# Patient Record
Sex: MALE | Race: WHITE | NOT HISPANIC OR LATINO | Employment: OTHER | URBAN - METROPOLITAN AREA
[De-identification: names, ages, dates, MRNs, and addresses within clinical notes are randomized per-mention and may not be internally consistent; named-entity substitution may affect disease eponyms.]

---

## 2017-03-15 ENCOUNTER — OFFICE VISIT (OUTPATIENT)
Dept: MEDICAL GROUP | Facility: PHYSICIAN GROUP | Age: 77
End: 2017-03-15
Payer: MEDICARE

## 2017-03-15 ENCOUNTER — HOSPITAL ENCOUNTER (OUTPATIENT)
Dept: LAB | Facility: MEDICAL CENTER | Age: 77
End: 2017-03-15
Attending: FAMILY MEDICINE
Payer: MEDICARE

## 2017-03-15 VITALS
DIASTOLIC BLOOD PRESSURE: 70 MMHG | OXYGEN SATURATION: 94 % | TEMPERATURE: 97.2 F | RESPIRATION RATE: 14 BRPM | BODY MASS INDEX: 29.63 KG/M2 | HEART RATE: 62 BPM | SYSTOLIC BLOOD PRESSURE: 136 MMHG | HEIGHT: 70 IN | WEIGHT: 207 LBS

## 2017-03-15 DIAGNOSIS — I25.9 ISCHEMIC HEART DISEASE: ICD-10-CM

## 2017-03-15 DIAGNOSIS — N40.2 NODULAR PROSTATE WITHOUT URINARY OBSTRUCTION: ICD-10-CM

## 2017-03-15 DIAGNOSIS — Z12.5 PROSTATE CANCER SCREENING: ICD-10-CM

## 2017-03-15 DIAGNOSIS — Z23 NEED FOR SHINGLES VACCINE: ICD-10-CM

## 2017-03-15 DIAGNOSIS — F41.9 ANXIETY: ICD-10-CM

## 2017-03-15 DIAGNOSIS — E55.9 VITAMIN D DEFICIENCY DISEASE: ICD-10-CM

## 2017-03-15 DIAGNOSIS — E78.2 MIXED HYPERLIPIDEMIA: ICD-10-CM

## 2017-03-15 DIAGNOSIS — R06.09 DYSPNEA ON EXERTION: ICD-10-CM

## 2017-03-15 LAB
25(OH)D3 SERPL-MCNC: 33 NG/ML (ref 30–100)
ALBUMIN SERPL BCP-MCNC: 4.3 G/DL (ref 3.2–4.9)
ALBUMIN/GLOB SERPL: 1.7 G/DL
ALP SERPL-CCNC: 63 U/L (ref 30–99)
ALT SERPL-CCNC: 33 U/L (ref 2–50)
ANION GAP SERPL CALC-SCNC: 9 MMOL/L (ref 0–11.9)
APPEARANCE UR: CLEAR
AST SERPL-CCNC: 25 U/L (ref 12–45)
BASOPHILS # BLD AUTO: 0.07 K/UL (ref 0–0.12)
BASOPHILS NFR BLD AUTO: 0.9 % (ref 0–1.8)
BILIRUB SERPL-MCNC: 0.7 MG/DL (ref 0.1–1.5)
BILIRUB UR QL STRIP.AUTO: NEGATIVE
BUN SERPL-MCNC: 14 MG/DL (ref 8–22)
CALCIUM SERPL-MCNC: 9.5 MG/DL (ref 8.5–10.5)
CHLORIDE SERPL-SCNC: 108 MMOL/L (ref 96–112)
CHOLEST SERPL-MCNC: 122 MG/DL (ref 100–199)
CO2 SERPL-SCNC: 23 MMOL/L (ref 20–33)
COLOR UR AUTO: ABNORMAL
CREAT SERPL-MCNC: 0.87 MG/DL (ref 0.5–1.4)
CULTURE IF INDICATED INDCX: NO UA CULTURE
EOSINOPHIL # BLD: 0.17 K/UL (ref 0–0.51)
EOSINOPHIL NFR BLD AUTO: 2.1 % (ref 0–6.9)
ERYTHROCYTE [DISTWIDTH] IN BLOOD BY AUTOMATED COUNT: 43.2 FL (ref 35.9–50)
GLOBULIN SER CALC-MCNC: 2.6 G/DL (ref 1.9–3.5)
GLUCOSE SERPL-MCNC: 193 MG/DL (ref 65–99)
GLUCOSE UR STRIP.AUTO-MCNC: ABNORMAL MG/DL
HCT VFR BLD AUTO: 45.5 % (ref 42–52)
HDLC SERPL-MCNC: 39 MG/DL
HGB BLD-MCNC: 14.8 G/DL (ref 14–18)
IMM GRANULOCYTES # BLD AUTO: 0.04 K/UL (ref 0–0.11)
IMM GRANULOCYTES NFR BLD AUTO: 0.5 % (ref 0–0.9)
KETONES UR STRIP.AUTO-MCNC: NEGATIVE MG/DL
LDLC SERPL CALC-MCNC: 60 MG/DL
LEUKOCYTE ESTERASE UR QL STRIP.AUTO: NEGATIVE
LYMPHOCYTES # BLD: 1.81 K/UL (ref 1–4.8)
LYMPHOCYTES NFR BLD AUTO: 22.3 % (ref 22–41)
MCH RBC QN AUTO: 30.1 PG (ref 27–33)
MCHC RBC AUTO-ENTMCNC: 32.5 G/DL (ref 33.7–35.3)
MCV RBC AUTO: 92.5 FL (ref 81.4–97.8)
MICRO URNS: ABNORMAL
MONOCYTES # BLD: 0.7 K/UL (ref 0–0.85)
MONOCYTES NFR BLD AUTO: 8.6 % (ref 0–13.4)
NEUTROPHILS # BLD: 5.31 K/UL (ref 1.82–7.42)
NEUTROPHILS NFR BLD AUTO: 65.6 % (ref 44–72)
NITRITE UR QL STRIP.AUTO: NEGATIVE
NRBC # BLD AUTO: 0 K/UL
NRBC BLD-RTO: 0 /100 WBC
PH UR: 5.5 [PH]
PLATELET # BLD AUTO: 251 K/UL (ref 164–446)
PMV BLD AUTO: 10.2 FL (ref 9–12.9)
POTASSIUM SERPL-SCNC: 4.2 MMOL/L (ref 3.6–5.5)
PROT SERPL-MCNC: 6.9 G/DL (ref 6–8.2)
PROT UR QL STRIP: NEGATIVE MG/DL
PSA SERPL DL<=0.01 NG/ML-MCNC: 4.16 NG/ML (ref 0–4)
RBC # BLD AUTO: 4.92 M/UL (ref 4.7–6.1)
RBC UR QL AUTO: NEGATIVE
SODIUM SERPL-SCNC: 140 MMOL/L (ref 135–145)
SP GR UR STRIP.AUTO: 1.02
TRIGL SERPL-MCNC: 113 MG/DL (ref 0–149)
WBC # BLD AUTO: 8.1 K/UL (ref 4.8–10.8)

## 2017-03-15 PROCEDURE — 85025 COMPLETE CBC W/AUTO DIFF WBC: CPT

## 2017-03-15 PROCEDURE — 84153 ASSAY OF PSA TOTAL: CPT | Mod: GA

## 2017-03-15 PROCEDURE — 80061 LIPID PANEL: CPT

## 2017-03-15 PROCEDURE — G8482 FLU IMMUNIZE ORDER/ADMIN: HCPCS | Performed by: FAMILY MEDICINE

## 2017-03-15 PROCEDURE — 81003 URINALYSIS AUTO W/O SCOPE: CPT

## 2017-03-15 PROCEDURE — 82306 VITAMIN D 25 HYDROXY: CPT

## 2017-03-15 PROCEDURE — 36415 COLL VENOUS BLD VENIPUNCTURE: CPT

## 2017-03-15 PROCEDURE — G8420 CALC BMI NORM PARAMETERS: HCPCS | Performed by: FAMILY MEDICINE

## 2017-03-15 PROCEDURE — 4040F PNEUMOC VAC/ADMIN/RCVD: CPT | Performed by: FAMILY MEDICINE

## 2017-03-15 PROCEDURE — 1036F TOBACCO NON-USER: CPT | Performed by: FAMILY MEDICINE

## 2017-03-15 PROCEDURE — 1101F PT FALLS ASSESS-DOCD LE1/YR: CPT | Performed by: FAMILY MEDICINE

## 2017-03-15 PROCEDURE — 80053 COMPREHEN METABOLIC PANEL: CPT

## 2017-03-15 PROCEDURE — G8599 NO ASA/ANTIPLAT THER USE RNG: HCPCS | Performed by: FAMILY MEDICINE

## 2017-03-15 PROCEDURE — G8432 DEP SCR NOT DOC, RNG: HCPCS | Performed by: FAMILY MEDICINE

## 2017-03-15 PROCEDURE — 99214 OFFICE O/P EST MOD 30 MIN: CPT | Performed by: FAMILY MEDICINE

## 2017-03-15 RX ORDER — ATORVASTATIN CALCIUM 20 MG/1
20 TABLET, FILM COATED ORAL
Qty: 90 TAB | Refills: 3 | Status: ON HOLD | OUTPATIENT
Start: 2017-03-15 | End: 2017-07-10

## 2017-03-15 ASSESSMENT — PATIENT HEALTH QUESTIONNAIRE - PHQ9: CLINICAL INTERPRETATION OF PHQ2 SCORE: 0

## 2017-03-15 NOTE — MR AVS SNAPSHOT
"Raf Pelayo   3/15/2017 8:00 AM   Office Visit   MRN: [id number]    Department:  [facility]   Dept Phone:  [phone]    Description:  Male : 1940   Provider:  Brandon Velázquez M.D.           Reason for Visit     Annual Exam annual wellness visit       Allergies as of 3/15/2017     No Known Allergies      You were diagnosed with     Ischemic heart disease   [262964]       Mixed hyperlipidemia   [272.2.ICD-9-CM]       Anxiety   [307883]       Nodular prostate without urinary obstruction   [600.10.ICD-9-CM]       Vitamin D deficiency disease   [422637]       Prostate cancer screening   [599225]       Dyspnea on exertion   [442540]         Vital Signs     Blood Pressure Pulse Temperature Respirations Height Weight    136/70 mmHg 62 36.2 °C (97.2 °F) 14 1.778 m (5' 10\") 93.895 kg (207 lb)    Body Mass Index Oxygen Saturation Smoking Status             29.70 kg/m2 94% Never Smoker          Basic Information     Date Of Birth Sex Race Ethnicity Preferred Language    1940 Male Unknown Non- English      Your appointments     2017  2:20 PM   Established Patient with Brandon Velázquez M.D.   [facility] [place] [facility]    [address] [id number] [phone]           You will be receiving a confirmation call a few days before your appointment from our automated call confirmation system.   Please bring to your appointment; a photo ID, copies of your insurance card, all medication bottles and any co-pay that you are responsible for.  Please allow 45-60 minutes to complete your scheduled appointment.            Aug 16, 2017  8:20 AM   Established Patient with Brandon Velázquez M.D.   [facility] [place] [facility])    [address] [place] [id number] [phone]           You will be receiving a confirmation call a few days before your appointment from our automated call confirmation system.              Problem List       "       ICD-10-CM Priority Class Noted - Resolved    Anxiety F41.9   7/27/2009 - Present    Mixed hyperlipidemia E78.2   7/27/2009 - Present    Nodular prostate without urinary obstruction N40.2   7/27/2009 - Present    Counseling for marital and partner problems Z63.0   8/31/2009 - Present    Vitamin D deficiency disease E55.9   12/13/2011 - Present    Ischemic heart disease I25.9   8/17/2012 - Present    History of colonic polyps Z86.010   12/29/2014 - Present    Arthritis M19.90   4/14/2016 - Present      Health Maintenance        Date Due Completion Dates    IMM ZOSTER VACCINE 8/17/2000 ---    COLON CANCER SCREENING ANNUAL FIT 4/18/2017 4/18/2016, 2/26/2013    COLONOSCOPY 12/24/2019 12/24/2014    IMM DTaP/Tdap/Td Vaccine (2 - Td) 12/9/2021 12/9/2011            Current Immunizations     13-VALENT PCV PREVNAR 2/18/2016    Influenza TIV (IM) 10/13/2011    Influenza Vaccine Pediatric 10/25/2009    Influenza Vaccine Quad Inj (Preserved) 9/20/2016, 9/29/2015    Pneumococcal polysaccharide vaccine (PPSV-23) 12/9/2011    Tdap Vaccine 12/9/2011      Below and/or attached are the medications your provider expects you to take. Review all of your home medications and newly ordered medications with your provider and/or pharmacist. Follow medication instructions as directed by your provider and/or pharmacist. Please keep your medication list with you and share with your provider. Update the information when medications are discontinued, doses are changed, or new medications (including over-the-counter products) are added; and carry medication information at all times in the event of emergency situations     Allergies:  No Known Allergies          Medications  Valid as of: March 15, 2017 -  9:07 AM    Generic Name Brand Name Tablet Size Instructions for use    Atorvastatin Calcium (Tab) LIPITOR 20 MG Take 1 Tab by mouth every bedtime.        Cholecalciferol (Tab) vitamin D3 (cholecalciferol) 1000 UNIT Take 1 Tab by mouth every  day.        Etodolac (Tab) LODINE 400 MG Take 1 Tab by mouth 2 times a day. Take with food        Glucosamine-Chondroit-Vit C-Mn (Cap) GLUCOSAMINE CHONDR 1500 COMPLX  Take 1 Cap by mouth every day.        Ketoconazole (Cream) NIZORAL 2 % Apply 1 Squirt to affected area(s) 2 times a day.        NON SPECIFIED   Apply blue emu oil to painful shoulders        Triamcinolone Acetonide (Cream) KENALOG 0.1 % Apply 1 Squirt to affected area(s) 2 times a day.        .                 Medicines prescribed today were sent to:     [id number] - [place], [place] - [address]    [address] [facility] [address]    Phone: [phone] Fax: [phone]    Open 24 Hours?: No    [facility] - [place] [address]    Phone: [phone] Fax: [phone]    Open 24 Hours?: No    [facility] - [place] [address]    Phone: [phone] Fax: [phone]    Open 24 Hours?: No      Medication refill instructions:       If your prescription bottle indicates you have medication refills left, it is not necessary to call your provider’s office. Please contact your pharmacy and they will refill your medication.    If your prescription bottle indicates you do not have any refills left, you may request refills at any time through one of the following ways: The online [employer] system (except Urgent Care), by calling your provider’s office, or by asking your pharmacy to contact your provider’s office with a refill request. Medication refills are processed only during regular business hours and may not be available until the next business day. Your provider may request additional information or to have a follow-up visit with you prior to refilling your medication.   *Please Note: Medication refills are assigned a new Rx number when refilled electronically. Your pharmacy may indicate that  no refills were authorized even though a new prescription for the same medication is available at the pharmacy. Please request the medicine by name with the pharmacy before contacting your provider for a refill.        Your To Do List     Future Labs/Procedures Complete By Expires    CBC WITH DIFFERENTIAL  As directed 3/16/2018    COMP METABOLIC PANEL  As directed 3/16/2018    LIPID PROFILE  As directed 3/16/2018    PROSTATE SPECIFIC AG SCREENING  As directed 3/16/2018    URINALYSIS,CULTURE IF INDICATED  As directed 3/16/2018      Referral     A referral request has been sent to our patient care coordination department. Please allow 3-5 business days for us to process this request and contact you either by phone or mail. If you do not hear from us by the 5th business day, please call us at [phone].           [employer] Access Code: Activation code not generated  Current [employer] Status: Active

## 2017-03-15 NOTE — PATIENT INSTRUCTIONS
Patient given written instructions regarding labs, medications, referrals, dietary and lifestyle management, and return visit.    Brandon Velázquez MD

## 2017-03-15 NOTE — PROGRESS NOTES
Patient comes in with several issues. He is due for lab work. He complains of some dyspnea on exertion although he denies chest pains. He is a nonsmoker. I would like to have a cardiologist see him because of this dyspnea, however.  The patient is worried that by alfred is becoming very expensive for him. He wonders if there is anything else we can give him. He is on the 10/20 dose of Vicodin. I think we can switch him to 20 mg atorvastatin and still get good control of his lipids. His lipids of been well controlled in the past.  He has no change in bowels. He says his urine flow is stable. He does not want a prostate exam today but is willing to get a PSA.    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    • Chronic ischemic heart disease, unspecified    • Mixed hyperlipidemia    • Anxiety         Past Surgical History   Procedure Laterality Date   • Appendectomy         No Known Allergies    Current Outpatient Prescriptions   Medication Sig Dispense Refill   • atorvastatin (LIPITOR) 20 MG Tab Take 1 Tab by mouth every bedtime. 90 Tab 3   • Glucosamine-Chondroit-Vit C-Mn (GLUCOSAMINE CHONDR 1500 COMPLX) Cap Take 1 Cap by mouth every day. 100 Cap 3   • Cholecalciferol (VITAMIN D3) 1000 UNIT TABS Take 1 Tab by mouth every day. 100 Tab 3   • NON SPECIFIED Apply blue emu oil to painful shoulders 1 Bottle 3   • etodolac (LODINE) 400 MG tablet Take 1 Tab by mouth 2 times a day. Take with food 30 Tab 3   • triamcinolone acetonide (KENALOG) 0.1 % Cream Apply 1 Squirt to affected area(s) 2 times a day. 30 g 1   • ketoconazole (NIZORAL) 2 % Cream Apply 1 Squirt to affected area(s) 2 times a day. 30 g 2     No current facility-administered medications for this visit.        Family History   Problem Relation Age of Onset   • Stroke Mother    • Heart Disease Brother        Social History     Social History   • Marital Status:      Spouse Name: N/A   •  Number of Children: N/A   • Years of Education: N/A     Occupational History   • Not on file.     Social History Main Topics   • Smoking status: Never Smoker    • Smokeless tobacco: Never Used   • Alcohol Use: 1.2 oz/week     2 Shots of liquor per week      Comment: moderate    • Drug Use: No   • Sexual Activity:     Partners: Female     Birth Control/ Protection: Post-Menopausal     Other Topics Concern   • Not on file     Social History Narrative          Physical Exam   Constitutional: He is oriented. He appears well-developed and well-nourished. No distress.   HENT:   Head: Normocephalic and atraumatic.   Right Ear: External ear normal. Ear canal and TM normal   Left Ear: External ear normal. Ear canal and TM normal   Nose: Nose normal.   Mouth/Throat: Oropharynx is clear and moist.   Eyes: Conjunctivae and extraocular motions are normal. Pupils are equal, round, and reactive to light.        Fundi benign bilaterally   Neck: No thyromegaly present.   Cardiovascular: Normal rate, regular rhythm, normal heart sounds and intact distal pulses.  Exam reveals no gallop.    No murmur heard.  Pulmonary/Chest: Effort normal and breath sounds normal. No respiratory distress. He has no wheezes. He has no rales.   Abdominal: Soft. Bowel sounds are normal. No hepatosplenomegaly. He exhibits no distension. No tenderness. He has no rebound and no guarding.   Musculoskeletal: Normal range of motion. He exhibits no edema and no tenderness.   Lymphadenopathy:     He has no cervical adenopathy.  No supraclavicular adenopathy.   Neurological: He is alert and oriented. He has normal reflexes.        Babinskis downgoing bilaterally   Skin: Skin is warm and dry. No rash noted. No erythema.   Psychiatric: He has a normal mood and appropriate affect. His behavior is normal. Judgment and thought content normal.     1. Ischemic heart disease  atorvastatin (LIPITOR) 20 MG Tab--one by mouth daily at bedtime instead of Vytorin 10/10 area  he'll finish his current prescription of that and then switch over to the atorvastatin.     COMP METABOLIC PANEL    LIPID PROFILE    REFERRAL TO CARDIOLOGY--[facility]    2. Mixed hyperlipidemia  atorvastatin (LIPITOR) 20 MG Tab--as above     CBC WITH DIFFERENTIAL    COMP METABOLIC PANEL    LIPID PROFILE    VITAMIN D 25-HYDROXY    REFERRAL TO CARDIOLOGY--[facility]    3. Anxiety   stable    4. Nodular prostate without urinary obstruction  URINALYSIS,CULTURE IF INDICATED   5. Vitamin D deficiency disease  VITAMIN D 25-HYDROXY   6. Prostate cancer screening  PROSTATE SPECIFIC AG SCREENING   7. Dyspnea on exertion  atorvastatin (LIPITOR) 20 MG Tab    REFERRAL TO CARDIOLOGY--[facility]      Recheck with me 5 months or when necessary    Please note that this dictation was created using voice recognition software. I have worked with consultants from the vendor as well as technical experts from [employer] to optimize the interface. I have made every reasonable attempt to correct obvious errors, but I expect that there are errors of grammar and possibly content that I did not discover before finalizing the note.

## 2017-03-16 DIAGNOSIS — R97.20 ELEVATED PSA: ICD-10-CM

## 2017-03-16 DIAGNOSIS — R73.9 HYPERGLYCEMIA: ICD-10-CM

## 2017-03-16 RX ORDER — CIPROFLOXACIN 500 MG/1
500 TABLET, FILM COATED ORAL 2 TIMES DAILY
Qty: 28 TAB | Refills: 0 | Status: SHIPPED | OUTPATIENT
Start: 2017-03-16 | End: 2017-06-29

## 2017-04-17 ENCOUNTER — OFFICE VISIT (OUTPATIENT)
Dept: MEDICAL GROUP | Facility: PHYSICIAN GROUP | Age: 77
End: 2017-04-17
Payer: MEDICARE

## 2017-04-17 VITALS
TEMPERATURE: 97 F | BODY MASS INDEX: 29.49 KG/M2 | RESPIRATION RATE: 14 BRPM | WEIGHT: 206 LBS | HEIGHT: 70 IN | DIASTOLIC BLOOD PRESSURE: 70 MMHG | OXYGEN SATURATION: 92 % | HEART RATE: 71 BPM | SYSTOLIC BLOOD PRESSURE: 140 MMHG

## 2017-04-17 DIAGNOSIS — R97.20 ELEVATED PSA: ICD-10-CM

## 2017-04-17 DIAGNOSIS — E11.9 DIABETES MELLITUS WITHOUT COMPLICATION (HCC): ICD-10-CM

## 2017-04-17 DIAGNOSIS — Z12.5 PROSTATE CANCER SCREENING: ICD-10-CM

## 2017-04-17 DIAGNOSIS — N40.2 NODULAR PROSTATE WITHOUT URINARY OBSTRUCTION: ICD-10-CM

## 2017-04-17 PROCEDURE — G8432 DEP SCR NOT DOC, RNG: HCPCS | Performed by: FAMILY MEDICINE

## 2017-04-17 PROCEDURE — 4040F PNEUMOC VAC/ADMIN/RCVD: CPT | Performed by: FAMILY MEDICINE

## 2017-04-17 PROCEDURE — 1036F TOBACCO NON-USER: CPT | Performed by: FAMILY MEDICINE

## 2017-04-17 PROCEDURE — 99214 OFFICE O/P EST MOD 30 MIN: CPT | Performed by: FAMILY MEDICINE

## 2017-04-17 PROCEDURE — G8599 NO ASA/ANTIPLAT THER USE RNG: HCPCS | Performed by: FAMILY MEDICINE

## 2017-04-17 PROCEDURE — G8417 CALC BMI ABV UP PARAM F/U: HCPCS | Performed by: FAMILY MEDICINE

## 2017-04-17 PROCEDURE — 1101F PT FALLS ASSESS-DOCD LE1/YR: CPT | Performed by: FAMILY MEDICINE

## 2017-04-17 NOTE — PROGRESS NOTES
Patient comes in to follow-up on his early diabetes. He's cut out sweets and sugar from his diet. He feels better. He is fasting today.  The patient also has being treated for an elevated PSA of 4.16. I gave him Cipro 500 mg by mouth twice a day. He is due for a follow-up PSA and I want to examine his prostate today.  The patient has no chest pains or shortness of breath.    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    • Chronic ischemic heart disease, unspecified    • Mixed hyperlipidemia    • Anxiety         Past Surgical History   Procedure Laterality Date   • Appendectomy         No Known Allergies    Current Outpatient Prescriptions   Medication Sig Dispense Refill   • Blood Glucose Monitoring Suppl Device Meter: Dispense Device of Insurance Preference. Sig. Use daily for blood sugar monitoring. #1. E11.9 1 Device 0   • glucose blood strip Disp blood glucose test strips to go with selected blood glucose meter--test daily E11.9 90 Strip 10   • ciprofloxacin (CIPRO) 500 MG Tab Take 1 Tab by mouth 2 times a day. 28 Tab 0   • metformin (GLUCOPHAGE) 500 MG Tab Take 1 Tab by mouth 2 times a day, with meals. 60 Tab 3   • atorvastatin (LIPITOR) 20 MG Tab Take 1 Tab by mouth every bedtime. 90 Tab 3   • Glucosamine-Chondroit-Vit C-Mn (GLUCOSAMINE CHONDR 1500 COMPLX) Cap Take 1 Cap by mouth every day. 100 Cap 3   • Cholecalciferol (VITAMIN D3) 1000 UNIT TABS Take 1 Tab by mouth every day. 100 Tab 3   • NON SPECIFIED Apply blue emu oil to painful shoulders 1 Bottle 3   • etodolac (LODINE) 400 MG tablet Take 1 Tab by mouth 2 times a day. Take with food 30 Tab 3   • triamcinolone acetonide (KENALOG) 0.1 % Cream Apply 1 Squirt to affected area(s) 2 times a day. 30 g 1   • ketoconazole (NIZORAL) 2 % Cream Apply 1 Squirt to affected area(s) 2 times a day. 30 g 2     No current facility-administered medications for this visit.        Family History    Problem Relation Age of Onset   • Stroke Mother    • Heart Disease Brother        Social History     Social History   • Marital Status:      Spouse Name: N/A   • Number of Children: N/A   • Years of Education: N/A     Occupational History   • Not on file.     Social History Main Topics   • Smoking status: Never Smoker    • Smokeless tobacco: Never Used   • Alcohol Use: 1.2 oz/week     2 Shots of liquor per week      Comment: moderate    • Drug Use: No   • Sexual Activity:     Partners: Female     Birth Control/ Protection: Post-Menopausal     Other Topics Concern   • Not on file     Social History Narrative            Physical Exam   Nursing note and vitals reviewed.  Constitutional: He is oriented. He appears well-developed and well-nourished. He appears not diaphoretic. No distress.   Head: Normocephalic and atraumatic.   Right Ear: External ear normal. Ear canal and TM normal  Left Ear: External ear normal. Ear canal and TM normal  Nose: Nose normal.   Mouth/Throat: Oropharynx is clear and moist. No oropharyngeal exudate.   Eyes: Conjunctivae and extraocular motions are normal. Pupils are equal, round, and reactive to light. Right eye exhibits no discharge. Left eye exhibits no discharge. No scleral icterus. Fundi benign bilaterally   Neck: Normal range of motion. Neck supple. No JVD present. No tracheal deviation present. No thyromegaly present.   Cardiovascular: Normal rate, regular rhythm, normal heart sounds and intact distal pulses.  Exam reveals no gallop and no friction rub.    No murmur heard.  Pulmonary/Chest: Effort normal and breath sounds normal. No stridor. No respiratory distress. He has no wheezes. He has no rales. He exhibits no tenderness.   Abdominal: Soft. Bowel sounds are normal. He exhibits no distension and no mass. No tenderness. No hepatosplenomegaly. He has no rebound and no guarding. Hernia confirmed negative in the right inguinal area and confirmed negative in the left inguinal  area.   Genitourinary: Penis normal. Rectal exam shows no external hemorrhoid, no internal hemorrhoid, no fissure, no mass, no tenderness and anal tone normal. Guaiac negative stool. Prostate is 1+ enlarged and not tender. There are no prostate masses. Right testis shows no mass, no swelling, no tenderness. Right testis is descended. Left testis shows no mass, no swelling, no tenderness. Left testis is descended. Circumcised. No phimosis, penile erythema or penile tenderness. No discharge found.   Musculoskeletal: Normal range of motion. He exhibits no edema and no tenderness.   Lymphadenopathy:     He has no cervical adenopathy.   No supraclavicular adenopathy.       Right: No inguinal adenopathy present.        Left: No inguinal adenopathy present.   Neurological: He is alert and oriented. He displays normal reflexes. No cranial nerve deficit. He exhibits normal muscle tone. Coordination normal. The patient has intact sensation to monofilament light touch over both feet. No sores or ulcers are noted over the feet.    Skin: Skin is warm and dry. No rash noted. He is not diaphoretic. No erythema. No pallor.   Psychiatric: He has a normal mood and appropriate affect. His behavior is normal. Judgment and thought content normal.     1. Nodular prostate without urinary obstruction  PROSTATE SPECIFIC AG SCREENING   2. Elevated PSA  PROSTATE SPECIFIC AG SCREENING   3. Prostate cancer screening  PROSTATE SPECIFIC AG SCREENING   4. Diabetes mellitus without complication (CMS-HCC) --needs reassessment  COMP METABOLIC PANEL    LIPID PROFILE    MICROALBUMIN CREAT RATIO URINE    HEMOGLOBIN A1C    Blood Glucose Monitoring Suppl Device--he's going to do fasting and 5 PM blood sugars     glucose blood strip    Diabetic Monofilament LE Exam     Recheck with me 2 months or when necessary    Please note that this dictation was created using voice recognition software. I have worked with consultants from the vendor as well as  technical experts from [facility] to optimize the interface. I have made every reasonable attempt to correct obvious errors, but I expect that there are errors of grammar and possibly content that I did not discover before finalizing the note.

## 2017-04-17 NOTE — PATIENT INSTRUCTIONS
Patient given written instructions regarding labs, imaging, medications, referrals, dietary and lifestyle management, and return visit.    Brandon Velázquez MD

## 2017-04-17 NOTE — MR AVS SNAPSHOT
"Raf Pelayo   2017 8:00 AM   Office Visit   MRN: [id number]    Department:  [facility]   Dept Phone:  [phone]    Description:  Male : 1940   Provider:  Brandon Velázquez M.D.           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   [600.10.ICD-9-CM]       Elevated PSA   [699623]       Prostate cancer screening   [383569]       Diabetes mellitus without complication (CMS-HCC)   [571577]         Vital Signs     Blood Pressure Pulse Temperature Respirations Height Weight    140/70 mmHg 71 36.1 °C (97 °F) 14 1.778 m (5' 10\") 93.441 kg (206 lb)    Body Mass Index Oxygen Saturation Smoking Status             29.56 kg/m2 92% Never Smoker          Basic Information     Date Of Birth Sex Race Ethnicity Preferred Language    1940 Male Unknown Non- English      Your appointments     May 10, 2017  8:00 AM   [facility] PAT[facility] Nino [facility]    [address]  [address]  [facility] [id number] [phone]            2017  8:20 AM   Established Patient with Brandon Velázquez M.D.   [facility])    [address] [facility] [address] [phone]           You will be receiving a confirmation call a few days before your appointment from our automated call confirmation system.   Please bring to your appointment; a photo ID, copies of your insurance card, all medication bottles and any co-pay that you are responsible for.  Please allow 45-60 minutes to complete your scheduled appointment.            Aug 16, 2017  8:20 AM   Established Patient with Brandon Velázquez M.D.   [facility] ([place])    [address] [facility] [address] [phone]           You will be receiving a confirmation call a few days " before your appointment from our automated call confirmation system.              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 N40.2   7/27/2009 - Present    Counseling for marital and partner problems Z63.0   8/31/2009 - Present    Vitamin D deficiency disease E55.9   12/13/2011 - Present    Ischemic heart disease I25.9   8/17/2012 - Present    History of colonic polyps Z86.010   12/29/2014 - Present    Arthritis M19.90   4/14/2016 - Present      Health Maintenance        Date Due Completion Dates    IMM ZOSTER VACCINE 8/17/2000 ---    COLON CANCER SCREENING ANNUAL FIT 4/18/2017 4/18/2016, 2/26/2013    COLONOSCOPY 12/24/2019 12/24/2014    IMM DTaP/Tdap/Td Vaccine (2 - Td) 12/9/2021 12/9/2011            Current Immunizations     13-VALENT PCV PREVNAR 2/18/2016    Influenza TIV (IM) 10/13/2011    Influenza Vaccine Pediatric 10/25/2009    Influenza Vaccine Quad Inj (Preserved) 9/20/2016, 9/29/2015    Pneumococcal polysaccharide vaccine (PPSV-23) 12/9/2011    Tdap Vaccine 12/9/2011      Below and/or attached are the medications your provider expects you to take. Review all of your home medications and newly ordered medications with your provider and/or pharmacist. Follow medication instructions as directed by your provider and/or pharmacist. Please keep your medication list with you and share with your provider. Update the information when medications are discontinued, doses are changed, or new medications (including over-the-counter products) are added; and carry medication information at all times in the event of emergency situations     Allergies:  No Known Allergies          Medications  Valid as of: April 17, 2017 - 11:41 AM    Generic Name Brand Name Tablet Size Instructions for use    Atorvastatin Calcium (Tab) LIPITOR 20 MG Take 1 Tab by mouth every  bedtime.        Blood Glucose Monitoring Suppl (Device) Blood Glucose Monitoring Suppl  Meter: Dispense Device of Insurance Preference. Sig. Use daily for blood sugar monitoring. #1. E11.9        Cholecalciferol (Tab) vitamin D3 (cholecalciferol) 1000 UNIT Take 1 Tab by mouth every day.        Ciprofloxacin HCl (Tab) CIPRO 500 MG Take 1 Tab by mouth 2 times a day.        Etodolac (Tab) LODINE 400 MG Take 1 Tab by mouth 2 times a day. Take with food        Glucosamine-Chondroit-Vit C-Mn (Cap) GLUCOSAMINE CHONDR 1500 COMPLX  Take 1 Cap by mouth every day.        Glucose Blood (Strip) glucose blood  Disp blood glucose test strips to go with selected blood glucose meter--test daily E11.9        Ketoconazole (Cream) NIZORAL 2 % Apply 1 Squirt to affected area(s) 2 times a day.        MetFORMIN HCl (Tab) GLUCOPHAGE 500 MG Take 1 Tab by mouth 2 times a day, with meals.        NON SPECIFIED   Apply blue emu oil to painful shoulders        Triamcinolone Acetonide (Cream) KENALOG 0.1 % Apply 1 Squirt to affected area(s) 2 times a day.        .                 Medicines prescribed today were sent to:     [facility][id number] - [place] - [address]    [address] [facility] [address]    Phone: [phone] Fax: [phone]    Open 24 Hours?: No    [facility] - [place] [address]    Phone: [phone] Fax: [phone]    Open 24 Hours?: No    [facility] - [place] [address]    Phone: [phone] Fax: [phone]    Open 24 Hours?: No    [id number] - [place] - [address] [address]    [address] [facility] [address]    Phone: [phone] Fax: [phone]    Open 24 Hours?: No      Medication refill instructions:       If your prescription bottle indicates you have medication refills left, it is not necessary to  call your provider’s office. Please contact your pharmacy and they will refill your medication.    If your prescription bottle indicates you do not have any refills left, you may request refills at any time through one of the following ways: The online prollie system (except Urgent Care), by calling your provider’s office, or by asking your pharmacy to contact your provider’s office with a refill request. Medication refills are processed only during regular business hours and may not be available until the next business day. Your provider may request additional information or to have a follow-up visit with you prior to refilling your medication.   *Please Note: Medication refills are assigned a new Rx number when refilled electronically. Your pharmacy may indicate that no refills were authorized even though a new prescription for the same medication is available at the pharmacy. Please request the medicine by name with the pharmacy before contacting your provider for a refill.        Your To Do List     Future Labs/Procedures Complete By Expires    COMP METABOLIC PANEL  As directed 4/18/2018    HEMOGLOBIN A1C  As directed 4/18/2018    LIPID PROFILE  As directed 4/18/2018    MICROALBUMIN CREAT RATIO URINE  As directed 4/18/2018    PROSTATE SPECIFIC AG SCREENING  As directed 4/18/2018         prollie Access Code: Activation code not generated  Current prollie Status: Active

## 2017-04-18 ENCOUNTER — HOSPITAL ENCOUNTER (OUTPATIENT)
Dept: LAB | Facility: MEDICAL CENTER | Age: 77
End: 2017-04-18
Attending: FAMILY MEDICINE
Payer: MEDICARE

## 2017-04-18 DIAGNOSIS — E11.9 DIABETES MELLITUS WITHOUT COMPLICATION (HCC): ICD-10-CM

## 2017-04-18 DIAGNOSIS — R97.20 ELEVATED PSA: ICD-10-CM

## 2017-04-18 DIAGNOSIS — Z12.5 PROSTATE CANCER SCREENING: ICD-10-CM

## 2017-04-18 DIAGNOSIS — N40.2 NODULAR PROSTATE WITHOUT URINARY OBSTRUCTION: ICD-10-CM

## 2017-04-18 LAB
ALBUMIN SERPL BCP-MCNC: 4.3 G/DL (ref 3.2–4.9)
ALBUMIN/GLOB SERPL: 1.7 G/DL
ALP SERPL-CCNC: 58 U/L (ref 30–99)
ALT SERPL-CCNC: 24 U/L (ref 2–50)
ANION GAP SERPL CALC-SCNC: 9 MMOL/L (ref 0–11.9)
AST SERPL-CCNC: 20 U/L (ref 12–45)
BILIRUB SERPL-MCNC: 0.8 MG/DL (ref 0.1–1.5)
BUN SERPL-MCNC: 14 MG/DL (ref 8–22)
CALCIUM SERPL-MCNC: 9.3 MG/DL (ref 8.5–10.5)
CHLORIDE SERPL-SCNC: 107 MMOL/L (ref 96–112)
CHOLEST SERPL-MCNC: 115 MG/DL (ref 100–199)
CO2 SERPL-SCNC: 24 MMOL/L (ref 20–33)
CREAT SERPL-MCNC: 0.79 MG/DL (ref 0.5–1.4)
CREAT UR-MCNC: 137.9 MG/DL
GFR SERPL CREATININE-BSD FRML MDRD: >60 ML/MIN/1.73 M 2
GLOBULIN SER CALC-MCNC: 2.6 G/DL (ref 1.9–3.5)
GLUCOSE SERPL-MCNC: 134 MG/DL (ref 65–99)
HDLC SERPL-MCNC: 35 MG/DL
LDLC SERPL CALC-MCNC: 61 MG/DL
MICROALBUMIN UR-MCNC: 1.6 MG/DL
MICROALBUMIN/CREAT UR: 12 MG/G (ref 0–30)
POTASSIUM SERPL-SCNC: 4 MMOL/L (ref 3.6–5.5)
PROT SERPL-MCNC: 6.9 G/DL (ref 6–8.2)
PSA SERPL-MCNC: 4.54 NG/ML (ref 0–4)
SODIUM SERPL-SCNC: 140 MMOL/L (ref 135–145)
TRIGL SERPL-MCNC: 96 MG/DL (ref 0–149)

## 2017-04-18 PROCEDURE — 80053 COMPREHEN METABOLIC PANEL: CPT

## 2017-04-18 PROCEDURE — 82570 ASSAY OF URINE CREATININE: CPT

## 2017-04-18 PROCEDURE — 80061 LIPID PANEL: CPT

## 2017-04-18 PROCEDURE — 82043 UR ALBUMIN QUANTITATIVE: CPT

## 2017-04-18 PROCEDURE — 84153 ASSAY OF PSA TOTAL: CPT | Mod: GA

## 2017-04-18 PROCEDURE — 36415 COLL VENOUS BLD VENIPUNCTURE: CPT

## 2017-05-24 ENCOUNTER — OFFICE VISIT (OUTPATIENT)
Dept: CARDIOLOGY | Facility: MEDICAL CENTER | Age: 77
End: 2017-05-24
Payer: MEDICARE

## 2017-05-24 VITALS
OXYGEN SATURATION: 98 % | SYSTOLIC BLOOD PRESSURE: 120 MMHG | WEIGHT: 196 LBS | HEART RATE: 68 BPM | DIASTOLIC BLOOD PRESSURE: 60 MMHG | BODY MASS INDEX: 28.06 KG/M2 | HEIGHT: 70 IN

## 2017-05-24 DIAGNOSIS — R06.02 SOB (SHORTNESS OF BREATH): ICD-10-CM

## 2017-05-24 DIAGNOSIS — I25.9 ISCHEMIC HEART DISEASE: ICD-10-CM

## 2017-05-24 DIAGNOSIS — F41.9 ANXIETY: ICD-10-CM

## 2017-05-24 DIAGNOSIS — E78.2 MIXED HYPERLIPIDEMIA: ICD-10-CM

## 2017-05-24 DIAGNOSIS — E11.8 CONTROLLED TYPE 2 DIABETES MELLITUS WITH COMPLICATION, WITHOUT LONG-TERM CURRENT USE OF INSULIN (HCC): ICD-10-CM

## 2017-05-24 LAB — EKG IMPRESSION: NORMAL

## 2017-05-24 PROCEDURE — G8432 DEP SCR NOT DOC, RNG: HCPCS | Performed by: INTERNAL MEDICINE

## 2017-05-24 PROCEDURE — 4040F PNEUMOC VAC/ADMIN/RCVD: CPT | Performed by: INTERNAL MEDICINE

## 2017-05-24 PROCEDURE — 1036F TOBACCO NON-USER: CPT | Performed by: INTERNAL MEDICINE

## 2017-05-24 PROCEDURE — G8417 CALC BMI ABV UP PARAM F/U: HCPCS | Performed by: INTERNAL MEDICINE

## 2017-05-24 PROCEDURE — 99204 OFFICE O/P NEW MOD 45 MIN: CPT | Performed by: INTERNAL MEDICINE

## 2017-05-24 PROCEDURE — 93000 ELECTROCARDIOGRAM COMPLETE: CPT | Performed by: INTERNAL MEDICINE

## 2017-05-24 PROCEDURE — 1101F PT FALLS ASSESS-DOCD LE1/YR: CPT | Performed by: INTERNAL MEDICINE

## 2017-05-24 PROCEDURE — G8599 NO ASA/ANTIPLAT THER USE RNG: HCPCS | Performed by: INTERNAL MEDICINE

## 2017-05-24 NOTE — Clinical Note
[facility]   [address]   [address]  [place] [address]  Phone: [phone]  Fax: [phone]              Raf Pelayo  1940    Encounter Date: 5/24/2017    Brandon Velázquez M.D.    Thank you for the referral. I had the pleasure of seeing Raf Pelayo today in cardiology clinic. I've attached my visit note below. If you have any questions please feel free to give me a call anytime.      Kevin Nino MD, PhD, formerly [facility]  Cardiology and Lipidology  [facility]                                                                    PROGRESS NOTE:  Cardiology Consult Note:    Kevin Nino  Date & Time note created:    5/24/2017   9:44 AM       Patient ID:  Name:             Raf Pelayo   YOB: 1940  Age:                 76 y.o.  male   MRN:               [id number]                                                             Chief Complaint: No chief complaint on file.            History of Present Illness:   Raf Pelayo has recent increased MESA no CP; h/o LHC about 10 yrs ago told CAD but no need for intervention at that time; DM II and HLD; FHx CVA    Review of Systems:     Constitutional: Denies fevers, Denies weight changes  Eyes: Denies changes in vision, no eye pain  Ears/Nose/Throat/Mouth: Denies nasal congestion or sore throat   Cardiovascular: Denies chest pain or palpitations   Respiratory: MESA;  shortness of breath , Denies cough  Gastrointestinal/Hepatic: Denies abdominal pain, nausea, vomiting, diarrhea, constipation or GI bleeding   Genitourinary: Denies bladder dysfunction, dysuria or frequency  Musculoskeletal/Rheum: Denies  joint pain and swelling   Skin/Breast: Denies rash, denies breast lumps or discharge  Neurological: Denies headache, confusion, memory loss or focal weakness/parasthesias  Psychiatric: denies mood  disorder   Endocrine: denies hx of diabetes or thyroid dysfunction  Heme/Oncology/Lymph Nodes: Denies enlarged lymph nodes, denies bruising or known bleeding disorder  Allergic/Immunologic: Denies hx of allergies      All other systems were reviewed and are negative (AMA/CMS criteria)    Past Medical History:   Past Medical History   Diagnosis Date   • Nodular 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 Sig. Use daily for blood sugar monitoring. #1. E11.9, Disp: 1 Device, Rfl: 0  •  glucose blood strip, Disp blood glucose test strips to go with selected blood glucose meter--test daily E11.9, Disp: 90 Strip, Rfl: 10  •  metformin (GLUCOPHAGE) 500 MG Tab, Take 1 Tab by mouth 2 times a day, with meals., Disp: 60 Tab, Rfl: 3  •  atorvastatin (LIPITOR) 20 MG Tab, Take 1 Tab by mouth every bedtime., Disp: 90 Tab, Rfl: 3  •  NON SPECIFIED, Apply blue emu oil to painful shoulders, Disp: 1 Bottle, Rfl: 3  •  etodolac (LODINE) 400 MG tablet, Take 1 Tab by mouth 2 times a day. Take with food, Disp: 30 Tab, Rfl: 3  •  Glucosamine-Chondroit-Vit C-Mn (GLUCOSAMINE CHONDR 1500 COMPLX) Cap, Take 1 Cap by mouth every day., Disp: 100 Cap, Rfl: 3  •  Cholecalciferol (VITAMIN D3) 1000 UNIT TABS, Take 1 Tab by mouth every day., Disp: 100 Tab, Rfl: 3  •  ciprofloxacin (CIPRO) 500 MG Tab, Take 1 Tab by mouth 2 times a day., Disp: 28 Tab, Rfl: 0  •  triamcinolone acetonide (KENALOG) 0.1 % Cream, Apply 1 Squirt to affected area(s) 2 times a day., Disp: 30 g, Rfl: 1  •  ketoconazole (NIZORAL) 2 % Cream, Apply 1 Squirt to affected area(s) 2 times a day., Disp: 30 g, Rfl: 2    Current Outpatient  Medications:  Current Outpatient Prescriptions   Medication Sig Dispense Refill   • Blood Glucose Monitoring Suppl Device Meter: Dispense Device of Insurance Preference. Sig. Use daily for blood sugar monitoring. #1. E11.9 1 Device 0   • glucose blood strip Disp blood glucose test strips to go with selected blood glucose meter--test daily E11.9 90 Strip 10   • metformin (GLUCOPHAGE) 500 MG Tab Take 1 Tab by mouth 2 times a day, with meals. 60 Tab 3   • atorvastatin (LIPITOR) 20 MG Tab Take 1 Tab by mouth every bedtime. 90 Tab 3   • NON SPECIFIED Apply blue emu oil to painful shoulders 1 Bottle 3   • etodolac (LODINE) 400 MG tablet Take 1 Tab by mouth 2 times a day. Take with food 30 Tab 3   • Glucosamine-Chondroit-Vit C-Mn (GLUCOSAMINE CHONDR 1500 COMPLX) Cap Take 1 Cap by mouth every day. 100 Cap 3   • Cholecalciferol (VITAMIN D3) 1000 UNIT TABS Take 1 Tab by mouth every day. 100 Tab 3   • ciprofloxacin (CIPRO) 500 MG Tab Take 1 Tab by mouth 2 times a day. 28 Tab 0   • triamcinolone acetonide (KENALOG) 0.1 % Cream Apply 1 Squirt to affected area(s) 2 times a day. 30 g 1   • ketoconazole (NIZORAL) 2 % Cream Apply 1 Squirt to affected area(s) 2 times a day. 30 g 2     No current facility-administered medications for this visit.         Medication Allergy/Sensitivities:  No Known Allergies    Family History:  Family History   Problem Relation Age of Onset   • Stroke Mother    • Heart Disease Brother        Social History:  Social History     Social History   • Marital Status:      Spouse Name: N/A   • Number of Children: N/A   • Years of Education: N/A     Occupational History   • Not on file.     Social History Main Topics   • Smoking status: Never Smoker    • Smokeless tobacco: Never Used   • Alcohol Use: 1.2 oz/week     2 Shots of liquor per week      Comment: moderate    • Drug Use: No   • Sexual Activity:     Partners: Female     Birth Control/ Protection: Post-Menopausal     Other Topics Concern   • Not  "on file     Social History Narrative         Physical Exam:  Vitals  Weight/BMI: Body mass index is 28.12 kg/(m^2).  Blood pressure 120/60, pulse 68, height 1.778 m (5' 10\"), weight 88.905 kg (196 lb), SpO2 98 %.  Filed Vitals:    05/24/17 0930   BP: 120/60   Pulse: 68   Height: 1.778 m (5' 10\")   Weight: 88.905 kg (196 lb)   SpO2: 98%     Oxygen Therapy:  Pulse Oximetry: 98 %  General Appearance:   Well developed, Well nourished, No acute distress, Non-toxic appearance.   HENT:  Normocephalic, Atraumatic, Oropharynx moist mucous membranes, Dentition: Mallampati 3 OP, Nose normal.    Eyes:  PERRLA, EOMI, Conjunctiva normal, No discharge.  Neck:  Normal range of motion, No cervical tenderness, Supple, No stridor, no JVD .  No thyromegaly.  No carotid bruit.  Cardiovascular:  Normal heart rate, Normal rhythm,  S1, S2, no S3,  S4; No gallops; No murmurs, No rubs, .   Extremitites with intact distal pulses, no cyanosis, clubbing or edema.  No heaves, thrills, HJR;  Peripheral pulses: carotid 2+, brachial 2+, radial 2+, ulnar 2+, femoral 2+, popliteal 2+, PT 2+, DP 2+;  Lungs:  Respiratory effort is normal. Normal breath sounds, breath sounds clear to auscultation bilaterally,  no rales, no rhonchi, no wheezing.   Abdomen: Bowel sounds normal, Soft, No tenderness, No guarding, No rebound, No masses, No hepatosplenomegaly.  Skin: Warm, Dry, No erythema, No rash, no induration or crepitus.  Neurologic: Alert & oriented x 3, Normal motor function, Normal sensory function, No focal deficits noted, cranial nerves II through XII are normal,  normal gait.  Psychiatric: Affect normal, Judgment normal, Mood normal.      Data Review:     Records reviewed and summarized in current documentation    Lab Data Review:  Lab Results   Component Value Date/Time    SODIUM 140 04/18/2017 08:42 AM    POTASSIUM 4.0 04/18/2017 08:42 AM    CHLORIDE 107 04/18/2017 08:42 AM    CO2 24 04/18/2017 08:42 AM    GLUCOSE 134* 04/18/2017 08:42 AM    BUN " 14 04/18/2017 08:42 AM    CREATININE 0.79 04/18/2017 08:42 AM      Lab Results   Component Value Date/Time    PT 14.2* 12/26/2006 10:34 AM    INR 1.13 12/26/2006 10:34 AM      Lab Results   Component Value Date/Time    WBC 8.1 03/15/2017 09:28 AM    RBC 4.92 03/15/2017 09:28 AM    HEMOGLOBIN 14.8 03/15/2017 09:28 AM    HEMATOCRIT 45.5 03/15/2017 09:28 AM    MCV 92.5 03/15/2017 09:28 AM    MCH 30.1 03/15/2017 09:28 AM    MCHC 32.5* 03/15/2017 09:28 AM    MPV 10.2 03/15/2017 09:28 AM    NEUTROPHILS-POLYS 65.60 03/15/2017 09:28 AM    LYMPHOCYTES 22.30 03/15/2017 09:28 AM    MONOCYTES 8.60 03/15/2017 09:28 AM    EOSINOPHILS 2.10 03/15/2017 09:28 AM    BASOPHILS 0.90 03/15/2017 09:28 AM    HYPOCHROMIA 1+ 02/19/2013 10:35 AM        Imaging/Procedures Review:    To my review shows:na    EKG To my review shows: SR 60 bpm and NSSTT abn QTc 408 msec    Assessment and Plan.   76 y.o. male has increased MESA and known CAD; Pls see orders for eval; Also discuss CV risk  Rec: continue DM control and Statin for HLD    1. SOB (shortness of breath)    - EKG  - ECHOCARDIOGRAM COMP W/O CONT; Future  - NM-CARDIAC STRESS TEST; Future    2. Ischemic heart disease    - ECHOCARDIOGRAM COMP W/O CONT; Future  - NM-CARDIAC STRESS TEST; Future    3. Mixed hyperlipidemia  Reviewed, on Statin    4. Controlled type 2 diabetes mellitus with complication, without long-term current use of insulin (CMS-[facility])  Per PCP    5. Anxiety  none      1. SOB (shortness of breath)  EKG    ECHOCARDIOGRAM COMP W/O CONT    NM-CARDIAC STRESS TEST   2. Ischemic heart disease  ECHOCARDIOGRAM COMP W/O CONT    NM-CARDIAC STRESS TEST   3. Mixed hyperlipidemia     4. Controlled type 2 diabetes mellitus with complication, without long-term current use of insulin (CMS-[facility])     5. Anxiety           Brandon Velázquez M.D.  [address] [facility]  [address]  [place] [address]  VIA In Basket

## 2017-05-24 NOTE — MR AVS SNAPSHOT
"        Raf Pelayo   2017 9:15 AM   Office Visit   MRN: [id number]    Department:  [facility]   Dept Phone:  [phone]    Description:  Male : 1940   Provider:  Kevin Nino MD,[facility]           Allergies as of 2017     No Known Allergies      You were diagnosed with     SOB (shortness of breath)   [706938]       Ischemic heart disease   [987480]       Mixed hyperlipidemia   [272.2.ICD-9-CM]       Controlled type 2 diabetes mellitus with complication, without long-term current use of insulin (CMS-[facility])   [6151365]       Anxiety   [264016]         Vital Signs     Blood Pressure Pulse Height Weight Body Mass Index Oxygen Saturation    120/60 mmHg 68 1.778 m (5' 10\") 88.905 kg (196 lb) 28.12 kg/m2 98%    Smoking Status                   Never Smoker            Basic Information     Date Of Birth Sex Race Ethnicity Preferred Language    1940 Male Unknown Non- English      Your appointments     2017  8:20 AM   Established Patient with Brandon Velázquez M.D.   [facility]    [address] [address]   [phone]           You will be receiving a confirmation call a few days before your appointment from our automated call confirmation system.   Please bring to your appointment; a photo ID, copies of your insurance card, all medication bottles and any co-pay that you are responsible for.  Please allow 45-60 minutes to complete your scheduled appointment.            2017  8:00 AM   NM CARDIAC STRESS TEST (30) with [facility] NM DSPECT 2   [facility] ([facility])    [address] [place] [address]   [phone]           NPO for 4 hours prior to scan.  No caffeine for 24 hours prior to test (decaf, coffee, cola, tea, chocolate, Excedrin, and Anacin).  No Viagra or other ED meds for 48 hours.  Warn patient of length of test and to bring list of meds.            2017  " 1:15 PM   ECHO with ECHO [facility], [facility] EXAM 12   ECHOCARDIOLOGY [facility] ([facility])    [address]  [place] [address]   [phone]           No prep            Aug 16, 2017  8:20 AM   Established Patient with Brandon Velázquez M.D.   [facility] [place] [facility]    [address]  [place] [address] [phone]           You will be receiving a confirmation call a few days before your appointment from our automated call confirmation system.              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 N40.2   7/27/2009 - Present    Counseling for marital and partner problems Z63.0   8/31/2009 - Present    Vitamin D deficiency disease E55.9   12/13/2011 - Present    Ischemic heart disease I25.9   8/17/2012 - Present    History of colonic polyps Z86.010   12/29/2014 - Present    Arthritis M19.90   4/14/2016 - Present      Health Maintenance        Date Due Completion Dates    RETINAL SCREENING 8/17/1958 ---    IMM ZOSTER VACCINE 8/17/2000 ---    COLON CANCER SCREENING ANNUAL FIT 4/18/2017 4/18/2016, 2/26/2013    A1C SCREENING 4/20/2017 10/20/2016    DIABETES MONOFILAMENT / LE EXAM 4/17/2018 4/17/2017    FASTING LIPID PROFILE 4/18/2018 4/18/2017, 3/15/2017, 10/20/2016, 1/23/2016, 1/22/2015, 2/19/2013, 8/17/2012, 12/9/2011, 9/23/2010, 3/18/2010, 8/19/2009, 10/27/2008, 5/3/2008, 11/6/2007, 3/23/2007, 12/18/2006, 11/9/2006    URINE ACR / MICROALBUMIN 4/18/2018 4/18/2017    SERUM CREATININE 4/18/2018 4/18/2017, 3/15/2017, 10/20/2016, 1/23/2016, 1/22/2015, 2/19/2013, 8/17/2012, 12/9/2011, 9/23/2010, 3/18/2010, 8/19/2009, 10/27/2008, 5/3/2008, 12/26/2006, 11/9/2006    COLONOSCOPY 12/24/2019 12/24/2014    IMM DTaP/Tdap/Td Vaccine (2 - Td) 12/9/2021 12/9/2011            Results       Current Immunizations     13-VALENT PCV PREVNAR 2/18/2016    Influenza TIV  (IM) 10/13/2011    Influenza Vaccine Pediatric 10/25/2009    Influenza Vaccine Quad Inj (Preserved) 9/20/2016, 9/29/2015    Pneumococcal polysaccharide vaccine (PPSV-23) 12/9/2011    Tdap Vaccine 12/9/2011      Below and/or attached are the medications your provider expects you to take. Review all of your home medications and newly ordered medications with your provider and/or pharmacist. Follow medication instructions as directed by your provider and/or pharmacist. Please keep your medication list with you and share with your provider. Update the information when medications are discontinued, doses are changed, or new medications (including over-the-counter products) are added; and carry medication information at all times in the event of emergency situations     Allergies:  No Known Allergies          Medications  Valid as of: May 24, 2017 -  9:59 AM    Generic Name Brand Name Tablet Size Instructions for use    Atorvastatin Calcium (Tab) LIPITOR 20 MG Take 1 Tab by mouth every bedtime.        Blood Glucose Monitoring Suppl (Device) Blood Glucose Monitoring Suppl  Meter: Dispense Device of Insurance Preference. Sig. Use daily for blood sugar monitoring. #1. E11.9        Cholecalciferol (Tab) vitamin D3 (cholecalciferol) 1000 UNIT Take 1 Tab by mouth every day.        Ciprofloxacin HCl (Tab) CIPRO 500 MG Take 1 Tab by mouth 2 times a day.        Etodolac (Tab) LODINE 400 MG Take 1 Tab by mouth 2 times a day. Take with food        Glucosamine-Chondroit-Vit C-Mn (Cap) GLUCOSAMINE CHONDR 1500 COMPLX  Take 1 Cap by mouth every day.        Glucose Blood (Strip) glucose blood  Disp blood glucose test strips to go with selected blood glucose meter--test daily E11.9        Ketoconazole (Cream) NIZORAL 2 % Apply 1 Squirt to affected area(s) 2 times a day.        MetFORMIN HCl (Tab) GLUCOPHAGE 500 MG Take 1 Tab by mouth 2 times a day, with meals.        NON SPECIFIED   Apply blue emu oil to painful shoulders         Triamcinolone Acetonide (Cream) KENALOG 0.1 % Apply 1 Squirt to affected area(s) 2 times a day.        .                 Medicines prescribed today were sent to:     [id number] - [place] - [address]    [address] [place] [address]    Phone: [phone] Fax: [phone]    Open 24 Hours?: No    [facility] - [place] [address]    Phone: [phone] Fax: [phone]    Open 24 Hours?: No    [facility] - [place] [address]    Phone: [phone] Fax: [phone]    Open 24 Hours?: No    [id number] - [place], [place] - [address] AT [facility]    [address] [facility] [place] [address]    Phone: [phone] Fax: [phone]    Open 24 Hours?: No      Medication refill instructions:       If your prescription bottle indicates you have medication refills left, it is not necessary to call your provider’s office. Please contact your pharmacy and they will refill your medication.    If your prescription bottle indicates you do not have any refills left, you may request refills at any time through one of the following ways: The online [employer] system (except Urgent Care), by calling your provider’s office, or by asking your pharmacy to contact your provider’s office with a refill request. Medication refills are processed only during regular business hours and may not be available until the next business day. Your provider may request additional information or to have a follow-up visit with you prior to refilling your medication.   *Please Note: Medication refills are assigned a new Rx number when refilled electronically. Your pharmacy may indicate that no refills were authorized even though a new prescription for the same medication is available at the pharmacy. Please request the medicine by name with the pharmacy  before contacting your provider for a refill.        Your To Do List     Future Labs/Procedures Complete By Expires    ECHOCARDIOGRAM COMP W/O CONT  As directed 5/24/2018    NM-CARDIAC STRESS TEST  As directed 11/21/2017         [facility] Access Code: Activation code not generated  Current Actual Experience Status: Active

## 2017-05-24 NOTE — PROGRESS NOTES
Cardiology Consult Note:    Kevin Nino  Date & Time note created:    5/24/2017   9:44 AM       Patient ID:  Name:             Raf Pelayo   YOB: 1940  Age:                 76 y.o.  male   MRN:               [id number]                                                             Chief Complaint: No chief complaint on file.            History of Present Illness:   Raf Pelayo has recent increased MESA no CP; h/o LHC about 10 yrs ago told CAD but no need for intervention at that time; DM II and HLD; FHx CVA    Review of Systems:     Constitutional: Denies fevers, Denies weight changes  Eyes: Denies changes in vision, no eye pain  Ears/Nose/Throat/Mouth: Denies nasal congestion or sore throat   Cardiovascular: Denies chest pain or palpitations   Respiratory: MESA;  shortness of breath , Denies cough  Gastrointestinal/Hepatic: Denies abdominal pain, nausea, vomiting, diarrhea, constipation or GI bleeding   Genitourinary: Denies bladder dysfunction, dysuria or frequency  Musculoskeletal/Rheum: Denies  joint pain and swelling   Skin/Breast: Denies rash, denies breast lumps or discharge  Neurological: Denies headache, confusion, memory loss or focal weakness/parasthesias  Psychiatric: denies mood disorder   Endocrine: denies hx of diabetes or thyroid dysfunction  Heme/Oncology/Lymph Nodes: Denies enlarged lymph nodes, denies bruising or known bleeding disorder  Allergic/Immunologic: Denies hx of allergies      All other systems were reviewed and are negative (AMA/CMS criteria)    Past Medical History:   Past Medical History   Diagnosis Date   • Nodular prostate without urinary obstruction    • Chronic ischemic heart disease, unspecified    • Mixed hyperlipidemia    • Anxiety          Past Surgical History:  Past Surgical History   Procedure Laterality Date   • Appendectomy         Hospital Medications:    Current outpatient prescriptions:   •  Blood Glucose Monitoring Suppl  Device, Meter: Dispense Device of Insurance Preference. Sig. Use daily for blood sugar monitoring. #1. E11.9, Disp: 1 Device, Rfl: 0  •  glucose blood strip, Disp blood glucose test strips to go with selected blood glucose meter--test daily E11.9, Disp: 90 Strip, Rfl: 10  •  metformin (GLUCOPHAGE) 500 MG Tab, Take 1 Tab by mouth 2 times a day, with meals., Disp: 60 Tab, Rfl: 3  •  atorvastatin (LIPITOR) 20 MG Tab, Take 1 Tab by mouth every bedtime., Disp: 90 Tab, Rfl: 3  •  NON SPECIFIED, Apply blue emu oil to painful shoulders, Disp: 1 Bottle, Rfl: 3  •  etodolac (LODINE) 400 MG tablet, Take 1 Tab by mouth 2 times a day. Take with food, Disp: 30 Tab, Rfl: 3  •  Glucosamine-Chondroit-Vit C-Mn (GLUCOSAMINE CHONDR 1500 COMPLX) Cap, Take 1 Cap by mouth every day., Disp: 100 Cap, Rfl: 3  •  Cholecalciferol (VITAMIN D3) 1000 UNIT TABS, Take 1 Tab by mouth every day., Disp: 100 Tab, Rfl: 3  •  ciprofloxacin (CIPRO) 500 MG Tab, Take 1 Tab by mouth 2 times a day., Disp: 28 Tab, Rfl: 0  •  triamcinolone acetonide (KENALOG) 0.1 % Cream, Apply 1 Squirt to affected area(s) 2 times a day., Disp: 30 g, Rfl: 1  •  ketoconazole (NIZORAL) 2 % Cream, Apply 1 Squirt to affected area(s) 2 times a day., Disp: 30 g, Rfl: 2    Current Outpatient Medications:  Current Outpatient Prescriptions   Medication Sig Dispense Refill   • Blood Glucose Monitoring Suppl Device Meter: Dispense Device of Insurance Preference. Sig. Use daily for blood sugar monitoring. #1. E11.9 1 Device 0   • glucose blood strip Disp blood glucose test strips to go with selected blood glucose meter--test daily E11.9 90 Strip 10   • metformin (GLUCOPHAGE) 500 MG Tab Take 1 Tab by mouth 2 times a day, with meals. 60 Tab 3   • atorvastatin (LIPITOR) 20 MG Tab Take 1 Tab by mouth every bedtime. 90 Tab 3   • NON SPECIFIED Apply blue emu oil to painful shoulders 1 Bottle 3   • etodolac (LODINE) 400 MG tablet Take 1 Tab by mouth 2 times a day. Take with food 30 Tab 3   •  "Glucosamine-Chondroit-Vit C-Mn (GLUCOSAMINE CHONDR 1500 COMPLX) Cap Take 1 Cap by mouth every day. 100 Cap 3   • Cholecalciferol (VITAMIN D3) 1000 UNIT TABS Take 1 Tab by mouth every day. 100 Tab 3   • ciprofloxacin (CIPRO) 500 MG Tab Take 1 Tab by mouth 2 times a day. 28 Tab 0   • triamcinolone acetonide (KENALOG) 0.1 % Cream Apply 1 Squirt to affected area(s) 2 times a day. 30 g 1   • ketoconazole (NIZORAL) 2 % Cream Apply 1 Squirt to affected area(s) 2 times a day. 30 g 2     No current facility-administered medications for this visit.         Medication Allergy/Sensitivities:  No Known Allergies    Family History:  Family History   Problem Relation Age of Onset   • Stroke Mother    • Heart Disease Brother        Social History:  Social History     Social History   • Marital Status:      Spouse Name: N/A   • Number of Children: N/A   • Years of Education: N/A     Occupational History   • Not on file.     Social History Main Topics   • Smoking status: Never Smoker    • Smokeless tobacco: Never Used   • Alcohol Use: 1.2 oz/week     2 Shots of liquor per week      Comment: moderate    • Drug Use: No   • Sexual Activity:     Partners: Female     Birth Control/ Protection: Post-Menopausal     Other Topics Concern   • Not on file     Social History Narrative         Physical Exam:  Vitals  Weight/BMI: Body mass index is 28.12 kg/(m^2).  Blood pressure 120/60, pulse 68, height 1.778 m (5' 10\"), weight 88.905 kg (196 lb), SpO2 98 %.  Filed Vitals:    05/24/17 0930   BP: 120/60   Pulse: 68   Height: 1.778 m (5' 10\")   Weight: 88.905 kg (196 lb)   SpO2: 98%     Oxygen Therapy:  Pulse Oximetry: 98 %  General Appearance:   Well developed, Well nourished, No acute distress, Non-toxic appearance.   HENT:  Normocephalic, Atraumatic, Oropharynx moist mucous membranes, Dentition: Mallampati 3 OP, Nose normal.    Eyes:  PERRLA, EOMI, Conjunctiva normal, No discharge.  Neck:  Normal range of motion, No cervical tenderness, " Supple, No stridor, no JVD .  No thyromegaly.  No carotid bruit.  Cardiovascular:  Normal heart rate, Normal rhythm,  S1, S2, no S3,  S4; No gallops; No murmurs, No rubs, .   Extremitites with intact distal pulses, no cyanosis, clubbing or edema.  No heaves, thrills, HJR;  Peripheral pulses: carotid 2+, brachial 2+, radial 2+, ulnar 2+, femoral 2+, popliteal 2+, PT 2+, DP 2+;  Lungs:  Respiratory effort is normal. Normal breath sounds, breath sounds clear to auscultation bilaterally,  no rales, no rhonchi, no wheezing.   Abdomen: Bowel sounds normal, Soft, No tenderness, No guarding, No rebound, No masses, No hepatosplenomegaly.  Skin: Warm, Dry, No erythema, No rash, no induration or crepitus.  Neurologic: Alert & oriented x 3, Normal motor function, Normal sensory function, No focal deficits noted, cranial nerves II through XII are normal,  normal gait.  Psychiatric: Affect normal, Judgment normal, Mood normal.      Data Review:     Records reviewed and summarized in current documentation    Lab Data Review:  Lab Results   Component Value Date/Time    SODIUM 140 04/18/2017 08:42 AM    POTASSIUM 4.0 04/18/2017 08:42 AM    CHLORIDE 107 04/18/2017 08:42 AM    CO2 24 04/18/2017 08:42 AM    GLUCOSE 134* 04/18/2017 08:42 AM    BUN 14 04/18/2017 08:42 AM    CREATININE 0.79 04/18/2017 08:42 AM      Lab Results   Component Value Date/Time    PT 14.2* 12/26/2006 10:34 AM    INR 1.13 12/26/2006 10:34 AM      Lab Results   Component Value Date/Time    WBC 8.1 03/15/2017 09:28 AM    RBC 4.92 03/15/2017 09:28 AM    HEMOGLOBIN 14.8 03/15/2017 09:28 AM    HEMATOCRIT 45.5 03/15/2017 09:28 AM    MCV 92.5 03/15/2017 09:28 AM    MCH 30.1 03/15/2017 09:28 AM    MCHC 32.5* 03/15/2017 09:28 AM    MPV 10.2 03/15/2017 09:28 AM    NEUTROPHILS-POLYS 65.60 03/15/2017 09:28 AM    LYMPHOCYTES 22.30 03/15/2017 09:28 AM    MONOCYTES 8.60 03/15/2017 09:28 AM    EOSINOPHILS 2.10 03/15/2017 09:28 AM    BASOPHILS 0.90 03/15/2017 09:28 AM     HYPOCHROMIA 1+ 02/19/2013 10:35 AM        Imaging/Procedures Review:    To my review shows:na    EKG To my review shows: SR 60 bpm and NSSTT abn QTc 408 msec    Assessment and Plan.   76 y.o. male has increased MESA and known CAD; Pls see orders for eval; Also discuss CV risk  Rec: continue DM control and Statin for HLD    1. SOB (shortness of breath)    - EKG  - ECHOCARDIOGRAM COMP W/O CONT; Future  - NM-CARDIAC STRESS TEST; Future    2. Ischemic heart disease    - ECHOCARDIOGRAM COMP W/O CONT; Future  - NM-CARDIAC STRESS TEST; Future    3. Mixed hyperlipidemia  Reviewed, on Statin    4. Controlled type 2 diabetes mellitus with complication, without long-term current use of insulin (CMS[facility])  Per PCP    5. Anxiety  none      1. SOB (shortness of breath)  EKG    ECHOCARDIOGRAM COMP W/O CONT    NM-CARDIAC STRESS TEST   2. Ischemic heart disease  ECHOCARDIOGRAM COMP W/O CONT    NM-CARDIAC STRESS TEST   3. Mixed hyperlipidemia     4. Controlled type 2 diabetes mellitus with complication, without long-term current use of insulin ([facility])     5. Anxiety

## 2017-05-30 RX ORDER — ETODOLAC 400 MG/1
TABLET, FILM COATED ORAL
Qty: 30 TAB | Refills: 3 | Status: SHIPPED | OUTPATIENT
Start: 2017-05-30 | End: 2017-06-29

## 2017-06-06 ENCOUNTER — HOSPITAL ENCOUNTER (OUTPATIENT)
Dept: LAB | Facility: MEDICAL CENTER | Age: 77
End: 2017-06-06
Attending: FAMILY MEDICINE
Payer: MEDICARE

## 2017-06-06 ENCOUNTER — OFFICE VISIT (OUTPATIENT)
Dept: MEDICAL GROUP | Facility: PHYSICIAN GROUP | Age: 77
End: 2017-06-06
Payer: MEDICARE

## 2017-06-06 VITALS
RESPIRATION RATE: 14 BRPM | BODY MASS INDEX: 23.48 KG/M2 | TEMPERATURE: 97.9 F | HEIGHT: 70 IN | WEIGHT: 164 LBS | DIASTOLIC BLOOD PRESSURE: 70 MMHG | HEART RATE: 68 BPM | SYSTOLIC BLOOD PRESSURE: 126 MMHG | OXYGEN SATURATION: 96 %

## 2017-06-06 DIAGNOSIS — I25.9 ISCHEMIC HEART DISEASE: ICD-10-CM

## 2017-06-06 DIAGNOSIS — G47.62 NOCTURNAL LEG CRAMPS: ICD-10-CM

## 2017-06-06 DIAGNOSIS — E78.2 MIXED HYPERLIPIDEMIA: ICD-10-CM

## 2017-06-06 DIAGNOSIS — R97.20 ELEVATED PSA: ICD-10-CM

## 2017-06-06 DIAGNOSIS — N40.2 NODULAR PROSTATE WITHOUT URINARY OBSTRUCTION: ICD-10-CM

## 2017-06-06 LAB
ANION GAP SERPL CALC-SCNC: 10 MMOL/L (ref 0–11.9)
CALCIUM SERPL-MCNC: 9.6 MG/DL (ref 8.5–10.5)
CHLORIDE SERPL-SCNC: 104 MMOL/L (ref 96–112)
CO2 SERPL-SCNC: 25 MMOL/L (ref 20–33)
POTASSIUM SERPL-SCNC: 4 MMOL/L (ref 3.6–5.5)
SODIUM SERPL-SCNC: 139 MMOL/L (ref 135–145)

## 2017-06-06 PROCEDURE — 36415 COLL VENOUS BLD VENIPUNCTURE: CPT

## 2017-06-06 PROCEDURE — G8599 NO ASA/ANTIPLAT THER USE RNG: HCPCS | Performed by: FAMILY MEDICINE

## 2017-06-06 PROCEDURE — 99214 OFFICE O/P EST MOD 30 MIN: CPT | Performed by: FAMILY MEDICINE

## 2017-06-06 PROCEDURE — G8432 DEP SCR NOT DOC, RNG: HCPCS | Performed by: FAMILY MEDICINE

## 2017-06-06 PROCEDURE — 1101F PT FALLS ASSESS-DOCD LE1/YR: CPT | Performed by: FAMILY MEDICINE

## 2017-06-06 PROCEDURE — G8420 CALC BMI NORM PARAMETERS: HCPCS | Performed by: FAMILY MEDICINE

## 2017-06-06 PROCEDURE — 4040F PNEUMOC VAC/ADMIN/RCVD: CPT | Performed by: FAMILY MEDICINE

## 2017-06-06 PROCEDURE — 80051 ELECTROLYTE PANEL: CPT

## 2017-06-06 PROCEDURE — 82310 ASSAY OF CALCIUM: CPT

## 2017-06-06 PROCEDURE — 1036F TOBACCO NON-USER: CPT | Performed by: FAMILY MEDICINE

## 2017-06-06 NOTE — MR AVS SNAPSHOT
"        Raf Morenourt   2017 8:20 AM   Office Visit   MRN: [id number]    Department:  [facility]   Dept Phone:  [phone]    Description:  Male : 1940   Provider:  Brandon Velázquez M.D.           Reason for Visit     Follow-Up           Allergies as of 2017     No Known Allergies      You were diagnosed with     Elevated PSA   [515183]       Ischemic heart disease   [005777]       Nodular prostate without urinary obstruction   [600.10.ICD-9-CM]       Mixed hyperlipidemia   [272.2.ICD-9-CM]       Nocturnal leg cramps   [853496]         Vital Signs     Blood Pressure Pulse Temperature Respirations Height Weight    126/70 mmHg 68 36.6 °C (97.9 °F) 14 1.778 m (5' 10\") 74.39 kg (164 lb)    Body Mass Index Oxygen Saturation Smoking Status             23.53 kg/m2 96% Never Smoker          Basic Information     Date Of Birth Sex Race Ethnicity Preferred Language    1940 Male Unknown Non- English      Your appointments     2017  8:00 AM   NM CARDIAC STRESS TEST (30) with [facility] NM DSPECT 2   [facility] ([facility])    [address] [place] [address] [place] [id number] [phone]           Some exams require specific prep instructions that would have been given to you at time of scheduling. If you have any additional questions about the prep instructions, please call Imaging Scheduling at [phone] and press #2.            2017  1:15 PM   ECHO with ECHO [facility], [facility] EXAM 12   ECHOCARDIOLOGY [facility] ([facility])    [address] [place] [address]   [phone]            2017  9:00 AM   FOLLOW UP with [facility] Nino MD,[facility]    [address]  [address]  [place] [address] [phone]            Aug 16, 2017  8:20 AM   Established Patient with Brandon Velázquez M.D.   [facility] ([place])    [address] [place] [address] "   [phone]           You will be receiving a confirmation call a few days before your appointment from our automated call confirmation system.            Sep 13, 2017  7:40 AM   Established Patient with Brandon Velázquez M.D.   [facility] ([place])    [address] [facility] [address]   [phone]           You will be receiving a confirmation call a few days before your appointment from our automated call confirmation system.              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 N40.2   7/27/2009 - Present    Counseling for marital and partner problems Z63.0   8/31/2009 - Present    Vitamin D deficiency disease E55.9   12/13/2011 - Present    Ischemic heart disease I25.9   8/17/2012 - Present    History of colonic polyps Z86.010   12/29/2014 - Present    Arthritis M19.90   4/14/2016 - Present      Health Maintenance        Date Due Completion Dates    RETINAL SCREENING 8/17/1958 ---    IMM ZOSTER VACCINE 8/17/2000 ---    COLON CANCER SCREENING ANNUAL FIT 4/18/2017 4/18/2016, 2/26/2013    A1C SCREENING 4/20/2017 10/20/2016    DIABETES MONOFILAMENT / LE EXAM 4/17/2018 4/17/2017    FASTING LIPID PROFILE 4/18/2018 4/18/2017, 3/15/2017, 10/20/2016, 1/23/2016, 1/22/2015, 2/19/2013, 8/17/2012, 12/9/2011, 9/23/2010, 3/18/2010, 8/19/2009, 10/27/2008, 5/3/2008, 11/6/2007, 3/23/2007, 12/18/2006, 11/9/2006    URINE ACR / MICROALBUMIN 4/18/2018 4/18/2017    SERUM CREATININE 4/18/2018 4/18/2017, 3/15/2017, 10/20/2016, 1/23/2016, 1/22/2015, 2/19/2013, 8/17/2012, 12/9/2011, 9/23/2010, 3/18/2010, 8/19/2009, 10/27/2008, 5/3/2008, 12/26/2006, 11/9/2006    COLONOSCOPY 12/24/2019 12/24/2014    IMM DTaP/Tdap/Td Vaccine (2 - Td) 12/9/2021 12/9/2011            Current Immunizations     13-VALENT PCV PREVNAR 2/18/2016    Influenza TIV (IM)  10/13/2011    Influenza Vaccine Pediatric 10/25/2009    Influenza Vaccine Quad Inj (Preserved) 9/20/2016, 9/29/2015    Pneumococcal polysaccharide vaccine (PPSV-23) 12/9/2011    Tdap Vaccine 12/9/2011      Below and/or attached are the medications your provider expects you to take. Review all of your home medications and newly ordered medications with your provider and/or pharmacist. Follow medication instructions as directed by your provider and/or pharmacist. Please keep your medication list with you and share with your provider. Update the information when medications are discontinued, doses are changed, or new medications (including over-the-counter products) are added; and carry medication information at all times in the event of emergency situations     Allergies:  No Known Allergies          Medications  Valid as of: June 06, 2017 -  9:11 AM    Generic Name Brand Name Tablet Size Instructions for use    Atorvastatin Calcium (Tab) LIPITOR 20 MG Take 1 Tab by mouth every bedtime.        Blood Glucose Monitoring Suppl (Device) Blood Glucose Monitoring Suppl  Meter: Dispense Device of Insurance Preference. Sig. Use daily for blood sugar monitoring. #1. E11.9        Cholecalciferol (Tab) vitamin D3 (cholecalciferol) 1000 UNIT Take 1 Tab by mouth every day.        Ciprofloxacin HCl (Tab) CIPRO 500 MG Take 1 Tab by mouth 2 times a day.        Etodolac (Tab) LODINE 400 MG TAKE 1 TAB BY MOUTH 2 TIMES A DAY. TAKE WITH FOOD        Glucosamine-Chondroit-Vit C-Mn (Cap) GLUCOSAMINE CHONDR 1500 COMPLX  Take 1 Cap by mouth every day.        Glucose Blood (Strip) glucose blood  Disp blood glucose test strips to go with selected blood glucose meter--test daily E11.9        Ketoconazole (Cream) NIZORAL 2 % Apply 1 Squirt to affected area(s) 2 times a day.        MetFORMIN HCl (Tab) GLUCOPHAGE 500 MG Take 1 Tab by mouth 2 times a day, with meals.        NON SPECIFIED   Apply blue emu oil to painful shoulders        Triamcinolone  Acetonide (Cream) KENALOG 0.1 % Apply 1 Squirt to affected area(s) 2 times a day.        .                 Medicines prescribed today were sent to:     [id number] - [place], [place] - [address]    [address] [facility] [place] [address]    Phone: [phone] Fax: [phone]    Open 24 Hours?: No    [facility] - [place] [address] [place] [address] [place] [address]    Phone: [phone] Fax: [phone]    Open 24 Hours?: No    [facility] - [place] [address]    Phone: [phone] Fax: [phone]    Open 24 Hours?: No    [id number] - [place], [place] - [address] [facility]    [address] [facility] [facility] [place] [address]    Phone: [phone] Fax: [phone]    Open 24 Hours?: No      Medication refill instructions:       If your prescription bottle indicates you have medication refills left, it is not necessary to call your provider’s office. Please contact your pharmacy and they will refill your medication.    If your prescription bottle indicates you do not have any refills left, you may request refills at any time through one of the following ways: The online [employer] system (except Urgent Care), by calling your provider’s office, or by asking your pharmacy to contact your provider’s office with a refill request. Medication refills are processed only during regular business hours and may not be available until the next business day. Your provider may request additional information or to have a follow-up visit with you prior to refilling your medication.   *Please Note: Medication refills are assigned a new Rx number when refilled electronically. Your pharmacy may indicate that no refills were authorized even though a new prescription for the same medication is available at the pharmacy. Please request the medicine by name with the pharmacy before  contacting your provider for a refill.        Your To Do List     Future Labs/Procedures Complete By Expires    CALCIUM (CA)  As directed 6/6/2018    ELECTROLYTES  As directed 6/7/2018         MyChart Access Code: Activation code not generated  Current [employer] Status: Active

## 2017-06-06 NOTE — PROGRESS NOTES
Patient comes in with numerous issues. He has seen the cardiologist will be having a stress test and an echocardiogram in the near future. He is not having chest pains now but he still complains of shortness of breath.  The patient has seen the urologist and has had prostate biopsies. The results are still pending.  His blood sugars are running around 100-120-- he says this is on his wife's machine because his glucose machine is broken. He is going to get a new glucose meter.  He complains of nocturnal leg cramps about 3 times a week.    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    • Chronic ischemic heart disease, unspecified    • Mixed hyperlipidemia    • Anxiety         Past Surgical History   Procedure Laterality Date   • Appendectomy         No Known Allergies    Current Outpatient Prescriptions   Medication Sig Dispense Refill   • Blood Glucose Monitoring Suppl Device Meter: Dispense Device of Insurance Preference. Sig. Use daily for blood sugar monitoring. #1. E11.9 1 Device 0   • glucose blood strip Disp blood glucose test strips to go with selected blood glucose meter--test daily E11.9 90 Strip 10   • metformin (GLUCOPHAGE) 500 MG Tab Take 1 Tab by mouth 2 times a day, with meals. 60 Tab 3   • atorvastatin (LIPITOR) 20 MG Tab Take 1 Tab by mouth every bedtime. 90 Tab 3   • Glucosamine-Chondroit-Vit C-Mn (GLUCOSAMINE CHONDR 1500 COMPLX) Cap Take 1 Cap by mouth every day. 100 Cap 3   • Cholecalciferol (VITAMIN D3) 1000 UNIT TABS Take 1 Tab by mouth every day. 100 Tab 3   • etodolac (LODINE) 400 MG tablet TAKE 1 TAB BY MOUTH 2 TIMES A DAY. TAKE WITH FOOD 30 Tab 3   • ciprofloxacin (CIPRO) 500 MG Tab Take 1 Tab by mouth 2 times a day. 28 Tab 0   • NON SPECIFIED Apply blue emu oil to painful shoulders 1 Bottle 3   • triamcinolone acetonide (KENALOG) 0.1 % Cream Apply 1 Squirt to affected area(s) 2 times a day. 30 g 1   • ketoconazole  (NIZORAL) 2 % Cream Apply 1 Squirt to affected area(s) 2 times a day. 30 g 2     No current facility-administered medications for this visit.        Family History   Problem Relation Age of Onset   • Stroke Mother    • Heart Disease Brother        Social History     Social History   • Marital Status:      Spouse Name: N/A   • Number of Children: N/A   • Years of Education: N/A     Occupational History   • Not on file.     Social History Main Topics   • Smoking status: Never Smoker    • Smokeless tobacco: Never Used   • Alcohol Use: 1.2 oz/week     2 Shots of liquor per week      Comment: moderate    • Drug Use: No   • Sexual Activity:     Partners: Female     Birth Control/ Protection: Post-Menopausal     Other Topics Concern   • Not on file     Social History Narrative          Physical Exam   Constitutional: He is oriented. He appears well-developed and well-nourished. No distress.   HENT:   Head: Normocephalic and atraumatic.   Right Ear: External ear normal. Ear canal and TM normal   Left Ear: External ear normal. Ear canal and TM normal   Nose: Nose normal.   Mouth/Throat: Oropharynx is clear and moist.   Eyes: Conjunctivae and extraocular motions are normal. Pupils are equal, round, and reactive to light.        Fundi benign bilaterally   Neck: No thyromegaly present.   Cardiovascular: Normal rate, regular rhythm, normal heart sounds and intact distal pulses.  Exam reveals no gallop.    No murmur heard.  Pulmonary/Chest: Effort normal and breath sounds normal. No respiratory distress. He has no wheezes. He has no rales.   Abdominal: Soft. Bowel sounds are normal. No hepatosplenomegaly. He exhibits no distension. No tenderness. He has no rebound and no guarding.   Musculoskeletal: Normal range of motion. He exhibits no edema and no tenderness.   Lymphadenopathy:     He has no cervical adenopathy.  No supraclavicular adenopathy.   Neurological: He is alert and oriented. He has normal reflexes.         Babinskis downgoing bilaterally   Skin: Skin is warm and dry. No rash noted. No erythema.   Psychiatric: He has a normal mood and appropriate affect. His behavior is normal. Judgment and thought content normal.     1. Elevated PSA   continue to follow-up with Dr. Tran the urologist    2. Ischemic heart disease   continue to follow up with Dr Nino cardiologist    3. Nodular prostate without urinary obstruction   continue to see Dr. Tran    4. Mixed hyperlipidemia   doing well    5. Nocturnal leg cramps  ELECTROLYTES    CALCIUM (CA)  I gave the patient the website [email or web address] and he will order some of that formula because I found that that helps patients with leg cramps.      Recheck with me when necessary above results    Please note that this dictation was created using voice recognition software. I have worked with consultants from the vendor as well as technical experts from [facility] [employer] to optimize the interface. I have made every reasonable attempt to correct obvious errors, but I expect that there are errors of grammar and possibly content that I did not discover before finalizing the note.

## 2017-06-07 ENCOUNTER — TELEPHONE (OUTPATIENT)
Dept: MEDICAL GROUP | Facility: PHYSICIAN GROUP | Age: 77
End: 2017-06-07

## 2017-06-07 NOTE — TELEPHONE ENCOUNTER
----- Message from Eric Lopes M.D. sent at 6/7/2017  7:48 AM PDT -----  Covering for Dr. Velázquez this week    Labs reviewed and electrolytes are normal. I recommend continuing to follow up with Dr. Velázquez regarding the leg cramps. Please call patient and inform them. Thank you  - Dr. Lopes

## 2017-06-23 ENCOUNTER — HOSPITAL ENCOUNTER (OUTPATIENT)
Dept: RADIOLOGY | Facility: MEDICAL CENTER | Age: 77
End: 2017-06-23
Attending: INTERNAL MEDICINE
Payer: MEDICARE

## 2017-06-23 ENCOUNTER — HOSPITAL ENCOUNTER (OUTPATIENT)
Dept: CARDIOLOGY | Facility: MEDICAL CENTER | Age: 77
End: 2017-06-23
Attending: INTERNAL MEDICINE
Payer: MEDICARE

## 2017-06-23 DIAGNOSIS — R06.02 SOB (SHORTNESS OF BREATH): ICD-10-CM

## 2017-06-23 DIAGNOSIS — I25.9 ISCHEMIC HEART DISEASE: ICD-10-CM

## 2017-06-23 LAB
LV EJECT FRACT  99904: 75
LV EJECT FRACT MOD 2C 99903: 76.03
LV EJECT FRACT MOD 4C 99902: 84.8
LV EJECT FRACT MOD BP 99901: 82.12

## 2017-06-23 PROCEDURE — A9502 TC99M TETROFOSMIN: HCPCS

## 2017-06-23 PROCEDURE — 700111 HCHG RX REV CODE 636 W/ 250 OVERRIDE (IP)

## 2017-06-23 PROCEDURE — 93306 TTE W/DOPPLER COMPLETE: CPT

## 2017-06-23 PROCEDURE — 93306 TTE W/DOPPLER COMPLETE: CPT | Mod: 26 | Performed by: INTERNAL MEDICINE

## 2017-06-23 RX ORDER — REGADENOSON 0.08 MG/ML
INJECTION, SOLUTION INTRAVENOUS
Status: COMPLETED
Start: 2017-06-23 | End: 2017-06-23

## 2017-06-23 RX ADMIN — REGADENOSON 0.4 MG: 0.08 INJECTION, SOLUTION INTRAVENOUS at 09:25

## 2017-06-23 NOTE — PROGRESS NOTES
Patient here for Nuclear Medicine treadmill.  Patient unable to complete test due to shortness of breath.  Converted to chemical stress test.      Nursing care plan includes knowledge deficit, potential for discomfort, potential for compromised cardiac output. POC includes teaching, comfort measures and reassurance, and access to code cart, cardiology stand by and availability of rapid response team. Pt verbalizes good understanding of benefits and risks of pharmacological cardiac stress test. Informed consent obtained. Lexiscan given, pt developed the following symptoms SOB. VS stable, major symptoms resolved. To waiting room, caffeinated fluids and/or snacks given, awaiting second scan. Nursing goals met.

## 2017-06-26 ENCOUNTER — OFFICE VISIT (OUTPATIENT)
Dept: CARDIOLOGY | Facility: MEDICAL CENTER | Age: 77
End: 2017-06-26
Payer: MEDICARE

## 2017-06-26 VITALS
DIASTOLIC BLOOD PRESSURE: 68 MMHG | WEIGHT: 194 LBS | OXYGEN SATURATION: 92 % | HEART RATE: 74 BPM | SYSTOLIC BLOOD PRESSURE: 128 MMHG | BODY MASS INDEX: 27.77 KG/M2 | HEIGHT: 70 IN

## 2017-06-26 DIAGNOSIS — R06.02 SOB (SHORTNESS OF BREATH): ICD-10-CM

## 2017-06-26 DIAGNOSIS — E78.2 MIXED HYPERLIPIDEMIA: ICD-10-CM

## 2017-06-26 DIAGNOSIS — I25.9 ISCHEMIC HEART DISEASE: ICD-10-CM

## 2017-06-26 DIAGNOSIS — E11.8 CONTROLLED TYPE 2 DIABETES MELLITUS WITH COMPLICATION, WITHOUT LONG-TERM CURRENT USE OF INSULIN (HCC): ICD-10-CM

## 2017-06-26 LAB — EKG IMPRESSION: NORMAL

## 2017-06-26 PROCEDURE — 99215 OFFICE O/P EST HI 40 MIN: CPT | Mod: 25 | Performed by: INTERNAL MEDICINE

## 2017-06-26 PROCEDURE — 93000 ELECTROCARDIOGRAM COMPLETE: CPT | Performed by: INTERNAL MEDICINE

## 2017-06-26 NOTE — Clinical Note
[facility]   [address]   [address]  [place] [address]  Phone: [phone]  Fax: [phone]              Raf Pelayo  1940    Encounter Date: 6/26/2017    Brandon Velázquez M.D.    Thank you for the referral. I had the pleasure of seeing Raf Pelayo today in cardiology clinic. I've attached my visit note below. If you have any questions please feel free to give me a call anytime.      Kevin Nino MD, PhD, [facility]  Cardiology and Lipidology  [facility]                                                                    PROGRESS NOTE:  Chief Complaint   Patient presents with   • Follow-Up     1 month f/u     Increased dyspnea and MESA    This patient is an established male who is here today to discuss:  Follow up cardiac w/u results; still MESA;     Patient Active Problem List    Diagnosis Date Noted   • Arthritis 04/14/2016   • History of colonic polyps 12/29/2014   • Ischemic heart disease 08/17/2012   • Vitamin D deficiency disease 12/13/2011   • Counseling for marital and partner problems 08/31/2009   • Anxiety 07/27/2009   • Mixed hyperlipidemia 07/27/2009   • Nodular prostate without urinary obstruction 07/27/2009       Past Medical History   Diagnosis Date   • Nodular prostate without urinary obstruction    • Chronic ischemic heart disease, unspecified    • Mixed hyperlipidemia    • Anxiety      Past Surgical History   Procedure Laterality Date   • Appendectomy       Social History     Social History   • Marital Status:      Spouse Name: N/A   • Number of Children: N/A   • Years of Education: N/A     Social History Main Topics   • Smoking status: Never Smoker    • Smokeless tobacco: Never Used   • Alcohol Use: 1.2 oz/week     2 Shots of liquor per week      Comment: moderate    • Drug Use: No   • Sexual Activity:     Partners: Female     Birth Control/  Protection: Post-Menopausal     Other Topics Concern   • None     Social History Narrative     Family History   Problem Relation Age of Onset   • Stroke Mother    • Heart Disease Brother        Current Outpatient Prescriptions   Medication Sig Dispense Refill   • Blood Glucose Monitoring Suppl Device Meter: Dispense Device of Insurance Preference. Sig. Use daily for blood sugar monitoring. #1. E11.9 1 Device 0   • glucose blood strip Disp blood glucose test strips to go with selected blood glucose meter--test daily E11.9 90 Strip 10   • metformin (GLUCOPHAGE) 500 MG Tab Take 1 Tab by mouth 2 times a day, with meals. 60 Tab 3   • atorvastatin (LIPITOR) 20 MG Tab Take 1 Tab by mouth every bedtime. 90 Tab 3   • Glucosamine-Chondroit-Vit C-Mn (GLUCOSAMINE CHONDR 1500 COMPLX) Cap Take 1 Cap by mouth every day. 100 Cap 3   • Cholecalciferol (VITAMIN D3) 1000 UNIT TABS Take 1 Tab by mouth every day. 100 Tab 3   • etodolac (LODINE) 400 MG tablet TAKE 1 TAB BY MOUTH 2 TIMES A DAY. TAKE WITH FOOD 30 Tab 3   • ciprofloxacin (CIPRO) 500 MG Tab Take 1 Tab by mouth 2 times a day. 28 Tab 0   • NON SPECIFIED Apply blue emu oil to painful shoulders 1 Bottle 3   • triamcinolone acetonide (KENALOG) 0.1 % Cream Apply 1 Squirt to affected area(s) 2 times a day. 30 g 1   • ketoconazole (NIZORAL) 2 % Cream Apply 1 Squirt to affected area(s) 2 times a day. 30 g 2     No current facility-administered medications for this visit.     Review of patient's allergies indicates no known allergies.    Review of Systems:     Constitutional: Denies fevers, Denies weight changes  Eyes: Denies changes in vision, no eye pain  Ears/Nose/Throat/Mouth: Denies nasal congestion or sore throat   Cardiovascular: Denies chest pain or palpitations   Respiratory: MESA:  shortness of breath , Denies cough  Gastrointestinal/Hepatic: Denies abdominal pain, nausea, vomiting, diarrhea, constipation or GI bleeding   Genitourinary: Denies bladder dysfunction, dysuria or  "frequency  Musculoskeletal/Rheum: Denies  joint pain and swelling   Skin/Breast: Denies rash, denies breast lumps or discharge  Neurological: Denies headache, confusion, memory loss or focal weakness/parasthesias  Psychiatric: denies mood disorder   Endocrine:  hx of diabetes or thyroid dysfunction  Heme/Oncology/Lymph Nodes: Denies enlarged lymph nodes, denies brusing or known bleeding disorder  Allergic/Immunologic: Denies hx of allergies      All other systems were reviewed and are negative (AMA/CMS criteria)      Blood pressure 128/68, pulse 74, height 1.778 m (5' 10\"), weight 87.998 kg (194 lb), SpO2 92 %.  General Appearance:   Well developed, Well nourished, No acute distress, Non-toxic appearance.   HENT:  Normocephalic, Atraumatic, Oropharynx moist mucous membranes, Dentition: Mallampati 3 OP, Nose normal.    Eyes:  PERRLA, EOMI, Conjunctiva normal, No discharge.  Neck:  Normal range of motion, No cervical tenderness, Supple, No stridor, no JVD .  No thyromegaly.  No carotid bruit.  Cardiovascular:  Normal heart rate, IRR rhythm,  S1, S2, no S3,  S4; No gallops; No murmurs, No rubs, .   Extremitites with intact distal pulses, no cyanosis, clubbing or edema.  No heaves, thrills, HJR;  Peripheral pulses: carotid 2+, brachial 2+, radial 2+, ulnar 2+, femoral 2+, popliteal 2+, PT 2+, DP 2+;  Lungs:  Respiratory effort is normal. Normal breath sounds, breath sounds clear to auscultation bilaterally,  no rales, no rhonchi, no wheezing.   Abdomen: Bowel sounds normal, Soft, No tenderness, No guarding, No rebound, No masses, No hepatosplenomegaly.  Skin: Warm, Dry, No erythema, No rash, no induration or crepitus.  Neurologic: Alert & oriented x 3, Normal motor function, Normal sensory function, No focal deficits noted, cranial nerves II through XII are normal,  normal gait.  Psychiatric: Affect normal, Judgment normal, Mood normal..    NUCLEAR IMAGING INTERPRETATION   Ischemic changes with Regadenoson infusion with " worsening of ST depression in      the lateral leads.    Moderate sized defect of moderately reduced counts in the inferior and    inferolateral wall which is more apparent on stress consistent with moderate    ischemia and small scar.   Normal left ventricular wall motion.  LV ejection fraction = 72%.     These findings were communicated to the ordering physician at the time of the study.    ADDENDUM   The patient attempted exercise, with a low level of exercise (  6 minutes),    ischemic EKG changes were noted in the inferior and lateral leads (3 mm flat    and downsloping ST depression). Heart rate criteria was not met, so a    pharmacologic stress test was performed.   ECG INTERPRETATION   Ischemic changes with Regadenoson infusion with worsening of ST depression in the lateral leads    Echo:   No prior study is available for comparison.   Normal left ventricular chamber size.  Mild left ventricular hypertrophy.  Left ventricular ejection fraction is visually estimated to be greater   than 75%.  Grade I diastolic dysfunction.  Aortic sclerosis without stenosis.  Trace aortic insufficiency.  Mild mitral regurgitation.  Systolic anterior motion of the anterior mitral valve leaflet.  Estimated right ventricular systolic pressure  is 30 mmHg.  Mild tricuspid regurgitation.  Normal pericardium without effusion.  Possible mild LVOT obstruction.    Assessment and Plan.   76 y.o. male has positive stress test and will order LHC; long discussion with him about further eval; Irreg heart beats; EKG ordered'    1. Ischemic heart disease  See above  - CARDIAC CATH  - EKG    2. SOB (shortness of breath)    - CARDIAC CATH  - EKG    3. Mixed hyperlipidemia  recheck    4. Controlled type 2 diabetes mellitus with complication, without long-term current use of insulin (CMS-[facility])  Per PCP      Return to clinic in  1 months    1. Ischemic heart disease  CARDIAC CATH    EKG   2. SOB (shortness of breath)  CARDIAC CATH    EKG   3.  Mixed hyperlipidemia     4. Controlled type 2 diabetes mellitus with complication, without long-term current use of insulin (CMS-[facility])               Brandon Velázquez M.D.  [address] [address]  VIA In Basket

## 2017-06-26 NOTE — PROGRESS NOTES
Chief Complaint   Patient presents with   • Follow-Up     1 month f/u     Increased dyspnea and MESA    This patient is an established male who is here today to discuss:  Follow up cardiac w/u results; still MESA;     Patient Active Problem List    Diagnosis Date Noted   • Arthritis 04/14/2016   • History of colonic polyps 12/29/2014   • Ischemic heart disease 08/17/2012   • Vitamin D deficiency disease 12/13/2011   • Counseling for marital and partner problems 08/31/2009   • Anxiety 07/27/2009   • Mixed hyperlipidemia 07/27/2009   • Nodular prostate without urinary obstruction 07/27/2009       Past Medical History   Diagnosis Date   • Nodular prostate without urinary obstruction    • Chronic ischemic heart disease, unspecified    • Mixed hyperlipidemia    • Anxiety      Past Surgical History   Procedure Laterality Date   • Appendectomy       Social History     Social History   • Marital Status:      Spouse Name: N/A   • Number of Children: N/A   • Years of Education: N/A     Social History Main Topics   • Smoking status: Never Smoker    • Smokeless tobacco: Never Used   • Alcohol Use: 1.2 oz/week     2 Shots of liquor per week      Comment: moderate    • Drug Use: No   • Sexual Activity:     Partners: Female     Birth Control/ Protection: Post-Menopausal     Other Topics Concern   • None     Social History Narrative     Family History   Problem Relation Age of Onset   • Stroke Mother    • Heart Disease Brother        Current Outpatient Prescriptions   Medication Sig Dispense Refill   • Blood Glucose Monitoring Suppl Device Meter: Dispense Device of Insurance Preference. Sig. Use daily for blood sugar monitoring. #1. E11.9 1 Device 0   • glucose blood strip Disp blood glucose test strips to go with selected blood glucose meter--test daily E11.9 90 Strip 10   • metformin (GLUCOPHAGE) 500 MG Tab Take 1 Tab by mouth 2 times a day, with meals. 60 Tab 3   • atorvastatin (LIPITOR) 20 MG Tab Take 1 Tab by mouth every  "bedtime. 90 Tab 3   • Glucosamine-Chondroit-Vit C-Mn (GLUCOSAMINE CHONDR 1500 COMPLX) Cap Take 1 Cap by mouth every day. 100 Cap 3   • Cholecalciferol (VITAMIN D3) 1000 UNIT TABS Take 1 Tab by mouth every day. 100 Tab 3   • etodolac (LODINE) 400 MG tablet TAKE 1 TAB BY MOUTH 2 TIMES A DAY. TAKE WITH FOOD 30 Tab 3   • ciprofloxacin (CIPRO) 500 MG Tab Take 1 Tab by mouth 2 times a day. 28 Tab 0   • NON SPECIFIED Apply blue emu oil to painful shoulders 1 Bottle 3   • triamcinolone acetonide (KENALOG) 0.1 % Cream Apply 1 Squirt to affected area(s) 2 times a day. 30 g 1   • ketoconazole (NIZORAL) 2 % Cream Apply 1 Squirt to affected area(s) 2 times a day. 30 g 2     No current facility-administered medications for this visit.     Review of patient's allergies indicates no known allergies.    Review of Systems:     Constitutional: Denies fevers, Denies weight changes  Eyes: Denies changes in vision, no eye pain  Ears/Nose/Throat/Mouth: Denies nasal congestion or sore throat   Cardiovascular: Denies chest pain or palpitations   Respiratory: MESA:  shortness of breath , Denies cough  Gastrointestinal/Hepatic: Denies abdominal pain, nausea, vomiting, diarrhea, constipation or GI bleeding   Genitourinary: Denies bladder dysfunction, dysuria or frequency  Musculoskeletal/Rheum: Denies  joint pain and swelling   Skin/Breast: Denies rash, denies breast lumps or discharge  Neurological: Denies headache, confusion, memory loss or focal weakness/parasthesias  Psychiatric: denies mood disorder   Endocrine:  hx of diabetes or thyroid dysfunction  Heme/Oncology/Lymph Nodes: Denies enlarged lymph nodes, denies brusing or known bleeding disorder  Allergic/Immunologic: Denies hx of allergies      All other systems were reviewed and are negative (AMA/CMS criteria)      Blood pressure 128/68, pulse 74, height 1.778 m (5' 10\"), weight 87.998 kg (194 lb), SpO2 92 %.  General Appearance:   Well developed, Well nourished, No acute distress, " Non-toxic appearance.   HENT:  Normocephalic, Atraumatic, Oropharynx moist mucous membranes, Dentition: Mallampati 3 OP, Nose normal.    Eyes:  PERRLA, EOMI, Conjunctiva normal, No discharge.  Neck:  Normal range of motion, No cervical tenderness, Supple, No stridor, no JVD .  No thyromegaly.  No carotid bruit.  Cardiovascular:  Normal heart rate, IRR rhythm,  S1, S2, no S3,  S4; No gallops; No murmurs, No rubs, .   Extremitites with intact distal pulses, no cyanosis, clubbing or edema.  No heaves, thrills, HJR;  Peripheral pulses: carotid 2+, brachial 2+, radial 2+, ulnar 2+, femoral 2+, popliteal 2+, PT 2+, DP 2+;  Lungs:  Respiratory effort is normal. Normal breath sounds, breath sounds clear to auscultation bilaterally,  no rales, no rhonchi, no wheezing.   Abdomen: Bowel sounds normal, Soft, No tenderness, No guarding, No rebound, No masses, No hepatosplenomegaly.  Skin: Warm, Dry, No erythema, No rash, no induration or crepitus.  Neurologic: Alert & oriented x 3, Normal motor function, Normal sensory function, No focal deficits noted, cranial nerves II through XII are normal,  normal gait.  Psychiatric: Affect normal, Judgment normal, Mood normal..    NUCLEAR IMAGING INTERPRETATION   Ischemic changes with Regadenoson infusion with worsening of ST depression in      the lateral leads.    Moderate sized defect of moderately reduced counts in the inferior and    inferolateral wall which is more apparent on stress consistent with moderate    ischemia and small scar.   Normal left ventricular wall motion.  LV ejection fraction = 72%.     These findings were communicated to the ordering physician at the time of the study.    ADDENDUM   The patient attempted exercise, with a low level of exercise (  6 minutes),    ischemic EKG changes were noted in the inferior and lateral leads (3 mm flat    and downsloping ST depression). Heart rate criteria was not met, so a    pharmacologic stress test was performed.   ECG  INTERPRETATION   Ischemic changes with Regadenoson infusion with worsening of ST depression in the lateral leads    Echo:   No prior study is available for comparison.   Normal left ventricular chamber size.  Mild left ventricular hypertrophy.  Left ventricular ejection fraction is visually estimated to be greater   than 75%.  Grade I diastolic dysfunction.  Aortic sclerosis without stenosis.  Trace aortic insufficiency.  Mild mitral regurgitation.  Systolic anterior motion of the anterior mitral valve leaflet.  Estimated right ventricular systolic pressure  is 30 mmHg.  Mild tricuspid regurgitation.  Normal pericardium without effusion.  Possible mild LVOT obstruction.    Assessment and Plan.   76 y.o. male has positive stress test and will order LHC; long discussion with him about further eval; Irreg heart beats; EKG ordered'    1. Ischemic heart disease  See above  - CARDIAC CATH  - EKG    2. SOB (shortness of breath)    - CARDIAC CATH  - EKG    3. Mixed hyperlipidemia  recheck    4. Controlled type 2 diabetes mellitus with complication, without long-term current use of insulin (CMS-[facility])  Per PCP      Return to clinic in  1 months    1. Ischemic heart disease  CARDIAC CATH    EKG   2. SOB (shortness of breath)  CARDIAC CATH    EKG   3. Mixed hyperlipidemia     4. Controlled type 2 diabetes mellitus with complication, without long-term current use of insulin (CMS-[facility])

## 2017-06-26 NOTE — MR AVS SNAPSHOT
"Raf Pelayo   2017 1:00 PM   Office Visit   MRN: [id number]    [facility]   Dept Phone:  [phone]    Description:  Male : 1940   Provider:  Kevin Nino MD,[facility]           Reason for Visit     Follow-Up 1 month f/u      Allergies as of 2017     No Known Allergies      You were diagnosed with     Ischemic heart disease   [861565]       SOB (shortness of breath)   [451097]       Mixed hyperlipidemia   [272.2.ICD-9-CM]       Controlled type 2 diabetes mellitus with complication, without long-term current use of insulin (CMS-[facility])   [6154552]         Vital Signs     Blood Pressure Pulse Height Weight Body Mass Index Oxygen Saturation    128/68 mmHg 74 1.778 m (5' 10\") 87.998 kg (194 lb) 27.84 kg/m2 92%    Smoking Status                   Never Smoker            Basic Information     Date Of Birth Sex Race Ethnicity Preferred Language    1940 Male Unknown Non- English      Your appointments     Aug 16, 2017  8:20 AM   Established Patient with Brandon Velázquez M.D.   [facility])    [address] [place] [address] [phone]           You will be receiving a confirmation call a few days before your appointment from our automated call confirmation system.            Sep 13, 2017  7:40 AM   Established Patient with Brandon Velázquez M.D.   [facility])    [address] [place] [address] [phone]           You will be receiving a confirmation call a few days before your appointment from our automated call confirmation system.              Problem List              ICD-10-CM Priority Class Noted - Resolved    Anxiety F41.9   2009 - Present    Mixed hyperlipidemia E78.2   2009 - Present    Nodular prostate without urinary obstruction N40.2   2009 - Present    Counseling for marital and partner problems Z63.0   2009 - Present    Vitamin D deficiency disease " E55.9   12/13/2011 - Present    Ischemic heart disease I25.9   8/17/2012 - Present    History of colonic polyps Z86.010   12/29/2014 - Present    Arthritis M19.90   4/14/2016 - Present      Health Maintenance        Date Due Completion Dates    RETINAL SCREENING 8/17/1958 ---    IMM ZOSTER VACCINE 8/17/2000 ---    COLON CANCER SCREENING ANNUAL FIT 4/18/2017 4/18/2016, 2/26/2013    A1C SCREENING 4/20/2017 10/20/2016    DIABETES MONOFILAMENT / LE EXAM 4/17/2018 4/17/2017    FASTING LIPID PROFILE 4/18/2018 4/18/2017, 3/15/2017, 10/20/2016, 1/23/2016, 1/22/2015, 2/19/2013, 8/17/2012, 12/9/2011, 9/23/2010, 3/18/2010, 8/19/2009, 10/27/2008, 5/3/2008, 11/6/2007, 3/23/2007, 12/18/2006, 11/9/2006    URINE ACR / MICROALBUMIN 4/18/2018 4/18/2017    SERUM CREATININE 4/18/2018 4/18/2017, 3/15/2017, 10/20/2016, 1/23/2016, 1/22/2015, 2/19/2013, 8/17/2012, 12/9/2011, 9/23/2010, 3/18/2010, 8/19/2009, 10/27/2008, 5/3/2008, 12/26/2006, 11/9/2006    COLONOSCOPY 12/24/2019 12/24/2014    IMM DTaP/Tdap/Td Vaccine (2 - Td) 12/9/2021 12/9/2011            Results       Current Immunizations     13-VALENT PCV PREVNAR 2/18/2016    Influenza TIV (IM) 10/13/2011    Influenza Vaccine Pediatric 10/25/2009    Influenza Vaccine Quad Inj (Preserved) 9/20/2016, 9/29/2015    Pneumococcal polysaccharide vaccine (PPSV-23) 12/9/2011    Tdap Vaccine 12/9/2011      Below and/or attached are the medications your provider expects you to take. Review all of your home medications and newly ordered medications with your provider and/or pharmacist. Follow medication instructions as directed by your provider and/or pharmacist. Please keep your medication list with you and share with your provider. Update the information when medications are discontinued, doses are changed, or new medications (including over-the-counter products) are added; and carry medication information at all times in the event of emergency situations     Allergies:  No Known Allergies             Medications  Valid as of: June 26, 2017 -  1:50 PM    Generic Name Brand Name Tablet Size Instructions for use    Atorvastatin Calcium (Tab) LIPITOR 20 MG Take 1 Tab by mouth every bedtime.        Blood Glucose Monitoring Suppl (Device) Blood Glucose Monitoring Suppl  Meter: Dispense Device of Insurance Preference. Sig. Use daily for blood sugar monitoring. #1. E11.9        Cholecalciferol (Tab) vitamin D3 (cholecalciferol) 1000 UNIT Take 1 Tab by mouth every day.        Ciprofloxacin HCl (Tab) CIPRO 500 MG Take 1 Tab by mouth 2 times a day.        Etodolac (Tab) LODINE 400 MG TAKE 1 TAB BY MOUTH 2 TIMES A DAY. TAKE WITH FOOD        Glucosamine-Chondroit-Vit C-Mn (Cap) GLUCOSAMINE CHONDR 1500 COMPLX  Take 1 Cap by mouth every day.        Glucose Blood (Strip) glucose blood  Disp blood glucose test strips to go with selected blood glucose meter--test daily E11.9        Ketoconazole (Cream) NIZORAL 2 % Apply 1 Squirt to affected area(s) 2 times a day.        MetFORMIN HCl (Tab) GLUCOPHAGE 500 MG Take 1 Tab by mouth 2 times a day, with meals.        NON SPECIFIED   Apply blue emu oil to painful shoulders        Triamcinolone Acetonide (Cream) KENALOG 0.1 % Apply 1 Squirt to affected area(s) 2 times a day.        .                 Medicines prescribed today were sent to:     [id number] - [place] - [address]    [address] [facility] [address]    Phone: [phone] Fax: [phone]    Open 24 Hours?: No    [facility] - [place] [address]    Phone: [phone] Fax: [phone]    Open 24 Hours?: No    [facility] - [place] [address]    Phone: [phone] Fax: [phone]    Open 24 Hours?: No    [id number] - [place] - [address] [address]    [address] [place] [address]    Phone:  [phone] Fax: [phone]    Open 24 Hours?: No      Medication refill instructions:       If your prescription bottle indicates you have medication refills left, it is not necessary to call your provider’s office. Please contact your pharmacy and they will refill your medication.    If your prescription bottle indicates you do not have any refills left, you may request refills at any time through one of the following ways: The online Prot[employer] system (except Urgent Care), by calling your provider’s office, or by asking your pharmacy to contact your provider’s office with a refill request. Medication refills are processed only during regular business hours and may not be available until the next business day. Your provider may request additional information or to have a follow-up visit with you prior to refilling your medication.   *Please Note: Medication refills are assigned a new Rx number when refilled electronically. Your pharmacy may indicate that no refills were authorized even though a new prescription for the same medication is available at the pharmacy. Please request the medicine by name with the pharmacy before contacting your provider for a refill.           Protagen Access Code: Activation code not generated  Current Protagen Status: Active

## 2017-06-26 NOTE — Clinical Note
[facility]   [address]   [address]  [place] [address]  Phone: [phone]  Fax: [phone]              Raf Pelayo  1940    Encounter Date: 6/26/2017    Brandon Velázquez M.D.    Thank you for the referral. I had the pleasure of seeing Raf Pelayo today in cardiology clinic. I've attached my visit note below. If you have any questions please feel free to give me a call anytime.      Kevin Nino MD, PhD, [facility]  Cardiology and Lipidology  [facility]                                                                  PROGRESS NOTE:  Chief Complaint   Patient presents with   • Follow-Up     1 month f/u     Increased dyspnea and MESA    This patient is an established male who is here today to discuss:  Follow up cardiac w/u results; still MESA;     Patient Active Problem List    Diagnosis Date Noted   • Arthritis 04/14/2016   • History of colonic polyps 12/29/2014   • Ischemic heart disease 08/17/2012   • Vitamin D deficiency disease 12/13/2011   • Counseling for marital and partner problems 08/31/2009   • Anxiety 07/27/2009   • Mixed hyperlipidemia 07/27/2009   • Nodular prostate without urinary obstruction 07/27/2009       Past Medical History   Diagnosis Date   • Nodular prostate without urinary obstruction    • Chronic ischemic heart disease, unspecified    • Mixed hyperlipidemia    • Anxiety      Past Surgical History   Procedure Laterality Date   • Appendectomy       Social History     Social History   • Marital Status:      Spouse Name: N/A   • Number of Children: N/A   • Years of Education: N/A     Social History Main Topics   • Smoking status: Never Smoker    • Smokeless tobacco: Never Used   • Alcohol Use: 1.2 oz/week     2 Shots of liquor per week      Comment: moderate    • Drug Use: No   • Sexual Activity:     Partners: Female     Birth Control/  Protection: Post-Menopausal     Other Topics Concern   • None     Social History Narrative     Family History   Problem Relation Age of Onset   • Stroke Mother    • Heart Disease Brother        Current Outpatient Prescriptions   Medication Sig Dispense Refill   • Blood Glucose Monitoring Suppl Device Meter: Dispense Device of Insurance Preference. Sig. Use daily for blood sugar monitoring. #1. E11.9 1 Device 0   • glucose blood strip Disp blood glucose test strips to go with selected blood glucose meter--test daily E11.9 90 Strip 10   • metformin (GLUCOPHAGE) 500 MG Tab Take 1 Tab by mouth 2 times a day, with meals. 60 Tab 3   • atorvastatin (LIPITOR) 20 MG Tab Take 1 Tab by mouth every bedtime. 90 Tab 3   • Glucosamine-Chondroit-Vit C-Mn (GLUCOSAMINE CHONDR 1500 COMPLX) Cap Take 1 Cap by mouth every day. 100 Cap 3   • Cholecalciferol (VITAMIN D3) 1000 UNIT TABS Take 1 Tab by mouth every day. 100 Tab 3   • etodolac (LODINE) 400 MG tablet TAKE 1 TAB BY MOUTH 2 TIMES A DAY. TAKE WITH FOOD 30 Tab 3   • ciprofloxacin (CIPRO) 500 MG Tab Take 1 Tab by mouth 2 times a day. 28 Tab 0   • NON SPECIFIED Apply blue emu oil to painful shoulders 1 Bottle 3   • triamcinolone acetonide (KENALOG) 0.1 % Cream Apply 1 Squirt to affected area(s) 2 times a day. 30 g 1   • ketoconazole (NIZORAL) 2 % Cream Apply 1 Squirt to affected area(s) 2 times a day. 30 g 2     No current facility-administered medications for this visit.     Review of patient's allergies indicates no known allergies.    Review of Systems:     Constitutional: Denies fevers, Denies weight changes  Eyes: Denies changes in vision, no eye pain  Ears/Nose/Throat/Mouth: Denies nasal congestion or sore throat   Cardiovascular: Denies chest pain or palpitations   Respiratory: MESA:  shortness of breath , Denies cough  Gastrointestinal/Hepatic: Denies abdominal pain, nausea, vomiting, diarrhea, constipation or GI bleeding   Genitourinary: Denies bladder dysfunction, dysuria or  "frequency  Musculoskeletal/Rheum: Denies  joint pain and swelling   Skin/Breast: Denies rash, denies breast lumps or discharge  Neurological: Denies headache, confusion, memory loss or focal weakness/parasthesias  Psychiatric: denies mood disorder   Endocrine:  hx of diabetes or thyroid dysfunction  Heme/Oncology/Lymph Nodes: Denies enlarged lymph nodes, denies brusing or known bleeding disorder  Allergic/Immunologic: Denies hx of allergies      All other systems were reviewed and are negative (AMA/CMS criteria)      Blood pressure 128/68, pulse 74, height 1.778 m (5' 10\"), weight 87.998 kg (194 lb), SpO2 92 %.  General Appearance:   Well developed, Well nourished, No acute distress, Non-toxic appearance.   HENT:  Normocephalic, Atraumatic, Oropharynx moist mucous membranes, Dentition: Mallampati 3 OP, Nose normal.    Eyes:  PERRLA, EOMI, Conjunctiva normal, No discharge.  Neck:  Normal range of motion, No cervical tenderness, Supple, No stridor, no JVD .  No thyromegaly.  No carotid bruit.  Cardiovascular:  Normal heart rate, IRR rhythm,  S1, S2, no S3,  S4; No gallops; No murmurs, No rubs, .   Extremitites with intact distal pulses, no cyanosis, clubbing or edema.  No heaves, thrills, HJR;  Peripheral pulses: carotid 2+, brachial 2+, radial 2+, ulnar 2+, femoral 2+, popliteal 2+, PT 2+, DP 2+;  Lungs:  Respiratory effort is normal. Normal breath sounds, breath sounds clear to auscultation bilaterally,  no rales, no rhonchi, no wheezing.   Abdomen: Bowel sounds normal, Soft, No tenderness, No guarding, No rebound, No masses, No hepatosplenomegaly.  Skin: Warm, Dry, No erythema, No rash, no induration or crepitus.  Neurologic: Alert & oriented x 3, Normal motor function, Normal sensory function, No focal deficits noted, cranial nerves II through XII are normal,  normal gait.  Psychiatric: Affect normal, Judgment normal, Mood normal..    NUCLEAR IMAGING INTERPRETATION   Ischemic changes with Regadenoson infusion with " worsening of ST depression in      the lateral leads.    Moderate sized defect of moderately reduced counts in the inferior and    inferolateral wall which is more apparent on stress consistent with moderate    ischemia and small scar.   Normal left ventricular wall motion.  LV ejection fraction = 72%.     These findings were communicated to the ordering physician at the time of the study.    ADDENDUM   The patient attempted exercise, with a low level of exercise (  6 minutes),    ischemic EKG changes were noted in the inferior and lateral leads (3 mm flat    and downsloping ST depression). Heart rate criteria was not met, so a    pharmacologic stress test was performed.   ECG INTERPRETATION   Ischemic changes with Regadenoson infusion with worsening of ST depression in the lateral leads    Echo:   No prior study is available for comparison.   Normal left ventricular chamber size.  Mild left ventricular hypertrophy.  Left ventricular ejection fraction is visually estimated to be greater   than 75%.  Grade I diastolic dysfunction.  Aortic sclerosis without stenosis.  Trace aortic insufficiency.  Mild mitral regurgitation.  Systolic anterior motion of the anterior mitral valve leaflet.  Estimated right ventricular systolic pressure  is 30 mmHg.  Mild tricuspid regurgitation.  Normal pericardium without effusion.  Possible mild LVOT obstruction.    Assessment and Plan.   76 y.o. male has positive stress test and will order LHC; long discussion with him about further eval; Irreg heart beats; EKG ordered'    1. Ischemic heart disease  See above  - CARDIAC CATH  - EKG    2. SOB (shortness of breath)    - CARDIAC CATH  - EKG    3. Mixed hyperlipidemia  recheck    4. Controlled type 2 diabetes mellitus with complication, without long-term current use of insulin (CMS-[facility])  Per PCP      Return to clinic in  1 months    1. Ischemic heart disease  CARDIAC CATH    EKG   2. SOB (shortness of breath)  CARDIAC CATH    EKG   3.  Mixed hyperlipidemia     4. Controlled type 2 diabetes mellitus with complication, without long-term current use of insulin (CMS-[facility])               Brandon Velázquez M.D.  [address] [address]  VIA In Basket

## 2017-06-27 ENCOUNTER — TELEPHONE (OUTPATIENT)
Dept: CARDIOLOGY | Facility: MEDICAL CENTER | Age: 77
End: 2017-06-27

## 2017-06-27 NOTE — TELEPHONE ENCOUNTER
Patient is scheduled on 7-3-17 for LHC with Dr. Mitchell at [facility]. Pt was told to hold metformin day of procedure and 48hrs after. Patient was told to check in at 7:00 am for a 9:00 procedure.

## 2017-06-27 NOTE — TELEPHONE ENCOUNTER
----- Message from Kevin Casiano MD,FACC sent at 2017  1:18 PM PDT -----  Regarding: Order for ANNMARIE SALAZAR    Patient Name: ANNMARIE SALAZAR([id number])  Sex: Male  : 1940      PCP: CONCHITA BRAVO    Center: [facility]     Types of orders made on 2017: cath lab    Order Date:2017  Ordering User:KEVIN CASIANO [[email or web address]]  Encounter Provider:Kevin Casiano MD,FAC [[id number]]  Authorizing Provider: Kevin Casiano MD,FACC [[id number]]  Department:HEART [facility] TOM[[id number]]    Order Specific Information  Order: CARDIAC CATH [Custom: [id number]]  Order #: [id number]  Qty: 1    Priority: Routine    Associated Diagnoses      I25.9 Ischemic heart disease      R06.02 SOB (shortness of breath)        Priority: Routine  Class: Normal      Scheduling Instructions:    Angiogram: Cath Left    Associated Diagnoses      I25.9 Ischemic heart disease      R06.02 SOB (shortness of breath)

## 2017-06-27 NOTE — TELEPHONE ENCOUNTER
----- Message from Kevin Casiano MD,FACC sent at 2017  1:18 PM PDT -----  Regarding: Order for ANNMARIE SALAZAR    Patient Name: ANNMARIE SALAZAR([id number])  Sex: Male  : 1940      PCP: CONCHITA BRAVO    Center: [facility]     Types of orders made on 2017: cath lab    Order Date:2017  Ordering User:KEVIN CASIANO [[email or web address]]  Encounter Provider:Kevin Casiano MD,FAC [[id number]]  Authorizing Provider: Kevin Casiano MD,FACC [[id number]]  Department:HEART [facility] TOM[[id number]]    Order Specific Information  Order: CARDIAC CATH [Custom: [id number]]  Order #: [id number]  Qty: 1    Priority: Routine    Associated Diagnoses      I25.9 Ischemic heart disease      R06.02 SOB (shortness of breath)        Priority: Routine  Class: Normal      Scheduling Instructions:    Angiogram: Cath Left    Associated Diagnoses      I25.9 Ischemic heart disease      R06.02 SOB (shortness of breath)

## 2017-06-29 DIAGNOSIS — Z01.810 PRE-OPERATIVE CARDIOVASCULAR EXAMINATION: ICD-10-CM

## 2017-06-29 DIAGNOSIS — Z01.812 PRE-OPERATIVE LABORATORY EXAMINATION: ICD-10-CM

## 2017-06-29 LAB
ANION GAP SERPL CALC-SCNC: 9 MMOL/L (ref 0–11.9)
BUN SERPL-MCNC: 15 MG/DL (ref 8–22)
CALCIUM SERPL-MCNC: 9.5 MG/DL (ref 8.5–10.5)
CHLORIDE SERPL-SCNC: 105 MMOL/L (ref 96–112)
CO2 SERPL-SCNC: 25 MMOL/L (ref 20–33)
CREAT SERPL-MCNC: 0.88 MG/DL (ref 0.5–1.4)
EKG IMPRESSION: NORMAL
ERYTHROCYTE [DISTWIDTH] IN BLOOD BY AUTOMATED COUNT: 41.1 FL (ref 35.9–50)
GFR SERPL CREATININE-BSD FRML MDRD: >60 ML/MIN/1.73 M 2
GLUCOSE SERPL-MCNC: 110 MG/DL (ref 65–99)
HCT VFR BLD AUTO: 44.6 % (ref 42–52)
HGB BLD-MCNC: 14.6 G/DL (ref 14–18)
INR PPP: 1.07 (ref 0.87–1.13)
MCH RBC QN AUTO: 30 PG (ref 27–33)
MCHC RBC AUTO-ENTMCNC: 32.7 G/DL (ref 33.7–35.3)
MCV RBC AUTO: 91.6 FL (ref 81.4–97.8)
PLATELET # BLD AUTO: 239 K/UL (ref 164–446)
PMV BLD AUTO: 10.3 FL (ref 9–12.9)
POTASSIUM SERPL-SCNC: 4 MMOL/L (ref 3.6–5.5)
PROTHROMBIN TIME: 14.2 SEC (ref 12–14.6)
RBC # BLD AUTO: 4.87 M/UL (ref 4.7–6.1)
SODIUM SERPL-SCNC: 139 MMOL/L (ref 135–145)
WBC # BLD AUTO: 7.8 K/UL (ref 4.8–10.8)

## 2017-06-29 PROCEDURE — 85027 COMPLETE CBC AUTOMATED: CPT

## 2017-06-29 PROCEDURE — 85610 PROTHROMBIN TIME: CPT

## 2017-06-29 PROCEDURE — 36415 COLL VENOUS BLD VENIPUNCTURE: CPT

## 2017-06-29 PROCEDURE — 93010 ELECTROCARDIOGRAM REPORT: CPT | Performed by: INTERNAL MEDICINE

## 2017-06-29 PROCEDURE — 93005 ELECTROCARDIOGRAM TRACING: CPT

## 2017-06-29 PROCEDURE — 80048 BASIC METABOLIC PNL TOTAL CA: CPT

## 2017-06-29 RX ORDER — M-VIT,TX,IRON,MINS/CALC/FOLIC 27MG-0.4MG
1 TABLET ORAL DAILY
COMMUNITY

## 2017-06-29 RX ORDER — ASPIRIN 81 MG/1
81 TABLET, CHEWABLE ORAL DAILY
COMMUNITY

## 2017-07-03 ENCOUNTER — HOSPITAL ENCOUNTER (OUTPATIENT)
Facility: MEDICAL CENTER | Age: 77
End: 2017-07-03
Attending: INTERNAL MEDICINE | Admitting: INTERNAL MEDICINE
Payer: MEDICARE

## 2017-07-03 VITALS
DIASTOLIC BLOOD PRESSURE: 79 MMHG | SYSTOLIC BLOOD PRESSURE: 136 MMHG | TEMPERATURE: 97.5 F | BODY MASS INDEX: 28.22 KG/M2 | OXYGEN SATURATION: 97 % | HEIGHT: 70 IN | WEIGHT: 197.09 LBS | HEART RATE: 49 BPM | RESPIRATION RATE: 12 BRPM

## 2017-07-03 PROBLEM — I25.9 CHRONIC ISCHEMIC HEART DISEASE: Status: ACTIVE | Noted: 2017-07-03

## 2017-07-03 PROCEDURE — C1769 GUIDE WIRE: HCPCS

## 2017-07-03 PROCEDURE — 93458 L HRT ARTERY/VENTRICLE ANGIO: CPT

## 2017-07-03 PROCEDURE — 360979 HCHG DIAGNOSTIC CATH

## 2017-07-03 PROCEDURE — C1894 INTRO/SHEATH, NON-LASER: HCPCS

## 2017-07-03 PROCEDURE — 99153 MOD SED SAME PHYS/QHP EA: CPT

## 2017-07-03 PROCEDURE — 99152 MOD SED SAME PHYS/QHP 5/>YRS: CPT

## 2017-07-03 PROCEDURE — 700101 HCHG RX REV CODE 250

## 2017-07-03 PROCEDURE — 93880 EXTRACRANIAL BILAT STUDY: CPT

## 2017-07-03 PROCEDURE — 700111 HCHG RX REV CODE 636 W/ 250 OVERRIDE (IP)

## 2017-07-03 RX ORDER — LIDOCAINE HYDROCHLORIDE 20 MG/ML
INJECTION, SOLUTION INFILTRATION; PERINEURAL
Status: COMPLETED
Start: 2017-07-03 | End: 2017-07-03

## 2017-07-03 RX ORDER — SODIUM CHLORIDE 9 MG/ML
INJECTION, SOLUTION INTRAVENOUS CONTINUOUS
Status: DISCONTINUED | OUTPATIENT
Start: 2017-07-03 | End: 2017-07-03 | Stop reason: HOSPADM

## 2017-07-03 RX ORDER — MIDAZOLAM HYDROCHLORIDE 1 MG/ML
INJECTION INTRAMUSCULAR; INTRAVENOUS
Status: COMPLETED
Start: 2017-07-03 | End: 2017-07-03

## 2017-07-03 RX ORDER — HEPARIN SODIUM,PORCINE 1000/ML
VIAL (ML) INJECTION
Status: COMPLETED
Start: 2017-07-03 | End: 2017-07-03

## 2017-07-03 RX ORDER — SODIUM CHLORIDE 9 MG/ML
INJECTION, SOLUTION INTRAVENOUS
Status: DISCONTINUED | OUTPATIENT
Start: 2017-07-03 | End: 2017-07-03 | Stop reason: HOSPADM

## 2017-07-03 RX ORDER — VERAPAMIL HYDROCHLORIDE 2.5 MG/ML
INJECTION, SOLUTION INTRAVENOUS
Status: COMPLETED
Start: 2017-07-03 | End: 2017-07-03

## 2017-07-03 RX ADMIN — LIDOCAINE HYDROCHLORIDE: 20 INJECTION, SOLUTION INFILTRATION; PERINEURAL at 08:31

## 2017-07-03 RX ADMIN — MIDAZOLAM 2 MG: 1 INJECTION INTRAMUSCULAR; INTRAVENOUS at 08:32

## 2017-07-03 RX ADMIN — HEPARIN SODIUM: 1000 INJECTION, SOLUTION INTRAVENOUS; SUBCUTANEOUS at 08:31

## 2017-07-03 RX ADMIN — FENTANYL CITRATE 100 MCG: 50 INJECTION, SOLUTION INTRAMUSCULAR; INTRAVENOUS at 08:40

## 2017-07-03 RX ADMIN — NITROGLYCERIN 10 ML: 20 INJECTION INTRAVENOUS at 08:31

## 2017-07-03 RX ADMIN — HEPARIN SODIUM 2000 UNITS: 200 INJECTION, SOLUTION INTRAVENOUS at 08:31

## 2017-07-03 RX ADMIN — VERAPAMIL HYDROCHLORIDE 2.5 MG: 2.5 INJECTION, SOLUTION INTRAVENOUS at 08:31

## 2017-07-03 ASSESSMENT — PAIN SCALES - GENERAL
PAINLEVEL_OUTOF10: 0

## 2017-07-03 NOTE — IP AVS SNAPSHOT
7/3/2017    Raf Pelayo  [address] [facility] [address]    Dear Raf:    [facility] wants to ensure your discharge home is safe and you or your loved ones have had all of your questions answered regarding your care after you leave the hospital.    Below is a list of resources and contact information should you have any questions regarding your hospital stay, follow-up instructions, or active medical symptoms.    Questions or Concerns Regarding… Contact   Medical Questions Related to Your Discharge  (7 days a week, 8am-5pm) Contact a Nurse Care Coordinator   [phone]   Medical Questions Not Related to Your Discharge  (24 hours a day / 7 days a week)  Contact the Nurse Health Line   [phone]    Medications or Discharge Instructions Refer to your discharge packet   or contact your [facility] Primary Care Provider   [phone]   Follow-up Appointment(s) Schedule your appointment via Lab21   or contact Scheduling [phone]   Billing Review your statement via Lab21  or contact Billing [phone]   Medical Records Review your records via Lab21   or contact Medical Records [phone]     You may receive a telephone call within two days of discharge. This call is to make certain you understand your discharge instructions and have the opportunity to have any questions answered. You can also easily access your medical information, test results and upcoming appointments via the Lab21 free online health management tool. You can learn more and sign up at uTaP/Lab21. For assistance setting up your Lab21 account, please call [phone].    Once again, we want to ensure your discharge home is safe and that you have a clear understanding of any next steps in your care. If you have any questions or concerns, please do not hesitate to contact us, we are here for you. Thank you for choosing [facility] for your healthcare needs.    Sincerely,    Your [facility] Healthcare Team

## 2017-07-03 NOTE — DISCHARGE INSTRUCTIONS
ACTIVITY: Rest and take it easy for the first 24 hours.  A responsible adult is recommended to remain with you during that time.  It is normal to feel sleepy.  We encourage you to not do anything that requires balance, judgment or coordination.    MILD FLU-LIKE SYMPTOMS ARE NORMAL. YOU MAY EXPERIENCE GENERALIZED MUSCLE ACHES, THROAT IRRITATION, HEADACHE AND/OR SOME NAUSEA.    FOR 24 HOURS DO NOT:  Drive, operate machinery or run household appliances.  Drink beer or alcoholic beverages.   Make important decisions or sign legal documents.      DIET: To avoid nausea, slowly advance diet as tolerated, avoiding spicy or greasy foods for the first day.  Add more substantial food to your diet according to your physician's instructions.  Babies can be fed formula or breast milk as soon as they are hungry.  INCREASE FLUIDS AND FIBER TO AVOID CONSTIPATION.    SURGICAL DRESSING/BATHING:     Keep the dressing clean and dry for 24 hours.  May remove dressing tomorrow at 10 am and shower.  Do not submerge for 1 week.  No heavy lifting.      FOLLOW-UP APPOINTMENT:    A follow-up appointment should be arranged with your doctor ; call to schedule.  Appointment with [facility] with Dr. Gaffney on Wednesday 7-5-2017  at 1 pm. TEL # [phone].    You should CALL YOUR PHYSICIAN if you develop:  Fever greater than 101 degrees F.  Pain not relieved by medication, or persistent nausea or vomiting.  Excessive bleeding (blood soaking through dressing) or unexpected drainage from the wound.  Extreme redness or swelling around the incision site, drainage of pus or foul smelling drainage.  Inability to urinate or empty your bladder within 8 hours.  Problems with breathing or chest pain.    You should call 911 if you develop problems with breathing or chest pain.  If you are unable to contact your doctor or surgical center, you should go to the nearest emergency room or urgent care center.      Physician's telephone #:  [phone]    If any questions arise, call your doctor.  If your doctor is not available, please feel free to call the Surgical Center at [phone].  The Center is open Monday through Friday from 7AM to 7PM.  You can also call the HEALTH HOTLINE open 24 hours/day, 7 days/week and speak to a nurse at [phone], or toll free at [phone].    A registered nurse may call you a few days after your surgery to see how you are doing after your procedure.    MEDICATIONS: Resume taking daily medication.  Take prescribed pain medication with food.  If no medication is prescribed, you may take non-aspirin pain medication if needed.  PAIN MEDICATION CAN BE VERY CONSTIPATING.  Take a stool softener or laxative such as senokot, pericolace, or milk of magnesia if needed.      If your physician has prescribed pain medication that includes Acetaminophen (Tylenol), do not take additional Acetaminophen (Tylenol) while taking the prescribed medication.    Depression / Suicide Risk    As you are discharged from this [facility] facility, it is important to learn how to keep safe from harming yourself.    Recognize the warning signs:  · Abrupt changes in personality, positive or negative- including increase in energy   · Giving away possessions  · Change in eating patterns- significant weight changes-  positive or negative  · Change in sleeping patterns- unable to sleep or sleeping all the time   · Unwillingness or inability to communicate  · Depression  · Unusual sadness, discouragement and loneliness  · Talk of wanting to die  · Neglect of personal appearance   · Rebelliousness- reckless behavior  · Withdrawal from people/activities they love  · Confusion- inability to concentrate     If you or a loved one observes any of these behaviors or has concerns about self-harm, here's what you can do:  · Talk about it- your feelings and reasons for harming yourself  · Remove any means that you might use to hurt yourself (examples:  "pills, rope, extension cords, firearm)  · Get professional help from the community (Mental Health, Substance Abuse, psychological counseling)  · Do not be alone:Call your Safe Contact- someone whom you trust who will be there for you.  · Call your local CRISIS HOTLINE 320-7051 or 922-836-0580  · Call your local Children's Mobile Crisis Response Team [place] (814) 693-9888 or www.MyFeelBack  · Call the toll free National Suicide Prevention Hotlines   · National Suicide Prevention Lifeline 578-330-RSEQ (9065)  · [employer] Hope Line Network 800-SUICIDE (810-9773)        Post Angiogram Groin Care Instructions     INSTRUCTIONS  2. Examine (look and feel) the site of your incision site TODAY so you can recognize changes that should be called to your doctor (see below).  3. Avoid straining either by lifting or pulling objects for 4-5 days. Avoid lifting over 5 pounds.   4. For at least 72 hours, if you should sneeze or cough, please hold pressure over your groin area.  5. If you should begin to have oozing from the catheterization site, please hold firm pressure and call your doctor's office immediately.  6. If profuse bleeding occurs from the catheterization site, hold firm pressure and call \"[phone]\" immediately for assistance.  7. Remove bandage after 24 hours.     ACTIVITY  2. Limit activity as instructed by your doctor.  3. No driving or very limited driving with frequent stops for one week.   4. If you must take a long car ride, stop every hour and walk around the car.   5. Warm showers or baths are permitted after the bandage is removed. Avoid hot showers, baths, hot tubs, and swimming for one week.    PLEASE CALL YOUR DOCTOR IF:  1. Temperature elevation occurs.  2. Catheterization site becomes reddened or begins to drain.   3. Bruising appears to be new or not resolving. The bruise may move down your leg. This is normal.  4. The small round lump in the groin increases in size.  5. Any leg numbness, aching, " or discomfort (immediately).  6. Increasing discomfort in the leg at the insertion site.  7. Chest pains, even if relieved by Nitroglycerin.    MISCELLANEOUS INSTRUCTIONS  1. Bruising may occur as a result of heart catheterization. Some of the discoloration may travel down the leg, going from blue to green in color.  2. A small round lump under the catheterization site will remain for up to six weeks.  3. If any questions arise call your physician's office. You can also call the HEALTH HOTLINE open 24 hours/day, 7 days/week and speak to a nurse at [phone], or toll free at [phone].   4. You should call 911 if you develop problems with breathing or chest pain.      I acknowledge receipt and understanding of these Home Care instructions.

## 2017-07-03 NOTE — CATH LAB
Immediate Post-Operative Note      PreOp Diagnosis:   1. MESA  2. DM2  3. CAD previously known  4. Positive stress test    PostOp Diagnosis:   1. 50% distal LM stenosis, highly eccentric  2. Diffuse prioximal 80% LAD stenosis  3. Tandem focal 80% bulky proximal and mid LCx disease with OM involvement (Gaines 0,1,0)  4. LVEF 65%    Procedure(s) :  Coronary Angiography, Left Heart Catheterization, Left Ventriculography    Surgeon(s):  Moris Mitchell M.D.    Type of Anesthesia: Moderate Sedation    Specimen: None    Complications: none apparent    Recs:  CABG      Moris Mitchell M.D.  7/3/2017 9:01 AM

## 2017-07-03 NOTE — OR NURSING
0911: Patient from cath lab to PPU via gurney. Patient is awake and on bedrest/flat X 3 hours. VSS. RLE CMS intact. R groin sites soft and dressing clean, dry and intact. Vascular access care management per MD order (see assessment). No c/o pain or nausea at this time. Will monitor closely. Vital signs q 15 min x 4, q 30 min x 4 and q 1 hour per MD order.  0930: VSS. Patient tolerating PO well.   1000: VSS. Carotid US done at bedside.   1015: Dr. Gaffney at bedside for surgery consult.  1045: Dr. Gaffney spoke with wife.   1145. Wife at bedside. VSS. R groin soft, CDI. HOB up 30 degrees.   1155: Dr. Mitchell at bedside to update patient and wife. Questions answered. R groin soft, CDI.   1200: VSS. Patient met criteria for discharge.

## 2017-07-03 NOTE — IP AVS SNAPSHOT
" Home Care Instructions                                                                                                                Name:Raf Pelayo  Medical Record Number:[id number]  CSN: [id number]    YOB: 1940   Age: 76 y.o.  Sex: male  HT:1.778 m (5' 10\") WT: 89.4 kg (197 lb 1.5 oz)          Admit Date: 7/3/2017     Discharge Date:   Today's Date: 7/3/2017  Attending Doctor:  Moris Mitchell M.D.                  Allergies:  Review of patient's allergies indicates no known allergies.                Discharge Instructions         ACTIVITY: Rest and take it easy for the first 24 hours.  A responsible adult is recommended to remain with you during that time.  It is normal to feel sleepy.  We encourage you to not do anything that requires balance, judgment or coordination.    MILD FLU-LIKE SYMPTOMS ARE NORMAL. YOU MAY EXPERIENCE GENERALIZED MUSCLE ACHES, THROAT IRRITATION, HEADACHE AND/OR SOME NAUSEA.    FOR 24 HOURS DO NOT:  Drive, operate machinery or run household appliances.  Drink beer or alcoholic beverages.   Make important decisions or sign legal documents.      DIET: To avoid nausea, slowly advance diet as tolerated, avoiding spicy or greasy foods for the first day.  Add more substantial food to your diet according to your physician's instructions.  Babies can be fed formula or breast milk as soon as they are hungry.  INCREASE FLUIDS AND FIBER TO AVOID CONSTIPATION.    SURGICAL DRESSING/BATHING:     Keep the dressing clean and dry for 24 hours.  May remove dressing tomorrow at 10 am and shower.  Do not submerge for 1 week.  No heavy lifting.      FOLLOW-UP APPOINTMENT:    A follow-up appointment should be arranged with your doctor ; call to schedule.  Appointment with [facility] with Dr. Gaffney on Wednesday 7-5-2017  at 1 pm. TEL # [phone].    You should CALL YOUR PHYSICIAN if you develop:  Fever greater than 101 degrees F.  Pain not relieved by medication, or " persistent nausea or vomiting.  Excessive bleeding (blood soaking through dressing) or unexpected drainage from the wound.  Extreme redness or swelling around the incision site, drainage of pus or foul smelling drainage.  Inability to urinate or empty your bladder within 8 hours.  Problems with breathing or chest pain.    You should call 911 if you develop problems with breathing or chest pain.  If you are unable to contact your doctor or surgical center, you should go to the nearest emergency room or urgent care center.      Physician's telephone #: [phone]    If any questions arise, call your doctor.  If your doctor is not available, please feel free to call the Surgical Center at [phone].  The Center is open Monday through Friday from 7AM to 7PM.  You can also call the peerTransfer HOTLINE open 24 hours/day, 7 days/week and speak to a nurse at [phone], or toll free at [phone].    A registered nurse may call you a few days after your surgery to see how you are doing after your procedure.    MEDICATIONS: Resume taking daily medication.  Take prescribed pain medication with food.  If no medication is prescribed, you may take non-aspirin pain medication if needed.  PAIN MEDICATION CAN BE VERY CONSTIPATING.  Take a stool softener or laxative such as senokot, pericolace, or milk of magnesia if needed.      If your physician has prescribed pain medication that includes Acetaminophen (Tylenol), do not take additional Acetaminophen (Tylenol) while taking the prescribed medication.    Depression / Suicide Risk    As you are discharged from this [facility] facility, it is important to learn how to keep safe from harming yourself.    Recognize the warning signs:  · Abrupt changes in personality, positive or negative- including increase in energy   · Giving away possessions  · Change in eating patterns- significant weight changes-  positive or negative  · Change in sleeping patterns- unable to sleep or  "sleeping all the time   · Unwillingness or inability to communicate  · Depression  · Unusual sadness, discouragement and loneliness  · Talk of wanting to die  · Neglect of personal appearance   · Rebelliousness- reckless behavior  · Withdrawal from people/activities they love  · Confusion- inability to concentrate     If you or a loved one observes any of these behaviors or has concerns about self-harm, here's what you can do:  · Talk about it- your feelings and reasons for harming yourself  · Remove any means that you might use to hurt yourself (examples: pills, rope, extension cords, firearm)  · Get professional help from the community (Mental Health, Substance Abuse, psychological counseling)  · Do not be alone:Call your Safe Contact- someone whom you trust who will be there for you.  · Call your local CRISIS HOTLINE 050-5873 or 634-853-1559  · Call your local Children's Mobile Crisis Response Team [place] (687) 954-8335 or www.Dreamise  · Call the toll free National Suicide Prevention Hotlines   · National Suicide Prevention Lifeline 259-242-DHSW (1873)  · National Hope Line Network 800-SUICIDE (700-8401)        Post Angiogram Groin Care Instructions     INSTRUCTIONS  2. Examine (look and feel) the site of your incision site TODAY so you can recognize changes that should be called to your doctor (see below).  3. Avoid straining either by lifting or pulling objects for 4-5 days. Avoid lifting over 5 pounds.   4. For at least 72 hours, if you should sneeze or cough, please hold pressure over your groin area.  5. If you should begin to have oozing from the catheterization site, please hold firm pressure and call your doctor's office immediately.  6. If profuse bleeding occurs from the catheterization site, hold firm pressure and call \"463\" immediately for assistance.  7. Remove bandage after 24 hours.     ACTIVITY  2. Limit activity as instructed by your doctor.  3. No driving or very limited driving " with frequent stops for one week.   4. If you must take a long car ride, stop every hour and walk around the car.   5. Warm showers or baths are permitted after the bandage is removed. Avoid hot showers, baths, hot tubs, and swimming for one week.    PLEASE CALL YOUR DOCTOR IF:  1. Temperature elevation occurs.  2. Catheterization site becomes reddened or begins to drain.   3. Bruising appears to be new or not resolving. The bruise may move down your leg. This is normal.  4. The small round lump in the groin increases in size.  5. Any leg numbness, aching, or discomfort (immediately).  6. Increasing discomfort in the leg at the insertion site.  7. Chest pains, even if relieved by Nitroglycerin.    MISCELLANEOUS INSTRUCTIONS  1. Bruising may occur as a result of heart catheterization. Some of the discoloration may travel down the leg, going from blue to green in color.  2. A small round lump under the catheterization site will remain for up to six weeks.  3. If any questions arise call your physician's office. You can also call the LABOMAR HOTLINE open 24 hours/day, 7 days/week and speak to a nurse at [phone], or toll free at [phone].   4. You should call 911 if you develop problems with breathing or chest pain.      I acknowledge receipt and understanding of these Home Care instructions.         Medication List      CHANGE how you take these medications        Instructions    Morning Afternoon Evening Bedtime    NON SPECIFIED   What changed:  additional instructions        Apply blue emu oil to painful shoulders                          CONTINUE taking these medications        Instructions    Morning Afternoon Evening Bedtime    aspirin 81 MG Chew chewable tablet   Commonly known as:  ASA        Take 81 mg by mouth every day.   Dose:  81 mg                        atorvastatin 20 MG Tabs   Commonly known as:  LIPITOR        Take 1 Tab by mouth every bedtime.   Dose:  20 mg                        Blood  Glucose Monitoring Suppl Magy        Meter: Dispense Device of Insurance Preference. Sig. Use daily for blood sugar monitoring. #1. E11.9                        GLUCOSAMINE CHONDR 1500 COMPLX Caps        Take 1 Cap by mouth every day.   Dose:  1 Cap                        glucose blood strip        Disp blood glucose test strips to go with selected blood glucose meter--test daily E11.9                        metformin 500 MG Tabs   Commonly known as:  GLUCOPHAGE        Take 1 Tab by mouth 2 times a day, with meals.   Dose:  500 mg                        therapeutic multivitamin-minerals Tabs        Take 1 Tab by mouth every day.   Dose:  1 Tab                        vitamin D3 (cholecalciferol) 1000 UNIT Tabs        Take 1 Tab by mouth every day.   Dose:  1000 Units                                Medication Information     Above and/or attached are the medications your physician expects you to take upon discharge. Review all of your home medications and newly ordered medications with your doctor and/or pharmacist. Follow medication instructions as directed by your doctor and/or pharmacist. Please keep your medication list with you and share with your physician. Update the information when medications are discontinued, doses are changed, or new medications (including over-the-counter products) are added; and carry medication information at all times in the event of emergency situations.        Resources     Quit Smoking / Tobacco Use:    I understand the use of any tobacco products increases my chance of suffering from future heart disease or stroke and could cause other illnesses which may shorten my life. Quitting the use of tobacco products is the single most important thing I can do to improve my health. For further information on smoking / tobacco cessation call a Toll Free Quit Line at 1-403.265.4517 (*National [employer] [place]) or 1-736.550.1025 (American Lung Association) or you can access the web based  program at www.lungusa.org.    [place] Tobacco Users Help Line:  (954) 840-2390       Toll Free: 1-313.995.9500  Quit Tobacco Program [facility] (056)352-5664    Crisis Hotline:    [place] Crisis Hotline:  5-821-ESPIXUK or 1-168.861.6212    [place] Crisis Hotline:    1-174.937.4176 or 467-601-1549    Discharge Survey:   Thank you for choosing [facility]. We hope we did everything we could to make your hospital stay a pleasant one. You may be receiving a survey and we would appreciate your time and participation in answering the questions. Your input is very valuable to us in our efforts to improve our service to our patients and their families.            Signatures     My signature on this form indicates that:    1. I acknowledge receipt and understanding of these Home Care Instruction.  2. My questions regarding this information have been answered to my satisfaction.  3. I have formulated a plan with my discharge nurse to obtain my prescribed medications for home.    __________________________________      __________________________________                   Patient Signature                                 Guardian/Responsible Adult Signature      __________________________________                 __________       ________                       Nurse Signature                                               Date                 Time

## 2017-07-03 NOTE — CONSULTS
DATE OF SERVICE: 07/03/2017    CHIEF COMPLAINT:  Shortness of breath and dypsnea on exertion    HISTORY OF PRESENT ILLNESS:  A pleasant 76-year-old gentleman with shortness of breath progressive.  Nor est pain.  No classic angina. Non smoker, non drinker, family history positive (brother 35 with CAD), hypertension, diabetes insulin dep[endemnt, dyslipidemia.  Positive stress test.  Denies TIA, stroke, claudication.  Heart catheterization shows 50% lefty main, long 80% LAD, ostial OM, ostial small OM 2, EF 50%.    ALLERGIES:  None.    FAMILY HISTORY:  Faily history positive, brother 35.   m  SOCIAL HISTORY:  , wife currently not present.  Lives in [place], [place].      REVIEW OF SYSTEMS:  A 14-point review of systems negative except for that    mentioned above.    MEDICATIONS:  Per medication list.    PHYSICAL EXAMINATION:  VITAL SIGNS:  Stable.  Blood pressure 140/74, respirations 12, heart rate 50.  HEENT:  PERRLA, EOM normal.  NEUROLOGIC:  Symmetric, intact, moving all 4 extremities, equal cranial    nerves.  CARDIAC:  S1, S2.  No S3.  No murmur.  PULMONARY:  Clear.  ABDOMEN:  Flat, scaphoid.  EXTREMITIES:  Normal range of motion.  Saphenous vein good for harvesting    bilaterally.  VASCULAR:  Femoral, carotids, radials 1+.  SKIN:  Clear.  PSYCHIATRIC:  Affect appropriate.  STERNUM:  Clear.  ENDOCRINE:  No thyromegaly.    IMPRESSION:  1.  Coronary artery disease  2. Shortness of breath anginal equivilant  3. HTN  4. Dyslipidemia,  5. Insulin dependent diabetes mellitus  6. Family history coronary disease  7. Positive stress test    PLAN:      I believe he is a [place] Classification of 3, [place] Heart Classification 3  His STS mortality is 1%,  His STS mortality and morbidity is 10%  Child [place] Class A     I have spent 2 hours reviewing films, discussing with cardiologist and with patient.    I have explained the risks, alternatives,  intent, and expectation and procedure to patient.    He has risk of  mortality of  1%.  In addition, the risk of infection, bleeding, heart attack, stroke, blood   transfusion risk, liver, lung, kidney failure, diaphragmatic paralysis,    paresis, ulnar neuropathy, chest wall paresthesia, leg paresthesia,    reoperation, bleeding, reoperation, infection, tracheostomy, ventilation,    AIDS, hepatitis, missed counts, surgical items, increased risk of bleeding.    Wife is not present at bedside, however we have spoken to her on the telephone, questions and concerns all addressed.     Patient will have carotids done today, pre-op work up on Wednesday and we will schedule fro surgery on Thursday 7/6/2017 at 0800.      Plan discussed with Dr. Vila who is in agreement.  Patient verbalizes understanding and agrees to plan of care

## 2017-07-05 ENCOUNTER — HOSPITAL ENCOUNTER (OUTPATIENT)
Dept: RADIOLOGY | Facility: MEDICAL CENTER | Age: 77
DRG: 236 | End: 2017-07-05
Attending: THORACIC SURGERY (CARDIOTHORACIC VASCULAR SURGERY)
Payer: MEDICARE

## 2017-07-05 ENCOUNTER — HOSPITAL ENCOUNTER (OUTPATIENT)
Dept: RADIOLOGY | Facility: MEDICAL CENTER | Age: 77
DRG: 236 | End: 2017-07-05
Attending: THORACIC SURGERY (CARDIOTHORACIC VASCULAR SURGERY) | Admitting: THORACIC SURGERY (CARDIOTHORACIC VASCULAR SURGERY)
Payer: MEDICARE

## 2017-07-05 DIAGNOSIS — I25.10 ATHEROSCLEROSIS OF NATIVE CORONARY ARTERY OF NATIVE HEART WITHOUT ANGINA PECTORIS: ICD-10-CM

## 2017-07-05 LAB
ABO GROUP BLD: NORMAL
ALBUMIN SERPL BCP-MCNC: 4 G/DL (ref 3.2–4.9)
ALBUMIN/GLOB SERPL: 1.4 G/DL
ALP SERPL-CCNC: 56 U/L (ref 30–99)
ALT SERPL-CCNC: 26 U/L (ref 2–50)
ANION GAP SERPL CALC-SCNC: 9 MMOL/L (ref 0–11.9)
APTT PPP: 31.1 SEC (ref 24.7–36)
AST SERPL-CCNC: 22 U/L (ref 12–45)
BASOPHILS # BLD AUTO: 0.6 % (ref 0–1.8)
BASOPHILS # BLD: 0.05 K/UL (ref 0–0.12)
BILIRUB SERPL-MCNC: 0.4 MG/DL (ref 0.1–1.5)
BLD GP AB SCN SERPL QL: NORMAL
BUN SERPL-MCNC: 12 MG/DL (ref 8–22)
CALCIUM SERPL-MCNC: 9.1 MG/DL (ref 8.5–10.5)
CHLORIDE SERPL-SCNC: 107 MMOL/L (ref 96–112)
CO2 SERPL-SCNC: 24 MMOL/L (ref 20–33)
CREAT SERPL-MCNC: 0.88 MG/DL (ref 0.5–1.4)
EKG IMPRESSION: NORMAL
EOSINOPHIL # BLD AUTO: 0.2 K/UL (ref 0–0.51)
EOSINOPHIL NFR BLD: 2.2 % (ref 0–6.9)
ERYTHROCYTE [DISTWIDTH] IN BLOOD BY AUTOMATED COUNT: 43.2 FL (ref 35.9–50)
EST. AVERAGE GLUCOSE BLD GHB EST-MCNC: 160 MG/DL
GFR SERPL CREATININE-BSD FRML MDRD: >60 ML/MIN/1.73 M 2
GLOBULIN SER CALC-MCNC: 2.8 G/DL (ref 1.9–3.5)
GLUCOSE SERPL-MCNC: 104 MG/DL (ref 65–99)
HBA1C MFR BLD: 7.2 % (ref 0–5.6)
HCT VFR BLD AUTO: 43.5 % (ref 42–52)
HGB BLD-MCNC: 14.2 G/DL (ref 14–18)
IMM GRANULOCYTES # BLD AUTO: 0.05 K/UL (ref 0–0.11)
IMM GRANULOCYTES NFR BLD AUTO: 0.6 % (ref 0–0.9)
INR PPP: 1.09 (ref 0.87–1.13)
LYMPHOCYTES # BLD AUTO: 2.05 K/UL (ref 1–4.8)
LYMPHOCYTES NFR BLD: 23 % (ref 22–41)
MCH RBC QN AUTO: 30.3 PG (ref 27–33)
MCHC RBC AUTO-ENTMCNC: 32.6 G/DL (ref 33.7–35.3)
MCV RBC AUTO: 92.8 FL (ref 81.4–97.8)
MONOCYTES # BLD AUTO: 0.68 K/UL (ref 0–0.85)
MONOCYTES NFR BLD AUTO: 7.6 % (ref 0–13.4)
NEUTROPHILS # BLD AUTO: 5.87 K/UL (ref 1.82–7.42)
NEUTROPHILS NFR BLD: 66 % (ref 44–72)
NRBC # BLD AUTO: 0 K/UL
NRBC BLD AUTO-RTO: 0 /100 WBC
PLATELET # BLD AUTO: 232 K/UL (ref 164–446)
PMV BLD AUTO: 9.9 FL (ref 9–12.9)
POTASSIUM SERPL-SCNC: 3.9 MMOL/L (ref 3.6–5.5)
PROT SERPL-MCNC: 6.8 G/DL (ref 6–8.2)
PROTHROMBIN TIME: 14.4 SEC (ref 12–14.6)
RBC # BLD AUTO: 4.69 M/UL (ref 4.7–6.1)
RH BLD: NORMAL
SODIUM SERPL-SCNC: 140 MMOL/L (ref 135–145)
WBC # BLD AUTO: 8.9 K/UL (ref 4.8–10.8)

## 2017-07-05 PROCEDURE — 71020 DX-CHEST-2 VIEWS: CPT

## 2017-07-05 PROCEDURE — 85025 COMPLETE CBC W/AUTO DIFF WBC: CPT

## 2017-07-05 PROCEDURE — 85730 THROMBOPLASTIN TIME PARTIAL: CPT

## 2017-07-05 PROCEDURE — 700105 HCHG RX REV CODE 258: Performed by: THORACIC SURGERY (CARDIOTHORACIC VASCULAR SURGERY)

## 2017-07-05 PROCEDURE — 83036 HEMOGLOBIN GLYCOSYLATED A1C: CPT

## 2017-07-05 PROCEDURE — 93005 ELECTROCARDIOGRAM TRACING: CPT | Performed by: THORACIC SURGERY (CARDIOTHORACIC VASCULAR SURGERY)

## 2017-07-05 PROCEDURE — 700101 HCHG RX REV CODE 250: Performed by: THORACIC SURGERY (CARDIOTHORACIC VASCULAR SURGERY)

## 2017-07-05 PROCEDURE — 86900 BLOOD TYPING SEROLOGIC ABO: CPT

## 2017-07-05 PROCEDURE — 86850 RBC ANTIBODY SCREEN: CPT

## 2017-07-05 PROCEDURE — 85610 PROTHROMBIN TIME: CPT

## 2017-07-05 PROCEDURE — 86901 BLOOD TYPING SEROLOGIC RH(D): CPT

## 2017-07-05 PROCEDURE — 80053 COMPREHEN METABOLIC PANEL: CPT

## 2017-07-05 PROCEDURE — 93010 ELECTROCARDIOGRAM REPORT: CPT | Performed by: INTERNAL MEDICINE

## 2017-07-06 ENCOUNTER — APPOINTMENT (OUTPATIENT)
Dept: RADIOLOGY | Facility: MEDICAL CENTER | Age: 77
DRG: 236 | End: 2017-07-06
Attending: THORACIC SURGERY (CARDIOTHORACIC VASCULAR SURGERY)
Payer: MEDICARE

## 2017-07-06 ENCOUNTER — HOSPITAL ENCOUNTER (INPATIENT)
Facility: MEDICAL CENTER | Age: 77
LOS: 4 days | DRG: 236 | End: 2017-07-10
Attending: THORACIC SURGERY (CARDIOTHORACIC VASCULAR SURGERY) | Admitting: THORACIC SURGERY (CARDIOTHORACIC VASCULAR SURGERY)
Payer: MEDICARE

## 2017-07-06 DIAGNOSIS — R06.09 DYSPNEA ON EXERTION: ICD-10-CM

## 2017-07-06 DIAGNOSIS — E78.2 MIXED HYPERLIPIDEMIA: ICD-10-CM

## 2017-07-06 DIAGNOSIS — I25.118 CORONARY ARTERY DISEASE OF NATIVE ARTERY OF NATIVE HEART WITH STABLE ANGINA PECTORIS (HCC): ICD-10-CM

## 2017-07-06 DIAGNOSIS — I25.9 ISCHEMIC HEART DISEASE: ICD-10-CM

## 2017-07-06 PROBLEM — I25.10 CAD (CORONARY ARTERY DISEASE): Status: ACTIVE | Noted: 2017-07-06

## 2017-07-06 LAB
ABO GROUP BLD: NORMAL
ACT BLD: 114 SEC (ref 74–137)
ACT BLD: 131 SEC (ref 74–137)
ACT BLD: 428 SEC (ref 74–137)
ACT BLD: 428 SEC (ref 74–137)
ACT BLD: 456 SEC (ref 74–137)
APTT PPP: 37.2 SEC (ref 24.7–36)
BASE EXCESS BLDA CALC-SCNC: -1 MMOL/L (ref -4–3)
BASE EXCESS BLDA CALC-SCNC: -2 MMOL/L (ref -4–3)
BASE EXCESS BLDA CALC-SCNC: -3 MMOL/L (ref -4–3)
BASE EXCESS BLDA CALC-SCNC: -4 MMOL/L (ref -4–3)
BASE EXCESS BLDA CALC-SCNC: -6 MMOL/L (ref -4–3)
BASE EXCESS BLDA CALC-SCNC: -6 MMOL/L (ref -4–3)
BASE EXCESS BLDA CALC-SCNC: -7 MMOL/L (ref -4–3)
BASE EXCESS BLDA CALC-SCNC: 1 MMOL/L (ref -4–3)
BASE EXCESS BLDV CALC-SCNC: -1 MMOL/L (ref -4–3)
BODY TEMPERATURE: ABNORMAL DEGREES
CA-I BLD ISE-SCNC: 1.02 MMOL/L (ref 1.1–1.3)
CA-I BLD ISE-SCNC: 1.05 MMOL/L (ref 1.1–1.3)
CA-I BLD ISE-SCNC: 1.06 MMOL/L (ref 1.1–1.3)
CA-I BLD ISE-SCNC: 1.07 MMOL/L (ref 1.1–1.3)
CA-I BLD ISE-SCNC: 1.18 MMOL/L (ref 1.1–1.3)
CO2 BLDA-SCNC: 20 MMOL/L (ref 20–33)
CO2 BLDA-SCNC: 21 MMOL/L (ref 20–33)
CO2 BLDA-SCNC: 21 MMOL/L (ref 20–33)
CO2 BLDA-SCNC: 22 MMOL/L (ref 20–33)
CO2 BLDA-SCNC: 23 MMOL/L (ref 20–33)
CO2 BLDA-SCNC: 25 MMOL/L (ref 20–33)
CO2 BLDA-SCNC: 26 MMOL/L (ref 20–33)
CO2 BLDA-SCNC: 27 MMOL/L (ref 20–33)
CO2 BLDV-SCNC: 25 MMOL/L (ref 20–33)
EKG IMPRESSION: NORMAL
EXPOSED MRN EXMRN: NORMAL
GLUCOSE BLD-MCNC: 118 MG/DL (ref 65–99)
GLUCOSE BLD-MCNC: 122 MG/DL (ref 65–99)
GLUCOSE BLD-MCNC: 136 MG/DL (ref 65–99)
GLUCOSE BLD-MCNC: 147 MG/DL (ref 65–99)
GLUCOSE BLD-MCNC: 152 MG/DL (ref 65–99)
HBV SURFACE AG SER QL: NEGATIVE
HCO3 BLDA-SCNC: 18.9 MMOL/L (ref 17–25)
HCO3 BLDA-SCNC: 19.4 MMOL/L (ref 17–25)
HCO3 BLDA-SCNC: 19.6 MMOL/L (ref 17–25)
HCO3 BLDA-SCNC: 20.8 MMOL/L (ref 17–25)
HCO3 BLDA-SCNC: 21.8 MMOL/L (ref 17–25)
HCO3 BLDA-SCNC: 24 MMOL/L (ref 17–25)
HCO3 BLDA-SCNC: 24.7 MMOL/L (ref 17–25)
HCO3 BLDA-SCNC: 25.7 MMOL/L (ref 17–25)
HCO3 BLDV-SCNC: 23.8 MMOL/L (ref 24–28)
HCT VFR BLD CALC: 27 % (ref 42–52)
HCT VFR BLD CALC: 27 % (ref 42–52)
HCT VFR BLD CALC: 30 % (ref 42–52)
HCT VFR BLD CALC: 31 % (ref 42–52)
HCT VFR BLD CALC: 36 % (ref 42–52)
HCV AB SER QL: NEGATIVE
HGB BLD-MCNC: 10.2 G/DL (ref 14–18)
HGB BLD-MCNC: 10.5 G/DL (ref 14–18)
HGB BLD-MCNC: 12.2 G/DL (ref 14–18)
HGB BLD-MCNC: 9.2 G/DL (ref 14–18)
HGB BLD-MCNC: 9.2 G/DL (ref 14–18)
HIV 1+2 AB+HIV1 P24 AG SERPL QL IA: NON REACTIVE
INR PPP: 1.31 (ref 0.87–1.13)
MAGNESIUM SERPL-MCNC: 2.3 MG/DL (ref 1.5–2.5)
MAGNESIUM SERPL-MCNC: 2.6 MG/DL (ref 1.5–2.5)
O2/TOTAL GAS SETTING VFR VENT: 100 %
O2/TOTAL GAS SETTING VFR VENT: 40 %
O2/TOTAL GAS SETTING VFR VENT: 40 %
O2/TOTAL GAS SETTING VFR VENT: 45 %
O2/TOTAL GAS SETTING VFR VENT: 60 %
O2/TOTAL GAS SETTING VFR VENT: 80 %
O2/TOTAL GAS SETTING VFR VENT: 85 %
PCO2 BLDA: 35.5 MMHG (ref 26–37)
PCO2 BLDA: 36.6 MMHG (ref 26–37)
PCO2 BLDA: 36.9 MMHG (ref 26–37)
PCO2 BLDA: 37.3 MMHG (ref 26–37)
PCO2 BLDA: 38.7 MMHG (ref 26–37)
PCO2 BLDA: 41.2 MMHG (ref 26–37)
PCO2 BLDA: 44.7 MMHG (ref 26–37)
PCO2 BLDA: 45.7 MMHG (ref 26–37)
PCO2 BLDV: 38.9 MMHG (ref 41–51)
PCO2 TEMP ADJ BLDA: 35.1 MMHG (ref 26–37)
PCO2 TEMP ADJ BLDA: 35.1 MMHG (ref 26–37)
PCO2 TEMP ADJ BLDA: 36.1 MMHG (ref 26–37)
PCO2 TEMP ADJ BLDA: 36.2 MMHG (ref 26–37)
PCO2 TEMP ADJ BLDA: 36.7 MMHG (ref 26–37)
PCO2 TEMP ADJ BLDA: 41.2 MMHG (ref 26–37)
PCO2 TEMP ADJ BLDA: 44.7 MMHG (ref 26–37)
PCO2 TEMP ADJ BLDA: 45.7 MMHG (ref 26–37)
PCO2 TEMP ADJ BLDV: 38.9 MMHG (ref 41–51)
PH BLDA: 7.32 [PH] (ref 7.4–7.5)
PH BLDA: 7.33 [PH] (ref 7.4–7.5)
PH BLDA: 7.34 [PH] (ref 7.4–7.5)
PH BLDA: 7.34 [PH] (ref 7.4–7.5)
PH BLDA: 7.35 [PH] (ref 7.4–7.5)
PH BLDA: 7.36 [PH] (ref 7.4–7.5)
PH BLDA: 7.36 [PH] (ref 7.4–7.5)
PH BLDA: 7.4 [PH] (ref 7.4–7.5)
PH BLDV: 7.39 [PH] (ref 7.31–7.45)
PH TEMP ADJ BLDA: 7.33 [PH] (ref 7.4–7.5)
PH TEMP ADJ BLDA: 7.33 [PH] (ref 7.4–7.5)
PH TEMP ADJ BLDA: 7.34 [PH] (ref 7.4–7.5)
PH TEMP ADJ BLDA: 7.38 [PH] (ref 7.4–7.5)
PH TEMP ADJ BLDA: 7.38 [PH] (ref 7.4–7.5)
PH TEMP ADJ BLDA: 7.4 [PH] (ref 7.4–7.5)
PH TEMP ADJ BLDV: 7.39 [PH] (ref 7.31–7.45)
PLATELET # BLD AUTO: 151 K/UL (ref 164–446)
PLATELET # BLD AUTO: 161 K/UL (ref 164–446)
PO2 BLDA: 104 MMHG (ref 64–87)
PO2 BLDA: 105 MMHG (ref 64–87)
PO2 BLDA: 117 MMHG (ref 64–87)
PO2 BLDA: 155 MMHG (ref 64–87)
PO2 BLDA: 233 MMHG (ref 64–87)
PO2 BLDA: 369 MMHG (ref 64–87)
PO2 BLDA: 394 MMHG (ref 64–87)
PO2 BLDA: 429 MMHG (ref 64–87)
PO2 BLDV: 46 MMHG (ref 25–40)
PO2 TEMP ADJ BLDA: 104 MMHG (ref 64–87)
PO2 TEMP ADJ BLDA: 119 MMHG (ref 64–87)
PO2 TEMP ADJ BLDA: 147 MMHG (ref 64–87)
PO2 TEMP ADJ BLDA: 226 MMHG (ref 64–87)
PO2 TEMP ADJ BLDA: 369 MMHG (ref 64–87)
PO2 TEMP ADJ BLDA: 394 MMHG (ref 64–87)
PO2 TEMP ADJ BLDA: 429 MMHG (ref 64–87)
PO2 TEMP ADJ BLDA: 99 MMHG (ref 64–87)
PO2 TEMP ADJ BLDV: 46 MMHG (ref 25–40)
POTASSIUM BLD-SCNC: 4.1 MMOL/L (ref 3.6–5.5)
POTASSIUM BLD-SCNC: 4.5 MMOL/L (ref 3.6–5.5)
POTASSIUM BLD-SCNC: 5.8 MMOL/L (ref 3.6–5.5)
POTASSIUM SERPL-SCNC: 4.1 MMOL/L (ref 3.6–5.5)
PROTHROMBIN TIME: 16.7 SEC (ref 12–14.6)
SAO2 % BLDA: 100 % (ref 93–99)
SAO2 % BLDA: 98 % (ref 93–99)
SAO2 % BLDA: 99 % (ref 93–99)
SAO2 % BLDV: 82 %
SODIUM BLD-SCNC: 137 MMOL/L (ref 135–145)
SODIUM BLD-SCNC: 138 MMOL/L (ref 135–145)
SODIUM BLD-SCNC: 138 MMOL/L (ref 135–145)
SODIUM BLD-SCNC: 140 MMOL/L (ref 135–145)
SODIUM BLD-SCNC: 141 MMOL/L (ref 135–145)
SPECIMEN DRAWN FROM PATIENT: ABNORMAL

## 2017-07-06 PROCEDURE — 71010 DX-CHEST-PORTABLE (1 VIEW): CPT

## 2017-07-06 PROCEDURE — 500815 HCHG LOCATORS, AC VEIN GRAFT: Performed by: THORACIC SURGERY (CARDIOTHORACIC VASCULAR SURGERY)

## 2017-07-06 PROCEDURE — 160048 HCHG OR STATISTICAL LEVEL 1-5: Performed by: THORACIC SURGERY (CARDIOTHORACIC VASCULAR SURGERY)

## 2017-07-06 PROCEDURE — 502627 HCHG HEMOSTAT, SURGICEL 4X4: Performed by: THORACIC SURGERY (CARDIOTHORACIC VASCULAR SURGERY)

## 2017-07-06 PROCEDURE — 700102 HCHG RX REV CODE 250 W/ 637 OVERRIDE(OP): Performed by: THORACIC SURGERY (CARDIOTHORACIC VASCULAR SURGERY)

## 2017-07-06 PROCEDURE — 84295 ASSAY OF SERUM SODIUM: CPT | Mod: 91

## 2017-07-06 PROCEDURE — 82330 ASSAY OF CALCIUM: CPT | Mod: 91

## 2017-07-06 PROCEDURE — 93010 ELECTROCARDIOGRAM REPORT: CPT | Performed by: INTERNAL MEDICINE

## 2017-07-06 PROCEDURE — C1898 LEAD, PMKR, OTHER THAN TRANS: HCPCS | Performed by: THORACIC SURGERY (CARDIOTHORACIC VASCULAR SURGERY)

## 2017-07-06 PROCEDURE — 85610 PROTHROMBIN TIME: CPT

## 2017-07-06 PROCEDURE — 93320 DOPPLER ECHO COMPLETE: CPT

## 2017-07-06 PROCEDURE — 85730 THROMBOPLASTIN TIME PARTIAL: CPT

## 2017-07-06 PROCEDURE — 700101 HCHG RX REV CODE 250

## 2017-07-06 PROCEDURE — 700105 HCHG RX REV CODE 258

## 2017-07-06 PROCEDURE — 93005 ELECTROCARDIOGRAM TRACING: CPT | Performed by: NURSE PRACTITIONER

## 2017-07-06 PROCEDURE — 02100Z9 BYPASS CORONARY ARTERY, ONE ARTERY FROM LEFT INTERNAL MAMMARY, OPEN APPROACH: ICD-10-PCS | Performed by: THORACIC SURGERY (CARDIOTHORACIC VASCULAR SURGERY)

## 2017-07-06 PROCEDURE — P9047 ALBUMIN (HUMAN), 25%, 50ML: HCPCS

## 2017-07-06 PROCEDURE — 5A1221Z PERFORMANCE OF CARDIAC OUTPUT, CONTINUOUS: ICD-10-PCS | Performed by: THORACIC SURGERY (CARDIOTHORACIC VASCULAR SURGERY)

## 2017-07-06 PROCEDURE — 500294 HCHG CLAMP, BULLDOG 1/2 FORCE BLUE: Performed by: THORACIC SURGERY (CARDIOTHORACIC VASCULAR SURGERY)

## 2017-07-06 PROCEDURE — 93312 ECHO TRANSESOPHAGEAL: CPT

## 2017-07-06 PROCEDURE — 700105 HCHG RX REV CODE 258: Performed by: NURSE PRACTITIONER

## 2017-07-06 PROCEDURE — 82962 GLUCOSE BLOOD TEST: CPT | Mod: 91

## 2017-07-06 PROCEDURE — 502654 HCHG INSERT, OFF-PUMP: Performed by: THORACIC SURGERY (CARDIOTHORACIC VASCULAR SURGERY)

## 2017-07-06 PROCEDURE — A6403 STERILE GAUZE>16 <= 48 SQ IN: HCPCS | Performed by: THORACIC SURGERY (CARDIOTHORACIC VASCULAR SURGERY)

## 2017-07-06 PROCEDURE — 770022 HCHG ROOM/CARE - ICU (200)

## 2017-07-06 PROCEDURE — A9270 NON-COVERED ITEM OR SERVICE: HCPCS | Performed by: NURSE PRACTITIONER

## 2017-07-06 PROCEDURE — C1751 CATH, INF, PER/CENT/MIDLINE: HCPCS | Performed by: THORACIC SURGERY (CARDIOTHORACIC VASCULAR SURGERY)

## 2017-07-06 PROCEDURE — 502240 HCHG MISC OR SUPPLY RC 0272: Performed by: THORACIC SURGERY (CARDIOTHORACIC VASCULAR SURGERY)

## 2017-07-06 PROCEDURE — 93010 ELECTROCARDIOGRAM REPORT: CPT | Mod: 77 | Performed by: INTERNAL MEDICINE

## 2017-07-06 PROCEDURE — 93325 DOPPLER ECHO COLOR FLOW MAPG: CPT

## 2017-07-06 PROCEDURE — 160042 HCHG SURGERY MINUTES - EA ADDL 1 MIN LEVEL 5: Performed by: THORACIC SURGERY (CARDIOTHORACIC VASCULAR SURGERY)

## 2017-07-06 PROCEDURE — C9248 INJ, CLEVIDIPINE BUTYRATE: HCPCS

## 2017-07-06 PROCEDURE — 500767 HCHG KIT, ENDOSCOPIC VEIN HARVEST: Performed by: THORACIC SURGERY (CARDIOTHORACIC VASCULAR SURGERY)

## 2017-07-06 PROCEDURE — 84132 ASSAY OF SERUM POTASSIUM: CPT

## 2017-07-06 PROCEDURE — 501183 HCHG PUNCH, AORTIC #4: Performed by: THORACIC SURGERY (CARDIOTHORACIC VASCULAR SURGERY)

## 2017-07-06 PROCEDURE — 500896 HCHG PACK, VASCULAR (FOR ROOM 10): Performed by: THORACIC SURGERY (CARDIOTHORACIC VASCULAR SURGERY)

## 2017-07-06 PROCEDURE — 700101 HCHG RX REV CODE 250: Performed by: NURSE PRACTITIONER

## 2017-07-06 PROCEDURE — 500824 HCHG MISTER BLOWER, CTS: Performed by: THORACIC SURGERY (CARDIOTHORACIC VASCULAR SURGERY)

## 2017-07-06 PROCEDURE — 501745 HCHG WIRE, SURGICAL STEEL: Performed by: THORACIC SURGERY (CARDIOTHORACIC VASCULAR SURGERY)

## 2017-07-06 PROCEDURE — 700111 HCHG RX REV CODE 636 W/ 250 OVERRIDE (IP)

## 2017-07-06 PROCEDURE — 94002 VENT MGMT INPAT INIT DAY: CPT

## 2017-07-06 PROCEDURE — B24BZZ4 ULTRASONOGRAPHY OF HEART WITH AORTA, TRANSESOPHAGEAL: ICD-10-PCS | Performed by: THORACIC SURGERY (CARDIOTHORACIC VASCULAR SURGERY)

## 2017-07-06 PROCEDURE — 82803 BLOOD GASES ANY COMBINATION: CPT | Mod: 91

## 2017-07-06 PROCEDURE — 500385 HCHG DRAIN, PLEUROVAC ADUL: Performed by: THORACIC SURGERY (CARDIOTHORACIC VASCULAR SURGERY)

## 2017-07-06 PROCEDURE — 500890 HCHG PACK, OPEN HEART: Performed by: THORACIC SURGERY (CARDIOTHORACIC VASCULAR SURGERY)

## 2017-07-06 PROCEDURE — 700111 HCHG RX REV CODE 636 W/ 250 OVERRIDE (IP): Performed by: THORACIC SURGERY (CARDIOTHORACIC VASCULAR SURGERY)

## 2017-07-06 PROCEDURE — 500002 HCHG ADHESIVE, DERMABOND: Performed by: THORACIC SURGERY (CARDIOTHORACIC VASCULAR SURGERY)

## 2017-07-06 PROCEDURE — 85049 AUTOMATED PLATELET COUNT: CPT

## 2017-07-06 PROCEDURE — A6402 STERILE GAUZE <= 16 SQ IN: HCPCS | Performed by: THORACIC SURGERY (CARDIOTHORACIC VASCULAR SURGERY)

## 2017-07-06 PROCEDURE — 500043 HCHG BAG-A-JET: Performed by: THORACIC SURGERY (CARDIOTHORACIC VASCULAR SURGERY)

## 2017-07-06 PROCEDURE — C1725 CATH, TRANSLUMIN NON-LASER: HCPCS | Performed by: THORACIC SURGERY (CARDIOTHORACIC VASCULAR SURGERY)

## 2017-07-06 PROCEDURE — 94667 MNPJ CHEST WALL 1ST: CPT

## 2017-07-06 PROCEDURE — 021209W BYPASS CORONARY ARTERY, THREE ARTERIES FROM AORTA WITH AUTOLOGOUS VENOUS TISSUE, OPEN APPROACH: ICD-10-PCS | Performed by: THORACIC SURGERY (CARDIOTHORACIC VASCULAR SURGERY)

## 2017-07-06 PROCEDURE — 503000 HCHG SUTURE, OHS: Performed by: THORACIC SURGERY (CARDIOTHORACIC VASCULAR SURGERY)

## 2017-07-06 PROCEDURE — 501519 HCHG SUTURE, E PACK: Performed by: THORACIC SURGERY (CARDIOTHORACIC VASCULAR SURGERY)

## 2017-07-06 PROCEDURE — 160031 HCHG SURGERY MINUTES - 1ST 30 MINS LEVEL 5: Performed by: THORACIC SURGERY (CARDIOTHORACIC VASCULAR SURGERY)

## 2017-07-06 PROCEDURE — 160009 HCHG ANES TIME/MIN: Performed by: THORACIC SURGERY (CARDIOTHORACIC VASCULAR SURGERY)

## 2017-07-06 PROCEDURE — 83735 ASSAY OF MAGNESIUM: CPT

## 2017-07-06 PROCEDURE — 700105 HCHG RX REV CODE 258: Performed by: THORACIC SURGERY (CARDIOTHORACIC VASCULAR SURGERY)

## 2017-07-06 PROCEDURE — 94669 MECHANICAL CHEST WALL OSCILL: CPT

## 2017-07-06 PROCEDURE — 700111 HCHG RX REV CODE 636 W/ 250 OVERRIDE (IP): Performed by: NURSE PRACTITIONER

## 2017-07-06 PROCEDURE — 501445 HCHG STAPLER, SKIN DISP: Performed by: THORACIC SURGERY (CARDIOTHORACIC VASCULAR SURGERY)

## 2017-07-06 PROCEDURE — 85347 COAGULATION TIME ACTIVATED: CPT | Mod: 91

## 2017-07-06 PROCEDURE — 85014 HEMATOCRIT: CPT | Mod: 91

## 2017-07-06 PROCEDURE — 500053 HCHG BANDAGE, ELASTIC 4: Performed by: THORACIC SURGERY (CARDIOTHORACIC VASCULAR SURGERY)

## 2017-07-06 PROCEDURE — 503001 HCHG PERFUSION: Performed by: THORACIC SURGERY (CARDIOTHORACIC VASCULAR SURGERY)

## 2017-07-06 PROCEDURE — 06BQ4ZZ EXCISION OF LEFT SAPHENOUS VEIN, PERCUTANEOUS ENDOSCOPIC APPROACH: ICD-10-PCS | Performed by: THORACIC SURGERY (CARDIOTHORACIC VASCULAR SURGERY)

## 2017-07-06 PROCEDURE — 500734 HCHG INSERT, STEALTH: Performed by: THORACIC SURGERY (CARDIOTHORACIC VASCULAR SURGERY)

## 2017-07-06 PROCEDURE — 700102 HCHG RX REV CODE 250 W/ 637 OVERRIDE(OP): Performed by: NURSE PRACTITIONER

## 2017-07-06 PROCEDURE — 700101 HCHG RX REV CODE 250: Performed by: THORACIC SURGERY (CARDIOTHORACIC VASCULAR SURGERY)

## 2017-07-06 PROCEDURE — C1729 CATH, DRAINAGE: HCPCS | Performed by: THORACIC SURGERY (CARDIOTHORACIC VASCULAR SURGERY)

## 2017-07-06 PROCEDURE — C1894 INTRO/SHEATH, NON-LASER: HCPCS | Performed by: THORACIC SURGERY (CARDIOTHORACIC VASCULAR SURGERY)

## 2017-07-06 PROCEDURE — 82803 BLOOD GASES ANY COMBINATION: CPT

## 2017-07-06 PROCEDURE — 82947 ASSAY GLUCOSE BLOOD QUANT: CPT | Mod: 91

## 2017-07-06 PROCEDURE — 84132 ASSAY OF SERUM POTASSIUM: CPT | Mod: 91

## 2017-07-06 PROCEDURE — 500074 HCHG BLADE, BEAVER (OHS): Performed by: THORACIC SURGERY (CARDIOTHORACIC VASCULAR SURGERY)

## 2017-07-06 PROCEDURE — 500359 HCHG DILATORS, PARSONNET (OHS): Performed by: THORACIC SURGERY (CARDIOTHORACIC VASCULAR SURGERY)

## 2017-07-06 DEVICE — LOCATORS AC VEIN GRAFT (OHS) - 10/BX  SCANLAN #250-1001-83: Type: IMPLANTABLE DEVICE | Status: FUNCTIONAL

## 2017-07-06 RX ORDER — DOCUSATE SODIUM 100 MG/1
100 CAPSULE, LIQUID FILLED ORAL EVERY MORNING
Status: DISCONTINUED | OUTPATIENT
Start: 2017-07-07 | End: 2017-07-10 | Stop reason: HOSPADM

## 2017-07-06 RX ORDER — ALUMINA, MAGNESIA, AND SIMETHICONE 2400; 2400; 240 MG/30ML; MG/30ML; MG/30ML
30 SUSPENSION ORAL EVERY 4 HOURS PRN
Status: DISCONTINUED | OUTPATIENT
Start: 2017-07-06 | End: 2017-07-10 | Stop reason: HOSPADM

## 2017-07-06 RX ORDER — OXYCODONE HYDROCHLORIDE 5 MG/1
5 TABLET ORAL
Status: DISCONTINUED | OUTPATIENT
Start: 2017-07-06 | End: 2017-07-10 | Stop reason: HOSPADM

## 2017-07-06 RX ORDER — DIPHENHYDRAMINE HCL 25 MG
25 TABLET ORAL
Status: DISCONTINUED | OUTPATIENT
Start: 2017-07-06 | End: 2017-07-10 | Stop reason: HOSPADM

## 2017-07-06 RX ORDER — NITROGLYCERIN 20 MG/100ML
0-100 INJECTION INTRAVENOUS CONTINUOUS
Status: DISCONTINUED | OUTPATIENT
Start: 2017-07-06 | End: 2017-07-07 | Stop reason: ALTCHOICE

## 2017-07-06 RX ORDER — ACETAMINOPHEN 650 MG/1
650 SUPPOSITORY RECTAL EVERY 4 HOURS PRN
Status: DISCONTINUED | OUTPATIENT
Start: 2017-07-06 | End: 2017-07-10 | Stop reason: HOSPADM

## 2017-07-06 RX ORDER — OXYCODONE HYDROCHLORIDE 10 MG/1
10 TABLET ORAL
Status: DISCONTINUED | OUTPATIENT
Start: 2017-07-06 | End: 2017-07-10 | Stop reason: HOSPADM

## 2017-07-06 RX ORDER — SODIUM CHLORIDE 9 MG/ML
INJECTION, SOLUTION INTRAVENOUS
Status: ACTIVE
Start: 2017-07-06 | End: 2017-07-06

## 2017-07-06 RX ORDER — PAPAVERINE HYDROCHLORIDE 30 MG/ML
INJECTION INTRAMUSCULAR; INTRAVENOUS
Status: DISCONTINUED | OUTPATIENT
Start: 2017-07-06 | End: 2017-07-06 | Stop reason: HOSPADM

## 2017-07-06 RX ORDER — SODIUM CHLORIDE 9 MG/ML
INJECTION, SOLUTION INTRAVENOUS CONTINUOUS
Status: DISCONTINUED | OUTPATIENT
Start: 2017-07-06 | End: 2017-07-10 | Stop reason: HOSPADM

## 2017-07-06 RX ORDER — ROSUVASTATIN CALCIUM 20 MG/1
20 TABLET, COATED ORAL
Status: DISCONTINUED | OUTPATIENT
Start: 2017-07-06 | End: 2017-07-10 | Stop reason: HOSPADM

## 2017-07-06 RX ORDER — CLOPIDOGREL BISULFATE 75 MG/1
75 TABLET ORAL DAILY
Status: DISCONTINUED | OUTPATIENT
Start: 2017-07-07 | End: 2017-07-10 | Stop reason: HOSPADM

## 2017-07-06 RX ORDER — BISACODYL 10 MG
10 SUPPOSITORY, RECTAL RECTAL
Status: DISCONTINUED | OUTPATIENT
Start: 2017-07-06 | End: 2017-07-10 | Stop reason: HOSPADM

## 2017-07-06 RX ORDER — ASPIRIN 81 MG/1
81 TABLET, CHEWABLE ORAL DAILY
Status: DISCONTINUED | OUTPATIENT
Start: 2017-07-07 | End: 2017-07-10 | Stop reason: HOSPADM

## 2017-07-06 RX ORDER — ACETAMINOPHEN 325 MG/1
650 TABLET ORAL EVERY 4 HOURS PRN
Status: DISCONTINUED | OUTPATIENT
Start: 2017-07-06 | End: 2017-07-10 | Stop reason: HOSPADM

## 2017-07-06 RX ORDER — MORPHINE SULFATE 4 MG/ML
4 INJECTION, SOLUTION INTRAMUSCULAR; INTRAVENOUS
Status: DISCONTINUED | OUTPATIENT
Start: 2017-07-06 | End: 2017-07-06 | Stop reason: ALTCHOICE

## 2017-07-06 RX ORDER — AMOXICILLIN 250 MG
1 CAPSULE ORAL NIGHTLY
Status: DISCONTINUED | OUTPATIENT
Start: 2017-07-06 | End: 2017-07-10 | Stop reason: HOSPADM

## 2017-07-06 RX ORDER — LIDOCAINE AND PRILOCAINE 25; 25 MG/G; MG/G
1 CREAM TOPICAL
Status: COMPLETED | OUTPATIENT
Start: 2017-07-06 | End: 2017-07-06

## 2017-07-06 RX ORDER — DEXTROSE MONOHYDRATE 25 G/50ML
25 INJECTION, SOLUTION INTRAVENOUS PRN
Status: DISCONTINUED | OUTPATIENT
Start: 2017-07-06 | End: 2017-07-07

## 2017-07-06 RX ORDER — LACTULOSE 20 G/30ML
30 SOLUTION ORAL
Status: DISCONTINUED | OUTPATIENT
Start: 2017-07-06 | End: 2017-07-10 | Stop reason: HOSPADM

## 2017-07-06 RX ORDER — LIDOCAINE HYDROCHLORIDE 10 MG/ML
0.5 INJECTION, SOLUTION INFILTRATION; PERINEURAL
Status: COMPLETED | OUTPATIENT
Start: 2017-07-06 | End: 2017-07-06

## 2017-07-06 RX ORDER — AMOXICILLIN 250 MG
1 CAPSULE ORAL
Status: DISCONTINUED | OUTPATIENT
Start: 2017-07-06 | End: 2017-07-10 | Stop reason: HOSPADM

## 2017-07-06 RX ORDER — LIDOCAINE HYDROCHLORIDE 10 MG/ML
INJECTION, SOLUTION INFILTRATION; PERINEURAL
Status: COMPLETED
Start: 2017-07-06 | End: 2017-07-06

## 2017-07-06 RX ORDER — MIDAZOLAM HYDROCHLORIDE 1 MG/ML
.5-2 INJECTION INTRAMUSCULAR; INTRAVENOUS
Status: DISCONTINUED | OUTPATIENT
Start: 2017-07-06 | End: 2017-07-06 | Stop reason: ALTCHOICE

## 2017-07-06 RX ORDER — ENEMA 19; 7 G/133ML; G/133ML
1 ENEMA RECTAL
Status: COMPLETED | OUTPATIENT
Start: 2017-07-06 | End: 2017-07-10

## 2017-07-06 RX ORDER — ONDANSETRON 2 MG/ML
4 INJECTION INTRAMUSCULAR; INTRAVENOUS EVERY 6 HOURS PRN
Status: DISCONTINUED | OUTPATIENT
Start: 2017-07-06 | End: 2017-07-10 | Stop reason: HOSPADM

## 2017-07-06 RX ORDER — TRAMADOL HYDROCHLORIDE 50 MG/1
50 TABLET ORAL EVERY 4 HOURS PRN
Status: DISCONTINUED | OUTPATIENT
Start: 2017-07-06 | End: 2017-07-10 | Stop reason: HOSPADM

## 2017-07-06 RX ORDER — SODIUM CHLORIDE, SODIUM GLUCONATE, SODIUM ACETATE, POTASSIUM CHLORIDE AND MAGNESIUM CHLORIDE 526; 502; 368; 37; 30 MG/100ML; MG/100ML; MG/100ML; MG/100ML; MG/100ML
INJECTION, SOLUTION INTRAVENOUS PRN
Status: DISCONTINUED | OUTPATIENT
Start: 2017-07-06 | End: 2017-07-07 | Stop reason: ALTCHOICE

## 2017-07-06 RX ORDER — POTASSIUM CHLORIDE 7.45 MG/ML
10 INJECTION INTRAVENOUS ONCE
Status: COMPLETED | OUTPATIENT
Start: 2017-07-06 | End: 2017-07-06

## 2017-07-06 RX ORDER — PROMETHAZINE HYDROCHLORIDE 25 MG/1
25 SUPPOSITORY RECTAL EVERY 6 HOURS PRN
Status: DISCONTINUED | OUTPATIENT
Start: 2017-07-06 | End: 2017-07-10 | Stop reason: HOSPADM

## 2017-07-06 RX ADMIN — POTASSIUM CHLORIDE 10 MEQ: 7.46 INJECTION, SOLUTION INTRAVENOUS at 18:21

## 2017-07-06 RX ADMIN — INSULIN HUMAN 1 UNITS: 100 INJECTION, SOLUTION PARENTERAL at 19:26

## 2017-07-06 RX ADMIN — EPINEPHRINE 0.02 MCG/KG/MIN: 1 INJECTION PARENTERAL at 15:36

## 2017-07-06 RX ADMIN — MAGNESIUM SULFATE HEPTAHYDRATE 1 G: 1 INJECTION, SOLUTION INTRAVENOUS at 13:35

## 2017-07-06 RX ADMIN — DEXMEDETOMIDINE HYDROCHLORIDE 0.2 MCG/KG/HR: 100 INJECTION, SOLUTION INTRAVENOUS at 15:34

## 2017-07-06 RX ADMIN — INSULIN HUMAN 2 UNITS: 100 INJECTION, SOLUTION PARENTERAL at 18:28

## 2017-07-06 RX ADMIN — VANCOMYCIN HYDROCHLORIDE 1500 MG: 100 INJECTION, POWDER, LYOPHILIZED, FOR SOLUTION INTRAVENOUS at 20:00

## 2017-07-06 RX ADMIN — OXYCODONE HYDROCHLORIDE 5 MG: 5 TABLET ORAL at 17:12

## 2017-07-06 RX ADMIN — LIDOCAINE HYDROCHLORIDE 0.5 ML: 10 INJECTION, SOLUTION INFILTRATION; PERINEURAL at 06:55

## 2017-07-06 RX ADMIN — POTASSIUM CHLORIDE 10 MEQ: 7.46 INJECTION, SOLUTION INTRAVENOUS at 13:35

## 2017-07-06 RX ADMIN — MUPIROCIN 1 APPLICATION: 20 OINTMENT TOPICAL at 21:15

## 2017-07-06 RX ADMIN — INSULIN HUMAN 1 UNITS: 100 INJECTION, SOLUTION PARENTERAL at 17:33

## 2017-07-06 RX ADMIN — SODIUM CHLORIDE: 9 INJECTION, SOLUTION INTRAVENOUS at 13:51

## 2017-07-06 RX ADMIN — INSULIN HUMAN 2 UNITS: 100 INJECTION, SOLUTION PARENTERAL at 14:17

## 2017-07-06 RX ADMIN — INSULIN HUMAN 2 UNITS: 100 INJECTION, SOLUTION PARENTERAL at 16:28

## 2017-07-06 RX ADMIN — INSULIN HUMAN 1 UNITS: 100 INJECTION, SOLUTION PARENTERAL at 20:37

## 2017-07-06 RX ADMIN — INSULIN HUMAN 1 UNITS: 100 INJECTION, SOLUTION PARENTERAL at 21:33

## 2017-07-06 RX ADMIN — SODIUM CHLORIDE, SODIUM GLUCONATE, SODIUM ACETATE, POTASSIUM CHLORIDE AND MAGNESIUM CHLORIDE 1000 ML: 526; 502; 368; 37; 30 INJECTION, SOLUTION INTRAVENOUS at 15:52

## 2017-07-06 RX ADMIN — AMIODARONE HYDROCHLORIDE 1 MG/MIN: 50 INJECTION, SOLUTION INTRAVENOUS at 17:45

## 2017-07-06 RX ADMIN — SODIUM CHLORIDE 2 UNITS/HR: 9 INJECTION, SOLUTION INTRAVENOUS at 13:30

## 2017-07-06 RX ADMIN — INSULIN HUMAN 1 UNITS: 100 INJECTION, SOLUTION PARENTERAL at 22:26

## 2017-07-06 RX ADMIN — CALCIUM CHLORIDE 1 G: 100 INJECTION, SOLUTION INTRAVENOUS at 17:40

## 2017-07-06 RX ADMIN — ROSUVASTATIN CALCIUM 20 MG: 20 TABLET ORAL at 21:15

## 2017-07-06 RX ADMIN — OXYCODONE HYDROCHLORIDE 10 MG: 10 TABLET ORAL at 20:00

## 2017-07-06 RX ADMIN — SODIUM CHLORIDE, SODIUM GLUCONATE, SODIUM ACETATE, POTASSIUM CHLORIDE AND MAGNESIUM CHLORIDE 1000 ML: 526; 502; 368; 37; 30 INJECTION, SOLUTION INTRAVENOUS at 13:00

## 2017-07-06 ASSESSMENT — LIFESTYLE VARIABLES
EVER_SMOKED: NEVER
REASON UNABLE TO ASSESS: INTUBATED

## 2017-07-06 ASSESSMENT — PAIN SCALES - GENERAL
PAINLEVEL_OUTOF10: 3
PAINLEVEL_OUTOF10: 7
PAINLEVEL_OUTOF10: 0
PAINLEVEL_OUTOF10: 0
PAINLEVEL_OUTOF10: 5

## 2017-07-06 ASSESSMENT — COPD QUESTIONNAIRES
HAVE YOU SMOKED AT LEAST 100 CIGARETTES IN YOUR ENTIRE LIFE: NO/DON'T KNOW
COPD SCREENING SCORE: 2
DO YOU EVER COUGH UP ANY MUCUS OR PHLEGM?: NO/ONLY WITH OCCASIONAL COLDS OR INFECTIONS
DURING THE PAST 4 WEEKS HOW MUCH DID YOU FEEL SHORT OF BREATH: NONE/LITTLE OF THE TIME

## 2017-07-06 NOTE — IP AVS SNAPSHOT
" Home Care Instructions                                                                                                                  Name:Raf Pelayo  Medical Record Number:[id number]  CSN: [id number]    YOB: 1940   Age: 76 y.o.  Sex: male  HT:1.778 m (5' 10\") WT: 88.8 kg (195 lb 12.3 oz)          Admit Date: 7/6/2017     Discharge Date:   Today's Date: 7/10/2017  Attending Doctor:  Anthony Gaffney M.D.                  Allergies:  Review of patient's allergies indicates no known allergies.            Discharge Instructions       Discharge Instructions    Discharged to home by car with relative. Discharged via wheelchair, hospital escort: Yes.  Special equipment needed: Not Applicable    Be sure to schedule a follow-up appointment with your primary care doctor or any specialists as instructed.     Discharge Plan:   Influenza Vaccine Indication: Not indicated: Previously immunized this influenza season and > 8 years of age    I understand that a diet low in cholesterol, fat, and sodium is recommended for good health. Unless I have been given specific instructions below for another diet, I accept this instruction as my diet prescription.   Other diet: cardiac    Special Instructions: heart surgery    Cardiac Surgery Discharge Instructions/[facility]    Activity:  1. NO driving for 4 weeks after surgery. You may ride as a passenger.  2. NO lifting of any item over 10 lbs (e.g. gallon of milk) for 6 weeks after surgery.  Do not raise both arms above head, only one at a time.  Do not push or pull with your arms.  3. Walk as much as possible! Walk a minimum of 4 times per day. Depending on your fatigue and comfort level, you may walk as much as you wish. There is no maximum.  4. Other physical activities (sex, housework, gardening, etc.) are OK after 4 weeks.  5. Continue using incentive spirometer for 2 weeks.  If you are going home on oxygen and you were not on oxygen prior to " surgery, keep using until you are oxygen free.  6. Weigh daily and write it down starting  the next morning after you arrive home. Call your doctor for a weight gain of 2-4 lbs in 1-2 days.    Incision Care:  1.  Home health nurse or patient may remove chest dressing 7 days post-surgery (on July 13, 2017) or sooner if the battery runs out or the device alarms. When the battery runs out, the vacuum will lose suction and the dressing will puff up.  Slowly roll down the dressing edges, until the entire dressing is removed. Then the entire dressing/incision vacuum may be thrown away.   2. Once the chest dressing is removed, keep the incision open to air. SHOWER EVERYDAY-no baths. Make sure to clean your incision(s) twice daily with plain, perfume and dye-free soap (if you shower in the morning, stand at the sink at bedtime and clean incision with soap and water). Then pat incision(s) dry with clean towel. Avoid creams or lotions on the incision(s).           3. If there is any increase in redness or swelling, or separation of the incision line, or thick drainage* from any of the incisions, call [facility] ([phone]). * Clear, thin drainage is not abnormal especially from the leg incision and/or chest tube sites.       4. Continue to wear your KITTY Stockings for 4 weeks on the leg(s) with the incision(s) or swelling. You may take off the stocking(s) when in bed or when the leg(s) are elevated.    General Instructions:  1. You have been referred to Cardiac Rehab which is highly recommended for you after heart surgery. You can start Cardiac Rehab 30 days after surgery.  If you do not have an appointment at the time of discharge call [phone] to schedule an appointment.  2. Your Primary Care Doctor typically handles Home Oxygen. The Home Oxygen service that drops off your tanks should be checking your oxygen saturation levels. Oxygen may be stopped when you are > 90 % saturated on room air. Check with your  Primary Care Doctor if you are unsure.    Prescription Refills/Questions:   Call your usual Pharmacy for ALL refills  1. Pain medication questions only: Call [facility] at [phone].  (Remember that refills may require additional physician approval and will need at least 24 hours’ notice. Please call for refills on Mondays through Fridays from 9 am to 4 pm).  2. For all other medications (except pain medication): Call your Cardiology Group or Primary Care Doctor (not [facility]) for refills or questions.    [facility] IS ALWAYS AVAILABLE TO ANSWER YOUR QUESTIONS. DO NOT HESITATE TO CALL at [phone].    · Is patient discharged on Warfarin / Coumadin?   No     · Is patient Post Blood Transfusion?  No    Depression / Suicide Risk    As you are discharged from this [facility] facility, it is important to learn how to keep safe from harming yourself.    Recognize the warning signs:  · Abrupt changes in personality, positive or negative- including increase in energy   · Giving away possessions  · Change in eating patterns- significant weight changes-  positive or negative  · Change in sleeping patterns- unable to sleep or sleeping all the time   · Unwillingness or inability to communicate  · Depression  · Unusual sadness, discouragement and loneliness  · Talk of wanting to die  · Neglect of personal appearance   · Rebelliousness- reckless behavior  · Withdrawal from people/activities they love  · Confusion- inability to concentrate     If you or a loved one observes any of these behaviors or has concerns about self-harm, here's what you can do:  · Talk about it- your feelings and reasons for harming yourself  · Remove any means that you might use to hurt yourself (examples: pills, rope, extension cords, firearm)  · Get professional help from the community (Mental Health, Substance Abuse, psychological counseling)  · Do not be alone:Call your Safe Contact- someone whom you trust  who will be there for you.  · Call your local CRISIS HOTLINE 016-0761 or 153-694-1328  · Call your local Children's Mobile Crisis Response Team [place] (170) 046-4949 or www.WeDeliver  · Call the toll free National Suicide Prevention Hotlines   · National Suicide Prevention Lifeline 067-209-QUMV (7711)  · [employer] 800-SUICIDE (327-4050)    Coronary Artery Bypass Grafting  Coronary artery bypass grafting (CABG) is a procedure done to bypass or fix arteries of the heart (coronary arteries) that have become narrow or blocked. This narrowing is usually the result of plaque that has built up in the walls of the vessels. The coronary arteries supply the heart with the oxygen and nutrients it needs to pump blood to your body. In the CABG procedure, a section of blood vessel from another part of the body (usually the leg, arm, or chest wall) is removed and then inserted where it will allow blood to bypass the damaged part of the coronary artery.   LET YOUR HEALTH CARE PROVIDER KNOW ABOUT:  · Any allergies you have.    · All medicines you are taking, including blood thinners, vitamins, herbs, eye drops, creams, and over-the-counter medicines.    · Use of steroids (by mouth or creams).    · Previous problems you or members of your family have had with the use of anesthetics.    · Any blood disorders you have.    · Previous surgeries you have had.    · Medical conditions you have.    RISKS AND COMPLICATIONS  Generally, this is a safe procedure. However, problems can occur and include:   · Blood loss.    · Stroke.    · Infection.    · Pain at the surgical site.    · Heart attack during or after surgery.    · Kidney failure.    BEFORE THE PROCEDURE  · Take medicines only as directed by your health care provider. You may be asked to start new medicines and stop taking others. Do not stop medicines or adjust dosages on your own.  · Do not eat or drink anything after midnight the night before the  procedure or as directed by your health care provider. Ask your health care provider if it is okay to take a sip of water with any needed medicines.  PROCEDURE  The surgeon may use either an open technique or a minimally invasive technique for this surgery.  Traditional open surgery  · You will be given medicine to make you sleep through the procedure (general anesthetic).  · Once you are asleep, a cut (incision) will be made down the front of the chest through the breastbone (sternum). The sternum will be spread open so your heart can be seen.  · You will then be placed on a heart-lung bypass machine. This machine will provide oxygen to your blood while the heart is undergoing surgery.  · Your heart will then be temporarily stopped so that the surgeon can do the next steps.  · A section of vein will likely be taken from your leg and used to bypass the blocked arteries of your heart. Sometimes parts of an artery from inside your chest wall or from your arm will be used, either alone or in combination with leg veins.  · When the bypasses are done, you will be taken off the machine.  · Your heart will be restarted and will take over again normally.  · The sac around the heart will be closed.  · Your chest will then be closed with stitches or staples.  · Tubes will remain in your chest and will be connected to a suction device in order to help drain fluid and reinflate the lungs.  Minimally invasive surgery  This technique is done from an incision over your left chest area. If appropriate, your surgeon may not have to slow or stop your heart. If your condition allows for this procedure, there will often be less blood loss, less pain, a shorter hospital stay, and faster recovery compared to traditional open surgery.  AFTER THE PROCEDURE  · You will be taken to a recovery area to be monitored.  · You may wake up with a tube in your throat to help your breathing. You may be connected to a breathing machine. You will not  be able to talk while the tube is in place. The tube will be taken out as soon as it is safe to do so.  · You will be groggy and may have some pain. You will be given pain medicine to help control the pain.     This information is not intended to replace advice given to you by your health care provider. Make sure you discuss any questions you have with your health care provider.     Document Released: 09/27/2006 Document Revised: 01/08/2016 Document Reviewed: 05/27/2014  [employer] Interactive Patient Education ©2016 [employer]    Coronary Artery Bypass Grafting, Care After  Refer to this sheet in the next few weeks. These instructions provide you with information on caring for yourself after your procedure. Your health care provider may also give you more specific instructions. Your treatment has been planned according to current medical practices, but problems sometimes occur. Call your health care provider if you have any problems or questions after your procedure.  WHAT TO EXPECT AFTER THE PROCEDURE  Recovery from surgery will be different for everyone. Some people feel well after 3 or 4 weeks, while for others it takes longer. After your procedure, it is typical to have the following:  · Nausea and a lack of appetite.    · Constipation.  · Weakness and fatigue.    · Depression or irritability.    · Pain or discomfort at your incision site.  HOME CARE INSTRUCTIONS  · Take medicines only as directed by your health care provider. Do not stop taking medicines or start any new medicines without first checking with your health care provider.  · Take your pulse as directed by your health care provider.  · Perform deep breathing as directed by your health care provider. If you were given a device called an incentive spirometer, use it to practice deep breathing several times a day. Support your chest with a pillow or your arms when you take deep breaths or cough.  · Keep incision areas clean, dry, and protected.  Remove or change any bandages (dressings) only as directed by your health care provider. You may have skin adhesive strips over the incision areas. Do not take the strips off. They will fall off on their own.  · Check incision areas daily for any swelling, redness, or drainage.  · If incisions were made in your legs, do the following:  ¨ Avoid crossing your legs.    ¨ Avoid sitting for long periods of time. Change positions every 30 minutes.    ¨ Elevate your legs when you are sitting.  · Wear compression stockings as directed by your health care provider. These stockings help keep blood clots from forming in your legs.  · Take showers once your health care provider approves. Until then, only take sponge baths. Pat incisions dry. Do not rub incisions with a washcloth or towel. Do not take baths, swim, or use a hot tub until your health care provider approves.  · Eat foods that are high in fiber, such as raw fruits and vegetables, whole grains, beans, and nuts. Meats should be lean cut. Avoid canned, processed, and fried foods.  · Drink enough fluid to keep your urine clear or pale yellow.  · Weigh yourself every day. This helps identify if you are retaining fluid that may make your heart and lungs work harder.  · Rest and limit activity as directed by your health care provider. You may be instructed to:  ¨ Stop any activity at once if you have chest pain, shortness of breath, irregular heartbeats, or dizziness. Get help right away if you have any of these symptoms.  ¨ Move around frequently for short periods or take short walks as directed by your health care provider. Increase your activities gradually. You may need physical therapy or cardiac rehabilitation to help strengthen your muscles and build your endurance.  ¨ Avoid lifting, pushing, or pulling anything heavier than 10 lb (4.5 kg) for at least 6 weeks after surgery.  · Do not drive until your health care provider approves.   · Ask your health care provider  "when you may return to work.  · Ask your health care provider when you may resume sexual activity.  · Keep all follow-up visits as directed by your health care provider. This is important.  SEEK MEDICAL CARE IF:  · You have swelling, redness, increasing pain, or drainage at the site of an incision.  · You have a fever.  · You have swelling in your ankles or legs.  · You have pain in your legs.    · You gain 2 or more pounds (0.9 kg) a day.  · You are nauseous or vomit.  · You have diarrhea.   SEEK IMMEDIATE MEDICAL CARE IF:  · You have chest pain that goes to your jaw or arms.  · You have shortness of breath.    · You have a fast or irregular heartbeat.    · You notice a \"clicking\" in your breastbone (sternum) when you move.    · You have numbness or weakness in your arms or legs.  · You feel dizzy or light-headed.    MAKE SURE YOU:  · Understand these instructions.  · Will watch your condition.  · Will get help right away if you are not doing well or get worse.     This information is not intended to replace advice given to you by your health care provider. Make sure you discuss any questions you have with your health care provider.     Document Released: 07/07/2006 Document Revised: 01/08/2016 Document Reviewed: 05/27/2014  [employer] Interactive Patient Education ©2016 Elsevier Inc.      Your appointments     Jul 26, 2017  4:00 PM   HOSPITAL FOLLOW UP with LORENA Armando   [facility] (--)    [address]  [facility] [address]   [phone]            Aug 16, 2017  8:20 AM   Established Patient with Brandon Velázquez M.D.   [facility] [place]    [address] [facility] [address] [facility] [address]   [phone]           You will be receiving a confirmation call a few days before your appointment from our automated call confirmation system.            Sep 13, 2017  7:40 AM   Established Patient with Brandon Velázquez M.D.   [facility] [place]    [address] [facility] [address] [phone]           You will be receiving a confirmation call a few days before your appointment from our automated call confirmation system.              Follow-up Information     1. Follow up with Anthony Gaffney M.D. On 8/22/2017.    Specialty:  Cardiac Surgery    Why:  2:00 pm    Contact information    [id number]  [place] [address]  [phone]           Discharge Medication Instructions:    Below are the medications your physician expects you to take upon discharge:    Review all your home medications and newly ordered medications with your doctor and/or pharmacist. Follow medication instructions as directed by your doctor and/or pharmacist.    Please keep your medication list with you and share with your physician.               Medication List      START taking these medications        Instructions    Morning Afternoon Evening Bedtime    amiodarone 400 MG tablet   Last time this was given:  400 mg on 7/10/2017  8:01 AM   Commonly known as:  PACERONE        Take 1 Tab by mouth every day.   Dose:  400 mg                        clopidogrel 75 MG Tabs   Last time this was given:  75 mg on 7/10/2017  8:02 AM   Commonly known as:  PLAVIX        Take 1 Tab by mouth every day.   Dose:  75 mg                         MG Caps   Last time this was given:  100 mg on 7/10/2017  7:57 AM        Take 100 mg by mouth every morning.   Dose:  100 mg                        furosemide 40 MG Tabs   Commonly known as:  LASIX        Take 1 Tab by mouth 2 Times a Day.   Dose:  40 mg                        hydrocodone-acetaminophen 5-325 MG Tabs per tablet   Last time this was given:  2 Tabs on 7/10/2017 10:36 AM   Commonly known as:  NORCO        Take 1-2 Tabs by mouth every 6 hours as needed.   Dose:  1-2 Tab                        metoprolol 25 MG Tabs   Last time this was given:  25 mg on 7/10/2017  8:02 AM   Commonly known as:  LOPRESSOR        Take  1 Tab by mouth 2 Times a Day.   Dose:  25 mg                        potassium chloride SA 20 MEQ Tbcr   Last time this was given:  40 mEq on 7/10/2017 10:36 AM   Commonly known as:  Kdur        Take 1 Tab by mouth 2 Times a Day.   Dose:  20 mEq                          CHANGE how you take these medications        Instructions    Morning Afternoon Evening Bedtime    atorvastatin 40 MG Tabs   What changed:    - medication strength  - how much to take   Commonly known as:  LIPITOR        Take 1 Tab by mouth every bedtime.   Dose:  40 mg                        NON SPECIFIED   What changed:  additional instructions        Apply blue emu oil to painful shoulders                          CONTINUE taking these medications        Instructions    Morning Afternoon Evening Bedtime    aspirin 81 MG Chew chewable tablet   Last time this was given:  81 mg on 7/10/2017  8:01 AM   Commonly known as:  ASA        Take 81 mg by mouth every day.   Dose:  81 mg                        DAYQUIL MULTI-SYMPTOM COLD/FLU PO        Take 1 Cap by mouth every 6 hours as needed.   Dose:  1 Cap                        GLUCOSAMINE CHONDR 1500 COMPLX Caps        Take 1 Cap by mouth every day.   Dose:  1 Cap                        metformin 500 MG Tabs   Commonly known as:  GLUCOPHAGE        Take 1 Tab by mouth 2 times a day, with meals.   Dose:  500 mg                        therapeutic multivitamin-minerals Tabs        Take 1 Tab by mouth every day.   Dose:  1 Tab                        vitamin D3 (cholecalciferol) 1000 UNIT Tabs        Take 1 Tab by mouth every day.   Dose:  1000 Units                             Where to Get Your Medications      You can get these medications from any pharmacy     Bring a paper prescription for each of these medications    - clopidogrel 75 MG Tabs      Information about where to get these medications is not yet available     ! Ask your nurse or doctor about these medications    - amiodarone 400 MG tablet  -  atorvastatin 40 MG Tabs  -  MG Caps  - furosemide 40 MG Tabs  - hydrocodone-acetaminophen 5-325 MG Tabs per tablet  - metoprolol 25 MG Tabs  - potassium chloride SA 20 MEQ Tbcr            Orders for after discharge     REFERRAL TO HOME HEALTH    Complete by:  As directed    Home health will create and establish a plan of care       REFERRAL TO INTENSIVE CARDIAC REHAB/CARDIAC REHAB    Complete by:  As directed    Qualifying Diagnosis for Cardiac Rehab:    -Myocardial Infarction  -Old Myocardial Infarction  -Coronary Artery Bypass Surgery  -Stable Angina Pectoris  -PTCA Status with or without Stents  -Heart Valve Replaced by other means  -Heart Transplant   -Aneurysm Repair    Provider Exercise Prescription for Treatment Plan:  -Outpatient Cardiac Rehab (CR)/Intensive Cardiac Rehab (ICR), duration based on patient progress 1-3 times per week up to a total of 36/72 sessions over a period of up to 18/36 weeks    -Progressive interval exercise training for 20-45 minutes, 1-3 times per week in Cardiac Rehab utilizing Treadmill, Elliptical, Stationary Cycling, Arm Ergometer, other conditioning activities and appropriate home program supplement    -Light resistance training 2-4 weeks post PTCA, 2-4 weeks post MI, or 4-6 weeks post CABG, 4-6 weeks post aneurysm repair (20 # max)    -% TARGET HEART RATE OR MET’s:        RPE of 11-16 on a scale of 6-20       GXT Data:  40% - 80% exercise capacity using %HR max       HR below ischemic threshold (if determined, HR restriction)    -Education to promote an active healthy lifestyle and reduction of personal health risk factors    -Follow [facility] Policies and Procedures for Phase II CR/ICR             Instructions           Diet / Nutrition:    Follow any diet instructions given to you by your doctor or the dietician, including how much salt (sodium) you are allowed each day.    If you are overweight, talk to your doctor about a weight reduction plan.    Activity:    Remain  physically active following your doctor's instructions about exercise and activity.    Rest often.     Any time you become even a little tired or short of breath, SIT DOWN and rest.    Worsening Symptoms:    Report any of the following signs and symptoms to the doctor's office immediately:    *Pain of jaw, arm, or neck  *Chest pain not relieved by medication                               *Dizziness or loss of consciousness  *Difficulty breathing even when at rest   *More tired than usual                                       *Bleeding drainage or swelling of surgical site  *Swelling of feet, ankles, legs or stomach                 *Fever (>100ºF)  *Pink or blood tinged sputum  *Weight gain (3lbs/day or 5lbs /week)           *Shock from internal defibrillator (if applicable)  *Palpitations or irregular heartbeats                *Cool and/or numb extremities    Stroke Awareness    Common Risk Factors for Stroke include:    Age  Atrial Fibrillation  Carotid Artery Stenosis  Diabetes Mellitus  Excessive alcohol consumption  High blood pressure  Overweight   Physical inactivity  Smoking    Warning signs and symptoms of a stroke include:    *Sudden numbness or weakness of the face, arm or leg (especially on one side of the body).  *Sudden confusion, trouble speaking or understanding.  *Sudden trouble seeing in one or both eyes.  *Sudden trouble walking, dizziness, loss of balance or coordination.Sudden severe headache with no known cause.    It is very important to get treatment quickly when a stroke occurs. If you experience any of the above warning signs, call 911 immediately.                   Disclaimer         Quit Smoking / Tobacco Use:    I understand the use of any tobacco products increases my chance of suffering from future heart disease or stroke and could cause other illnesses which may shorten my life. Quitting the use of tobacco products is the single most important thing I can do to improve my health. For  further information on smoking / tobacco cessation call a Toll Free Quit Line at 1-553.110.7309 (*[facility]) or 1-595.575.9584 (American Lung Association) or you can access the web based program at www.lungusa.org.    [place] Tobacco Users Help Line:  (610) 889-7187       Toll Free: 1-746.209.6781  Quit Tobacco Program [facility]ly [facility] ([phone]    Crisis Hotline:    [place] Crisis Hotline:  1-123-EVZNGYY or 1-732.384.5798    [place] Crisis Hotline:    1-750.411.9689 or 989-007-3443    Discharge Survey:   Thank you for choosing [facility]. We hope we did everything we could to make your hospital stay a pleasant one. You may be receiving a phone survey and we would appreciate your time and participation in answering the questions. Your input is very valuable to us in our efforts to improve our service to our patients and their families.        My signature on this form indicates that:    1. I have reviewed and understand the above information.  2. My questions regarding this information have been answered to my satisfaction.  3. I have formulated a plan with my discharge nurse to obtain my prescribed medications for home.                  Disclaimer         __________________________________                     __________       ________                       Patient Signature                                                 Date                    Time

## 2017-07-06 NOTE — PROGRESS NOTES
1600: Run of ectopy noted on Pt's monitor, appears to be multifocal PVCs with a rate of 38-122bpm; systolic BP noted to be below 90mmHG; epinephrine drip stopped; EKG ordered; stat magnesium and potassium levels sent to lab; Pt's VS now back to normal trends.

## 2017-07-06 NOTE — PROGRESS NOTES
Per Dr. Rider, Pt extubated from the following parameters; RN and RT both in agreement.        Patient extubated from Spontaneous ventilator mode, 40 % FiO2, and 8 PEEP. FVC 1.1 L, NIF -11 cm H2O, RR 12, pInsp 10 cm H2O. See 1630 ABG     Total time intubated from reaching room 4 hours and 15 minutes.     Patient currently tolerating 4 L NC.; SpaO2 remains >/= 92%.

## 2017-07-06 NOTE — CARE PLAN
Problem: Pre Op  Goal: Optimal preparation for CABG/Heart Valve surgery  Intervention: Pre Op education to patient/significant other. Provide patient [facility] Patient Guideline for Cardiac Surgery (See Pt. Ed.)  Problem: Pre Op  Goal: Optimal preparation for CABG/Heart Valve surgery  Intervention: Pre Op education to patient/significant other. Provide patient wt Patient Guideline for Cardiac Surgery (See Pt. Ed.)  Intervention: Pre Op education to patient/significant other. Provide patient [facility] Patient Guideline for Cardiac Surgery (See Pt. Ed.)  Discussed anatomy and physiology of cardiac surgery with patient and family to include pre-op regimen. Reviewed post-op expectations to include  the use of incentive spirometry with return demonstration, ventilator management, cardiac monitoring, tubes and drains, early ambulation, and expected length of stay. Also provided information on Cardiac Rehab and how to schedule an appointment. Patient and family state full understanding of all information given.Reviewed prevention of bacterial endocarditis with patient and family, handout given. Patient and family state full understanding of all information presented.  Intervention: Baseline assessment documented to include IS volume, weight, bilateral BP and peripheral pulses.  Baseline IS 2750 mL.  Intervention: NPO at midnight except cardiac medications. (No ASA, coumadin or Plavix)  Instructed patient nothing to eat or drink after midnight the night prior to scheduled surgery date.  Intervention: Shower with chlorhexidine x 1  Instructed patient to wash entire body with chlorhexedine wipes prior to bedtime the night before surgery.

## 2017-07-06 NOTE — PROGRESS NOTES
Late entry:     Pt to [id number] by OR team.  Report received from Dr. Sotelo.  Drips and lines verified at bedside.  Pt attached to monitor, alarms set properly.

## 2017-07-06 NOTE — IP AVS SNAPSHOT
7/10/2017    Raf Pelayo  [address] [address]    Dear Raf:    [facility] wants to ensure your discharge home is safe and you or your loved ones have had all of your questions answered regarding your care after you leave the hospital.    Below is a list of resources and contact information should you have any questions regarding your hospital stay, follow-up instructions, or active medical symptoms.    Questions or Concerns Regarding… Contact   Medical Questions Related to Your Discharge  (7 days a week, 8am-5pm) Contact a Nurse Care Coordinator   [phone]   Medical Questions Not Related to Your Discharge  (24 hours a day / 7 days a week)  Contact the Nurse Health Line   [phone]    Medications or Discharge Instructions Refer to your discharge packet   or contact your [facility] Primary Care Provider   [phone]   Follow-up Appointment(s) Schedule your appointment via [employer]   or contact Scheduling [phone]   Billing Review your statement via [employer]  or contact Billing [phone]   Medical Records Review your records via [employer]   or contact Medical Records [phone]     You may receive a telephone call within two days of discharge. This call is to make certain you understand your discharge instructions and have the opportunity to have any questions answered. You can also easily access your medical information, test results and upcoming appointments via the [employer] free online health management tool. You can learn more and sign up at "[employer]. For assistance setting up your [employer] account, please call [phone].    Once again, we want to ensure your discharge home is safe and that you have a clear understanding of any next steps in your care. If you have any questions or concerns, please do not hesitate to contact us, we are here for you. Thank you for choosing [facility] for your healthcare needs.    Sincerely,    Your [facility] Healthcare Team

## 2017-07-06 NOTE — PROGRESS NOTES
Adriana Marquis at bedside, reviewed Pt's monitor strips from episode of ectopy, since resolved other than occasional PVCs; reviewed current VS, MAP remains 60mmHG; orders received, see MAR.

## 2017-07-06 NOTE — IP AVS SNAPSHOT
Bandwagon Access Code: Activation code not generated  Current Bandwagon Status: Active    Telebithart  A secure, online tool to manage your health information     [employer]’s Bandwagon® is a secure, online tool that connects you to your personalized health information from the privacy of your home -- day or night - making it very easy for you to manage your healthcare. Once the activation process is completed, you can even access your medical information using the Bandwagon majo, which is available for free in the Apple Majo store or Google Play store.     Bandwagon provides the following levels of access (as shown below):   My Chart Features   [facility] Primary Care Doctor [facility]  Specialists [facility]  Urgent  Care Non-[facility]  Primary Care  Doctor   Email your healthcare team securely and privately 24/7 X X X X   Manage appointments: schedule your next appointment; view details of past/upcoming appointments X      Request prescription refills. X      View recent personal medical records, including lab and immunizations X X X X   View health record, including health history, allergies, medications X X X X   Read reports about your outpatient visits, procedures, consult and ER notes X X X X   See your discharge summary, which is a recap of your hospital and/or ER visit that includes your diagnosis, lab results, and care plan. X X       How to register for Bandwagon:  1. Go to  [email or web address].  2. Click on the Sign Up Now box, which takes you to the New Member Sign Up page. You will need to provide the following information:  a. Enter your Bandwagon Access Code exactly as it appears at the top of this page. (You will not need to use this code after you’ve completed the sign-up process. If you do not sign up before the expiration date, you must request a new code.)   b. Enter your date of birth.   c. Enter your home email address.   d. Click Submit, and follow the next screen’s instructions.  3. Create a Bandwagon ID. This will  be your [employer] login ID and cannot be changed, so think of one that is secure and easy to remember.  4. Create a [employer] password. You can change your password at any time.  5. Enter your Password Reset Question and Answer. This can be used at a later time if you forget your password.   6. Enter your e-mail address. This allows you to receive e-mail notifications when new information is available in [employer].  7. Click Sign Up. You can now view your health information.    For assistance activating your [employer] account, call [phone]

## 2017-07-06 NOTE — OP REPORT
DATE OF SERVICE:  07/06/2017    PREOPERATIVE DIAGNOSES:  Coronary artery disease, shortness of breath,    diabetes mellitus, hypertension, dyslipidemia, and hypercholesterolemia.    POSTOPERATIVE DIAGNOSES:  Coronary artery disease, shortness of breath,    diabetes mellitus, hypertension, dyslipidemia, and hypercholesterolemia.    OPERATION:  Coronary artery bypass grafting x4, left MIKHAIL to the distal diagonal,     reverse saphenous vein graft sequence to the distal OM1 and distal OM 2,   and reverse saphenous vein graft to distal LAD, left greater saphenous vein endoscopic vein    harvest.    SURGEON:  Anthony Gaffney MD    ASSISTANT:  MADELYN Alvarado    ANESTHESIOLOGIST:  Dr. Sotelo    ANESTHESIA:  General anesthetic.    ESTIMATED BLOOD LOSS:  50 mL Cell Saver protocol.    COMPLICATIONS:  None.    DESCRIPTION OF PROCEDURE:  The patient was brought to the operating room,    placed in a supine position upon the table.  After adequate general    anesthesia, the patient prepped and draped in usual sterile manner.  Elbows    were protected to avoid ulnar neuropathy, chest expansion to avoid ulnar    neuropathy, phrenic nerve protectors used to protect phrenic nerve, removed at   end of case.    YOSELYN Alvarado harvested the left greater saphenous vein, endoscopic vein technique.    Side branch secured  using clips, leg closed with multilayer Vicryl and Dexon technique.    Adriana Marquis first assisted the case from start to finish.    Phrenic nerve was protected.  It was visualized, anatomy was normal.  It was    normal at the end of case.  It was not damaged.    Midline skin incision carried down to sternum, divided with saw, left MIKHAIL was    harvested, prepared for anastomosis, heparin given, ACT checked.  Patient    cannulated with 6.5 arterial cannula, 2-stage venous return catheter,    antegrade cardioplegia, began cardiopulmonary bypass, cooled 34 degrees, cross   clamp applied, cardioplegia delivered.   Heart topically cooled with cold    saline, ice flush.  Phrenic nerves were protected.  Good vessels noted.    Reverse saphenous vein graft placed to distal OM1 artery using 7-0    Prolene running technique, and then sequenced to the distal OM2 using 7-0 prolene running technique.     Cardioplegia was delivered    Left MIKHAIL was then sewn to the distal diagonal, as it was not long enough to reach the distal LAD.  7-0 prolene running technique used.     Cardioplegia run was delivered.    Reverse saphenous vein graft then sewn to distal LAD using 7-0 prolene running technique.    Cross-clamp removed, side clamp placed.  Two proximal anastomoses were    performed using 6-0 running Prolene technique.  Deairing procedure carried    out.  Side clamp removed.        Vein graft section then added to LAD vein graft end to end using 7-0 prolene for better placement.      Patient regained normal sinus rhythm, brought to    normothermia, good ventilation, weaned from cardiopulmonary bypass in first    attempt.  Lines were removed and doubly secured.  Protamine delivered,    meticulous hemostasis present.  AC locators and chest tubes were placed.  Ventricular pacing wires placed.     Methodical wound examination for foreign objects, none were found.  Counts    were correct.    Methodical wound examination was done for hemostasis x5.  Mediastinal rotation   inspections.  Good hemostasis appreciated.    ST segments were normal.  Good cardiodynamics noted, ejection fraction    improved, closure begun.    Sternum closed with wire followed by linea alba and pectus fascia, 0 sutures    in double-layer technique.  Skin closed with subcuticular 4-0 Dexon suture    technique.  Patient tolerated the procedure well and transferred to ICU in    stable condition.  Prevena dressing is being placed.       ____________________________________     NEGRITA FELIX MD

## 2017-07-06 NOTE — PROGRESS NOTES
The Medication Reconciliation process has been completed by interviewing the patient    Allergies have been reviewed  Antibiotic use in 30 days - none    Home Pharmacy:  [facility] - [place]

## 2017-07-06 NOTE — PROGRESS NOTES
Late entry:     Pt opening eyes, moves all four extremities to command; denies pain/discomfort at this time via head shake to query.

## 2017-07-07 ENCOUNTER — APPOINTMENT (OUTPATIENT)
Dept: RADIOLOGY | Facility: MEDICAL CENTER | Age: 77
DRG: 236 | End: 2017-07-07
Attending: NURSE PRACTITIONER
Payer: MEDICARE

## 2017-07-07 LAB
ANION GAP SERPL CALC-SCNC: 6 MMOL/L (ref 0–11.9)
BUN SERPL-MCNC: 12 MG/DL (ref 8–22)
CALCIUM SERPL-MCNC: 8 MG/DL (ref 8.5–10.5)
CHLORIDE SERPL-SCNC: 112 MMOL/L (ref 96–112)
CO2 SERPL-SCNC: 21 MMOL/L (ref 20–33)
CREAT SERPL-MCNC: 0.83 MG/DL (ref 0.5–1.4)
EKG IMPRESSION: NORMAL
EKG IMPRESSION: NORMAL
ERYTHROCYTE [DISTWIDTH] IN BLOOD BY AUTOMATED COUNT: 44.7 FL (ref 35.9–50)
GFR SERPL CREATININE-BSD FRML MDRD: >60 ML/MIN/1.73 M 2
GLUCOSE BLD-MCNC: 102 MG/DL (ref 65–99)
GLUCOSE BLD-MCNC: 105 MG/DL (ref 65–99)
GLUCOSE BLD-MCNC: 107 MG/DL (ref 65–99)
GLUCOSE BLD-MCNC: 111 MG/DL (ref 65–99)
GLUCOSE BLD-MCNC: 113 MG/DL (ref 65–99)
GLUCOSE BLD-MCNC: 120 MG/DL (ref 65–99)
GLUCOSE BLD-MCNC: 120 MG/DL (ref 65–99)
GLUCOSE BLD-MCNC: 130 MG/DL (ref 65–99)
GLUCOSE BLD-MCNC: 131 MG/DL (ref 65–99)
GLUCOSE BLD-MCNC: 131 MG/DL (ref 65–99)
GLUCOSE BLD-MCNC: 135 MG/DL (ref 65–99)
GLUCOSE BLD-MCNC: 135 MG/DL (ref 65–99)
GLUCOSE BLD-MCNC: 146 MG/DL (ref 65–99)
GLUCOSE BLD-MCNC: 147 MG/DL (ref 65–99)
GLUCOSE BLD-MCNC: 151 MG/DL (ref 65–99)
GLUCOSE BLD-MCNC: 156 MG/DL (ref 65–99)
GLUCOSE BLD-MCNC: 163 MG/DL (ref 65–99)
GLUCOSE BLD-MCNC: 167 MG/DL (ref 65–99)
GLUCOSE BLD-MCNC: 176 MG/DL (ref 65–99)
GLUCOSE BLD-MCNC: 186 MG/DL (ref 65–99)
GLUCOSE BLD-MCNC: 98 MG/DL (ref 65–99)
GLUCOSE SERPL-MCNC: 103 MG/DL (ref 65–99)
HCT VFR BLD AUTO: 33.8 % (ref 42–52)
HGB BLD-MCNC: 11.2 G/DL (ref 14–18)
MCH RBC QN AUTO: 30.1 PG (ref 27–33)
MCHC RBC AUTO-ENTMCNC: 32.2 G/DL (ref 33.7–35.3)
MCV RBC AUTO: 93.6 FL (ref 81.4–97.8)
PLATELET # BLD AUTO: 181 K/UL (ref 164–446)
PMV BLD AUTO: 10.4 FL (ref 9–12.9)
POTASSIUM SERPL-SCNC: 4.3 MMOL/L (ref 3.6–5.5)
POTASSIUM SERPL-SCNC: 4.4 MMOL/L (ref 3.6–5.5)
RBC # BLD AUTO: 3.62 M/UL (ref 4.7–6.1)
SODIUM SERPL-SCNC: 139 MMOL/L (ref 135–145)
WBC # BLD AUTO: 18 K/UL (ref 4.8–10.8)

## 2017-07-07 PROCEDURE — 71010 DX-CHEST-PORTABLE (1 VIEW): CPT

## 2017-07-07 PROCEDURE — 700105 HCHG RX REV CODE 258

## 2017-07-07 PROCEDURE — 94668 MNPJ CHEST WALL SBSQ: CPT

## 2017-07-07 PROCEDURE — 700111 HCHG RX REV CODE 636 W/ 250 OVERRIDE (IP)

## 2017-07-07 PROCEDURE — A9270 NON-COVERED ITEM OR SERVICE: HCPCS | Performed by: THORACIC SURGERY (CARDIOTHORACIC VASCULAR SURGERY)

## 2017-07-07 PROCEDURE — 85027 COMPLETE CBC AUTOMATED: CPT

## 2017-07-07 PROCEDURE — 94669 MECHANICAL CHEST WALL OSCILL: CPT

## 2017-07-07 PROCEDURE — 700102 HCHG RX REV CODE 250 W/ 637 OVERRIDE(OP): Performed by: NURSE PRACTITIONER

## 2017-07-07 PROCEDURE — 700105 HCHG RX REV CODE 258: Performed by: THORACIC SURGERY (CARDIOTHORACIC VASCULAR SURGERY)

## 2017-07-07 PROCEDURE — A9270 NON-COVERED ITEM OR SERVICE: HCPCS | Performed by: NURSE PRACTITIONER

## 2017-07-07 PROCEDURE — 80048 BASIC METABOLIC PNL TOTAL CA: CPT

## 2017-07-07 PROCEDURE — 700111 HCHG RX REV CODE 636 W/ 250 OVERRIDE (IP): Performed by: NURSE PRACTITIONER

## 2017-07-07 PROCEDURE — C9248 INJ, CLEVIDIPINE BUTYRATE: HCPCS

## 2017-07-07 PROCEDURE — 770020 HCHG ROOM/CARE - TELE (206)

## 2017-07-07 PROCEDURE — 93010 ELECTROCARDIOGRAM REPORT: CPT | Performed by: INTERNAL MEDICINE

## 2017-07-07 PROCEDURE — 82962 GLUCOSE BLOOD TEST: CPT | Mod: 91

## 2017-07-07 PROCEDURE — 700112 HCHG RX REV CODE 229: Performed by: NURSE PRACTITIONER

## 2017-07-07 PROCEDURE — 700102 HCHG RX REV CODE 250 W/ 637 OVERRIDE(OP): Performed by: THORACIC SURGERY (CARDIOTHORACIC VASCULAR SURGERY)

## 2017-07-07 PROCEDURE — 93005 ELECTROCARDIOGRAM TRACING: CPT | Performed by: NURSE PRACTITIONER

## 2017-07-07 RX ORDER — AMIODARONE HYDROCHLORIDE 200 MG/1
400 TABLET ORAL TWICE DAILY
Status: DISCONTINUED | OUTPATIENT
Start: 2017-07-07 | End: 2017-07-09

## 2017-07-07 RX ADMIN — AMIODARONE HYDROCHLORIDE 400 MG: 200 TABLET ORAL at 10:36

## 2017-07-07 RX ADMIN — SODIUM CHLORIDE 5 UNITS/HR: 9 INJECTION, SOLUTION INTRAVENOUS at 04:34

## 2017-07-07 RX ADMIN — OXYCODONE HYDROCHLORIDE 5 MG: 5 TABLET ORAL at 12:22

## 2017-07-07 RX ADMIN — MUPIROCIN 1 APPLICATION: 20 OINTMENT TOPICAL at 09:46

## 2017-07-07 RX ADMIN — OXYCODONE HYDROCHLORIDE 10 MG: 10 TABLET ORAL at 08:04

## 2017-07-07 RX ADMIN — OXYCODONE HYDROCHLORIDE 10 MG: 10 TABLET ORAL at 04:32

## 2017-07-07 RX ADMIN — TRAMADOL HYDROCHLORIDE 50 MG: 50 TABLET, COATED ORAL at 21:26

## 2017-07-07 RX ADMIN — ASPIRIN 81 MG: 81 TABLET, CHEWABLE ORAL at 09:47

## 2017-07-07 RX ADMIN — OXYCODONE HYDROCHLORIDE 5 MG: 5 TABLET ORAL at 21:26

## 2017-07-07 RX ADMIN — DOCUSATE SODIUM 100 MG: 100 CAPSULE ORAL at 09:46

## 2017-07-07 RX ADMIN — OXYCODONE HYDROCHLORIDE 10 MG: 10 TABLET ORAL at 20:05

## 2017-07-07 RX ADMIN — OXYCODONE HYDROCHLORIDE 10 MG: 10 TABLET ORAL at 17:00

## 2017-07-07 RX ADMIN — MAGNESIUM SULFATE HEPTAHYDRATE 1 G: 1 INJECTION, SOLUTION INTRAVENOUS at 09:44

## 2017-07-07 RX ADMIN — TRAMADOL HYDROCHLORIDE 50 MG: 50 TABLET, COATED ORAL at 14:21

## 2017-07-07 RX ADMIN — AMIODARONE HYDROCHLORIDE 0.5 MG/MIN: 50 INJECTION, SOLUTION INTRAVENOUS at 01:01

## 2017-07-07 RX ADMIN — MUPIROCIN 1 APPLICATION: 20 OINTMENT TOPICAL at 20:11

## 2017-07-07 RX ADMIN — ENOXAPARIN SODIUM 40 MG: 100 INJECTION SUBCUTANEOUS at 20:06

## 2017-07-07 RX ADMIN — OXYCODONE HYDROCHLORIDE 10 MG: 10 TABLET ORAL at 23:03

## 2017-07-07 RX ADMIN — STANDARDIZED SENNA CONCENTRATE AND DOCUSATE SODIUM 1 TABLET: 8.6; 5 TABLET, FILM COATED ORAL at 20:06

## 2017-07-07 RX ADMIN — INSULIN HUMAN 25 UNITS: 100 INJECTION, SUSPENSION SUBCUTANEOUS at 21:21

## 2017-07-07 RX ADMIN — METOPROLOL TARTRATE 12.5 MG: 25 TABLET, FILM COATED ORAL at 20:06

## 2017-07-07 RX ADMIN — AMIODARONE HYDROCHLORIDE 400 MG: 200 TABLET ORAL at 20:06

## 2017-07-07 RX ADMIN — CLOPIDOGREL 75 MG: 75 TABLET, FILM COATED ORAL at 09:46

## 2017-07-07 RX ADMIN — ROSUVASTATIN CALCIUM 20 MG: 20 TABLET ORAL at 20:10

## 2017-07-07 ASSESSMENT — ENCOUNTER SYMPTOMS
CARDIOVASCULAR NEGATIVE: 1
EYES NEGATIVE: 1
CONSTITUTIONAL NEGATIVE: 1
RESPIRATORY NEGATIVE: 1
GASTROINTESTINAL NEGATIVE: 1
MUSCULOSKELETAL NEGATIVE: 1
PSYCHIATRIC NEGATIVE: 1
NEUROLOGICAL NEGATIVE: 1

## 2017-07-07 ASSESSMENT — PATIENT HEALTH QUESTIONNAIRE - PHQ9
SUM OF ALL RESPONSES TO PHQ QUESTIONS 1-9: 0
2. FEELING DOWN, DEPRESSED, IRRITABLE, OR HOPELESS: NOT AT ALL
SUM OF ALL RESPONSES TO PHQ9 QUESTIONS 1 AND 2: 0
1. LITTLE INTEREST OR PLEASURE IN DOING THINGS: NOT AT ALL

## 2017-07-07 ASSESSMENT — PAIN SCALES - GENERAL
PAINLEVEL_OUTOF10: 5
PAINLEVEL_OUTOF10: 4
PAINLEVEL_OUTOF10: 3
PAINLEVEL_OUTOF10: 7
PAINLEVEL_OUTOF10: 0
PAINLEVEL_OUTOF10: 5
PAINLEVEL_OUTOF10: 0
PAINLEVEL_OUTOF10: 2
PAINLEVEL_OUTOF10: 0
PAINLEVEL_OUTOF10: 4
PAINLEVEL_OUTOF10: 0
PAINLEVEL_OUTOF10: 3
PAINLEVEL_OUTOF10: 4

## 2017-07-07 ASSESSMENT — LIFESTYLE VARIABLES
DO YOU DRINK ALCOHOL: YES
EVER HAD A DRINK FIRST THING IN THE MORNING TO STEADY YOUR NERVES TO GET RID OF A HANGOVER: NO
TOTAL SCORE: 0
HAVE YOU EVER FELT YOU SHOULD CUT DOWN ON YOUR DRINKING: NO
TOTAL SCORE: 0
HAVE PEOPLE ANNOYED YOU BY CRITICIZING YOUR DRINKING: NO
TOTAL SCORE: 0
EVER FELT BAD OR GUILTY ABOUT YOUR DRINKING: NO
AVERAGE NUMBER OF DAYS PER WEEK YOU HAVE A DRINK CONTAINING ALCOHOL: 1
ON A TYPICAL DAY WHEN YOU DRINK ALCOHOL HOW MANY DRINKS DO YOU HAVE: 1
CONSUMPTION TOTAL: NEGATIVE
HOW MANY TIMES IN THE PAST YEAR HAVE YOU HAD 5 OR MORE DRINKS IN A DAY: 0

## 2017-07-07 NOTE — FLOWSHEET NOTE
07/07/17 0511   Events/Summary/Plan   Events/Summary/Plan PEP IS done.   PEP/CPT Group   PEP/CPT/Airway Clearance Therapy Yes   #PEP/CPT (Manual) Subsequent Subsequent   #CPT (Mechanical) Yes   PEP/CPT Method Positive Airway Pressure Device   CPT Settings Yes   Pressure 5   Incentive Spirometry Group   Incentive Spirometry Instruction Yes   Breathing Exercises Yes   Incentive Spirometer Volume 1250 mL   Chest Exam   Respiration 16   Pulse 61   Heart Rate (Monitored) 61   Oximetry   Continuous Oximetry Yes   O2 Alarms Set & Reviewed Yes   Oxygen   Pulse Oximetry 100 %   O2 (LPM) 4   O2 Daily Delivery Respiratory  Silicone Nasal Cannula

## 2017-07-07 NOTE — CARE PLAN
Problem: Day of surgery post CABG/Heart valve replacement  Goal: Stabilization in immediate post op period  Outcome: PROGRESSING AS EXPECTED  Intervention: VS q 15 min x 4 hours, then q 1 hour. Include temperature immediately upon arrival. Check CO/CI q 2-4 hours and PRN  Completed  Intervention: If radial artery used, elevate arm, no BP checks or needle sticks from affected arm, monitor ulnar pulse and capillary refill  Right radial arterial line in place; pulse of 2+; capillary refill > 2 seconds  Intervention: First post op hour labs and diagnostics per MD order  Previously sent  Intervention: Serum K q 6 hours x 24 hours. ABG and CBC prn.  Serum potassium, ABG, and CBC sent per active orders   Intervention: For FSBS greater than 130, start Post Cardiac Surgery Insulin Drip Protocol  Insulin drip initiated and currently infusing per protocol  Intervention: FSBS frequency as per Cardiac Surgery Insulin Drip Protocol  FSBS q 1 hour  Intervention: For patients on Beta Blockers: verify dose given prior to surgery or within 6 hours after arrival to the unit  Pt not on home beta blockers   Intervention: Chest tube to 20 cm suction, record CT drainage with VS  Chest tube to 20 cm water suction; output recorded q 1 hour   Intervention: For CT drainage > 300 cc in first post op hour and/or 150 cc in subsequent hours: platelets, coag screen, fibrinogen, H&H per order  Not applicable; CT output > 200mL  Intervention: Titrate and wean off vasoactive drips per patient’s condition and per MD order while maintaining SBP  mmHg per MD order  Vasoactive medications titrated per active order; SBP currently   Intervention: VAP protocol in place  VAP protocol in place; Pt extubated at 1640  Intervention: Wean from vent per protocol (see protocol), extubation goal with 2-6 hours post op.  Pt extubated within 4 hours and 15 minutes  Intervention: IS q 1 hour while awake post extubation  IS q 1 hour while awake; best volume  of 750mL  Intervention: Bedrest until extubated and groin lines out  Bedrest until 2040  Intervention: Maintain all original surgical dressings for 24 hours  Surgical dressings maintained   Intervention: Clear liquids post extubation, advance as tolerated  Pt tolerating ice chips and sips of water   Intervention: A-Fib and DVT prophylaxis per MD order or contraindications documented (refer to DVT/VTE problem on Care Plan)  DVT prophylaxis ordered   Intervention: Amiodarone protocol per MD order  Amiodarone protocol in place

## 2017-07-07 NOTE — PROGRESS NOTES
Pulmonary Critical Care Progress Note        DOS:  7/7/2017    Chief Complaint: CABG    History of Present Illness: 76 y.o. Male, h/o IDDM, HTN, DLD admitted for CABG x 4     ROS:  Respiratory: unable to perform due to the patient's inability to effectively communicate, Cardiac: unable to perform due to the patient's inability to effectively communicate, GI: unable to perform due to the patient's inability to effectively communicate.  All other systems negative.    Interval Events:  24 hour interval history reviewed    - POD 1   - up in chair   - off amio gtt - PO   - insulin gtt   - A/O, NFE   - , SR/SB   - afebrile   - payne to come out today   - CTs to come out today   - PEP, IS 1500, room air    - no abx   -         PFSH:  No change.    Respiratory:     Pulse Oximetry: 97 %  Chest Tube Drains:     Mediastinal Chest Tube 1: 40 ml    Exam: unlabored respirations, no intercostal retractions or accessory muscle use and diminished breath sounds mild  ImagingAvailable data reviewed   Recent Labs      07/06/17   1412  07/06/17   1511  07/06/17   1630   ISTATAPH  7.321*  7.334*  7.347*   ISTATAPCO2  36.6  36.9  35.5   ISTATAPO2  104*  105*  117*   ISTATATCO2  20  21  21   [id number]  98  98  98   ISTATARTHCO3  18.9  19.6  19.4   ISTATARTBE  -7*  -6*  -6*   ISTATTEMP  36.1 C  36.9 C  37.4 C   ISTATFIO2  45  40  40   ISTATSPEC  Arterial  Arterial  Arterial   ISTATAPHTC  7.333*  7.335*  7.341*   [id number]  99*  104*  119*       HemoDynamics:  Pulse: (!) 57, Heart Rate (Monitored): (!) 56  Arterial BP: (!) 96/45 mmHg, NIBP: 107/47 mmHg  CVP (mm Hg): (!) 8 MM HG    Exam: regular rate and rhythm  Imaging: Available data reviewed        Neuro:  GCS Total Loco Coma Score: 15       Exam: sedated post-op, not following  Imaging: Available data reviewed    Fluids:  Intake/Output       07/05/17 0700 - 07/06/17 0659 (Not Admitted) 07/06/17 0700 - 07/07/17 0659 07/07/17 0700 - 07/08/17 0659      [id number] 3614-4408  Total 0700-1859 1900-0659 Total 0700-1859 1900-0659 Total       Intake    P.O.  --  -- --  --  240 240  300  -- 300    P.O. -- -- -- -- 240 240 300 -- 300    I.V.  --  -- --  3033.2  1670.9 4704.1  91  -- 91    Clevidipine Volume -- -- -- 1 -- 1 -- -- --    Crystalloid Intake -- -- -- 1000 -- 1000 -- -- --    Precedex Volume -- -- -- 43.7 46.2 89.9 0 -- 0    Amiodarone Volume -- -- -- -- 294.2 294.2 51 -- 51    Insulin Volume -- -- -- 45.7 220.5 266.2 40 -- 40    Epinephrine Volume -- -- -- 2.8 -- 2.8 -- -- --    IV Volume (Normal Saline) -- -- -- 140 210 350 -- -- --    IV Volume (Plasmalyte) -- -- -- 5566 334 8424 -- -- --    IV Piggyback Volume (IV Piggyback) -- -- -- 300 400 700 -- -- --    Blood  --  -- --  430  -- 430  --  -- --    Cell Saver Volume (mL) -- -- -- 430 -- 430 -- -- --    Total Intake -- -- -- 3463.2 1910.9 5374.1 391 -- 391       Output    Urine  --  -- --  1640  440 2080  60  -- 60    Indwelling Cathether -- -- --  60 -- 60    Void (ml) -- -- -- 900 -- 900 -- -- --    Drains  --  -- --  117  172 289  40  -- 40    Mediastinal Chest Tube 1 -- -- -- 117 172 289 40 -- 40    Stool  --  -- --  --  -- --  --  -- --    Number of Times Stooled -- -- -- 0 x -- 0 x -- -- --    Total Output -- -- -- 0705 119 8266 100 -- 100       Net I/O     -- -- -- 1706.2 1298.9 3005.1 291 -- 291        Weight: 91.9 kg (202 lb 9.6 oz)  Recent Labs      07/05/17   1505  07/06/17   1227  07/06/17   1614  07/06/17   2346  07/07/17   0500   SODIUM  140   --    --    --   139   POTASSIUM  3.9   --   4.1  4.3  4.4   CHLORIDE  107   --    --    --   112   CO2  24   --    --    --   21   BUN  12   --    --    --   12   CREATININE  0.88   --    --    --   0.83   MAGNESIUM   --   2.3  2.6*   --    --    CALCIUM  9.1   --    --    --   8.0*       GI/Nutrition:  Exam: abdomen is soft and non-tender  Imaging: Available data reviewed  NPO  Liver Function  Recent Labs      07/05/17   1505  07/07/17   0500   ALTSGPT  26   --     ASTSGOT  22   --    ALKPHOSPHAT  56   --    TBILIRUBIN  0.4   --    GLUCOSE  104*  103*       Heme:  Recent Labs      17   1505  17   1020  17   1227  17   0500   RBC  4.69*   --    --   3.62*   HEMOGLOBIN  14.2   --    --   11.2*   HEMATOCRIT  43.5   --    --   33.8*   PLATELETCT  232  151*  161*  181   PROTHROMBTM  14.4   --   16.7*   --    APTT  31.1   --   37.2*   --    INR  1.09   --   1.31*   --        Infectious Disease:  Temp  Av.9 °C (98.4 °F)  Min: 35.6 °C (96.1 °F)  Max: 37.6 °C (99.7 °F)  Micro: reviewed  Recent Labs      17   1505  17   0500   WBC  8.9  18.0*   NEUTSPOLYS  66.00   --    LYMPHOCYTES  23.00   --    MONOCYTES  7.60   --    EOSINOPHILS  2.20   --    BASOPHILS  0.60   --    ASTSGOT  22   --    ALTSGPT  26   --    ALKPHOSPHAT  56   --    TBILIRUBIN  0.4   --      Current Facility-Administered Medications   Medication Dose Frequency Provider Last Rate Last Dose   • insulin lispro (HUMALOG) injection 9 Units  9 Units 4X/DAY MONSERRAT Gaffney M.D.   Stopped at 17 0700   • insulin NPH (HUMULIN,NOVOLIN) injection 25 Units  25 Units Q8HRS Anthony Gaffney M.D.       • insulin lispro (HUMALOG) injection 18 Units  18 Units TID AC Anthony Gaffney M.D.       • insulin lispro (HUMALOG) injection 0-25 Units  0-25 Units ACHS & 0200 Anthony Gaffney M.D.   Stopped at 17 0700   • aspirin (ASA) chewable tab 81 mg  81 mg DAILY Adriana Wadet, A.P.N.       • Respiratory Care per Protocol   Continuous RT Adriana ELEAZAR Wadet, A.P.N.       • NS infusion   Continuous Adriana L Kandis, A.P.N. 10 mL/hr at 17 1351     • K+ Scale: Goal of 4.5  1 Each Q6HRS Adriana Marquis, A.P.N.   Stopped at 17 0019   • docusate sodium (COLACE) capsule 100 mg  100 mg QAM Adriana Marquis, A.P.N.        And   • senna-docusate (PERICOLACE or SENOKOT S) 8.6-50 MG per tablet 1 Tab  1 Tab Nightly Adriana Marquis, A.P.N.   1 Tab at 17 2100    And   • senna-docusate  (PERICOLACE or SENOKOT S) 8.6-50 MG per tablet 1 Tab  1 Tab Q24HRS PRN Adriana Marquis, A.P.N.        And   • lactulose 20 GM/30ML solution 30 mL  30 mL Q24HRS PRN Adriana Wadet, A.P.N.        And   • bisacodyl (DULCOLAX) suppository 10 mg  10 mg Q24HRS PRN Adriana Marquis, A.P.N.        And   • fleet enema 133 mL  1 Each Once PRN Adriana Marquis, A.P.N.       • enoxaparin (LOVENOX) inj 40 mg  40 mg DAILY Adriana Marquis, A.P.N.       • mupirocin (BACTROBAN) 2 % ointment 1 Application  1 Application BID Adriana Marquis, A.P.N.   1 Application at 07/06/17 2115   • magnesium sulfate ivpb premix 1 g  1 g DAILY Adriana Marquis, A.P.N.   Stopped at 07/06/17 1435   • metoprolol (LOPRESSOR) tablet 12.5 mg  12.5 mg BID Adriana Marquis, A.P.N.        Followed by   • [START ON 7/8/2017] metoprolol (LOPRESSOR) tablet 25 mg  25 mg BID Adriana Marquis, A.P.N.       • clopidogrel (PLAVIX) tablet 75 mg  75 mg DAILY Adriana Marquis, A.P.N.       • rosuvastatin (CRESTOR) tablet 20 mg  20 mg QHS Adriana Marquis, A.P.N.   20 mg at 07/06/17 2115   • oxycodone immediate-release (ROXICODONE) tablet 5 mg  5 mg Q3HRS PRN Adriana Maruqis, A.P.N.   5 mg at 07/06/17 1712   • oxycodone immediate release (ROXICODONE) tablet 10 mg  10 mg Q3HRS PRN Adriana Marquis, A.P.N.   10 mg at 07/07/17 0804   • tramadol (ULTRAM) 50 MG tablet 50 mg  50 mg Q4HRS PRN Adriana Marquis, A.P.N.       • ondansetron (ZOFRAN) syringe/vial injection 4 mg  4 mg Q6HRS PRN Adriana L Kandis, A.P.N.        Or   • prochlorperazine (COMPAZINE) injection 10 mg  10 mg Q6HRS PRN Adriana L Kandis, A.P.N.        Or   • promethazine (PHENERGAN) suppository 25 mg  25 mg Q6HRS PRN Adriana L Kandis, A.P.N.       • acetaminophen (TYLENOL) tablet 650 mg  650 mg Q4HRS PRN Adriana L Kandis, A.P.N.        Or   • acetaminophen (TYLENOL) suppository 650 mg  650 mg Q4HRS PRN Adriana L Kandis, A.P.N.       • mag hydrox-al hydrox-simeth (MAALOX PLUS ES or MYLANTA DS) suspension 30 mL  30 mL Q4HRS  PRN Adriana Marquis A.P.N.       • diphenhydrAMINE (BENADRYL) tablet/capsule 25 mg  25 mg HS PRN - MR X 1 Adriana Marquis A.P.N.       • insulin regular human (HUMULIN/NOVOLIN R) 62.5 Units in  mL infusion per protocol  1-6 Units/hr Continuous Anthony Gaffney M.D. 20 mL/hr at 07/07/17 0812 5 Units/hr at 07/07/17 0812    And   • insulin regular (HUMULIN R) injection 0-10 Units  0-10 Units PRN Anthony Gaffney M.D.   1 Units at 07/06/17 2226    And   • dextrose 50% (D50W) injection 25 mL  25 mL PRN Anthony Gaffney M.D.         Last reviewed on 7/6/2017 11:14 AM by Van Cooper R.N.    Quality  Measures:  Labs reviewed, Medications reviewed and Radiology images reviewed                      Problems/Plan:  Post-op vent mgt   - CXR, ABG reviewed with RN/RT   - liberated   - IS, mobilize, RT protocols  CAD   - CABGx4 7/6   - follow CTs, hemodynamics, CVS protocols   - CVS following  IDDM   - glycemic control  HTN   - BP control  DLD  Will follow in ICU  Discussed patient condition and risk of morbidity and/or mortality with RN, RT and QA team.

## 2017-07-07 NOTE — DISCHARGE PLANNING
Care Transition Team Assessment   IHD met with patient at bedside . Patient lives in a 1 story home with his wife Estefanía and a house guest. Patient drives himself to providers appointments . Patient wife Estefanía will drive patient home after discharged from hospital . Patient uses [facility] on [address].Patient does not use DME,O2 or any other services. Patient expects to be discharge with no additional new services.      Information Source  Orientation : Oriented x 4  Information Given By: Patient  Informant's Name: Raf  Who is responsible for making decisions for patient? : Patient    Readmission Evaluation  Is this a readmission?: No    Elopement Risk  Legal Hold: No  Ambulatory or Self Mobile in Wheelchair: No-Not an Elopement Risk  Elopement Risk: Not at Risk for Elopement         Discharge Preparedness  What is your plan after discharge?: Home with help  What are your discharge supports?: Spouse  Prior Functional Level: Ambulatory, Drives Self, Independent with Activities of Daily Living, Independent with Medication Management  Difficulity with ADLs: None  Difficulity with IADLs: None    Functional Assesment  Prior Functional Level: Ambulatory, Drives Self, Independent with Activities of Daily Living, Independent with Medication Management    Finances  Financial Barriers to Discharge: No  Prescription Coverage: Yes ([facility] [address])    Vision / Hearing Impairment  Right Eye Vision: Impaired, Wears Glasses  Left Eye Vision: Impaired, Wears Glasses    Values / Beliefs / Concerns  Spiritual Requests During Hospitalization: No              Psychological Assessment  History of Substance Abuse: None  History of Psychiatric Problems: No    Discharge Risks or Barriers  Discharge risks or barriers?: No    Anticipated Discharge Information  Anticipated discharge disposition: Discharge needs currently unknown  Discharge Address: [address] [place] [id number]  Discharge Contact Phone Number:  [phone]

## 2017-07-07 NOTE — DISCHARGE PLANNING
CABGx4.  PO day 1.  Up to chair.  Extubated.   Chest tube out.     77 y/o  [place] resident.   Medicare and [place] insurance.      Plan:  Mobilize today.

## 2017-07-07 NOTE — CARE PLAN
Problem: Post Op Day 1 CABG/Heart Valve Replacement  Goal: Optimal care of the post op CABG/heart valve replacement Post Op Day 1  Intervention: EKG and CXR completed  complete  Intervention: Daily Weights  complete  Intervention: Up in chair for all meals  Complete  Patient up to chair for breakfast   Intervention: IS q 1 hour while awake and record best IS volume  Complete best 1250  Intervention: Graduated elastic stockings  Complete KITTY hose on right leg, left leg has original surgical dressing    Intervention: Transfer to [facility] status, begin VS q 4 hours  complete

## 2017-07-07 NOTE — PROGRESS NOTES
12 chart check    12 hour monitor summery    SB/ SR 54-72 (in the 60's most of the day)  0.16/0.08/0.36  NO ECTOPY

## 2017-07-07 NOTE — CARE PLAN
Problem: Hyperinflation:  Goal: Prevent or improve atelectasis  Outcome: PROGRESSING AS EXPECTED  PEP Q4  IS values today 1100 to 1500  60% of predicted = 1770

## 2017-07-07 NOTE — CARE PLAN
Problem: Pain Management  Goal: Pain level will decrease to patient’s comfort goal  Outcome: PROGRESSING AS EXPECTED  Pain assessment done Q 2 and PRN. Pt was educated about the impotence of pain management via medication and no pharmacological interventions. Pt and nurse agreed that pain target should be below 5 in order for him to be able to ambulate and use IS/ deep breath. Pt verbalized understanding of how controlling pain will lead to more activity and better outcomes/ discharge Pt is using call light to ask for additional pain medication.

## 2017-07-07 NOTE — PROGRESS NOTES
Cardiovascular Surgery Progress Note    Name: Raf Pelayo  MRN: [id number]  : 1940  Admit Date: 2017  5:10 AM  Procedure:  Procedure(s) and Anesthesia Type:     * MULTIPLE CORONARY ARTERY BYPASS ENDO VEIN HARVEST X2-4, PREVENA DRESSING  - General     * ROHINI - INTRAOPERATIVE  - General  1 Day Post-Op    Vitals:  Patient Vitals for the past 8 hrs:   Temp SpO2 O2 (LPM) Pulse Heart Rate (Monitored) Resp NIBP Weight   17 0738 - 95 % 3 - (!) 54 (!) 11 - -   17 0600 - 99 % - 60 (!) 58 17 - 91.9 kg (202 lb 9.6 oz)   17 0511 - 100 % 4 61 61 16 - -   17 0500 - 99 % - (!) 59 60 (!) 27 (!) 96/51 mmHg -   17 0400 37.6 °C (99.7 °F) 98 % - (!) 57 (!) 57 14 (!) 96/50 mmHg -   17 0300 - 98 % - 60 61 18 111/59 mmHg -   17 0200 - 99 % - (!) 59 (!) 59 14 (!) 96/51 mmHg -   17 0100 - 99 % - 60 60 19 (!) 89/49 mmHg -     Temp (24hrs), Av.9 °C (98.4 °F), Min:35.6 °C (96.1 °F), Max:37.6 °C (99.7 °F)      Respiratory:  Branch Vent Mode: Spont, PEEP/CPAP: 5, FiO2: 40, P Peak (PIP): 12, P MEAN: 7 Respiration: (!) 11, Pulse Oximetry: 95 %, O2 Daily Delivery Respiratory : Silicone Nasal Cannula  Chest Tube Group 1 (A) Left;Anterior-Tube Status / Drainage: Draining;Sutured in Place;Small, Chest Tube Group 2 (B) Right;Anterior-Tube Status / Drainage: Draining;Small, Chest Tube Group 1 (A) Left;Anterior-Device: Suction 20 cm Water, Chest Tube Group 2 (B) Right;Anterior-Device: Suction 20 cm Water  Chest Tube Drains:     Mediastinal Chest Tube 1: 40 ml    Fluids:    Intake/Output Summary (Last 24 hours) at 17 0814  Last data filed at 17 0600   Gross per 24 hour   Intake 5374.09 ml   Output   2369 ml   Net 3005.09 ml     Admit weight: Weight: 89.4 kg (197 lb 1.5 oz)  Current weight: Weight: 91.9 kg (202 lb 9.6 oz) (17 0600)    Labs:  Recent Labs      17   1505  17   1020  17   1227  17   0500   WBC  8.9   --    --   18.0*   RBC   4.69*   --    --   3.62*   HEMOGLOBIN  14.2   --    --   11.2*   HEMATOCRIT  43.5   --    --   33.8*   MCV  92.8   --    --   93.6   MCH  30.3   --    --   30.1   MCHC  32.6*   --    --   32.2*   RDW  43.2   --    --   44.7   PLATELETCT  232  151*  161*  181   MPV  9.9   --    --   10.4     Recent Labs      07/05/17   1505   NEUTSPOLYS  66.00   LYMPHOCYTES  23.00   MONOCYTES  7.60   EOSINOPHILS  2.20   BASOPHILS  0.60     Recent Labs      07/05/17   1505  07/06/17   1614  07/06/17   2346  07/07/17   0500   SODIUM  140   --    --   139   POTASSIUM  3.9  4.1  4.3  4.4   CHLORIDE  107   --    --   112   CO2  24   --    --   21   GLUCOSE  104*   --    --   103*   BUN  12   --    --   12   CREATININE  0.88   --    --   0.83   CALCIUM  9.1   --    --   8.0*     Recent Labs      07/05/17   1505  07/06/17   1227   APTT  31.1  37.2*   INR  1.09  1.31*       Medications:  • insulin lispro  9 Units     • insulin NPH  25 Units     • insulin lispro  18 Units     • insulin lispro  0-25 Units     • aspirin  81 mg     • K+ Scale: Goal of 4.5  1 Each     • docusate sodium  100 mg      And   • senna-docusate  1 Tab     • enoxaparin  40 mg     • mupirocin  1 Application     • magnesium sulfate  1 g Stopped (07/06/17 1435)   • metoprolol  12.5 mg      Followed by   • [START ON 7/8/2017] metoprolol  25 mg     • clopidogrel  75 mg     • rosuvastatin  20 mg         Exam:   Review of Systems   Constitutional: Negative.    HENT: Negative.    Eyes: Negative.    Respiratory: Negative.    Cardiovascular: Negative.    Gastrointestinal: Negative.    Genitourinary: Negative.    Musculoskeletal: Negative.    Skin: Negative.    Neurological: Negative.    Psychiatric/Behavioral: Negative.        Physical Exam   Constitutional: He is oriented to person, place, and time. He appears well-developed and well-nourished.   HENT:   Head: Normocephalic and atraumatic.   Eyes: Pupils are equal, round, and reactive to light.   Neck: Normal range of motion.  Neck supple.   Cardiovascular: Normal rate and regular rhythm.    Pulmonary/Chest: Effort normal.   Decreased at bases.    Abdominal: Soft. Bowel sounds are normal.   Musculoskeletal: Normal range of motion.   Neurological: He is alert and oriented to person, place, and time.   Skin: Skin is warm and dry.   Wounds CDI   Psychiatric: He has a normal mood and affect.       Quality Measures:   EKG reviewed, Medications reviewed, Radiology images reviewed and Labs reviewed  Payne catheter: No Payne  Central line in place: Need for access    DVT Prophylaxis: Enoxaparin (Lovenox)  DVT prophylaxis - mechanical: SCDs  Ulcer prophylaxis: Yes    Assessed for rehab: Patient returned to prior level of function, rehabilitation not indicated at this time      Assessment/Plan:  POD 1 Extubated, HDS, no drips, chest tube output minimal and negative for air leak.  Neuro grossly intact.  Plan:  DC chest tubes and payne.  Transition to tele status.     Active Hospital Problems    Diagnosis   • CAD (coronary artery disease) [I25.10]

## 2017-07-07 NOTE — CARE PLAN
Problem: Day of surgery post CABG/Heart valve replacement  Goal: Stabilization in immediate post op period  Intervention: If radial artery used, elevate arm, no BP checks or needle sticks from affected arm, monitor ulnar pulse and capillary refill  Complete   No sticks on right arm, no non-invasive BP on right arm, right arm elevated on pillow, capillary refill normal on right had less then 3 seconds, pulses present     Intervention: Serum K q 6 hours x 24 hours. ABG and CBC prn.  Complete following protocol   Intervention: FSBS frequency as per Cardiac Surgery Insulin Drip Protocol  Complete following protocol   Intervention: Chest tube to 20 cm suction, record CT drainage with VS  Complete   Intervention: Titrate and wean off vasoactive drips per patient’s condition and per MD order while maintaining SBP  mmHg per MD order  Patient on no vasoactive gtt's at this time   Intervention: IS q 1 hour while awake post extubation  Complete best 1250 while dangling at the bedside   Intervention: Out of bed, dangle 4 hours post extubation  Complete patient dangled at the bedside 4 hours post extubation   Intervention: Maintain all original surgical dressings for 24 hours  Complete all dressings in place   Intervention: Clear liquids post extubation, advance as tolerated  Complete sips and ice chips. Patient tolerated well   Intervention: A-Fib and DVT prophylaxis per MD order or contraindications documented (refer to DVT/VTE problem on Care Plan)  Patient on amio gtt per MADELYN delcid with SCD's   Intervention: Amiodarone protocol per MD order  Following

## 2017-07-07 NOTE — CARE PLAN
Problem: Safety  Goal: Will remain free from falls  Outcome: PROGRESSING AS EXPECTED  Pt is free from falls while ambulating. Pt. understand the need to call for assistance before mobilizing/ getting out of bed. Fall precautions implemented.   Call light/ personal belonging near pt and in reach at all times. Food & drinks / temperature adjustment and repositionings to increase pt comfort. Hourly rounding. Fall risk socks on pt. Fall risk sign in place out side of the door.

## 2017-07-07 NOTE — CARE PLAN
Problem: Nutritional:  Goal: Patient to verbalize or demonstrate understanding of diet  Outcome: MET Date Met:  07/07/17  Cardiac rehab diet education completed by MARCY

## 2017-07-07 NOTE — PROGRESS NOTES
12 hour chart check     MS: Sinus bradycardia to sinus rhythm, rate of 50-65 bpm with occasional PVCs: 0.18/0.07/0.40

## 2017-07-08 LAB
ANION GAP SERPL CALC-SCNC: 3 MMOL/L (ref 0–11.9)
BUN SERPL-MCNC: 14 MG/DL (ref 8–22)
CALCIUM SERPL-MCNC: 8.1 MG/DL (ref 8.5–10.5)
CHLORIDE SERPL-SCNC: 105 MMOL/L (ref 96–112)
CO2 SERPL-SCNC: 26 MMOL/L (ref 20–33)
CREAT SERPL-MCNC: 0.93 MG/DL (ref 0.5–1.4)
ERYTHROCYTE [DISTWIDTH] IN BLOOD BY AUTOMATED COUNT: 45.9 FL (ref 35.9–50)
GFR SERPL CREATININE-BSD FRML MDRD: >60 ML/MIN/1.73 M 2
GLUCOSE BLD-MCNC: 106 MG/DL (ref 65–99)
GLUCOSE BLD-MCNC: 125 MG/DL (ref 65–99)
GLUCOSE BLD-MCNC: 138 MG/DL (ref 65–99)
GLUCOSE BLD-MCNC: 146 MG/DL (ref 65–99)
GLUCOSE BLD-MCNC: 148 MG/DL (ref 65–99)
GLUCOSE SERPL-MCNC: 142 MG/DL (ref 65–99)
HCT VFR BLD AUTO: 34.1 % (ref 42–52)
HGB BLD-MCNC: 11 G/DL (ref 14–18)
MCH RBC QN AUTO: 30.3 PG (ref 27–33)
MCHC RBC AUTO-ENTMCNC: 32.3 G/DL (ref 33.7–35.3)
MCV RBC AUTO: 93.9 FL (ref 81.4–97.8)
PLATELET # BLD AUTO: 183 K/UL (ref 164–446)
PMV BLD AUTO: 10.2 FL (ref 9–12.9)
POTASSIUM SERPL-SCNC: 4.7 MMOL/L (ref 3.6–5.5)
RBC # BLD AUTO: 3.63 M/UL (ref 4.7–6.1)
SODIUM SERPL-SCNC: 134 MMOL/L (ref 135–145)
WBC # BLD AUTO: 21.2 K/UL (ref 4.8–10.8)

## 2017-07-08 PROCEDURE — A9270 NON-COVERED ITEM OR SERVICE: HCPCS | Performed by: INTERNAL MEDICINE

## 2017-07-08 PROCEDURE — A9270 NON-COVERED ITEM OR SERVICE: HCPCS | Performed by: NURSE PRACTITIONER

## 2017-07-08 PROCEDURE — 770020 HCHG ROOM/CARE - TELE (206)

## 2017-07-08 PROCEDURE — 85027 COMPLETE CBC AUTOMATED: CPT

## 2017-07-08 PROCEDURE — 94669 MECHANICAL CHEST WALL OSCILL: CPT

## 2017-07-08 PROCEDURE — 700102 HCHG RX REV CODE 250 W/ 637 OVERRIDE(OP): Performed by: INTERNAL MEDICINE

## 2017-07-08 PROCEDURE — 700102 HCHG RX REV CODE 250 W/ 637 OVERRIDE(OP): Performed by: NURSE PRACTITIONER

## 2017-07-08 PROCEDURE — 700111 HCHG RX REV CODE 636 W/ 250 OVERRIDE (IP): Performed by: NURSE PRACTITIONER

## 2017-07-08 PROCEDURE — 97161 PT EVAL LOW COMPLEX 20 MIN: CPT

## 2017-07-08 PROCEDURE — 80048 BASIC METABOLIC PNL TOTAL CA: CPT

## 2017-07-08 PROCEDURE — G8979 MOBILITY GOAL STATUS: HCPCS | Mod: CI

## 2017-07-08 PROCEDURE — 700112 HCHG RX REV CODE 229: Performed by: NURSE PRACTITIONER

## 2017-07-08 PROCEDURE — 94668 MNPJ CHEST WALL SBSQ: CPT

## 2017-07-08 PROCEDURE — A9270 NON-COVERED ITEM OR SERVICE: HCPCS | Performed by: THORACIC SURGERY (CARDIOTHORACIC VASCULAR SURGERY)

## 2017-07-08 PROCEDURE — 700102 HCHG RX REV CODE 250 W/ 637 OVERRIDE(OP): Performed by: THORACIC SURGERY (CARDIOTHORACIC VASCULAR SURGERY)

## 2017-07-08 PROCEDURE — 82962 GLUCOSE BLOOD TEST: CPT

## 2017-07-08 PROCEDURE — G8978 MOBILITY CURRENT STATUS: HCPCS | Mod: CJ

## 2017-07-08 RX ORDER — HYDROCODONE BITARTRATE AND ACETAMINOPHEN 5; 325 MG/1; MG/1
1-2 TABLET ORAL EVERY 6 HOURS PRN
Status: DISCONTINUED | OUTPATIENT
Start: 2017-07-08 | End: 2017-07-10 | Stop reason: HOSPADM

## 2017-07-08 RX ADMIN — ASPIRIN 81 MG: 81 TABLET, CHEWABLE ORAL at 08:09

## 2017-07-08 RX ADMIN — METOPROLOL TARTRATE 25 MG: 25 TABLET, FILM COATED ORAL at 08:09

## 2017-07-08 RX ADMIN — MUPIROCIN 1 APPLICATION: 20 OINTMENT TOPICAL at 10:19

## 2017-07-08 RX ADMIN — MAGNESIUM SULFATE HEPTAHYDRATE 1 G: 1 INJECTION, SOLUTION INTRAVENOUS at 08:09

## 2017-07-08 RX ADMIN — MUPIROCIN 1 APPLICATION: 20 OINTMENT TOPICAL at 20:14

## 2017-07-08 RX ADMIN — ENOXAPARIN SODIUM 40 MG: 100 INJECTION SUBCUTANEOUS at 08:09

## 2017-07-08 RX ADMIN — OXYCODONE HYDROCHLORIDE 5 MG: 5 TABLET ORAL at 11:30

## 2017-07-08 RX ADMIN — TRAMADOL HYDROCHLORIDE 50 MG: 50 TABLET, COATED ORAL at 04:06

## 2017-07-08 RX ADMIN — HYDROCODONE BITARTRATE AND ACETAMINOPHEN 2 TABLET: 5; 325 TABLET ORAL at 22:01

## 2017-07-08 RX ADMIN — INSULIN HUMAN 25 UNITS: 100 INJECTION, SUSPENSION SUBCUTANEOUS at 15:00

## 2017-07-08 RX ADMIN — ROSUVASTATIN CALCIUM 20 MG: 20 TABLET ORAL at 20:14

## 2017-07-08 RX ADMIN — OXYCODONE HYDROCHLORIDE 10 MG: 10 TABLET ORAL at 04:06

## 2017-07-08 RX ADMIN — HYDROCODONE BITARTRATE AND ACETAMINOPHEN 2 TABLET: 5; 325 TABLET ORAL at 10:15

## 2017-07-08 RX ADMIN — OXYCODONE HYDROCHLORIDE 5 MG: 5 TABLET ORAL at 02:10

## 2017-07-08 RX ADMIN — AMIODARONE HYDROCHLORIDE 400 MG: 200 TABLET ORAL at 20:13

## 2017-07-08 RX ADMIN — TRAMADOL HYDROCHLORIDE 50 MG: 50 TABLET, COATED ORAL at 13:00

## 2017-07-08 RX ADMIN — CLOPIDOGREL 75 MG: 75 TABLET, FILM COATED ORAL at 08:09

## 2017-07-08 RX ADMIN — HYDROCODONE BITARTRATE AND ACETAMINOPHEN 2 TABLET: 5; 325 TABLET ORAL at 16:15

## 2017-07-08 RX ADMIN — AMIODARONE HYDROCHLORIDE 400 MG: 200 TABLET ORAL at 08:09

## 2017-07-08 RX ADMIN — OXYCODONE HYDROCHLORIDE 5 MG: 5 TABLET ORAL at 23:20

## 2017-07-08 RX ADMIN — DOCUSATE SODIUM 100 MG: 100 CAPSULE ORAL at 08:09

## 2017-07-08 RX ADMIN — TRAMADOL HYDROCHLORIDE 50 MG: 50 TABLET, COATED ORAL at 08:30

## 2017-07-08 RX ADMIN — METOPROLOL TARTRATE 25 MG: 25 TABLET, FILM COATED ORAL at 21:00

## 2017-07-08 RX ADMIN — TRAMADOL HYDROCHLORIDE 50 MG: 50 TABLET, COATED ORAL at 20:14

## 2017-07-08 RX ADMIN — STANDARDIZED SENNA CONCENTRATE AND DOCUSATE SODIUM 1 TABLET: 8.6; 5 TABLET, FILM COATED ORAL at 20:14

## 2017-07-08 RX ADMIN — INSULIN HUMAN 25 UNITS: 100 INJECTION, SUSPENSION SUBCUTANEOUS at 06:36

## 2017-07-08 ASSESSMENT — GAIT ASSESSMENTS
ASSISTIVE DEVICE: WHEELCHAIR PUSH
GAIT LEVEL OF ASSIST: SUPERVISED
DISTANCE (FEET): 200

## 2017-07-08 ASSESSMENT — COGNITIVE AND FUNCTIONAL STATUS - GENERAL
TURNING FROM BACK TO SIDE WHILE IN FLAT BAD: A LITTLE
SUGGESTED CMS G CODE MODIFIER MOBILITY: CJ
CLIMB 3 TO 5 STEPS WITH RAILING: A LITTLE
MOBILITY SCORE: 21
MOVING TO AND FROM BED TO CHAIR: A LITTLE

## 2017-07-08 ASSESSMENT — ENCOUNTER SYMPTOMS
PSYCHIATRIC NEGATIVE: 1
NEUROLOGICAL NEGATIVE: 1
MUSCULOSKELETAL NEGATIVE: 1
RESPIRATORY NEGATIVE: 1
CARDIOVASCULAR NEGATIVE: 1
CONSTITUTIONAL NEGATIVE: 1
GASTROINTESTINAL NEGATIVE: 1
EYES NEGATIVE: 1

## 2017-07-08 ASSESSMENT — PAIN SCALES - GENERAL
PAINLEVEL_OUTOF10: 4
PAINLEVEL_OUTOF10: 6
PAINLEVEL_OUTOF10: 4
PAINLEVEL_OUTOF10: 5
PAINLEVEL_OUTOF10: 4
PAINLEVEL_OUTOF10: 3

## 2017-07-08 NOTE — CARE PLAN
Problem: Hyperinflation:  Goal: Prevent or improve atelectasis  Outcome: PROGRESSING SLOWER THAN EXPECTED  PEP Q4  750-1500ml   1LNC - RA  60% 1770ml

## 2017-07-08 NOTE — PROGRESS NOTES
Pulmonary Critical Care Progress Note        DOS:  7/8/2017    Chief Complaint: CABG    History of Present Illness: 76 y.o. Male, h/o IDDM, HTN, DLD admitted for CABG x 4     ROS:    Respiratory: unable to perform due to the patient's inability to effectively communicate   Cardiac: unable to perform due to the patient's inability to effectively communicate   GI: unable to perform due to the patient's inability to effectively communicate.    All other systems negative.    Interval Events:  24 hour interval history reviewed     Post op day 2.  Sinus rhythm in the 80-90s.  Systolic BPs in the low 100s.  EF 72%.  Eating cardiac diet with good appetite.  Voided 400 last night.    Afebrile.  Mobilizing well today.  IS 1250.  No cxr obtained today.    PFSH:  No change.    Physical Exam:  General:  Up in chair, no distress  HEENT:  PERR:  CVS:  RRR  Respiratory:  CTA, dim at bases  Abdomen:  Soft, nt  Extremities:  Tr edema  Skin:  No rash  Neuro/Psych:  NFE      Respiratory:     Pulse Oximetry: 91 %  Chest Tube Drains:          ImagingAvailable data reviewed   Recent Labs      07/06/17   1412  07/06/17   1511  07/06/17   1630   ISTATAPH  7.321*  7.334*  7.347*   ISTATAPCO2  36.6  36.9  35.5   ISTATAPO2  104*  105*  117*   ISTATATCO2  20  21  21   [id number]  98  98  98   ISTATARTHCO3  18.9  19.6  19.4   ISTATARTBE  -7*  -6*  -6*   ISTATTEMP  36.1 C  36.9 C  37.4 C   ISTATFIO2  45  40  40   ISTATSPEC  Arterial  Arterial  Arterial   ISTATAPHTC  7.333*  7.335*  7.341*   [id number]  99*  104*  119*       HemoDynamics:  Pulse: 73, Heart Rate (Monitored): 73  NIBP: 113/55 mmHg        Imaging: Available data reviewed        Neuro:  GCS Total [place] Coma Score: 15       Imaging: Available data reviewed    Fluids:  Intake/Output       07/06/17 0700 - 07/07/17 0659 07/07/17 0700 - 07/08/17 0659 07/08/17 0700 - 07/09/17 0659      6206-5187 1171-1747 Total 0700-1859 1900-0659 Total 0700-1859 1900-0659 Total       Intake    P.O.   --  240 240  1210  220 1430  400  -- 400    P.O. --  220 1430 400 -- 400    I.V.  3033.2  1670.9 4704.1  183.1  -- 183.1  100  -- 100    Clevidipine Volume 1 -- 1 -- -- -- -- -- --    Crystalloid Intake 1000 -- 1000 -- -- -- -- -- --    Precedex Volume 43.7 46.2 89.9 0 -- 0 -- -- --    Amiodarone Volume -- 294.2 294.2 58.1 -- 58.1 -- -- --    Insulin Volume 45.7 220.5 266.2 125 -- 125 -- -- --    Epinephrine Volume 2.8 -- 2.8 -- -- -- -- -- --    IV Volume (Normal Saline) 140 210 350 -- -- -- -- -- --    IV Volume (Plasmalyte) 0379 161 1232 -- -- -- -- -- --    IV Piggyback Volume (IV Piggyback) 300 400 700 -- -- -- -- -- --    Mg -- -- -- -- -- -- 100 -- 100    Blood  430  -- 430  --  -- --  --  -- --    Cell Saver Volume (mL) 430 -- 430 -- -- -- -- -- --    Total Intake 3463.2 1910.9 5374.1 1393.1 220 1613.1 500 -- 500       Output    Urine  1640  440 2080  440  25 465  --  -- --    Indwelling Cathether  440 -- 440 -- -- --    Void (ml) 900 -- 900 -- 25 25 -- -- --    Drains  117  172 289  70  -- 70  --  -- --    Mediastinal Chest Tube 1 117 172 289 70 -- 70 -- -- --    Stool  --  -- --  --  -- --  --  -- --    Number of Times Stooled 0 x -- 0 x -- 0 x 0 x -- -- --    Total Output 6404 840 3902 510 25 535 -- -- --       Net I/O     1706.2 1298.9 3005.1 883.1 195 1078.1 500 -- 500        Weight: 93.2 kg (205 lb 7.5 oz)  Recent Labs      07/05/17   1505  07/06/17   1227  07/06/17   1614  07/06/17   2346  07/07/17   0500  07/08/17   0215   SODIUM  140   --    --    --   139  134*   POTASSIUM  3.9   --   4.1  4.3  4.4  4.7   CHLORIDE  107   --    --    --   112  105   CO2  24   --    --    --   21  26   BUN  12   --    --    --   12  14   CREATININE  0.88   --    --    --   0.83  0.93   MAGNESIUM   --   2.3  2.6*   --    --    --    CALCIUM  9.1   --    --    --   8.0*  8.1*       GI/Nutrition:     Imaging: Available data reviewed     NPO  Liver Function  Recent Labs      07/05/17   1501   17   0500  17   0215   ALTSGPT  26   --    --    ASTSGOT  22   --    --    ALKPHOSPHAT  56   --    --    TBILIRUBIN  0.4   --    --    GLUCOSE  104*  103*  142*       Heme:  Recent Labs      17   1505   17   1227  17   0500  17   0215   RBC  4.69*   --    --   3.62*  3.63*   HEMOGLOBIN  14.2   --    --   11.2*  11.0*   HEMATOCRIT  43.5   --    --   33.8*  34.1*   PLATELETCT  232   < >  161*  181  183   PROTHROMBTM  14.4   --   16.7*   --    --    APTT  31.1   --   37.2*   --    --    INR  1.09   --   1.31*   --    --     < > = values in this interval not displayed.       Infectious Disease:  Temp  Av.2 °C (98.9 °F)  Min: 36.9 °C (98.4 °F)  Max: 37.4 °C (99.3 °F)  Micro: reviewed  Recent Labs      17   1505  17   0500  17   0215   WBC  8.9  18.0*  21.2*   NEUTSPOLYS  66.00   --    --    LYMPHOCYTES  23.00   --    --    MONOCYTES  7.60   --    --    EOSINOPHILS  2.20   --    --    BASOPHILS  0.60   --    --    ASTSGOT  22   --    --    ALTSGPT  26   --    --    ALKPHOSPHAT  56   --    --    TBILIRUBIN  0.4   --    --      Current Facility-Administered Medications   Medication Dose Frequency Provider Last Rate Last Dose   • hydrocodone-acetaminophen (NORCO) 5-325 MG per tablet 1-2 Tab  1-2 Tab Q6HRS PRN Varinder Rider M.D.   2 Tab at 17 1015   • insulin NPH (HUMULIN,NOVOLIN) injection 25 Units  25 Units Q8HRS Anthony Gaffney M.D.   25 Units at 17 0636   • insulin lispro (HUMALOG) injection 18 Units  18 Units TID AC Anthony Gaffney M.D.   9 Units at 17 0814   • insulin lispro (HUMALOG) injection 0-25 Units  0-25 Units ACHS & 0200 Anthony Gaffney M.D.   4 Units at 17 1115   • amiodarone (CORDARONE) tablet 400 mg  400 mg TWICE DAILY Antohny Gaffney M.D.   400 mg at 17 0809   • aspirin (ASA) chewable tab 81 mg  81 mg DAILY Adriana Marquis, A.P.N.   81 mg at 17 08   • Respiratory Care per Protocol   Continuous RT Adriana BUSH  Kandis, A.P.N.       • NS infusion   Continuous Adriana Marquis, A.P.N. 10 mL/hr at 07/06/17 1351     • docusate sodium (COLACE) capsule 100 mg  100 mg QAM Adriana Marquis, A.P.N.   100 mg at 07/08/17 0809    And   • senna-docusate (PERICOLACE or SENOKOT S) 8.6-50 MG per tablet 1 Tab  1 Tab Nightly Adriana Marquis, A.P.N.   1 Tab at 07/07/17 2006    And   • senna-docusate (PERICOLACE or SENOKOT S) 8.6-50 MG per tablet 1 Tab  1 Tab Q24HRS PRN Adriana Marquis, A.P.N.        And   • lactulose 20 GM/30ML solution 30 mL  30 mL Q24HRS PRN Adriana Marquis, A.P.N.        And   • bisacodyl (DULCOLAX) suppository 10 mg  10 mg Q24HRS PRN Adriana Marquis, A.P.N.        And   • fleet enema 133 mL  1 Each Once PRN Adriana Marquis, A.P.N.       • enoxaparin (LOVENOX) inj 40 mg  40 mg DAILY Adriana Marquis, A.P.N.   40 mg at 07/08/17 0809   • mupirocin (BACTROBAN) 2 % ointment 1 Application  1 Application BID Adriana Marquis, A.P.N.   1 Application at 07/08/17 1019   • metoprolol (LOPRESSOR) tablet 25 mg  25 mg BID Adriana Marquis, A.P.N.   25 mg at 07/08/17 0809   • clopidogrel (PLAVIX) tablet 75 mg  75 mg DAILY Adriana Marquis, A.P.N.   75 mg at 07/08/17 0809   • rosuvastatin (CRESTOR) tablet 20 mg  20 mg QHS Adriana Marquis, A.P.N.   20 mg at 07/07/17 2010   • oxycodone immediate-release (ROXICODONE) tablet 5 mg  5 mg Q3HRS PRN Adriana Marquis, A.P.N.   5 mg at 07/08/17 1130   • oxycodone immediate release (ROXICODONE) tablet 10 mg  10 mg Q3HRS PRN Adriana L Kandis, A.P.N.   10 mg at 07/08/17 0406   • tramadol (ULTRAM) 50 MG tablet 50 mg  50 mg Q4HRS PRN Adriana L Kandis, A.P.N.   50 mg at 07/08/17 0830   • ondansetron (ZOFRAN) syringe/vial injection 4 mg  4 mg Q6HRS PRN Adriana L Kandis, A.P.N.        Or   • prochlorperazine (COMPAZINE) injection 10 mg  10 mg Q6HRS PRN Adriana L Kandis, A.P.N.        Or   • promethazine (PHENERGAN) suppository 25 mg  25 mg Q6HRS PRN Adriana Jacquesakat, A.P.N.       • acetaminophen (TYLENOL) tablet  650 mg  650 mg Q4HRS PRN Adriana L Kandis, A.P.N.        Or   • acetaminophen (TYLENOL) suppository 650 mg  650 mg Q4HRS PRN Adriana L Kandis, A.P.N.       • mag hydrox-al hydrox-simeth (MAALOX PLUS ES or MYLANTA DS) suspension 30 mL  30 mL Q4HRS PRN Adriana L Kandis, A.P.N.       • diphenhydrAMINE (BENADRYL) tablet/capsule 25 mg  25 mg HS PRN - MR X 1 Adriana L Kandis, A.P.N.         Last reviewed on 7/6/2017 11:14 AM by Van Cooper R.N.    Quality  Measures:  Labs reviewed, Medications reviewed and Radiology images reviewed                      Problems/Plan:  Post-op vent mgt   - CXR, ABG reviewed with RN/RT   - liberated   - IS, mobilize, RT protocols  CAD   - CABGx4 7/6   - follow CTs, hemodynamics, CVS protocols   - CVS following  IDDM   - glycemic control  HTN   - BP control  DLD  Will follow in ICU  Discussed patient condition and risk of morbidity and/or mortality with RN, RT and QA team.           Jose Miguel REAGAN (Andra), am scribing for, and in the presence of, Varinder Rider M.D..  Electronically signed by: Jose Miguel Briceño (Andra), 7/8/2017  Varinder REAGAN M.D. personally performed the services described in this documentation, as scribed by Jose Miguel Briceño in my presence, and it is both accurate and complete.

## 2017-07-08 NOTE — PROGRESS NOTES
Cardiovascular Surgery Progress Note    Name: Raf Pelayo  MRN: [id number]  : 1940  Admit Date: 2017  5:10 AM  Procedure:  Procedure(s) and Anesthesia Type:     * MULTIPLE CORONARY ARTERY BYPASS ENDO VEIN HARVEST X2-4, PREVENA DRESSING  - General     * ROHINI - INTRAOPERATIVE  - General  2 Day Post-Op    Vitals:  Patient Vitals for the past 8 hrs:   Temp SpO2 O2 Delivery O2 (LPM) Pulse Heart Rate (Monitored) Resp NIBP Weight   17 1102 - 89 % - - 70 - 18 - -   17 0800 37 °C (98.6 °F) 91 % Silicone Nasal Cannula 1 77 77 17 114/61 mmHg -   17 0745 - (!) 85 % None (Room Air) - - - - - -   17 0742 - - - - - 75 17 - -   17 0716 - 93 % - - - 77 19 - -   17 0600 - - - - - - - - 93.2 kg (205 lb 7.5 oz)   17 0533 - - - - - 70 12 - -   17 0400 37.4 °C (99.3 °F) 92 % Silicone Nasal Cannula 1 - 78 14 127/71 mmHg -     Temp (24hrs), Av.3 °C (99.1 °F), Min:37 °C (98.6 °F), Max:37.4 °C (99.3 °F)      Respiratory:    Respiration: 18, Pulse Oximetry: 89 %, O2 Daily Delivery Respiratory : Room Air with O2 Available     Chest Tube Drains:     Mediastinal Chest Tube 1:  (d/c at this time)    Fluids:    Intake/Output Summary (Last 24 hours) at 17 1103  Last data filed at 17 0800   Gross per 24 hour   Intake   1325 ml   Output    325 ml   Net   1000 ml     Admit weight: Weight: 89.4 kg (197 lb 1.5 oz)  Current weight: Weight: 93.2 kg (205 lb 7.5 oz) (17 0600)    Labs:  Recent Labs      17   1505   17   1227  17   0500  17   0215   WBC  8.9   --    --   18.0*  21.2*   RBC  4.69*   --    --   3.62*  3.63*   HEMOGLOBIN  14.2   --    --   11.2*  11.0*   HEMATOCRIT  43.5   --    --   33.8*  34.1*   MCV  92.8   --    --   93.6  93.9   MCH  30.3   --    --   30.1  30.3   MCHC  32.6*   --    --   32.2*  32.3*   RDW  43.2   --    --   44.7  45.9   PLATELETCT  232   < >  161*  181  183   MPV  9.9   --    --   10.4  10.2    < > = values  in this interval not displayed.     Recent Labs      07/05/17   1505   NEUTSPOLYS  66.00   LYMPHOCYTES  23.00   MONOCYTES  7.60   EOSINOPHILS  2.20   BASOPHILS  0.60     Recent Labs      07/05/17   1505   07/06/17   2346  07/07/17   0500  07/08/17   0215   SODIUM  140   --    --   139  134*   POTASSIUM  3.9   < >  4.3  4.4  4.7   CHLORIDE  107   --    --   112  105   CO2  24   --    --   21  26   GLUCOSE  104*   --    --   103*  142*   BUN  12   --    --   12  14   CREATININE  0.88   --    --   0.83  0.93   CALCIUM  9.1   --    --   8.0*  8.1*    < > = values in this interval not displayed.     Recent Labs      07/05/17   1505  07/06/17   1227   APTT  31.1  37.2*   INR  1.09  1.31*       Medications:  • insulin NPH  25 Units     • insulin lispro  18 Units     • insulin lispro  0-25 Units     • amiodarone  400 mg     • aspirin  81 mg     • docusate sodium  100 mg      And   • senna-docusate  1 Tab     • enoxaparin  40 mg     • mupirocin  1 Application     • metoprolol  25 mg     • clopidogrel  75 mg     • rosuvastatin  20 mg         Exam:   Review of Systems   Constitutional: Negative.    HENT: Negative.    Eyes: Negative.    Respiratory: Negative.    Cardiovascular: Negative.    Gastrointestinal: Negative.    Genitourinary: Negative.    Musculoskeletal: Negative.    Skin: Negative.    Neurological: Negative.    Psychiatric/Behavioral: Negative.        Physical Exam   Constitutional: He is oriented to person, place, and time. He appears well-developed and well-nourished.   HENT:   Head: Normocephalic and atraumatic.   Eyes: Pupils are equal, round, and reactive to light.   Neck: Normal range of motion. Neck supple.   Cardiovascular: Normal rate and regular rhythm.    Pulmonary/Chest: Effort normal.   Decreased at bases.    Abdominal: Soft. Bowel sounds are normal.   Musculoskeletal: Normal range of motion.   Neurological: He is alert and oriented to person, place, and time.   Skin: Skin is warm and dry.   Wounds CDI    Psychiatric: He has a normal mood and affect.       Quality Measures:   EKG reviewed, Medications reviewed, Radiology images reviewed and Labs reviewed  Payne catheter: No Payne  Central line in place: Need for access    DVT Prophylaxis: Enoxaparin (Lovenox)  DVT prophylaxis - mechanical: SCDs  Ulcer prophylaxis: Yes    Assessed for rehab: Patient returned to prior level of function, rehabilitation not indicated at this time      Assessment/Plan:  POD 1 Extubated, HDS, no drips, chest tube output minimal and negative for air leak.  Neuro grossly intact.  Plan:  DC chest tubes and payne.  Transition to tele status.   POD 2 HDS, SR, wounds CDI, prevena intact, neuro intact.  Plan:  DC chest tubes.  AMB/IS.  CPM    Active Hospital Problems    Diagnosis   • CAD (coronary artery disease) [I25.10]

## 2017-07-08 NOTE — THERAPY
"Pt is a 77 y/o male presenting to physical therapy s/p CABG x4 on 7/6/17. Pt ambulating very well with minimal UE support. Provided pt post op [facility] education, pt verbalized understanding; however, does not feel cardiac rehab phase II is necessarily needed. Encouraged pt to speak about this program with MD regarding benefits. Pt has not attempted bed mobility from fully supine. Educated pt on ability to sleep in recliner as needed upon D/C home until more comfortable to negotiate in/out of bed. PT will remain available to address bed mobility as needed prior to D/C     Physical Therapy Evaluation completed.   Bed Mobility:  Supine to Sit:  (not evaluated)  Transfers: Sit to Stand: Supervised  Gait: Level Of Assist: Supervised with wheelchair push. x400 total feet. Tolerated well       Plan of Care: Will benefit from Physical Therapy 2 more visits as needed for bed mobility training  Discharge Recommendations: Equipment: Will Continue to Assess for Equipment Needs. Post-acute therapy Discharge to home with outpatient CARDIAC REHAB for additional skilled therapy services.    See \"Rehab Therapy-Acute\" Patient Summary Report for complete documentation.     "

## 2017-07-08 NOTE — CARE PLAN
Problem: Hyperinflation:  Goal: Prevent or improve atelectasis  Outcome: PROGRESSING AS EXPECTED  Pt is achieving 5843-3241 mL on IS. PEP is ordered QID

## 2017-07-08 NOTE — PROGRESS NOTES
Is at 2000 1100, now IS only 750, pain medication provided at this time. Education reinforced regarding pain control. Pt claims pain only 4-5, pt taking short shallow breaths and grimacing at times

## 2017-07-08 NOTE — CARE PLAN
Problem: Post Op Day 1 CABG/Heart Valve Replacement  Goal: Optimal care of the post op CABG/heart valve replacement Post Op Day 1  Intervention: EKG and CXR completed  Completed.   Intervention: Antibiotics are discontinued within 24 hours of anesthesia end time unless indication documented for continuation beyond 24 hours  Completed per MAR.   Intervention: Daily Weights  Completed per NOCS.   Intervention: Up in chair for all meals  Pt in chair for all meals.   Intervention: Ambulate in am if stable. First ambulation is 25 feet. Repeat x 3 as tolerated. Ambulate again before bed.  Pt ambulated approx 40ft with two RN assist.   Intervention: Discontinue payne catheter unless documented reason for continuation  Completed per order.   Intervention: Remove original surgical dressing after 24 hrs, leave open to air unless otherwise specified by physician  Right [facility] site unwrapped per protocol. Midline Prevena in place.   Intervention: Cardiac rehab phase 1 ordered and smoking cessation education and program referral as appropriate  NA.   Intervention: Consider chest tube removal by MD  Completed per order.   Intervention: IS q 1 hour while awake and record best IS volume  Completed per EPIC.   Intervention: Graduated elastic stockings  On per order.   Intervention: Saline lock IV  Pt in saline locked.   Intervention: Transfer to tele status, begin VS q 4 hours  Pt transitioned to TELE status.   Intervention: After 24th hour post-anesthesia end time, transition patient to Cardiac Surgery SQ Insulin Protocol  Pt transitioned per MAR.   Intervention: If patient is CABG or on home beta-blocker, start Beta-Blocker on POD 1 or POD 2 or contraindication documented  See MAR.

## 2017-07-08 NOTE — PROGRESS NOTES
"Pt c/o pain consistently at 4-5. No real relief at this time from pain medication regimen. Pt states \"Its not too bad but definitely at least a solid 4. IS level not reaching above 750 at this time. Providing pain medication as ordered  "

## 2017-07-08 NOTE — PROGRESS NOTES
Pt grimacing with deep breaths and guarding while talking. Pt needing encouragement and education regarding pain medication. Pt refused pain meds at 1930, but willing to take now.

## 2017-07-09 LAB
ANION GAP SERPL CALC-SCNC: 6 MMOL/L (ref 0–11.9)
BUN SERPL-MCNC: 18 MG/DL (ref 8–22)
CALCIUM SERPL-MCNC: 8.5 MG/DL (ref 8.5–10.5)
CHLORIDE SERPL-SCNC: 104 MMOL/L (ref 96–112)
CO2 SERPL-SCNC: 26 MMOL/L (ref 20–33)
CREAT SERPL-MCNC: 0.85 MG/DL (ref 0.5–1.4)
ERYTHROCYTE [DISTWIDTH] IN BLOOD BY AUTOMATED COUNT: 44.2 FL (ref 35.9–50)
GFR SERPL CREATININE-BSD FRML MDRD: >60 ML/MIN/1.73 M 2
GLUCOSE SERPL-MCNC: 126 MG/DL (ref 65–99)
HCT VFR BLD AUTO: 33.2 % (ref 42–52)
HGB BLD-MCNC: 10.8 G/DL (ref 14–18)
MCH RBC QN AUTO: 30.3 PG (ref 27–33)
MCHC RBC AUTO-ENTMCNC: 32.5 G/DL (ref 33.7–35.3)
MCV RBC AUTO: 93.3 FL (ref 81.4–97.8)
PLATELET # BLD AUTO: 179 K/UL (ref 164–446)
PMV BLD AUTO: 10.2 FL (ref 9–12.9)
POTASSIUM SERPL-SCNC: 4 MMOL/L (ref 3.6–5.5)
RBC # BLD AUTO: 3.56 M/UL (ref 4.7–6.1)
SODIUM SERPL-SCNC: 136 MMOL/L (ref 135–145)
WBC # BLD AUTO: 15.4 K/UL (ref 4.8–10.8)

## 2017-07-09 PROCEDURE — A9270 NON-COVERED ITEM OR SERVICE: HCPCS | Performed by: CLINICAL NURSE SPECIALIST

## 2017-07-09 PROCEDURE — 700111 HCHG RX REV CODE 636 W/ 250 OVERRIDE (IP): Performed by: CLINICAL NURSE SPECIALIST

## 2017-07-09 PROCEDURE — 80048 BASIC METABOLIC PNL TOTAL CA: CPT

## 2017-07-09 PROCEDURE — 770020 HCHG ROOM/CARE - TELE (206)

## 2017-07-09 PROCEDURE — 700102 HCHG RX REV CODE 250 W/ 637 OVERRIDE(OP): Performed by: NURSE PRACTITIONER

## 2017-07-09 PROCEDURE — 700112 HCHG RX REV CODE 229: Performed by: NURSE PRACTITIONER

## 2017-07-09 PROCEDURE — A9270 NON-COVERED ITEM OR SERVICE: HCPCS | Performed by: NURSE PRACTITIONER

## 2017-07-09 PROCEDURE — 700111 HCHG RX REV CODE 636 W/ 250 OVERRIDE (IP): Performed by: NURSE PRACTITIONER

## 2017-07-09 PROCEDURE — 700102 HCHG RX REV CODE 250 W/ 637 OVERRIDE(OP): Performed by: THORACIC SURGERY (CARDIOTHORACIC VASCULAR SURGERY)

## 2017-07-09 PROCEDURE — 700102 HCHG RX REV CODE 250 W/ 637 OVERRIDE(OP): Performed by: CLINICAL NURSE SPECIALIST

## 2017-07-09 PROCEDURE — 94669 MECHANICAL CHEST WALL OSCILL: CPT

## 2017-07-09 PROCEDURE — 94668 MNPJ CHEST WALL SBSQ: CPT

## 2017-07-09 PROCEDURE — 700102 HCHG RX REV CODE 250 W/ 637 OVERRIDE(OP): Performed by: INTERNAL MEDICINE

## 2017-07-09 PROCEDURE — A9270 NON-COVERED ITEM OR SERVICE: HCPCS | Performed by: INTERNAL MEDICINE

## 2017-07-09 PROCEDURE — A9270 NON-COVERED ITEM OR SERVICE: HCPCS | Performed by: THORACIC SURGERY (CARDIOTHORACIC VASCULAR SURGERY)

## 2017-07-09 PROCEDURE — 85027 COMPLETE CBC AUTOMATED: CPT

## 2017-07-09 RX ORDER — POTASSIUM CHLORIDE 20 MEQ/1
20 TABLET, EXTENDED RELEASE ORAL 2 TIMES DAILY
Status: DISCONTINUED | OUTPATIENT
Start: 2017-07-09 | End: 2017-07-10 | Stop reason: HOSPADM

## 2017-07-09 RX ORDER — FUROSEMIDE 10 MG/ML
40 INJECTION INTRAMUSCULAR; INTRAVENOUS 2 TIMES DAILY
Status: DISCONTINUED | OUTPATIENT
Start: 2017-07-09 | End: 2017-07-10 | Stop reason: HOSPADM

## 2017-07-09 RX ORDER — AMIODARONE HYDROCHLORIDE 200 MG/1
400 TABLET ORAL TWICE DAILY
Status: DISCONTINUED | OUTPATIENT
Start: 2017-07-09 | End: 2017-07-10 | Stop reason: HOSPADM

## 2017-07-09 RX ADMIN — MUPIROCIN 1 APPLICATION: 20 OINTMENT TOPICAL at 08:16

## 2017-07-09 RX ADMIN — DOCUSATE SODIUM 100 MG: 100 CAPSULE ORAL at 08:15

## 2017-07-09 RX ADMIN — ROSUVASTATIN CALCIUM 20 MG: 20 TABLET ORAL at 21:04

## 2017-07-09 RX ADMIN — POTASSIUM CHLORIDE 20 MEQ: 1500 TABLET, EXTENDED RELEASE ORAL at 21:03

## 2017-07-09 RX ADMIN — POTASSIUM CHLORIDE 20 MEQ: 1500 TABLET, EXTENDED RELEASE ORAL at 11:10

## 2017-07-09 RX ADMIN — TRAMADOL HYDROCHLORIDE 50 MG: 50 TABLET, COATED ORAL at 08:16

## 2017-07-09 RX ADMIN — HYDROCODONE BITARTRATE AND ACETAMINOPHEN 2 TABLET: 5; 325 TABLET ORAL at 05:05

## 2017-07-09 RX ADMIN — AMIODARONE HYDROCHLORIDE 400 MG: 200 TABLET ORAL at 08:15

## 2017-07-09 RX ADMIN — OXYCODONE HYDROCHLORIDE 10 MG: 10 TABLET ORAL at 15:19

## 2017-07-09 RX ADMIN — HYDROCODONE BITARTRATE AND ACETAMINOPHEN 2 TABLET: 5; 325 TABLET ORAL at 11:12

## 2017-07-09 RX ADMIN — STANDARDIZED SENNA CONCENTRATE AND DOCUSATE SODIUM 1 TABLET: 8.6; 5 TABLET, FILM COATED ORAL at 21:03

## 2017-07-09 RX ADMIN — MUPIROCIN 1 APPLICATION: 20 OINTMENT TOPICAL at 21:04

## 2017-07-09 RX ADMIN — AMIODARONE HYDROCHLORIDE 400 MG: 200 TABLET ORAL at 21:03

## 2017-07-09 RX ADMIN — ENOXAPARIN SODIUM 40 MG: 100 INJECTION SUBCUTANEOUS at 08:16

## 2017-07-09 RX ADMIN — ASPIRIN 81 MG: 81 TABLET, CHEWABLE ORAL at 08:16

## 2017-07-09 RX ADMIN — METOPROLOL TARTRATE 25 MG: 25 TABLET, FILM COATED ORAL at 08:16

## 2017-07-09 RX ADMIN — CLOPIDOGREL 75 MG: 75 TABLET, FILM COATED ORAL at 08:16

## 2017-07-09 RX ADMIN — FUROSEMIDE 40 MG: 10 INJECTION, SOLUTION INTRAMUSCULAR; INTRAVENOUS at 11:10

## 2017-07-09 RX ADMIN — FUROSEMIDE 40 MG: 10 INJECTION, SOLUTION INTRAMUSCULAR; INTRAVENOUS at 21:04

## 2017-07-09 RX ADMIN — HYDROCODONE BITARTRATE AND ACETAMINOPHEN 2 TABLET: 5; 325 TABLET ORAL at 21:04

## 2017-07-09 RX ADMIN — METOPROLOL TARTRATE 25 MG: 25 TABLET, FILM COATED ORAL at 21:03

## 2017-07-09 ASSESSMENT — PAIN SCALES - GENERAL
PAINLEVEL_OUTOF10: 4
PAINLEVEL_OUTOF10: 5
PAINLEVEL_OUTOF10: 4
PAINLEVEL_OUTOF10: 3
PAINLEVEL_OUTOF10: 4

## 2017-07-09 ASSESSMENT — ENCOUNTER SYMPTOMS
RESPIRATORY NEGATIVE: 1
EYES NEGATIVE: 1
CARDIOVASCULAR NEGATIVE: 1
NEUROLOGICAL NEGATIVE: 1
MUSCULOSKELETAL NEGATIVE: 1
GASTROINTESTINAL NEGATIVE: 1
PSYCHIATRIC NEGATIVE: 1
CONSTITUTIONAL NEGATIVE: 1

## 2017-07-09 NOTE — PROGRESS NOTES
Pulmonary Critical Care Progress Note        DOS:  7/9/2017    Chief Complaint: CABG    History of Present Illness: 76 y.o. Male, h/o IDDM, HTN, DLD admitted for CABG x 4     ROS:    Respiratory: unable to perform due to the patient's inability to effectively communicate   Cardiac: negative   GI: negative.    All other systems negative.    Interval Events:  24 hour interval history reviewed      Post op day 3  IS 1750 mL  A&O X4   Norco and tramadol for consistent pain  60-80 SR -130.  Afebrile  Last BM PTA  Urine output adequate.   RIJ and x2 peripherals.  IS with 1600mL  Walking on room air.   WBC is normalizing.   CT tubes out.    CXR: No CXR today.     PFSH:  No change.    Physical Exam:  General:  Up in chair, calm, no distress.   HEENT:  PERRL, room air,  neck supple trach midline, RIJ double lumen in place CDI.   CVS:  Distant and regular at 70, sternotomy dressing in place CDI  Respiratory: room air, clear anteriorly, slightly diminished at the bases  Abdomen:  Soft, NT.   Extremities: trace edema.   Skin:  Warm, dry, no rash.  Neuro/Psych:  Moves all extremities, NFE.     Respiratory:     Pulse Oximetry: 94 %  Chest Tube Drains:          ImagingAvailable data reviewed   Recent Labs      07/06/17   1412  07/06/17   1511  07/06/17   1630   ISTATAPH  7.321*  7.334*  7.347*   ISTATAPCO2  36.6  36.9  35.5   ISTATAPO2  104*  105*  117*   ISTATATCO2  20  21  21   [id number]  98  98  98   ISTATARTHCO3  18.9  19.6  19.4   ISTATARTBE  -7*  -6*  -6*   ISTATTEMP  36.1 C  36.9 C  37.4 C   ISTATFIO2  45  40  40   ISTATSPEC  Arterial  Arterial  Arterial   ISTATAPHTC  7.333*  7.335*  7.341*   [id number]  99*  104*  119*       HemoDynamics:  Pulse: 63, Heart Rate (Monitored): 73  NIBP: 134/70 mmHg        Imaging: Available data reviewed        Neuro:  GCS Total Shantal Coma Score: 15       Imaging: Available data reviewed    Fluids:  Intake/Output       07/07/17 0700 - 07/08/17 0659 07/08/17 0700 - 07/09/17 0659  07/09/17 0700 - 07/10/17 0659      5796-6235 1229-6742 Total 0700-1859 1900-0659 Total 9512-4557 1954-7446 Total       Intake    P.O.  1210  220 1430  600  240 840  --  -- --    P.O. 3798 899 0464 600 240 840 -- -- --    I.V.  183.1  -- 183.1  100  -- 100  --  -- --    Precedex Volume 0 -- 0 -- -- -- -- -- --    Amiodarone Volume 58.1 -- 58.1 -- -- -- -- -- --    Insulin Volume 125 -- 125 -- -- -- -- -- --    Mg -- -- -- 100 -- 100 -- -- --    Total Intake 1393.1 220 1613.1 700 240 940 -- -- --       Output    Urine  440  25 465  300  -- 300  --  -- --    Number of Times Voided -- -- -- -- 5 x 5 x -- -- --    Indwelling Cathether 440 -- 440 -- -- -- -- -- --    Void (ml) -- 25 25 300 -- 300 -- -- --    Drains  70  -- 70  --  -- --  --  -- --    Mediastinal Chest Tube 1 70 -- 70 -- -- -- -- -- --    Stool  --  -- --  --  -- --  --  -- --    Number of Times Stooled -- 0 x 0 x -- -- -- -- -- --    Total Output 510 25 535 300 -- 300 -- -- --       Net I/O     883.1 195 1078.1 400 240 640 -- -- --        Weight: 92 kg (202 lb 13.2 oz)  Recent Labs      07/06/17   1227  07/06/17   1614   07/07/17   0500  07/08/17   0215  07/09/17   0508   SODIUM   --    --    --   139  134*  136   POTASSIUM   --   4.1   < >  4.4  4.7  4.0   CHLORIDE   --    --    --   112  105  104   CO2   --    --    --   21  26  26   BUN   --    --    --   12  14  18   CREATININE   --    --    --   0.83  0.93  0.85   MAGNESIUM  2.3  2.6*   --    --    --    --    CALCIUM   --    --    --   8.0*  8.1*  8.5    < > = values in this interval not displayed.       GI/Nutrition:     Imaging: Available data reviewed     NPO  Liver Function  Recent Labs      07/07/17   0500  07/08/17 0215  07/09/17   0508   GLUCOSE  103*  142*  126*       Heme:  Recent Labs      07/06/17   1227  07/07/17   0500  07/08/17   0215  07/09/17   0508   RBC   --   3.62*  3.63*  3.56*   HEMOGLOBIN   --   11.2*  11.0*  10.8*   HEMATOCRIT   --   33.8*  34.1*  33.2*   PLATELETCT  161*   181  183  179   PROTHROMBTM  16.7*   --    --    --    APTT  37.2*   --    --    --    INR  1.31*   --    --    --        Infectious Disease:  Temp  Av.9 °C (98.5 °F)  Min: 36.7 °C (98 °F)  Max: 37.3 °C (99.2 °F)  Micro: reviewed  Recent Labs      17   0500  17   0215  17   0508   WBC  18.0*  21.2*  15.4*     Current Facility-Administered Medications   Medication Dose Frequency Provider Last Rate Last Dose   • hydrocodone-acetaminophen (NORCO) 5-325 MG per tablet 1-2 Tab  1-2 Tab Q6HRS PRN Varinder Rider M.D.   2 Tab at 17 0505   • amiodarone (CORDARONE) tablet 400 mg  400 mg TWICE DAILY Anthony Gaffney M.D.   400 mg at 17   • aspirin (ASA) chewable tab 81 mg  81 mg DAILY Adriana Marquis, A.P.N.   81 mg at 17 0809   • Respiratory Care per Protocol   Continuous RT Adriana Marquis, A.P.N.       • NS infusion   Continuous Adriana Marquis, A.P.N. 10 mL/hr at 17 1351     • docusate sodium (COLACE) capsule 100 mg  100 mg QAM Adriana Marquis, A.P.N.   100 mg at 17 0809    And   • senna-docusate (PERICOLACE or SENOKOT S) 8.6-50 MG per tablet 1 Tab  1 Tab Nightly Adriana Marquis, A.P.N.   1 Tab at 17    And   • senna-docusate (PERICOLACE or SENOKOT S) 8.6-50 MG per tablet 1 Tab  1 Tab Q24HRS PRN Adriana Marquis, A.P.N.        And   • lactulose 20 GM/30ML solution 30 mL  30 mL Q24HRS PRN Adriana Marquis, A.P.N.        And   • bisacodyl (DULCOLAX) suppository 10 mg  10 mg Q24HRS PRN Adriana Marquis, A.P.N.        And   • fleet enema 133 mL  1 Each Once PRN Adriana Marquis, A.P.N.       • enoxaparin (LOVENOX) inj 40 mg  40 mg DAILY Adriana Marquis, A.P.N.   40 mg at 17 0809   • mupirocin (BACTROBAN) 2 % ointment 1 Application  1 Application BID Adriana Marquis, A.P.N.   1 Application at 17   • metoprolol (LOPRESSOR) tablet 25 mg  25 mg BID Adriana Marquis, A.P.N.   25 mg at 17   • clopidogrel (PLAVIX) tablet 75 mg  75 mg DAILY  Adriana L Kandis, A.P.N.   75 mg at 07/08/17 0809   • rosuvastatin (CRESTOR) tablet 20 mg  20 mg QHS Adriana L Kandis, A.P.N.   20 mg at 07/08/17 2014   • oxycodone immediate-release (ROXICODONE) tablet 5 mg  5 mg Q3HRS PRN Adriana L Kandis, A.P.N.   5 mg at 07/08/17 2320   • oxycodone immediate release (ROXICODONE) tablet 10 mg  10 mg Q3HRS PRN Adriana L Kandis, A.P.N.   10 mg at 07/08/17 0406   • tramadol (ULTRAM) 50 MG tablet 50 mg  50 mg Q4HRS PRN Adriana L Kandis, A.P.N.   50 mg at 07/08/17 2014   • ondansetron (ZOFRAN) syringe/vial injection 4 mg  4 mg Q6HRS PRN Adriana Wadet, A.P.N.        Or   • prochlorperazine (COMPAZINE) injection 10 mg  10 mg Q6HRS PRN Adriana L Kandis, A.P.N.        Or   • promethazine (PHENERGAN) suppository 25 mg  25 mg Q6HRS PRN Adriana L Kandis, A.P.N.       • acetaminophen (TYLENOL) tablet 650 mg  650 mg Q4HRS PRN Adriana Wadet, A.P.N.        Or   • acetaminophen (TYLENOL) suppository 650 mg  650 mg Q4HRS PRN Adriana Wadet, A.P.N.       • mag hydrox-al hydrox-simeth (MAALOX PLUS ES or MYLANTA DS) suspension 30 mL  30 mL Q4HRS PRN Adriana L Kandis, A.P.N.       • diphenhydrAMINE (BENADRYL) tablet/capsule 25 mg  25 mg HS PRN - MR X 1 Adriana ELEAZAR Wadet, A.P.N.         Last reviewed on 7/6/2017 11:14 AM by Van Cooper R.N.    Quality  Measures:  Labs reviewed, Medications reviewed and Radiology images reviewed                      Problems/Plan:  Post-op vent mgt   - CXR, ABG reviewed with RN/RT   - liberated   - IS, mobilize, RT protocols  CAD   - CABGx4 7/6   - follow CTs, hemodynamics, CVS protocols   - CVS following  IDDM   - glycemic control  HTN   - BP control  DLD  Will follow in ICU  Discussed patient condition and risk of morbidity and/or mortality with RN, RT and QA team.        Frank REAGAN (Scribe), am scribing for, and in the presence of, Varinder Rider M.D  Electronically signed by: Frank Gomez (Scribe), 7/9/2017  Varinder REAGAN M.D personally performed the  services described in this documentation, as scribed by Frank Gomez in my presence, and it is both accurate and complete.

## 2017-07-09 NOTE — CARE PLAN
Problem: Post Op Day 3 CABG/Heart Valve replacement  Goal: Optimal care of the post op CABG/Heart Valve replacement post op day 3  Intervention: Daily Weights  Done, 92kg  Intervention: Up in chair for all meals  In chair for breakfast  Intervention: Ambulate, increasing the distance each time x 3 and before bed  Pt ambulated x1 around unit  Intervention: IS q 1 hour while awake and record best IS volume  Done, best IS 1600  Intervention: Consider removal of payne, chest tube and pacer wire if not already done  NA, already done

## 2017-07-09 NOTE — CARE PLAN
Problem: Hyperinflation:  Goal: Prevent or improve atelectasis  Outcome: PROGRESSING AS EXPECTED  PEP QID  IS 1500ml RA  60% 1770ml

## 2017-07-09 NOTE — PROGRESS NOTES
Cardiovascular Surgery Progress Note    Name: Raf Pelayo  MRN: [id number]  : 1940  Admit Date: 2017  5:10 AM  Procedure:  Procedure(s) and Anesthesia Type:     * MULTIPLE CORONARY ARTERY BYPASS ENDO VEIN HARVEST X2-4, PREVENA DRESSING  - General     * ROHINI - INTRAOPERATIVE  - General  3 Day Post-Op    Vitals:  Patient Vitals for the past 8 hrs:   Temp SpO2 O2 Delivery O2 (LPM) Pulse Resp NIBP Weight   17 0744 - 94 % - - 62 16 - -   17 0600 - 94 % None (Room Air) 0 - - - 92 kg (202 lb 13.2 oz)   17 0400 36.7 °C (98 °F) 92 % None (Room Air) 1 63 16 134/70 mmHg -     Temp (24hrs), Av.9 °C (98.5 °F), Min:36.7 °C (98 °F), Max:37.3 °C (99.2 °F)      Respiratory:    Respiration: 16, Pulse Oximetry: 94 %, O2 Daily Delivery Respiratory : Room Air with O2 Available     Chest Tube Drains:          Fluids:    Intake/Output Summary (Last 24 hours) at 17 0951  Last data filed at 17 0600   Gross per 24 hour   Intake    640 ml   Output    300 ml   Net    340 ml     Admit weight: Weight: 89.4 kg (197 lb 1.5 oz)  Current weight: Weight: 92 kg (202 lb 13.2 oz) (17 0600)    Labs:  Recent Labs      17   0500  17   0215  17   0508   WBC  18.0*  21.2*  15.4*   RBC  3.62*  3.63*  3.56*   HEMOGLOBIN  11.2*  11.0*  10.8*   HEMATOCRIT  33.8*  34.1*  33.2*   MCV  93.6  93.9  93.3   MCH  30.1  30.3  30.3   MCHC  32.2*  32.3*  32.5*   RDW  44.7  45.9  44.2   PLATELETCT  181  183  179   MPV  10.4  10.2  10.2         Recent Labs      17   0500  17   0215  17   0508   SODIUM  139  134*  136   POTASSIUM  4.4  4.7  4.0   CHLORIDE  112  105  104   CO2  21  26  26   GLUCOSE  103*  142*  126*   BUN  12  14  18   CREATININE  0.83  0.93  0.85   CALCIUM  8.0*  8.1*  8.5     Recent Labs      17   1227   APTT  37.2*   INR  1.31*       Medications:  • amiodarone  400 mg     • furosemide  40 mg     • potassium chloride SA  20 mEq     • aspirin  81 mg     •  docusate sodium  100 mg      And   • senna-docusate  1 Tab     • enoxaparin  40 mg     • mupirocin  1 Application     • metoprolol  25 mg     • clopidogrel  75 mg     • rosuvastatin  20 mg         Exam:   Review of Systems   Constitutional: Negative.    HENT: Negative.    Eyes: Negative.    Respiratory: Negative.    Cardiovascular: Negative.    Gastrointestinal: Negative.    Genitourinary: Negative.    Musculoskeletal: Negative.    Skin: Negative.    Neurological: Negative.    Psychiatric/Behavioral: Negative.        Physical Exam   Constitutional: He is oriented to person, place, and time. He appears well-developed and well-nourished.   HENT:   Head: Normocephalic and atraumatic.   Eyes: Pupils are equal, round, and reactive to light.   Neck: Normal range of motion. Neck supple.   Cardiovascular: Normal rate and regular rhythm.    Pulmonary/Chest: Effort normal.   Decreased at bases.    Abdominal: Soft. Bowel sounds are normal.   Musculoskeletal: Normal range of motion.   Neurological: He is alert and oriented to person, place, and time.   Skin: Skin is warm and dry.   Wounds CDI   Psychiatric: He has a normal mood and affect.       Quality Measures:   EKG reviewed, Medications reviewed, Radiology images reviewed and Labs reviewed  Payne catheter: No Payne  Central line in place: Need for access    DVT Prophylaxis: Enoxaparin (Lovenox)  DVT prophylaxis - mechanical: SCDs  Ulcer prophylaxis: Yes    Assessed for rehab: Patient returned to prior level of function, rehabilitation not indicated at this time      Assessment/Plan:  POD 1 Extubated, HDS, no drips, chest tube output minimal and negative for air leak.  Neuro grossly intact.  Plan:  DC chest tubes and payne.  Transition to tele status.   POD 2 HDS, SR, wounds CDI, prevena intact, neuro intact.  Plan:  DC chest tubes.  AMB/IS.  CPM  POD 3 HDS, SR, prevena intact, neuro grossly intact.  IS 1500 cc.  Fluid balance + 4 liters.  Plan:  CXR today.  Bhupinder JUÁREZ.      Active Hospital Problems    Diagnosis   • CAD (coronary artery disease) [I25.10]

## 2017-07-10 ENCOUNTER — HOME HEALTH ADMISSION (OUTPATIENT)
Dept: HOME HEALTH SERVICES | Facility: HOME HEALTHCARE | Age: 77
End: 2017-07-10
Payer: MEDICARE

## 2017-07-10 VITALS
HEART RATE: 66 BPM | OXYGEN SATURATION: 93 % | SYSTOLIC BLOOD PRESSURE: 133 MMHG | BODY MASS INDEX: 28.03 KG/M2 | DIASTOLIC BLOOD PRESSURE: 72 MMHG | WEIGHT: 195.77 LBS | TEMPERATURE: 97.2 F | HEIGHT: 70 IN | RESPIRATION RATE: 18 BRPM

## 2017-07-10 LAB
ANION GAP SERPL CALC-SCNC: 11 MMOL/L (ref 0–11.9)
BUN SERPL-MCNC: 21 MG/DL (ref 8–22)
CALCIUM SERPL-MCNC: 8.4 MG/DL (ref 8.5–10.5)
CHLORIDE SERPL-SCNC: 101 MMOL/L (ref 96–112)
CO2 SERPL-SCNC: 25 MMOL/L (ref 20–33)
CREAT SERPL-MCNC: 1 MG/DL (ref 0.5–1.4)
ERYTHROCYTE [DISTWIDTH] IN BLOOD BY AUTOMATED COUNT: 43.2 FL (ref 35.9–50)
GFR SERPL CREATININE-BSD FRML MDRD: >60 ML/MIN/1.73 M 2
GLUCOSE SERPL-MCNC: 126 MG/DL (ref 65–99)
HCT VFR BLD AUTO: 32 % (ref 42–52)
HGB BLD-MCNC: 10.6 G/DL (ref 14–18)
MCH RBC QN AUTO: 30 PG (ref 27–33)
MCHC RBC AUTO-ENTMCNC: 33.1 G/DL (ref 33.7–35.3)
MCV RBC AUTO: 90.7 FL (ref 81.4–97.8)
PLATELET # BLD AUTO: 243 K/UL (ref 164–446)
PMV BLD AUTO: 10 FL (ref 9–12.9)
POTASSIUM SERPL-SCNC: 3.5 MMOL/L (ref 3.6–5.5)
RBC # BLD AUTO: 3.53 M/UL (ref 4.7–6.1)
SODIUM SERPL-SCNC: 137 MMOL/L (ref 135–145)
WBC # BLD AUTO: 12.4 K/UL (ref 4.8–10.8)

## 2017-07-10 PROCEDURE — 700111 HCHG RX REV CODE 636 W/ 250 OVERRIDE (IP): Performed by: NURSE PRACTITIONER

## 2017-07-10 PROCEDURE — 700101 HCHG RX REV CODE 250: Performed by: NURSE PRACTITIONER

## 2017-07-10 PROCEDURE — 700102 HCHG RX REV CODE 250 W/ 637 OVERRIDE(OP): Performed by: NURSE PRACTITIONER

## 2017-07-10 PROCEDURE — 700112 HCHG RX REV CODE 229: Performed by: NURSE PRACTITIONER

## 2017-07-10 PROCEDURE — 85027 COMPLETE CBC AUTOMATED: CPT

## 2017-07-10 PROCEDURE — A9270 NON-COVERED ITEM OR SERVICE: HCPCS | Performed by: CLINICAL NURSE SPECIALIST

## 2017-07-10 PROCEDURE — A9270 NON-COVERED ITEM OR SERVICE: HCPCS | Performed by: NURSE PRACTITIONER

## 2017-07-10 PROCEDURE — A9270 NON-COVERED ITEM OR SERVICE: HCPCS | Performed by: INTERNAL MEDICINE

## 2017-07-10 PROCEDURE — 700102 HCHG RX REV CODE 250 W/ 637 OVERRIDE(OP): Performed by: INTERNAL MEDICINE

## 2017-07-10 PROCEDURE — A9270 NON-COVERED ITEM OR SERVICE: HCPCS | Performed by: THORACIC SURGERY (CARDIOTHORACIC VASCULAR SURGERY)

## 2017-07-10 PROCEDURE — 700102 HCHG RX REV CODE 250 W/ 637 OVERRIDE(OP): Performed by: THORACIC SURGERY (CARDIOTHORACIC VASCULAR SURGERY)

## 2017-07-10 PROCEDURE — G8987 SELF CARE CURRENT STATUS: HCPCS | Mod: CI

## 2017-07-10 PROCEDURE — G8988 SELF CARE GOAL STATUS: HCPCS | Mod: CI

## 2017-07-10 PROCEDURE — 700111 HCHG RX REV CODE 636 W/ 250 OVERRIDE (IP): Performed by: CLINICAL NURSE SPECIALIST

## 2017-07-10 PROCEDURE — 700102 HCHG RX REV CODE 250 W/ 637 OVERRIDE(OP): Performed by: CLINICAL NURSE SPECIALIST

## 2017-07-10 PROCEDURE — 97165 OT EVAL LOW COMPLEX 30 MIN: CPT

## 2017-07-10 PROCEDURE — 80048 BASIC METABOLIC PNL TOTAL CA: CPT

## 2017-07-10 RX ORDER — FUROSEMIDE 40 MG/1
40 TABLET ORAL 2 TIMES DAILY
Qty: 30 TAB | Refills: 3 | Status: SHIPPED | OUTPATIENT
Start: 2017-07-10 | End: 2017-07-26 | Stop reason: SDUPTHER

## 2017-07-10 RX ORDER — POTASSIUM CHLORIDE 20 MEQ/1
40 TABLET, EXTENDED RELEASE ORAL ONCE
Status: COMPLETED | OUTPATIENT
Start: 2017-07-10 | End: 2017-07-10

## 2017-07-10 RX ORDER — ENEMA 19; 7 G/133ML; G/133ML
1 ENEMA RECTAL ONCE
Status: DISCONTINUED | OUTPATIENT
Start: 2017-07-10 | End: 2017-07-10 | Stop reason: HOSPADM

## 2017-07-10 RX ORDER — PSEUDOEPHEDRINE HCL 30 MG
100 TABLET ORAL EVERY MORNING
Qty: 60 CAP | Refills: 3 | Status: SHIPPED | OUTPATIENT
Start: 2017-07-10 | End: 2017-07-26

## 2017-07-10 RX ORDER — AMIODARONE HYDROCHLORIDE 400 MG/1
400 TABLET ORAL DAILY
Qty: 30 TAB | Refills: 3 | Status: SHIPPED | OUTPATIENT
Start: 2017-07-10 | End: 2017-07-26 | Stop reason: SDUPTHER

## 2017-07-10 RX ORDER — HYDROCODONE BITARTRATE AND ACETAMINOPHEN 5; 325 MG/1; MG/1
1-2 TABLET ORAL EVERY 6 HOURS PRN
Qty: 60 TAB | Refills: 0 | Status: SHIPPED | OUTPATIENT
Start: 2017-07-10 | End: 2017-07-26

## 2017-07-10 RX ORDER — CLOPIDOGREL BISULFATE 75 MG/1
75 TABLET ORAL DAILY
Qty: 30 TAB | Refills: 3 | Status: SHIPPED
Start: 2017-07-10 | End: 2018-02-06

## 2017-07-10 RX ORDER — ATORVASTATIN CALCIUM 40 MG/1
40 TABLET, FILM COATED ORAL
Qty: 30 TAB | Refills: 3 | Status: SHIPPED | OUTPATIENT
Start: 2017-07-10 | End: 2017-08-16 | Stop reason: SDUPTHER

## 2017-07-10 RX ORDER — POTASSIUM CHLORIDE 20 MEQ/1
20 TABLET, EXTENDED RELEASE ORAL 2 TIMES DAILY
Qty: 60 TAB | Refills: 11 | Status: SHIPPED | OUTPATIENT
Start: 2017-07-10 | End: 2017-07-26 | Stop reason: SDUPTHER

## 2017-07-10 RX ADMIN — DOCUSATE SODIUM 100 MG: 100 CAPSULE ORAL at 07:57

## 2017-07-10 RX ADMIN — OXYCODONE HYDROCHLORIDE 10 MG: 10 TABLET ORAL at 01:20

## 2017-07-10 RX ADMIN — AMIODARONE HYDROCHLORIDE 400 MG: 200 TABLET ORAL at 08:01

## 2017-07-10 RX ADMIN — OXYCODONE HYDROCHLORIDE 10 MG: 10 TABLET ORAL at 08:00

## 2017-07-10 RX ADMIN — HYDROCODONE BITARTRATE AND ACETAMINOPHEN 2 TABLET: 5; 325 TABLET ORAL at 04:06

## 2017-07-10 RX ADMIN — METOPROLOL TARTRATE 25 MG: 25 TABLET, FILM COATED ORAL at 08:02

## 2017-07-10 RX ADMIN — FUROSEMIDE 40 MG: 10 INJECTION, SOLUTION INTRAMUSCULAR; INTRAVENOUS at 07:58

## 2017-07-10 RX ADMIN — POTASSIUM CHLORIDE 40 MEQ: 1500 TABLET, EXTENDED RELEASE ORAL at 10:36

## 2017-07-10 RX ADMIN — ASPIRIN 81 MG: 81 TABLET, CHEWABLE ORAL at 08:01

## 2017-07-10 RX ADMIN — CLOPIDOGREL 75 MG: 75 TABLET, FILM COATED ORAL at 08:02

## 2017-07-10 RX ADMIN — MUPIROCIN 1 APPLICATION: 20 OINTMENT TOPICAL at 08:08

## 2017-07-10 RX ADMIN — SODIUM PHOSPHATE, DIBASIC AND SODIUM PHOSPHATE, MONOBASIC 133 ML: 7; 19 ENEMA RECTAL at 06:29

## 2017-07-10 RX ADMIN — ENOXAPARIN SODIUM 40 MG: 100 INJECTION SUBCUTANEOUS at 08:01

## 2017-07-10 RX ADMIN — HYDROCODONE BITARTRATE AND ACETAMINOPHEN 2 TABLET: 5; 325 TABLET ORAL at 10:36

## 2017-07-10 RX ADMIN — POTASSIUM CHLORIDE 20 MEQ: 1500 TABLET, EXTENDED RELEASE ORAL at 07:57

## 2017-07-10 ASSESSMENT — PAIN SCALES - GENERAL
PAINLEVEL_OUTOF10: 3
PAINLEVEL_OUTOF10: 4
PAINLEVEL_OUTOF10: 3
PAINLEVEL_OUTOF10: 3
PAINLEVEL_OUTOF10: 4

## 2017-07-10 ASSESSMENT — ACTIVITIES OF DAILY LIVING (ADL): TOILETING: INDEPENDENT

## 2017-07-10 ASSESSMENT — ENCOUNTER SYMPTOMS
CONSTITUTIONAL NEGATIVE: 1
GASTROINTESTINAL NEGATIVE: 1
NEUROLOGICAL NEGATIVE: 1
MUSCULOSKELETAL NEGATIVE: 1
EYES NEGATIVE: 1
RESPIRATORY NEGATIVE: 1
CARDIOVASCULAR NEGATIVE: 1
PSYCHIATRIC NEGATIVE: 1

## 2017-07-10 ASSESSMENT — COGNITIVE AND FUNCTIONAL STATUS - GENERAL
SUGGESTED CMS G CODE MODIFIER DAILY ACTIVITY: CJ
HELP NEEDED FOR BATHING: A LITTLE
DRESSING REGULAR LOWER BODY CLOTHING: A LITTLE
DAILY ACTIVITIY SCORE: 22

## 2017-07-10 NOTE — CARE PLAN
Problem: Post Op Day 3 CABG/Heart Valve replacement  Goal: Optimal care of the post op CABG/Heart Valve replacement post op day 3  Outcome: PROGRESSING AS EXPECTED  Intervention: Shower daily and clean incisions twice daily with soap and water  Completed  Intervention: Up in chair for all meals  Completed  Intervention: Ambulate, increasing the distance each time x 3 and before bed  Completed. Patient walked 3 times during shift and increased distance to 2 laps around the unit.  Intervention: IS q 1 hour while awake and record best IS volume  Completed. Best IS = 2000  Intervention: Case management and  for discharge needs  Done  Intervention: Discharge planning (See discharge barriers/planning problem on care plan)  Done

## 2017-07-10 NOTE — FACE TO FACE
Face to Face Supporting Documentation - Home Health    The encounter with this patient was in whole or in part the primary reason for home health admission.    Date of encounter:   Patient:                    MRN:                       YOB: 2017  Raf Pelayo  [id number]  1940     Home health to see patient for:  Skilled Nursing care for assessment, interventions & education    Skilled need for:  Surgical Aftercare s/p cabg    Skilled nursing interventions to include:  Wound Care    Homebound status evidenced by:  Needs the assistance of another person in order to leave the home. Leaving home requires a considerable and taxing effort. There is a normal inability to leave the home.    Community Physician to provide follow up care: Brandon Velázquez M.D.     Optional Interventions? No      I certify the face to face encounter for this home health care referral meets the CMS requirements and the encounter/clinical assessment with the patient was, in whole, or in part, for the medical condition(s) listed above, which is the primary reason for home health care. Based on my clinical findings: the service(s) are medically necessary, support the need for home health care, and the homebound criteria are met.  I certify that this patient has had a face to face encounter by myself.  BOB Garza. - NPI: [id number]

## 2017-07-10 NOTE — PROGRESS NOTES
Cardiovascular Surgery Progress Note    Name: Raf Pelayo  MRN: [id number]  : 1940  Admit Date: 2017  5:10 AM  Procedure:  Procedure(s) and Anesthesia Type:     * MULTIPLE CORONARY ARTERY BYPASS ENDO VEIN HARVEST X2-4, PREVENA DRESSING  - General     * ROHINI - INTRAOPERATIVE  - General  4 Day Post-Op    Vitals:  Patient Vitals for the past 8 hrs:   Temp SpO2 O2 Delivery O2 (LPM) Pulse Heart Rate (Monitored) Resp NIBP   07/10/17 0800 36.8 °C (98.2 °F) 94 % None (Room Air) 0 72 71 16 115/53 mmHg   07/10/17 0400 37.2 °C (98.9 °F) - - - 68 - 14 126/59 mmHg     Temp (24hrs), Av.8 °C (98.3 °F), Min:36.6 °C (97.8 °F), Max:37.2 °C (98.9 °F)      Respiratory:    Respiration: 16, Pulse Oximetry: 94 %, O2 Daily Delivery Respiratory : Room Air with O2 Available     Chest Tube Drains:          Fluids:    Intake/Output Summary (Last 24 hours) at 07/10/17 1002  Last data filed at 07/10/17 1000   Gross per 24 hour   Intake   1300 ml   Output   3075 ml   Net  -1775 ml     Admit weight: Weight: 89.4 kg (197 lb 1.5 oz)  Current weight: Weight: 92 kg (202 lb 13.2 oz) (17 0600)    Labs:  Recent Labs      17   0215  17   0508  07/10/17   0445   WBC  21.2*  15.4*  12.4*   RBC  3.63*  3.56*  3.53*   HEMOGLOBIN  11.0*  10.8*  10.6*   HEMATOCRIT  34.1*  33.2*  32.0*   MCV  93.9  93.3  90.7   MCH  30.3  30.3  30.0   MCHC  32.3*  32.5*  33.1*   RDW  45.9  44.2  43.2   PLATELETCT  183  179  243   MPV  10.2  10.2  10.0         Recent Labs      17   0215  17   0508  07/10/17   0445   SODIUM  134*  136  137   POTASSIUM  4.7  4.0  3.5*   CHLORIDE  105  104  101   CO2  26  26  25   GLUCOSE  142*  126*  126*   BUN  14  18  21   CREATININE  0.93  0.85  1.00   CALCIUM  8.1*  8.5  8.4*           Medications:  • fleet  1 Each     • potassium chloride (KCl)  40 mEq     • amiodarone  400 mg     • furosemide  40 mg     • potassium chloride SA  20 mEq     • aspirin  81 mg     • docusate sodium  100 mg       And   • senna-docusate  1 Tab     • enoxaparin  40 mg     • mupirocin  1 Application     • metoprolol  25 mg     • clopidogrel  75 mg     • rosuvastatin  20 mg         Exam:   Review of Systems   Constitutional: Negative.    HENT: Negative.    Eyes: Negative.    Respiratory: Negative.    Cardiovascular: Negative.    Gastrointestinal: Negative.    Genitourinary: Negative.    Musculoskeletal: Negative.    Skin: Negative.    Neurological: Negative.    Psychiatric/Behavioral: Negative.        Physical Exam   Constitutional: He is oriented to person, place, and time. He appears well-developed and well-nourished.   HENT:   Head: Normocephalic and atraumatic.   Eyes: Pupils are equal, round, and reactive to light.   Neck: Normal range of motion. Neck supple.   Cardiovascular: Normal rate and regular rhythm.    Pulmonary/Chest: Effort normal.   Decreased at bases.    Abdominal: Soft. Bowel sounds are normal.   Musculoskeletal: Normal range of motion.   Neurological: He is alert and oriented to person, place, and time.   Skin: Skin is warm and dry.   Wounds CDI   Psychiatric: He has a normal mood and affect.       Quality Measures:   EKG reviewed, Medications reviewed, Radiology images reviewed and Labs reviewed  Payne catheter: No Payne  Central line in place: Need for access    DVT Prophylaxis: Enoxaparin (Lovenox)  DVT prophylaxis - mechanical: SCDs  Ulcer prophylaxis: Yes    Assessed for rehab: Patient returned to prior level of function, rehabilitation not indicated at this time      Assessment/Plan:  POD 1 Extubated, HDS, no drips, chest tube output minimal and negative for air leak.  Neuro grossly intact.  Plan:  DC chest tubes and payne.  Transition to tele status.   POD 2 HDS, SR, wounds CDI, prevena intact, neuro intact.  Plan:  DC chest tubes.  AMB/IS.  CPM  POD 3 HDS, SR, prevena intact, neuro grossly intact.  IS 1500 cc.  Fluid balance + 4 liters.  Plan:  CXR today.  Diurese.  DC IJ.   POD 4  HDS, NSR, on RA,  has had BM, ambulating well, K low--replace, stable and clear for home today with home health to follow.        Active Hospital Problems    Diagnosis   • CAD (coronary artery disease) [I25.10]

## 2017-07-10 NOTE — THERAPY
"Occupational Therapy Evaluation completed.   Functional Status:  Pt seen for OT eval due to CABG. Supervision - Min A with ADLs/ADL transfers. Family training completed for bed mobility. Pt able to verbalize and demonstrate understanding of precautions and DME recs. No further acute OT needs at this time.   Plan of Care: No further acute OT needs at this time  Discharge Recommendations:  Equipment: Shower Chair. Post-acute therapy Currently anticipate no further skilled therapy needs once patient is discharged from the inpatient setting.    See \"Rehab Therapy-Acute\" Patient Summary Report for complete documentation.    "

## 2017-07-10 NOTE — DISCHARGE PLANNING
Received choice form from GERARDO Nino. Notified SW that we will need order and F2F before the referral can be sent.

## 2017-07-10 NOTE — PROGRESS NOTES
Pulmonary Critical Care Progress Note        Date of service: 7/10/2017    Chief Complaint: CABG    History of Present Illness: 76 y.o. Male, h/o IDDM, HTN, DLD admitted for CABG x 4     ROS:    Respiratory: negative   Cardiac: negative   GI: negative.    All other systems negative.     Interval Events:  24 hour interval history reviewed   Alert and oriented x4, mobilized to chair.  Mobilized around unit well on room air   Over 2L urine output with lasix   Discharge home today    PFSH:  No change.    Respiratory:     Pulse Oximetry: 91 % on room air, incentive spirometry 1000 mL.        Exam: Clear to auscultation, breathing comfortably on room air, speaking full sentences.   Chest X ray (personal interpretation) indicates: none today.        Invalid input(s): [id number]   No blood gas today.    HemoDynamics:  Pulse: 68  NIBP: 126/59 mmHg    Exam: RRR, distant heart tones, sternotomy dressing CDI, no edema.   Imaging: Available data reviewed        Neuro:  GCS Total Slater Coma Score: 15  Exam: Alert and oriented x4, no focal deficits.   Imaging: Available data reviewed    Fluids:  Intake/Output       07/08/17 0700 - 07/09/17 0659 07/09/17 0700 - 07/10/17 0659 07/10/17 0700 - 07/11/17 0659      [id number] 1398-4481 Total 7899-8453 9494-7658 Total 8719-4502 8123-4112 Total       Intake    P.O.  600  240 840  750  60 810  --  -- --    P.O. 600 240 840 750 60 810 -- -- --    I.V.  100  -- 100  --  -- --  --  -- --    Mg 100 -- 100 -- -- -- -- -- --    Total Intake 700 240 940 750 60 810 -- -- --       Output    Urine  300  -- 300  1050  2025 3075  --  -- --    Number of Times Voided -- 5 x 5 x 3 x 4 x 7 x -- -- --    Void (ml) 300 -- 300 1050 2025 3075 -- -- --    Total Output 300 -- 300 1050 2025 3075 -- -- --       Net I/O     400 240 640 -300 -1965 -2265 -- -- --           Recent Labs      07/08/17 0215  07/09/17   0508  07/10/17   0445   SODIUM  134*  136  137   POTASSIUM  4.7  4.0  3.5*   CHLORIDE  105  104   101   CO2  26  26  25   BUN  14  18  21   CREATININE  0.93  0.85  1.00   CALCIUM  8.1*  8.5  8.4*     GI/Nutrition:   Exam: Soft, NT, normal BS.   Imaging: Available data reviewed   taking PO     Liver Function  Recent Labs      17   0508  07/10/17   0445   GLUCOSE  142*  126*  126*     Heme:  Recent Labs      17   0508  07/10/17   0445   RBC  3.63*  3.56*  3.53*   HEMOGLOBIN  11.0*  10.8*  10.6*   HEMATOCRIT  34.1*  33.2*  32.0*   PLATELETCT  183  179  243     Infectious Disease:  Temp  Av.8 °C (98.3 °F)  Min: 36.6 °C (97.8 °F)  Max: 37.2 °C (98.9 °F)  Micro: reviewed  Recent Labs      17   0508  07/10/17   0445   WBC  21.2*  15.4*  12.4*     Current Facility-Administered Medications   Medication Dose Frequency Provider Last Rate Last Dose   • fleet enema 133 mL  1 Each Once Anthony Gaffney M.D.   Stopped at 07/10/17 0345   • amiodarone (CORDARONE) tablet 400 mg  400 mg TWICE DAILY Anthony Gaffney M.D.   400 mg at 17   • furosemide (LASIX) injection 40 mg  40 mg BID Lisa Cui, A.P.N.   40 mg at 17   • potassium chloride SA (Kdur) tablet 20 mEq  20 mEq BID Lisa Cui, A.P.N.   20 mEq at 17   • hydrocodone-acetaminophen (NORCO) 5-325 MG per tablet 1-2 Tab  1-2 Tab Q6HRS PRN Varinder Rider M.D.   2 Tab at 07/10/17 0406   • aspirin (ASA) chewable tab 81 mg  81 mg DAILY Adriana Marquis, A.P.N.   81 mg at 17 0816   • Respiratory Care per Protocol   Continuous RT Adriana Marquis, A.P.N.       • NS infusion   Continuous Adriana L Kandis, A.P.N. 10 mL/hr at 17 1351     • docusate sodium (COLACE) capsule 100 mg  100 mg QAM Adriana Marquis, A.P.N.   100 mg at 17 0815    And   • senna-docusate (PERICOLACE or SENOKOT S) 8.6-50 MG per tablet 1 Tab  1 Tab Nightly Adriana Marquis, A.P.N.   1 Tab at 17 2103    And   • senna-docusate (PERICOLACE or SENOKOT S) 8.6-50 MG per tablet 1 Tab  1 Tab  Q24HRS PRN Adriana Wadet, A.P.N.        And   • lactulose 20 GM/30ML solution 30 mL  30 mL Q24HRS PRN Adriana Wadet, A.P.N.        And   • bisacodyl (DULCOLAX) suppository 10 mg  10 mg Q24HRS PRN Adriana Wadet, A.P.N.       • enoxaparin (LOVENOX) inj 40 mg  40 mg DAILY Adriana Marquis, A.P.N.   40 mg at 07/09/17 0816   • mupirocin (BACTROBAN) 2 % ointment 1 Application  1 Application BID Adriana Marquis, A.P.N.   1 Application at 07/09/17 2104   • metoprolol (LOPRESSOR) tablet 25 mg  25 mg BID Adriana Marquis, A.P.N.   25 mg at 07/09/17 2103   • clopidogrel (PLAVIX) tablet 75 mg  75 mg DAILY Adriana Marquis, A.P.N.   75 mg at 07/09/17 0816   • rosuvastatin (CRESTOR) tablet 20 mg  20 mg QHS Adriana Marquis, A.P.N.   20 mg at 07/09/17 2104   • oxycodone immediate-release (ROXICODONE) tablet 5 mg  5 mg Q3HRS PRN Adriana Wadet, A.P.N.   5 mg at 07/08/17 2320   • oxycodone immediate release (ROXICODONE) tablet 10 mg  10 mg Q3HRS PRN Adriana Marquis, A.P.N.   10 mg at 07/10/17 0120   • tramadol (ULTRAM) 50 MG tablet 50 mg  50 mg Q4HRS PRN Adriana Wadet, A.P.N.   50 mg at 07/09/17 0816   • ondansetron (ZOFRAN) syringe/vial injection 4 mg  4 mg Q6HRS PRN Adriana Wadet, A.P.N.        Or   • prochlorperazine (COMPAZINE) injection 10 mg  10 mg Q6HRS PRN Adriana Marquis, A.P.N.        Or   • promethazine (PHENERGAN) suppository 25 mg  25 mg Q6HRS PRN Adriana Marquis, A.P.N.       • acetaminophen (TYLENOL) tablet 650 mg  650 mg Q4HRS PRN Adriana Wadet, A.P.N.        Or   • acetaminophen (TYLENOL) suppository 650 mg  650 mg Q4HRS PRN Adriana Marquis, A.P.N.       • mag hydrox-al hydrox-simeth (MAALOX PLUS ES or MYLANTA DS) suspension 30 mL  30 mL Q4HRS PRN Adriana Marquis, A.P.N.       • diphenhydrAMINE (BENADRYL) tablet/capsule 25 mg  25 mg HS PRN - MR X 1 Adriana Marquis, A.P.N.         Last reviewed on 7/6/2017 11:14 AM by Van Cooper R.N.    Quality  Measures:  Labs reviewed, Medications reviewed and  Radiology images reviewed  Irving catheter: No Irving              Assessed for rehab: Patient returned to prior level of function, rehabilitation not indicated at this time      Assessment and Plan:  Post-op vent mgt  CAD s/p CABGx4 7/6   - CVS following  IDDM - glycemic control  HTN - BP control  DLD    Okay to discharge home from pulmonary/critical care standpoint. Follow up with pulmonary when necessary    Discussed patient condition with Family, RN, RT, Charge nurse / hot rounds, QA team, Patient and CVS.       West REAGAN (Andra), am scribing for, and in the presence of, Jeremy Gonda M.D..    Electronically signed by: West Dueñas (Andra), 7/10/2017    I, Jeremy Gonda M.D. personally performed the services described in this documentation, as scribed by West Dueñas in my presence, and it is both accurate and complete.

## 2017-07-10 NOTE — DISCHARGE PLANNING
Received order and F2F. Referral sent to [facility] at [phone]. Final orders faxed to [facility] at [phone].

## 2017-07-10 NOTE — DISCHARGE SUMMARY
CHIEF COMPLAINT ON ADMISSION  No chief complaint on file.  Shortness of breath, dypsnea on exertion    CODE STATUS  Full Code    HPI & HOSPITAL COURSE  A pleasant 76-year-old gentleman with shortness of breath progressive.  Nor est pain.  No classic angina. Non smoker, non drinker, family history positive (brother 35 with CAD), hypertension, diabetes insulin dep[endemnt, dyslipidemia.  Positive stress test.  Denies TIA, stroke, claudication.  Heart catheterization shows 50% lefty main, long 80% LAD, ostial OM, ostial small OM 2, EF 50%.  Dr. Anthony Gaffney was consulted and decision was made to move forward with CABG.    PREOPERATIVE DIAGNOSES:  Coronary artery disease, shortness of breath,    diabetes mellitus, hypertension, dyslipidemia, and hypercholesterolemia.    POSTOPERATIVE DIAGNOSES:  Coronary artery disease, shortness of breath,    diabetes mellitus, hypertension, dyslipidemia, and hypercholesterolemia.    HOSPITAL COURSE:  POD 1 Extubated, HDS, no drips, chest tube output minimal and negative for air leak.  Neuro grossly intact.  Plan:  DC chest tubes and payne.  Transition to tele status.    POD 2 HDS, SR, wounds CDI, prevena intact, neuro intact.  Plan:  DC chest tubes.  AMB/IS.  CPM  POD 3 HDS, SR, prevena intact, neuro grossly intact.  IS 1500 cc.  Fluid balance + 4 liters.  Plan:  CXR today.  Diurese.  DC IJ.   POD 4 HDS, NSR, on RA, has had BM, ambulating well, K low-- replace,  stable and clear for home today with home health to follow.    Wts above baseline 3 kg, will continue on lasix at home until further directed by cardiology        Therefore, he is discharged in good and stable condition with close outpatient follow-up.    SPECIFIC OUTPATIENT FOLLOW-UP  Cardiology 1-2 weeks  Cardiac Rehab as directed by cardiology  Cardiothoracic surgery 1 month    DISCHARGE PROBLEM LIST  Active Problems:    CAD (coronary artery disease) POA: Unknown  Resolved Problems:    * No resolved hospital problems.  *      FOLLOW UP  Future Appointments  Date Time Provider Department Center   7/26/2017 4:00 PM ADELITA Armando. [facility] None   8/16/2017 8:20 AM Brandon Velázquez M.D. [facility]   9/13/2017 7:40 AM Brandon Velázquez M.D. [facility] Gaffney M.D.  [id number]  [id number]  [facility] [id number]  [phone]    On 8/22/2017  2:00 pm      MEDICATIONS ON DISCHARGE   Raf Pelayo   Home Medication Instructions [facility]:[id number]    Printed on:07/10/17 0955   Medication Information                      amiodarone (PACERONE) 400 MG tablet  Take 1 Tab by mouth every day.             aspirin (ASA) 81 MG Chew Tab chewable tablet  Take 81 mg by mouth every day.             atorvastatin (LIPITOR) 40 MG Tab  Take 1 Tab by mouth every bedtime.             Cholecalciferol (VITAMIN D3) 1000 UNIT TABS  Take 1 Tab by mouth every day.             clopidogrel (PLAVIX) 75 MG Tab  Take 1 Tab by mouth every day.             docusate sodium 100 MG Cap  Take 100 mg by mouth every morning.             furosemide (LASIX) 40 MG Tab  Take 1 Tab by mouth 2 Times a Day.             Glucosamine-Chondroit-Vit C-Mn (GLUCOSAMINE CHONDR 1500 COMPLX) Cap  Take 1 Cap by mouth every day.             hydrocodone-acetaminophen (NORCO) 5-325 MG Tab per tablet  Take 1-2 Tabs by mouth every 6 hours as needed.             metformin (GLUCOPHAGE) 500 MG Tab  Take 1 Tab by mouth 2 times a day, with meals.             metoprolol (LOPRESSOR) 25 MG Tab  Take 1 Tab by mouth 2 Times a Day.             NON SPECIFIED  Apply blue emu oil to painful shoulders             potassium chloride SA (KDUR) 20 MEQ Tab CR  Take 1 Tab by mouth 2 Times a Day.             Pseudoephedrine-APAP-DM (DAYQUIL MULTI-SYMPTOM COLD/FLU PO)  Take 1 Cap by mouth every 6 hours as needed.             therapeutic multivitamin-minerals (THERAGRAN-M) Tab  Take 1 Tab by mouth every day.               Use of narcotics and risks associated reviewed with patient.  [place]   checked.   Discussed weaning.    Patient verbalizes understanding.    DIET  Orders Placed This Encounter   Procedures   • DIET ORDER     Standing Status: Standing      Number of Occurrences: 1      Standing Expiration Date:      Order Specific Question:  Diet:     Answer:  Consistent Carbohydrate [4]     Order Specific Question:  Diet:     Answer:  Cardiac [6]       ACTIVITY  As tolerated.  No Driving x 1 month   No heavy lifting/pushing/pulling >10# x 3 months      CONSULTATIONS  Cardiology  Cardiothoracic surgery  Pulmonary  Hospitalist    PROCEDURES  OPERATION: 7/6/17 Coronary artery bypass grafting x4, left MIKHAIL to the distal diagonal,     reverse saphenous vein graft sequence to the distal OM1 and distal OM 2,    and reverse saphenous vein graft to distal LAD, left greater saphenous vein endoscopic vein    harvest.    LABORATORY  Lab Results   Component Value Date/Time    SODIUM 137 07/10/2017 04:45 AM    POTASSIUM 3.5* 07/10/2017 04:45 AM    CHLORIDE 101 07/10/2017 04:45 AM    CO2 25 07/10/2017 04:45 AM    GLUCOSE 126* 07/10/2017 04:45 AM    BUN 21 07/10/2017 04:45 AM    CREATININE 1.00 07/10/2017 04:45 AM        Lab Results   Component Value Date/Time    WBC 12.4* 07/10/2017 04:45 AM    HEMOGLOBIN 10.6* 07/10/2017 04:45 AM    HEMATOCRIT 32.0* 07/10/2017 04:45 AM    PLATELET COUNT 243 07/10/2017 04:45 AM    Discussed when to seek emergency medical care  Questions and concerns addressed.  Patient verbalizes understanding and agrees to plan of care    Total time of the discharge process exceeds 40 minutes

## 2017-07-10 NOTE — DISCHARGE INSTRUCTIONS
Discharge Instructions    Discharged to home by car with relative. Discharged via wheelchair, hospital escort: Yes.  Special equipment needed: Not Applicable    Be sure to schedule a follow-up appointment with your primary care doctor or any specialists as instructed.     Discharge Plan:   Influenza Vaccine Indication: Not indicated: Previously immunized this influenza season and > 8 years of age    I understand that a diet low in cholesterol, fat, and sodium is recommended for good health. Unless I have been given specific instructions below for another diet, I accept this instruction as my diet prescription.   Other diet: cardiac    Special Instructions: heart surgery    Cardiac Surgery Discharge Instructions/[facility]    Activity:  1. NO driving for 4 weeks after surgery. You may ride as a passenger.  2. NO lifting of any item over 10 lbs (e.g. gallon of milk) for 6 weeks after surgery.  Do not raise both arms above head, only one at a time.  Do not push or pull with your arms.  3. Walk as much as possible! Walk a minimum of 4 times per day. Depending on your fatigue and comfort level, you may walk as much as you wish. There is no maximum.  4. Other physical activities (sex, housework, gardening, etc.) are OK after 4 weeks.  5. Continue using incentive spirometer for 2 weeks.  If you are going home on oxygen and you were not on oxygen prior to surgery, keep using until you are oxygen free.  6. Weigh daily and write it down starting  the next morning after you arrive home. Call your doctor for a weight gain of 2-4 lbs in 1-2 days.    Incision Care:  1.  Home health nurse or patient may remove chest dressing 7 days post-surgery (on July 13, 2017) or sooner if the battery runs out or the device alarms. When the battery runs out, the vacuum will lose suction and the dressing will puff up.  Slowly roll down the dressing edges, until the entire dressing is removed. Then the entire dressing/incision vacuum  may be thrown away.   2. Once the chest dressing is removed, keep the incision open to air. SHOWER EVERYDAY-no baths. Make sure to clean your incision(s) twice daily with plain, perfume and dye-free soap (if you shower in the morning, stand at the sink at bedtime and clean incision with soap and water). Then pat incision(s) dry with clean towel. Avoid creams or lotions on the incision(s).           3. If there is any increase in redness or swelling, or separation of the incision line, or thick drainage* from any of the incisions, call [facility] ([phone]). * Clear, thin drainage is not abnormal especially from the leg incision and/or chest tube sites.       4. Continue to wear your KITTY Stockings for 4 weeks on the leg(s) with the incision(s) or swelling. You may take off the stocking(s) when in bed or when the leg(s) are elevated.    General Instructions:  1. You have been referred to Cardiac Rehab which is highly recommended for you after heart surgery. You can start Cardiac Rehab 30 days after surgery.  If you do not have an appointment at the time of discharge call [phone] to schedule an appointment.  2. Your Primary Care Doctor typically handles Home Oxygen. The Home Oxygen service that drops off your tanks should be checking your oxygen saturation levels. Oxygen may be stopped when you are > 90 % saturated on room air. Check with your Primary Care Doctor if you are unsure.    Prescription Refills/Questions:   Call your usual Pharmacy for ALL refills  1. Pain medication questions only: Call [facility] at [phone].  (Remember that refills may require additional physician approval and will need at least 24 hours’ notice. Please call for refills on Mondays through Fridays from 9 am to 4 pm).  2. For all other medications (except pain medication): Call your Cardiology Group or Primary Care Doctor (not [facility]) for refills or questions.    [facility] IS  ALWAYS AVAILABLE TO ANSWER YOUR QUESTIONS. DO NOT HESITATE TO CALL at [phone].    · Is patient discharged on Warfarin / Coumadin?   No     · Is patient Post Blood Transfusion?  No    Depression / Suicide Risk    As you are discharged from this [facility] facility, it is important to learn how to keep safe from harming yourself.    Recognize the warning signs:  · Abrupt changes in personality, positive or negative- including increase in energy   · Giving away possessions  · Change in eating patterns- significant weight changes-  positive or negative  · Change in sleeping patterns- unable to sleep or sleeping all the time   · Unwillingness or inability to communicate  · Depression  · Unusual sadness, discouragement and loneliness  · Talk of wanting to die  · Neglect of personal appearance   · Rebelliousness- reckless behavior  · Withdrawal from people/activities they love  · Confusion- inability to concentrate     If you or a loved one observes any of these behaviors or has concerns about self-harm, here's what you can do:  · Talk about it- your feelings and reasons for harming yourself  · Remove any means that you might use to hurt yourself (examples: pills, rope, extension cords, firearm)  · Get professional help from the community (Mental Health, Substance Abuse, psychological counseling)  · Do not be alone:Call your Safe Contact- someone whom you trust who will be there for you.  · Call your local CRISIS HOTLINE 421-5673 or 028-228-4004  · Call your local Children's Mobile Crisis Response Team [place] (870) 173-3576 or www.Zapnip  · Call the toll free National Suicide Prevention Hotlines   · National Suicide Prevention Lifeline 599-797-UTZI (7532)  · National Hope Line Network 800-SUICIDE (996-1657)    Coronary Artery Bypass Grafting  Coronary artery bypass grafting (CABG) is a procedure done to bypass or fix arteries of the heart (coronary arteries) that have become narrow or blocked.  This narrowing is usually the result of plaque that has built up in the walls of the vessels. The coronary arteries supply the heart with the oxygen and nutrients it needs to pump blood to your body. In the CABG procedure, a section of blood vessel from another part of the body (usually the leg, arm, or chest wall) is removed and then inserted where it will allow blood to bypass the damaged part of the coronary artery.   LET YOUR HEALTH CARE PROVIDER KNOW ABOUT:  · Any allergies you have.    · All medicines you are taking, including blood thinners, vitamins, herbs, eye drops, creams, and over-the-counter medicines.    · Use of steroids (by mouth or creams).    · Previous problems you or members of your family have had with the use of anesthetics.    · Any blood disorders you have.    · Previous surgeries you have had.    · Medical conditions you have.    RISKS AND COMPLICATIONS  Generally, this is a safe procedure. However, problems can occur and include:   · Blood loss.    · Stroke.    · Infection.    · Pain at the surgical site.    · Heart attack during or after surgery.    · Kidney failure.    BEFORE THE PROCEDURE  · Take medicines only as directed by your health care provider. You may be asked to start new medicines and stop taking others. Do not stop medicines or adjust dosages on your own.  · Do not eat or drink anything after midnight the night before the procedure or as directed by your health care provider. Ask your health care provider if it is okay to take a sip of water with any needed medicines.  PROCEDURE  The surgeon may use either an open technique or a minimally invasive technique for this surgery.  Traditional open surgery  · You will be given medicine to make you sleep through the procedure (general anesthetic).  · Once you are asleep, a cut (incision) will be made down the front of the chest through the breastbone (sternum). The sternum will be spread open so your heart can be seen.  · You will  then be placed on a heart-lung bypass machine. This machine will provide oxygen to your blood while the heart is undergoing surgery.  · Your heart will then be temporarily stopped so that the surgeon can do the next steps.  · A section of vein will likely be taken from your leg and used to bypass the blocked arteries of your heart. Sometimes parts of an artery from inside your chest wall or from your arm will be used, either alone or in combination with leg veins.  · When the bypasses are done, you will be taken off the machine.  · Your heart will be restarted and will take over again normally.  · The sac around the heart will be closed.  · Your chest will then be closed with stitches or staples.  · Tubes will remain in your chest and will be connected to a suction device in order to help drain fluid and reinflate the lungs.  Minimally invasive surgery  This technique is done from an incision over your left chest area. If appropriate, your surgeon may not have to slow or stop your heart. If your condition allows for this procedure, there will often be less blood loss, less pain, a shorter hospital stay, and faster recovery compared to traditional open surgery.  AFTER THE PROCEDURE  · You will be taken to a recovery area to be monitored.  · You may wake up with a tube in your throat to help your breathing. You may be connected to a breathing machine. You will not be able to talk while the tube is in place. The tube will be taken out as soon as it is safe to do so.  · You will be groggy and may have some pain. You will be given pain medicine to help control the pain.     This information is not intended to replace advice given to you by your health care provider. Make sure you discuss any questions you have with your health care provider.     Document Released: 09/27/2006 Document Revised: 01/08/2016 Document Reviewed: 05/27/2014  [employer] Interactive Patient Education ©2016 [employer]    Coronary Artery Bypass  Grafting, Care After  Refer to this sheet in the next few weeks. These instructions provide you with information on caring for yourself after your procedure. Your health care provider may also give you more specific instructions. Your treatment has been planned according to current medical practices, but problems sometimes occur. Call your health care provider if you have any problems or questions after your procedure.  WHAT TO EXPECT AFTER THE PROCEDURE  Recovery from surgery will be different for everyone. Some people feel well after 3 or 4 weeks, while for others it takes longer. After your procedure, it is typical to have the following:  · Nausea and a lack of appetite.    · Constipation.  · Weakness and fatigue.    · Depression or irritability.    · Pain or discomfort at your incision site.  HOME CARE INSTRUCTIONS  · Take medicines only as directed by your health care provider. Do not stop taking medicines or start any new medicines without first checking with your health care provider.  · Take your pulse as directed by your health care provider.  · Perform deep breathing as directed by your health care provider. If you were given a device called an incentive spirometer, use it to practice deep breathing several times a day. Support your chest with a pillow or your arms when you take deep breaths or cough.  · Keep incision areas clean, dry, and protected. Remove or change any bandages (dressings) only as directed by your health care provider. You may have skin adhesive strips over the incision areas. Do not take the strips off. They will fall off on their own.  · Check incision areas daily for any swelling, redness, or drainage.  · If incisions were made in your legs, do the following:  ¨ Avoid crossing your legs.    ¨ Avoid sitting for long periods of time. Change positions every 30 minutes.    ¨ Elevate your legs when you are sitting.  · Wear compression stockings as directed by your health care provider.  These stockings help keep blood clots from forming in your legs.  · Take showers once your health care provider approves. Until then, only take sponge baths. Pat incisions dry. Do not rub incisions with a washcloth or towel. Do not take baths, swim, or use a hot tub until your health care provider approves.  · Eat foods that are high in fiber, such as raw fruits and vegetables, whole grains, beans, and nuts. Meats should be lean cut. Avoid canned, processed, and fried foods.  · Drink enough fluid to keep your urine clear or pale yellow.  · Weigh yourself every day. This helps identify if you are retaining fluid that may make your heart and lungs work harder.  · Rest and limit activity as directed by your health care provider. You may be instructed to:  ¨ Stop any activity at once if you have chest pain, shortness of breath, irregular heartbeats, or dizziness. Get help right away if you have any of these symptoms.  ¨ Move around frequently for short periods or take short walks as directed by your health care provider. Increase your activities gradually. You may need physical therapy or cardiac rehabilitation to help strengthen your muscles and build your endurance.  ¨ Avoid lifting, pushing, or pulling anything heavier than 10 lb (4.5 kg) for at least 6 weeks after surgery.  · Do not drive until your health care provider approves.   · Ask your health care provider when you may return to work.  · Ask your health care provider when you may resume sexual activity.  · Keep all follow-up visits as directed by your health care provider. This is important.  SEEK MEDICAL CARE IF:  · You have swelling, redness, increasing pain, or drainage at the site of an incision.  · You have a fever.  · You have swelling in your ankles or legs.  · You have pain in your legs.    · You gain 2 or more pounds (0.9 kg) a day.  · You are nauseous or vomit.  · You have diarrhea.   SEEK IMMEDIATE MEDICAL CARE IF:  · You have chest pain that goes  "to your jaw or arms.  · You have shortness of breath.    · You have a fast or irregular heartbeat.    · You notice a \"clicking\" in your breastbone (sternum) when you move.    · You have numbness or weakness in your arms or legs.  · You feel dizzy or light-headed.    MAKE SURE YOU:  · Understand these instructions.  · Will watch your condition.  · Will get help right away if you are not doing well or get worse.     This information is not intended to replace advice given to you by your health care provider. Make sure you discuss any questions you have with your health care provider.     Document Released: 07/07/2006 Document Revised: 01/08/2016 Document Reviewed: 05/27/2014  [employer] Interactive Patient Education ©2016 [employer]    "

## 2017-07-10 NOTE — PROGRESS NOTES
Pt provided with written discharge instructions; this RN reviewed discharge instructions, including medications, incision care, and sternal precautions with Pt and wife at bedside.  Pt and wife deny questions/concerns at this time.      VS before discharge as charted in flow sheets at 1200.  All PIVs removed and telemetry monitor removed. Pt transported to car via wheelchair by RN with all belongings, written discharge instructions, and prescriptions without incident.

## 2017-07-10 NOTE — CARE PLAN
Problem: Post Op Day 4 CABG/Heart Valve Replacement  Goal: Optimal care of the Post Op CABG/Heart Valve replacement Post Op Day 4  Outcome: PROGRESSING AS EXPECTED  Intervention: Daily Weights  Documented in flow sheets      Intervention: Shower daily and clean incisions twice daily with soap and water  Prevena dressing in place; Pt refused to shower today r/t discharge this afternoon  Intervention: Up in chair for all meals  Up to chair for breakfast  Intervention: Ambulate, increasing the distance each time x 3 and before bed  Ambulated x 1 today after breakfast, distance of 500 feet  Intervention: IS q 1 hour while awake and record best IS volume  IS q 1 hour while awake; best volume of 2000mL  Intervention: Consider removal of payne, chest tube and pacer wire if not already done  Indwelling catheter, chest tubes, and pacer wires previously removed  Intervention: Discharge Education  Discharge education previously provided by YOSELYN Marquis; this RN to review with Pt during discharge and provide paper print out of instructions  Intervention: Add special instructions for cardiac surgery [facility] to after visit summary and review with patient  Cardiac surgery discharge instructions provided; [facility] after visit summary provided and reviewed with Pt

## 2017-07-11 ENCOUNTER — HOME CARE VISIT (OUTPATIENT)
Dept: HOME HEALTH SERVICES | Facility: HOME HEALTHCARE | Age: 77
End: 2017-07-11
Payer: MEDICARE

## 2017-07-11 ENCOUNTER — PATIENT OUTREACH (OUTPATIENT)
Dept: HEALTH INFORMATION MANAGEMENT | Facility: OTHER | Age: 77
End: 2017-07-11

## 2017-07-11 VITALS
DIASTOLIC BLOOD PRESSURE: 60 MMHG | HEIGHT: 70 IN | TEMPERATURE: 97.8 F | BODY MASS INDEX: 27.49 KG/M2 | RESPIRATION RATE: 16 BRPM | HEART RATE: 66 BPM | WEIGHT: 192 LBS | SYSTOLIC BLOOD PRESSURE: 160 MMHG

## 2017-07-11 PROCEDURE — 665001 SOC-HOME HEALTH

## 2017-07-11 PROCEDURE — G0162 HHC RN E&M PLAN SVS, 15 MIN: HCPCS

## 2017-07-11 PROCEDURE — 665998 HH PPS REVENUE CREDIT

## 2017-07-11 PROCEDURE — 665999 HH PPS REVENUE DEBIT

## 2017-07-11 SDOH — ECONOMIC STABILITY: HOUSING INSECURITY: UNSAFE APPLIANCES: 0

## 2017-07-11 SDOH — ECONOMIC STABILITY: HOUSING INSECURITY: UNSAFE COOKING RANGE AREA: 0

## 2017-07-11 ASSESSMENT — ENCOUNTER SYMPTOMS
VOMITING: DENIES
NAUSEA: DENIES

## 2017-07-11 ASSESSMENT — PATIENT HEALTH QUESTIONNAIRE - PHQ9
2. FEELING DOWN, DEPRESSED, IRRITABLE, OR HOPELESS: 00
1. LITTLE INTEREST OR PLEASURE IN DOING THINGS: 00

## 2017-07-11 ASSESSMENT — ACTIVITIES OF DAILY LIVING (ADL)
HOME_HEALTH_OASIS: 02
OASIS_M1830: 03
HOME_HEALTH_OASIS: 01

## 2017-07-12 PROCEDURE — 665999 HH PPS REVENUE DEBIT

## 2017-07-12 PROCEDURE — 665998 HH PPS REVENUE CREDIT

## 2017-07-12 NOTE — PROCEDURES
REFERRING PHYSICIAN: Dr. Nino    PREOPERATIVE DIAGNOSIS:  1. MESA  2. DM2  3. CAD previously known  4. Positive stress test    POSTOPERATIVE DIAGNOSIS:  1. 50% distal LM stenosis, highly eccentric  2. Diffuse prioximal 80% LAD stenosis  3. Tandem focal 80% bulky proximal and mid LCx disease with OM involvement (Gaines 0,1,0)  4. LVEF 65%      PROCEDURE PERFORMED:  Selective coronary angiography of the native vessels  Left heart catheterization  Left ventriculogram    DESCRIPTION OF PROCEDURE:  The risks and benefits of cardiac catheterization as well as the procedure itself, rationale and appropriateness were discussed with the patient today. Complications including but not limited to death, stroke, MI, urgent bypass surgery, contrast nephropathy, vascular complications, bleeding and infection were explained to the patient. The potential outcomes associated with the procedure (possible PCI, possible CABG, possible medical Rx only) were also discussed at length. The patient agreed to proceed.    The patient was transported to the catheterization laboratory in the fasting state. Bilateral groins were prepped and draped in the usual sterile fashion. The right groin was forced topically localized anesthetized and entered with a single front wall puncture at the common femoral artery. A 4 Romansh SideArm sheath was placed. A 4 Romansh JL4 and 3-D RC catheters were advanced in succession of the guidewire cannulation of the coronary arteries. Contrast was administered multiple images were obtained. Exchanged over a guidewire for an angled 6 Romansh pigtail catheter used across the aortic valve contrast was administered at 10 mL/s for 30 mL. All catheters and guidewires were removed. Manual pressure was applied to achieve adequate hemostasis. Patient tolerated the procedure well and left the cath lab in stable condition.      FINDINGS:  I. HEMODYNAMICS:    Ao: 128/63 mmHg   LEDP: 16 mmHg   Gradient on LV pullback: No    II.  LEFT VENTRICULOGRAM:   LVEF SAWYER PROJECTION: 65 %     III. CORONARY ANGIOGRAPHY:  Left Main: Moderate-sized. 50% distal shelflike left main coronary artery stenosis. Bifurcation into the LAD and circumflex.  Left Anterior Descending: Moderate-sized vessel. Diffuse 80% proximal stenosis. Small proximal diagonal and moderate-sized 2nd diagonal. No other high-grade stenoses.  Left Circumflex: Large dominant vessel continuing with tandem focal 80% bulky proximal and mid stenoses involving the obtuse marginal sonja Gaines 0, 1, 0 fashion.  Right Coronary: Small nondominant vessel with nonobstructive CAD.    COMPLICATIONS: none apparent    CONCLUSIONS:  1. 50% distal LM stenosis, highly eccentric  2. Diffuse prioximal 80% LAD stenosis  3. Tandem focal 80% bulky proximal and mid LCx disease with OM involvement (Gaines 0,1,0)  4. LVEF 65%    RECOMMENDATIONS:  CABG

## 2017-07-13 ENCOUNTER — HOME CARE VISIT (OUTPATIENT)
Dept: HOME HEALTH SERVICES | Facility: HOME HEALTHCARE | Age: 77
End: 2017-07-13
Payer: MEDICARE

## 2017-07-13 VITALS
HEART RATE: 71 BPM | DIASTOLIC BLOOD PRESSURE: 58 MMHG | TEMPERATURE: 97.2 F | BODY MASS INDEX: 27.05 KG/M2 | SYSTOLIC BLOOD PRESSURE: 110 MMHG | RESPIRATION RATE: 16 BRPM | WEIGHT: 188.5 LBS

## 2017-07-13 PROCEDURE — G0162 HHC RN E&M PLAN SVS, 15 MIN: HCPCS

## 2017-07-13 PROCEDURE — 665999 HH PPS REVENUE DEBIT

## 2017-07-13 PROCEDURE — 665998 HH PPS REVENUE CREDIT

## 2017-07-13 SDOH — ECONOMIC STABILITY: HOUSING INSECURITY: UNSAFE COOKING RANGE AREA: 0

## 2017-07-13 SDOH — ECONOMIC STABILITY: HOUSING INSECURITY: UNSAFE APPLIANCES: 0

## 2017-07-13 ASSESSMENT — ENCOUNTER SYMPTOMS
NAUSEA: DENIES
VOMITING: DENIES

## 2017-07-14 PROCEDURE — 665999 HH PPS REVENUE DEBIT

## 2017-07-14 PROCEDURE — 665998 HH PPS REVENUE CREDIT

## 2017-07-15 PROCEDURE — 665998 HH PPS REVENUE CREDIT

## 2017-07-15 PROCEDURE — 665999 HH PPS REVENUE DEBIT

## 2017-07-16 PROCEDURE — 665999 HH PPS REVENUE DEBIT

## 2017-07-16 PROCEDURE — 665998 HH PPS REVENUE CREDIT

## 2017-07-17 ENCOUNTER — HOME CARE VISIT (OUTPATIENT)
Dept: HOME HEALTH SERVICES | Facility: HOME HEALTHCARE | Age: 77
End: 2017-07-17
Payer: MEDICARE

## 2017-07-17 VITALS
DIASTOLIC BLOOD PRESSURE: 70 MMHG | TEMPERATURE: 207.7 F | WEIGHT: 178 LBS | BODY MASS INDEX: 25.54 KG/M2 | HEART RATE: 71 BPM | RESPIRATION RATE: 18 BRPM | SYSTOLIC BLOOD PRESSURE: 120 MMHG

## 2017-07-17 PROCEDURE — 665998 HH PPS REVENUE CREDIT

## 2017-07-17 PROCEDURE — G0299 HHS/HOSPICE OF RN EA 15 MIN: HCPCS

## 2017-07-17 PROCEDURE — 665999 HH PPS REVENUE DEBIT

## 2017-07-17 SDOH — ECONOMIC STABILITY: HOUSING INSECURITY: UNSAFE APPLIANCES: 0

## 2017-07-17 SDOH — ECONOMIC STABILITY: HOUSING INSECURITY: UNSAFE COOKING RANGE AREA: 0

## 2017-07-17 ASSESSMENT — ENCOUNTER SYMPTOMS
DEBILITATING PAIN: 1
RESPIRATORY SYMPTOMS COMMENTS: NO S/S OF RESP DISTRESS

## 2017-07-18 LAB — LV EJECT FRACT  99904: 45

## 2017-07-18 PROCEDURE — G0180 MD CERTIFICATION HHA PATIENT: HCPCS | Performed by: FAMILY MEDICINE

## 2017-07-18 PROCEDURE — 665999 HH PPS REVENUE DEBIT

## 2017-07-18 PROCEDURE — 665998 HH PPS REVENUE CREDIT

## 2017-07-19 ENCOUNTER — HOME CARE VISIT (OUTPATIENT)
Dept: HOME HEALTH SERVICES | Facility: HOME HEALTHCARE | Age: 77
End: 2017-07-19
Payer: MEDICARE

## 2017-07-19 VITALS
WEIGHT: 176.5 LBS | BODY MASS INDEX: 25.33 KG/M2 | TEMPERATURE: 98.2 F | DIASTOLIC BLOOD PRESSURE: 60 MMHG | RESPIRATION RATE: 18 BRPM | SYSTOLIC BLOOD PRESSURE: 120 MMHG | HEART RATE: 71 BPM

## 2017-07-19 PROCEDURE — 665999 HH PPS REVENUE DEBIT

## 2017-07-19 PROCEDURE — 665998 HH PPS REVENUE CREDIT

## 2017-07-19 PROCEDURE — G0299 HHS/HOSPICE OF RN EA 15 MIN: HCPCS

## 2017-07-19 SDOH — ECONOMIC STABILITY: HOUSING INSECURITY: UNSAFE COOKING RANGE AREA: 0

## 2017-07-19 SDOH — ECONOMIC STABILITY: HOUSING INSECURITY: UNSAFE APPLIANCES: 0

## 2017-07-19 ASSESSMENT — ENCOUNTER SYMPTOMS
DEBILITATING PAIN: 1
RESPIRATORY SYMPTOMS COMMENTS: NO S/S OF RESP DISTRESS

## 2017-07-19 NOTE — PROGRESS NOTES
Raf Pelayo was admitted to [facility] on 7/6/17 and was discharged home on 7/10/17 with a lace score of 13. Patient was admitted for shortness of breath. Patient was ordered to follow up with Anthony Gomes and has an appointment scheduled for 8/22/17. Patient declined IHD services. IHD will not be following this patient.

## 2017-07-20 PROCEDURE — 665998 HH PPS REVENUE CREDIT

## 2017-07-20 PROCEDURE — 665999 HH PPS REVENUE DEBIT

## 2017-07-21 ENCOUNTER — HOME CARE VISIT (OUTPATIENT)
Dept: HOME HEALTH SERVICES | Facility: HOME HEALTHCARE | Age: 77
End: 2017-07-21
Payer: MEDICARE

## 2017-07-21 PROCEDURE — 665999 HH PPS REVENUE DEBIT

## 2017-07-21 PROCEDURE — 665998 HH PPS REVENUE CREDIT

## 2017-07-21 PROCEDURE — G0299 HHS/HOSPICE OF RN EA 15 MIN: HCPCS

## 2017-07-22 PROCEDURE — 665998 HH PPS REVENUE CREDIT

## 2017-07-22 PROCEDURE — 665999 HH PPS REVENUE DEBIT

## 2017-07-23 PROCEDURE — 665999 HH PPS REVENUE DEBIT

## 2017-07-23 PROCEDURE — 665998 HH PPS REVENUE CREDIT

## 2017-07-24 VITALS
SYSTOLIC BLOOD PRESSURE: 110 MMHG | RESPIRATION RATE: 20 BRPM | HEART RATE: 73 BPM | DIASTOLIC BLOOD PRESSURE: 70 MMHG | WEIGHT: 315 LBS | BODY MASS INDEX: 252.96 KG/M2 | TEMPERATURE: 97.4 F

## 2017-07-24 PROCEDURE — 665998 HH PPS REVENUE CREDIT

## 2017-07-24 PROCEDURE — 665999 HH PPS REVENUE DEBIT

## 2017-07-24 SDOH — ECONOMIC STABILITY: HOUSING INSECURITY: UNSAFE APPLIANCES: 0

## 2017-07-24 SDOH — ECONOMIC STABILITY: HOUSING INSECURITY: UNSAFE COOKING RANGE AREA: 0

## 2017-07-24 ASSESSMENT — ENCOUNTER SYMPTOMS
RESPIRATORY SYMPTOMS COMMENTS: NO S/S OF RESP DISTRESS
DEBILITATING PAIN: 1

## 2017-07-25 ENCOUNTER — HOME CARE VISIT (OUTPATIENT)
Dept: HOME HEALTH SERVICES | Facility: HOME HEALTHCARE | Age: 77
End: 2017-07-25
Payer: MEDICARE

## 2017-07-25 VITALS — SYSTOLIC BLOOD PRESSURE: 110 MMHG | DIASTOLIC BLOOD PRESSURE: 60 MMHG | RESPIRATION RATE: 18 BRPM | TEMPERATURE: 97 F

## 2017-07-25 PROCEDURE — 665998 HH PPS REVENUE CREDIT

## 2017-07-25 PROCEDURE — G0495 RN CARE TRAIN/EDU IN HH: HCPCS

## 2017-07-25 PROCEDURE — 665999 HH PPS REVENUE DEBIT

## 2017-07-25 SDOH — ECONOMIC STABILITY: HOUSING INSECURITY: UNSAFE COOKING RANGE AREA: 0

## 2017-07-25 SDOH — ECONOMIC STABILITY: HOUSING INSECURITY: UNSAFE APPLIANCES: 0

## 2017-07-25 ASSESSMENT — ENCOUNTER SYMPTOMS
VOMITING: DENIED
NAUSEA: DENIED

## 2017-07-25 ASSESSMENT — ACTIVITIES OF DAILY LIVING (ADL): TRANSPORTATION COMMENTS: NEEDS ONE ASSIST TO LEAVE HOME SAFELY

## 2017-07-26 ENCOUNTER — OFFICE VISIT (OUTPATIENT)
Dept: CARDIOLOGY | Facility: MEDICAL CENTER | Age: 77
End: 2017-07-26
Payer: MEDICARE

## 2017-07-26 VITALS
WEIGHT: 175 LBS | SYSTOLIC BLOOD PRESSURE: 126 MMHG | DIASTOLIC BLOOD PRESSURE: 60 MMHG | HEART RATE: 73 BPM | OXYGEN SATURATION: 97 % | HEIGHT: 70 IN | BODY MASS INDEX: 25.05 KG/M2

## 2017-07-26 DIAGNOSIS — E11.9 TYPE 2 DIABETES MELLITUS WITHOUT COMPLICATION, WITHOUT LONG-TERM CURRENT USE OF INSULIN (HCC): ICD-10-CM

## 2017-07-26 DIAGNOSIS — E78.2 MIXED HYPERLIPIDEMIA: ICD-10-CM

## 2017-07-26 DIAGNOSIS — Z95.1 S/P CABG X 4: ICD-10-CM

## 2017-07-26 DIAGNOSIS — I51.89 DIASTOLIC DYSFUNCTION: ICD-10-CM

## 2017-07-26 DIAGNOSIS — I25.118 CORONARY ARTERY DISEASE OF NATIVE ARTERY OF NATIVE HEART WITH STABLE ANGINA PECTORIS (HCC): ICD-10-CM

## 2017-07-26 PROBLEM — I25.9 CHRONIC ISCHEMIC HEART DISEASE: Status: RESOLVED | Noted: 2017-07-03 | Resolved: 2017-07-26

## 2017-07-26 PROCEDURE — 99214 OFFICE O/P EST MOD 30 MIN: CPT | Performed by: NURSE PRACTITIONER

## 2017-07-26 PROCEDURE — 665998 HH PPS REVENUE CREDIT

## 2017-07-26 PROCEDURE — 665999 HH PPS REVENUE DEBIT

## 2017-07-26 RX ORDER — AMIODARONE HYDROCHLORIDE 400 MG/1
200 TABLET ORAL DAILY
Qty: 14 TAB | Refills: 0 | COMMUNITY
Start: 2017-07-26 | End: 2017-08-11

## 2017-07-26 RX ORDER — FUROSEMIDE 40 MG/1
40 TABLET ORAL DAILY
Qty: 30 TAB | Refills: 11 | COMMUNITY
Start: 2017-07-26 | End: 2017-08-16 | Stop reason: SDUPTHER

## 2017-07-26 RX ORDER — POTASSIUM CHLORIDE 20 MEQ/1
20 TABLET, EXTENDED RELEASE ORAL DAILY
Qty: 30 TAB | Refills: 11 | COMMUNITY
Start: 2017-07-26 | End: 2017-08-16 | Stop reason: SDUPTHER

## 2017-07-26 ASSESSMENT — ENCOUNTER SYMPTOMS
PALPITATIONS: 0
MYALGIAS: 0
ORTHOPNEA: 0
PND: 0
COUGH: 0
FEVER: 0
CLAUDICATION: 0
SHORTNESS OF BREATH: 0
ABDOMINAL PAIN: 0

## 2017-07-26 NOTE — MR AVS SNAPSHOT
"        Raf Pelayo   2017 4:00 PM   Office Visit   MRN: [id number]    Department:  [facility]   Dept Phone:  [phone]    Description:  Male : 1940   Provider:  LORENA Armando           Reason for Visit     Hospital Follow-up           Allergies as of 2017     No Known Allergies      You were diagnosed with     Coronary artery disease of native artery of native heart with stable angina pectoris (CMS-[facility])   [6143416]       Mixed hyperlipidemia   [272.2.ICD-9-CM]       S/P CABG x 4   [081525]       Type 2 diabetes mellitus without complication, without long-term current use of insulin (CMS-[facility])   [4132433]       Diastolic dysfunction   [319281]         Vital Signs     Blood Pressure Pulse Height Weight Body Mass Index Oxygen Saturation    126/60 mmHg 73 1.778 m (5' 10\") 79.379 kg (175 lb) 25.11 kg/m2 97%    Smoking Status                   Never Smoker            Basic Information     Date Of Birth Sex Race Ethnicity Preferred Language    1940 Male White Non- English      Your appointments     2017 To Be Determined   SN-HH-HOME VISIT with Jojo Sidhu R.N.   [facility] (--)    [address]  [place] [address]   [phone]            2017 To Be Determined   SN-HH-HOME VISIT with Jojo Sidhu R.N.   [facility] (--)    [address]  [place] [address]   [phone]            2017 To Be Determined   SN-HH-HOME VISIT with [facility] TEAM PAYAN   [facility] (--)    [address]  [place] [address]   [phone]            Aug 02, 2017 To Be Determined   SN-HH-HOME VISIT with Jojo Sidhu R.N.   [facility] (--)    [address]  [place] [address]   [phone]            Aug 04, 2017 To Be Determined   SN-HH-HOME VISIT with Jojo Sidhu R.N.   [facility] (--)    [address]  [facility] [address]   [phone]            Aug 07, 2017 To Be Determined   --[place] " VISIT with [facility] (--)    [address]  [place] [address]   [phone]            Aug 09, 2017 To Be Determined   SN-HH-HOME VISIT with LAQUITA [facility] (--)    [address]  [place] [address]   [phone]            Aug 11, 2017 To Be Determined   SN-HH-HOME VISIT with [facility] (--)    [address]  [place] [address]   [phone]            Aug 15, 2017 To Be Determined   SN-HH-HOME VISIT + LPN SUP with LA[facility] (--)    [address]  [place] [address]   [phone]            Aug 16, 2017  8:20 AM   Established Patient with Brandon Velázquez M.D.   [facility]    [address]  [place] [address]   [phone]           You will be receiving a confirmation call a few days before your appointment from our automated call confirmation system.            Aug 18, 2017 To Be Determined   SN-HH-HOME VISIT with LAQUITA [facility] (--)    [address]  [place] [address]   [phone]            Aug 23, 2017 To Be Determined   SN-HH-HOME VISIT with LAQUITA [facility] (--)    [address]  [place] [address]   [phone]            Aug 25, 2017 To Be Determined   SN-HH-HOME VISIT with LAQUITA [facility] (--)    [address]  [place] [address]   [phone]            Aug 31, 2017 To Be Determined   SN-HH-HOME VISIT with LAQUITA [facility] (--)    [address] [facility]  [facility] [address]   [phone]            Sep 07, 2017 To Be Determined   SN-HH-HOME VISIT with Stephanie Cavanaugh R.N.   [facility] (--)    [address] [facility]  [facility] [address]   [phone]            Sep 13, 2017  7:40 AM   Established Patient with Brandon Velázquez M.D.   [facility]    [address] [facility] [address]      [phone]           You will be receiving a confirmation call a few days before your appointment from our automated call confirmation system.            Oct 24, 2017 10:30 AM   FOLLOW UP with Kevin Nino MD,[facility]    [address]  [address]  [place] [id number] [phone]              Problem List              ICD-10-CM Priority Class Noted - Resolved    Anxiety F41.9 Low  7/27/2009 - Present    Mixed hyperlipidemia E78.2 Medium  7/27/2009 - Present    Nodular prostate without urinary obstruction N40.2 Low  7/27/2009 - Present    Counseling for marital and partner problems Z63.0 Low  8/31/2009 - Present    Vitamin D deficiency disease E55.9 Low  12/13/2011 - Present    History of colonic polyps Z86.010 Low  12/29/2014 - Present    Arthritis M19.90 Low  4/14/2016 - Present    CAD (coronary artery disease) I25.10 Medium  7/6/2017 - Present    S/P CABG x 4 Z95.1 Medium  7/26/2017 - Present    Type 2 diabetes mellitus without complication, without long-term current use of insulin (CMS-HCC) E11.9 Medium  7/26/2017 - Present    Diastolic dysfunction I51.9 Medium  7/26/2017 - Present      Health Maintenance        Date Due Completion Dates    RETINAL SCREENING 8/17/1958 ---    IMM ZOSTER VACCINE 8/17/2000 ---    COLON CANCER SCREENING ANNUAL FIT 4/18/2017 4/18/2016, 2/26/2013    IMM INFLUENZA (1) 9/1/2017 9/20/2016, 9/29/2015, 10/13/2011, 10/25/2009    A1C SCREENING 1/5/2018 7/5/2017, 10/20/2016    DIABETES MONOFILAMENT / LE EXAM 4/17/2018 4/17/2017    FASTING LIPID PROFILE 4/18/2018 4/18/2017, 3/15/2017, 10/20/2016, 1/23/2016, 1/22/2015, 2/19/2013, 8/17/2012, 12/9/2011, 9/23/2010, 3/18/2010, 8/19/2009, 10/27/2008, 5/3/2008, 11/6/2007, 3/23/2007, 12/18/2006, 11/9/2006    URINE ACR / MICROALBUMIN 4/18/2018 4/18/2017    SERUM CREATININE 7/10/2018 7/10/2017, 7/9/2017, 7/8/2017, 7/7/2017, 7/5/2017, 6/29/2017, 4/18/2017, 3/15/2017,  10/20/2016, 1/23/2016, 1/22/2015, 2/19/2013, 8/17/2012, 12/9/2011, 9/23/2010, 3/18/2010, 8/19/2009, 10/27/2008, 5/3/2008, 12/26/2006, 11/9/2006    COLONOSCOPY 12/24/2019 12/24/2014    IMM DTaP/Tdap/Td Vaccine (2 - Td) 12/9/2021 12/9/2011            Current Immunizations     13-VALENT PCV PREVNAR 2/18/2016    Influenza TIV (IM) 10/13/2011    Influenza Vaccine Pediatric 10/25/2009    Influenza Vaccine Quad Inj (Preserved) 9/20/2016, 9/29/2015    Pneumococcal polysaccharide vaccine (PPSV-23) 12/9/2011    Tdap Vaccine 12/9/2011      Below and/or attached are the medications your provider expects you to take. Review all of your home medications and newly ordered medications with your provider and/or pharmacist. Follow medication instructions as directed by your provider and/or pharmacist. Please keep your medication list with you and share with your provider. Update the information when medications are discontinued, doses are changed, or new medications (including over-the-counter products) are added; and carry medication information at all times in the event of emergency situations     Allergies:  No Known Allergies          Medications  Valid as of: July 26, 2017 -  4:41 PM    Generic Name Brand Name Tablet Size Instructions for use    Amiodarone HCl (Tab) PACERONE 400 MG Take 0.5 Tabs by mouth every day.        Aspirin (Chew Tab) ASA 81 MG Take 81 mg by mouth every day. Indications: post cardiac surgery to prevent blood clots        Atorvastatin Calcium (Tab) LIPITOR 40 MG Take 1 Tab by mouth every bedtime.        Cholecalciferol (Tab) vitamin D3 (cholecalciferol) 1000 UNIT Take 1 Tab by mouth every day.        Clopidogrel Bisulfate (Tab) PLAVIX 75 MG Take 1 Tab by mouth every day.        Furosemide (Tab) LASIX 40 MG Take 1 Tab by mouth every day.        Glucosamine-Chondroit-Vit C-Mn (Cap) GLUCOSAMINE CHONDR 1500 COMPLX  Take 1 Cap by mouth every day.        MetFORMIN HCl (Tab) GLUCOPHAGE 500 MG Take 1 Tab by mouth 2  times a day, with meals.        Metoprolol Tartrate (Tab) LOPRESSOR 25 MG Take 1 Tab by mouth 2 Times a Day.        Multiple Vitamins-Minerals (Tab) THERAGRAN-M  Take 1 Tab by mouth every day. Indications: supplement        NON SPECIFIED   Apply blue emu oil to painful shoulders        Potassium Chloride Cathy CR (Tab CR) Kdur 20 MEQ Take 1 Tab by mouth every day.        .                 Medicines prescribed today were sent to:     [id number] - [place], [place] - [address]    [address] [facility] [place] [address]    Phone: [phone] Fax: [phone]    Open 24 Hours?: No    [facility] - [place] [address]    Phone: [phone] Fax: [phone]    Open 24 Hours?: No    [facility] - [place] [address]    Phone: [phone] Fax: [phone]    Open 24 Hours?: No    [facility] - [place], [place] - [address] [facility] [address]    [address] [facility] [place] [address]    Phone: [phone] Fax: [phone]    Open 24 Hours?: No      Medication refill instructions:       If your prescription bottle indicates you have medication refills left, it is not necessary to call your provider’s office. Please contact your pharmacy and they will refill your medication.    If your prescription bottle indicates you do not have any refills left, you may request refills at any time through one of the following ways: The online Meet[employer] system (except Urgent Care), by calling your provider’s office, or by asking your pharmacy to contact your provider’s office with a refill request. Medication refills are processed only during regular business hours and may not be available until the next business day. Your provider may request additional information or to have a follow-up visit with you prior to refilling your medication.   *Please Note:  Medication refills are assigned a new Rx number when refilled electronically. Your pharmacy may indicate that no refills were authorized even though a new prescription for the same medication is available at the pharmacy. Please request the medicine by name with the pharmacy before contacting your provider for a refill.        Your To Do List     Future Labs/Procedures Complete By Expires    BTYPE NATRIURETIC PEPTIDE  As directed 7/27/2018    CBC WITHOUT DIFFERENTIAL  As directed 1/25/2018    COMP METABOLIC PANEL  As directed 7/27/2018      Referral     A referral request has been sent to our patient care coordination department. Please allow 3-5 business days for us to process this request and contact you either by phone or mail. If you do not hear from us by the 5th business day, please call us at [phone].           Vigoda Access Code: Activation code not generated  Current Vigoda Status: Active

## 2017-07-26 NOTE — Clinical Note
[facility]   [address] [facility] [address]  [place] [address]  Phone: [phone]  Fax: [phone]              Raf Pelayo  1940    Encounter Date: 7/26/2017    LORENA Armando          PROGRESS NOTE:  Subjective:   Raf Pelayo is a 76 y.o. male who presents today for hospital follow up for CABG X4 (LIMA-distal diag, SVG-distalLAD ,SVG-distal OM1 , SVG-distal OM2).    He is a patient of Dr. Nino in our office. Hx of progressive shortness of breath, HLD, and DM.    He is overall doing well post-operatively. He was sent for elective outpatient angiogram and was found to have multi-vessel disease. His incisions are healing well. He only has mild edema. He has good energy and continues to lose weight.    He has had no episodes of chest pain, palpitations, dizziness/lightheadedness, shortness of breath, or orthopnea.    Past Medical History   Diagnosis Date   • Nodular prostate without urinary obstruction    • Chronic ischemic heart disease, unspecified    • Mixed hyperlipidemia    • Anxiety    • Arthritis    • Diabetes (CMS-[facility])      oral medication    • Breath shortness      current    • CAD (coronary artery disease)      CABG X4     Past Surgical History   Procedure Laterality Date   • Appendectomy  1955   • Multiple coronary artery bypass endo vein harvest  7/6/2017     Procedure: MULTIPLE CORONARY ARTERY BYPASS ENDO VEIN HARVEST X2-4, PREVENA DRESSING ;  Surgeon: Anthony Gaffney M.D.;  Location: SURGERY [facility];  Service:    • Andres  7/6/2017     Procedure: ANDRES - INTRAOPERATIVE ;  Surgeon: Anthony Gaffney M.D.;  Location: SURGERY [facility];  Service:    • Cardiac cath       Family History   Problem Relation Age of Onset   • Stroke Mother    • Heart Disease Brother      History   Smoking status   • Never Smoker    Smokeless tobacco   • Never Used     No Known Allergies  Outpatient Encounter Prescriptions as of  "7/26/2017   Medication Sig Dispense Refill   • furosemide (LASIX) 40 MG Tab Take 1 Tab by mouth every day. 30 Tab 11   • amiodarone (PACERONE) 400 MG tablet Take 0.5 Tabs by mouth every day. 14 Tab 0   • potassium chloride SA (KDUR) 20 MEQ Tab CR Take 1 Tab by mouth every day. 30 Tab 11   • atorvastatin (LIPITOR) 40 MG Tab Take 1 Tab by mouth every bedtime. 30 Tab 3   • metoprolol (LOPRESSOR) 25 MG Tab Take 1 Tab by mouth 2 Times a Day. 60 Tab 3   • clopidogrel (PLAVIX) 75 MG Tab Take 1 Tab by mouth every day. 30 Tab 3   • aspirin (ASA) 81 MG Chew Tab chewable tablet Take 81 mg by mouth every day. Indications: post cardiac surgery to prevent blood clots     • therapeutic multivitamin-minerals (THERAGRAN-M) Tab Take 1 Tab by mouth every day. Indications: supplement     • metformin (GLUCOPHAGE) 500 MG Tab Take 1 Tab by mouth 2 times a day, with meals. 60 Tab 3   • NON SPECIFIED Apply blue emu oil to painful shoulders (Patient taking differently: Apply \"[place] dream cream\" to painful shoulders) 1 Bottle 3   • Glucosamine-Chondroit-Vit C-Mn (GLUCOSAMINE CHONDR 1500 COMPLX) Cap Take 1 Cap by mouth every day. 100 Cap 3   • Cholecalciferol (VITAMIN D3) 1000 UNIT TABS Take 1 Tab by mouth every day. 100 Tab 3   • [DISCONTINUED] hydrocodone-acetaminophen (NORCO) 5-325 MG Tab per tablet Take 1-2 Tabs by mouth every 6 hours as needed. 60 Tab 0   • [DISCONTINUED] amiodarone (PACERONE) 400 MG tablet Take 1 Tab by mouth every day. 30 Tab 3   • [DISCONTINUED] docusate sodium 100 MG Cap Take 100 mg by mouth every morning. 60 Cap 3   • [DISCONTINUED] potassium chloride SA (KDUR) 20 MEQ Tab CR Take 1 Tab by mouth 2 Times a Day. 60 Tab 11   • [DISCONTINUED] furosemide (LASIX) 40 MG Tab Take 1 Tab by mouth 2 Times a Day. 30 Tab 3   • [DISCONTINUED] Pseudoephedrine-APAP-DM (DAYQUIL MULTI-SYMPTOM COLD/FLU PO) Take 1 Cap by mouth every 6 hours as needed (as needed for cold symptoms).       No facility-administered encounter medications " "on file as of 7/26/2017.     Review of Systems   Constitutional: Negative for fever and malaise/fatigue.   Respiratory: Negative for cough and shortness of breath.    Cardiovascular: Positive for leg swelling. Negative for chest pain, palpitations, orthopnea, claudication and PND.        Mild leg swelling    Gastrointestinal: Negative for abdominal pain.   Musculoskeletal: Negative for myalgias.   All other systems reviewed and are negative.       Objective:   /60 mmHg  Pulse 73  Ht 1.778 m (5' 10\")  Wt 79.379 kg (175 lb)  BMI 25.11 kg/m2  SpO2 97%    Physical Exam   Constitutional: He is oriented to person, place, and time. He appears well-developed and well-nourished. No distress.   HENT:   Head: Normocephalic and atraumatic.   Eyes: EOM are normal.   Neck: Normal range of motion. No JVD present.   Cardiovascular: Normal rate, regular rhythm, normal heart sounds and intact distal pulses.    No murmur heard.  Pulmonary/Chest: Effort normal and breath sounds normal. No respiratory distress. He has no wheezes. He has no rales.   Abdominal: Soft. Bowel sounds are normal.   Musculoskeletal: Normal range of motion. He exhibits edema.   Trace edema legs   Neurological: He is alert and oriented to person, place, and time.   Skin: Skin is warm and dry.   Psychiatric: He has a normal mood and affect.   Nursing note and vitals reviewed.    No results found for: BNPBTYPENAT   Lab Results   Component Value Date/Time    CHOLESTEROL, 04/18/2017 08:42 AM    LDL 61 04/18/2017 08:42 AM    HDL 35* 04/18/2017 08:42 AM    TRIGLYCERIDES 96 04/18/2017 08:42 AM       Lab Results   Component Value Date/Time    SODIUM 137 07/10/2017 04:45 AM    POTASSIUM 3.5* 07/10/2017 04:45 AM    CHLORIDE 101 07/10/2017 04:45 AM    CO2 25 07/10/2017 04:45 AM    GLUCOSE 126* 07/10/2017 04:45 AM    BUN 21 07/10/2017 04:45 AM    CREATININE 1.00 07/10/2017 04:45 AM     Lab Results   Component Value Date/Time    ALKALINE PHOSPHATASE 56 " 07/05/2017 03:05 PM    AST(SGOT) 22 07/05/2017 03:05 PM    ALT(SGPT) 26 07/05/2017 03:05 PM    TOTAL BILIRUBIN 0.4 07/05/2017 03:05 PM      Lab Results   Component Value Date/Time    WBC 12.4* 07/10/2017 04:45 AM    RBC 3.53* 07/10/2017 04:45 AM    HEMOGLOBIN 10.6* 07/10/2017 04:45 AM    HEMATOCRIT 32.0* 07/10/2017 04:45 AM    MCV 90.7 07/10/2017 04:45 AM    MCH 30.0 07/10/2017 04:45 AM    MCHC 33.1* 07/10/2017 04:45 AM    MPV 10.0 07/10/2017 04:45 AM      Lab Results   Component Value Date/Time    PT 16.7* 07/06/2017 12:27 PM    INR 1.31* 07/06/2017 12:27 PM      2017 CAROTID DUPLEX CONCLUSIONS   Mild plaque of the right carotid artery with no significant right internal    carotid artery stenosis (<50%)   Mild plaque of the left carotid artery with no significant left internal    carotid artery stenosis  (<50%)   No significant stenosis of the right or left subclavian artery.   No significant stenosis of the right or left vertebral artery.    2017 ROHINI INTRA-OP REPORT CONCLUSIONS  1)  CABG x 2 LAD, CX  2)  Mild MR  3)  Trace AI  4)  Normal biventricular function    2017 CATH POSTOPERATIVE DIAGNOSIS:  1. 50% distal LM stenosis, highly eccentric  2. Diffuse prioximal 80% LAD stenosis  3. Tandem focal 80% bulky proximal and mid LCx disease with OM involvement (Gaines 0,1,0)  4. LVEF 65%    2017 OP REPORT OPERATION:  Coronary artery bypass grafting x4, left MIKHAIL to the distal diagonal,     reverse saphenous vein graft sequence to the distal OM1 and distal OM 2,    and reverse saphenous vein graft to distal LAD, left greater saphenous vein endoscopic vein    harvest.    Assessment:     1. Coronary artery disease of native artery of native heart with stable angina pectoris (CMS-HCC)  REFERRAL TO INTENSIVE CARDIAC REHAB/CARDIAC REHAB    CBC WITHOUT DIFFERENTIAL   2. Mixed hyperlipidemia  REFERRAL TO INTENSIVE CARDIAC REHAB/CARDIAC REHAB    COMP METABOLIC PANEL    LIPID PANEL   3. S/P CABG x 4  REFERRAL TO INTENSIVE CARDIAC  REHAB/CARDIAC REHAB   4. Type 2 diabetes mellitus without complication, without long-term current use of insulin (CMS-[facility])  REFERRAL TO INTENSIVE CARDIAC REHAB/CARDIAC REHAB   5. Diastolic dysfunction  REFERRAL TO INTENSIVE CARDIAC REHAB/CARDIAC REHAB    BTYPE NATRIURETIC PEPTIDE     Medical Decision Making:  Today's Assessment / Status / Plan:     1. CAD with CABG X4, no angina or MESA. Continue ASA, plavix (3 months min), lipitor, and metoprolol. Wean amiodarone to 200 mg for 2 weeks then discontinue-unsure if patient went into afib post-op (no documentation). He knows to call for palpitations. Wean lasix to 40 mg QD and K 20 mEq QD, FU with labs.    2. HLD, LDL goal <70. Check CMP and lipid in 3 weeks.    3. DM, managed by PCP.    FU in clinic in 3 months with EC; call with lab results in 3 weeks    Patient verbalizes understanding and agrees with the plan of care.     Collaborating MD: Paula HERNANDEZ        No Recipients

## 2017-07-26 NOTE — PROGRESS NOTES
Subjective:   Raf Pelayo is a 76 y.o. male who presents today for hospital follow up for CABG X4 (LIMA-distal diag, SVG-distalLAD ,SVG-distal OM1 , SVG-distal OM2).    He is a patient of Dr. Nino in our office. Hx of progressive shortness of breath, HLD, and DM.    He is overall doing well post-operatively. He was sent for elective outpatient angiogram and was found to have multi-vessel disease. His incisions are healing well. He only has mild edema. He has good energy and continues to lose weight.    He has had no episodes of chest pain, palpitations, dizziness/lightheadedness, shortness of breath, or orthopnea.    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;  Service:    • Rohini  7/6/2017     Procedure: ROHINI - INTRAOPERATIVE ;  Surgeon: Anthony Gaffney M.D.;  Location: SURGERY [facility];  Service:    • Cardiac cath       Family History   Problem Relation Age of Onset   • Stroke Mother    • Heart Disease Brother      History   Smoking status   • Never Smoker    Smokeless tobacco   • Never Used     No Known Allergies  Outpatient Encounter Prescriptions as of 7/26/2017   Medication Sig Dispense Refill   • furosemide (LASIX) 40 MG Tab Take 1 Tab by mouth every day. 30 Tab 11   • amiodarone (PACERONE) 400 MG tablet Take 0.5 Tabs by mouth every day. 14 Tab 0   • potassium chloride SA (KDUR) 20 MEQ Tab CR Take 1 Tab by mouth every day. 30  "Tab 11   • atorvastatin (LIPITOR) 40 MG Tab Take 1 Tab by mouth every bedtime. 30 Tab 3   • metoprolol (LOPRESSOR) 25 MG Tab Take 1 Tab by mouth 2 Times a Day. 60 Tab 3   • clopidogrel (PLAVIX) 75 MG Tab Take 1 Tab by mouth every day. 30 Tab 3   • aspirin (ASA) 81 MG Chew Tab chewable tablet Take 81 mg by mouth every day. Indications: post cardiac surgery to prevent blood clots     • therapeutic multivitamin-minerals (THERAGRAN-M) Tab Take 1 Tab by mouth every day. Indications: supplement     • metformin (GLUCOPHAGE) 500 MG Tab Take 1 Tab by mouth 2 times a day, with meals. 60 Tab 3   • NON SPECIFIED Apply blue emu oil to painful shoulders (Patient taking differently: Apply \"Australian dream cream\" to painful shoulders) 1 Bottle 3   • Glucosamine-Chondroit-Vit C-Mn (GLUCOSAMINE CHONDR 1500 COMPLX) Cap Take 1 Cap by mouth every day. 100 Cap 3   • Cholecalciferol (VITAMIN D3) 1000 UNIT TABS Take 1 Tab by mouth every day. 100 Tab 3   • [DISCONTINUED] hydrocodone-acetaminophen (NORCO) 5-325 MG Tab per tablet Take 1-2 Tabs by mouth every 6 hours as needed. 60 Tab 0   • [DISCONTINUED] amiodarone (PACERONE) 400 MG tablet Take 1 Tab by mouth every day. 30 Tab 3   • [DISCONTINUED] docusate sodium 100 MG Cap Take 100 mg by mouth every morning. 60 Cap 3   • [DISCONTINUED] potassium chloride SA (KDUR) 20 MEQ Tab CR Take 1 Tab by mouth 2 Times a Day. 60 Tab 11   • [DISCONTINUED] furosemide (LASIX) 40 MG Tab Take 1 Tab by mouth 2 Times a Day. 30 Tab 3   • [DISCONTINUED] Pseudoephedrine-APAP-DM (DAYQUIL MULTI-SYMPTOM COLD/FLU PO) Take 1 Cap by mouth every 6 hours as needed (as needed for cold symptoms).       No facility-administered encounter medications on file as of 7/26/2017.     Review of Systems   Constitutional: Negative for fever and malaise/fatigue.   Respiratory: Negative for cough and shortness of breath.    Cardiovascular: Positive for leg swelling. Negative for chest pain, palpitations, orthopnea, claudication and " "PND.        Mild leg swelling    Gastrointestinal: Negative for abdominal pain.   Musculoskeletal: Negative for myalgias.   All other systems reviewed and are negative.       Objective:   /60 mmHg  Pulse 73  Ht 1.778 m (5' 10\")  Wt 79.379 kg (175 lb)  BMI 25.11 kg/m2  SpO2 97%    Physical Exam   Constitutional: He is oriented to person, place, and time. He appears well-developed and well-nourished. No distress.   HENT:   Head: Normocephalic and atraumatic.   Eyes: EOM are normal.   Neck: Normal range of motion. No JVD present.   Cardiovascular: Normal rate, regular rhythm, normal heart sounds and intact distal pulses.    No murmur heard.  Pulmonary/Chest: Effort normal and breath sounds normal. No respiratory distress. He has no wheezes. He has no rales.   Abdominal: Soft. Bowel sounds are normal.   Musculoskeletal: Normal range of motion. He exhibits edema.   Trace edema legs   Neurological: He is alert and oriented to person, place, and time.   Skin: Skin is warm and dry.   Psychiatric: He has a normal mood and affect.   Nursing note and vitals reviewed.    No results found for: BNPBTYPENAT   Lab Results   Component Value Date/Time    CHOLESTEROL, 04/18/2017 08:42 AM    LDL 61 04/18/2017 08:42 AM    HDL 35* 04/18/2017 08:42 AM    TRIGLYCERIDES 96 04/18/2017 08:42 AM       Lab Results   Component Value Date/Time    SODIUM 137 07/10/2017 04:45 AM    POTASSIUM 3.5* 07/10/2017 04:45 AM    CHLORIDE 101 07/10/2017 04:45 AM    CO2 25 07/10/2017 04:45 AM    GLUCOSE 126* 07/10/2017 04:45 AM    BUN 21 07/10/2017 04:45 AM    CREATININE 1.00 07/10/2017 04:45 AM     Lab Results   Component Value Date/Time    ALKALINE PHOSPHATASE 56 07/05/2017 03:05 PM    AST(SGOT) 22 07/05/2017 03:05 PM    ALT(SGPT) 26 07/05/2017 03:05 PM    TOTAL BILIRUBIN 0.4 07/05/2017 03:05 PM      Lab Results   Component Value Date/Time    WBC 12.4* 07/10/2017 04:45 AM    RBC 3.53* 07/10/2017 04:45 AM    HEMOGLOBIN 10.6* 07/10/2017 04:45 " AM    HEMATOCRIT 32.0* 07/10/2017 04:45 AM    MCV 90.7 07/10/2017 04:45 AM    MCH 30.0 07/10/2017 04:45 AM    MCHC 33.1* 07/10/2017 04:45 AM    MPV 10.0 07/10/2017 04:45 AM      Lab Results   Component Value Date/Time    PT 16.7* 07/06/2017 12:27 PM    INR 1.31* 07/06/2017 12:27 PM      2017 CAROTID DUPLEX CONCLUSIONS   Mild plaque of the right carotid artery with no significant right internal    carotid artery stenosis (<50%)   Mild plaque of the left carotid artery with no significant left internal    carotid artery stenosis  (<50%)   No significant stenosis of the right or left subclavian artery.   No significant stenosis of the right or left vertebral artery.    2017 ROHINI INTRA-OP REPORT CONCLUSIONS  1)  CABG x 2 LAD, CX  2)  Mild MR  3)  Trace AI  4)  Normal biventricular function    2017 CATH POSTOPERATIVE DIAGNOSIS:  1. 50% distal LM stenosis, highly eccentric  2. Diffuse prioximal 80% LAD stenosis  3. Tandem focal 80% bulky proximal and mid LCx disease with OM involvement (Gaines 0,1,0)  4. LVEF 65%    2017 OP REPORT OPERATION:  Coronary artery bypass grafting x4, left MIKHAIL to the distal diagonal,     reverse saphenous vein graft sequence to the distal OM1 and distal OM 2,    and reverse saphenous vein graft to distal LAD, left greater saphenous vein endoscopic vein    harvest.    Assessment:     1. Coronary artery disease of native artery of native heart with stable angina pectoris (CMS-HCC)  REFERRAL TO INTENSIVE CARDIAC REHAB/CARDIAC REHAB    CBC WITHOUT DIFFERENTIAL   2. Mixed hyperlipidemia  REFERRAL TO INTENSIVE CARDIAC REHAB/CARDIAC REHAB    COMP METABOLIC PANEL    LIPID PANEL   3. S/P CABG x 4  REFERRAL TO INTENSIVE CARDIAC REHAB/CARDIAC REHAB   4. Type 2 diabetes mellitus without complication, without long-term current use of insulin (CMS-HCC)  REFERRAL TO INTENSIVE CARDIAC REHAB/CARDIAC REHAB   5. Diastolic dysfunction  REFERRAL TO INTENSIVE CARDIAC REHAB/CARDIAC REHAB    BTYPE NATRIURETIC PEPTIDE      Medical Decision Making:  Today's Assessment / Status / Plan:     1. CAD with CABG X4, no angina or MESA. Continue ASA, plavix (3 months min), lipitor, and metoprolol. Wean amiodarone to 200 mg for 2 weeks then discontinue-unsure if patient went into afib post-op (no documentation). He knows to call for palpitations. Wean lasix to 40 mg QD and K 20 mEq QD, FU with labs.    2. HLD, LDL goal <70. Check CMP and lipid in 3 weeks.    3. DM, managed by PCP.    FU in clinic in 3 months with EC; call with lab results in 3 weeks    Patient verbalizes understanding and agrees with the plan of care.     Collaborating MD: Paula HERNANDEZ

## 2017-07-27 ENCOUNTER — HOME CARE VISIT (OUTPATIENT)
Dept: HOME HEALTH SERVICES | Facility: HOME HEALTHCARE | Age: 77
End: 2017-07-27
Payer: MEDICARE

## 2017-07-27 PROCEDURE — G0495 RN CARE TRAIN/EDU IN HH: HCPCS

## 2017-07-27 PROCEDURE — 665999 HH PPS REVENUE DEBIT

## 2017-07-27 PROCEDURE — 665998 HH PPS REVENUE CREDIT

## 2017-07-28 ENCOUNTER — HOME CARE VISIT (OUTPATIENT)
Dept: HOME HEALTH SERVICES | Facility: HOME HEALTHCARE | Age: 77
End: 2017-07-28
Payer: MEDICARE

## 2017-07-28 VITALS
TEMPERATURE: 97.7 F | DIASTOLIC BLOOD PRESSURE: 60 MMHG | SYSTOLIC BLOOD PRESSURE: 100 MMHG | HEART RATE: 61 BPM | RESPIRATION RATE: 20 BRPM | WEIGHT: 141 LBS | BODY MASS INDEX: 20.23 KG/M2

## 2017-07-28 PROCEDURE — 665999 HH PPS REVENUE DEBIT

## 2017-07-28 PROCEDURE — 665998 HH PPS REVENUE CREDIT

## 2017-07-28 SDOH — ECONOMIC STABILITY: HOUSING INSECURITY: UNSAFE COOKING RANGE AREA: 0

## 2017-07-28 SDOH — ECONOMIC STABILITY: HOUSING INSECURITY: UNSAFE APPLIANCES: 0

## 2017-07-28 ASSESSMENT — ENCOUNTER SYMPTOMS: DEBILITATING PAIN: 1

## 2017-07-29 PROCEDURE — 665999 HH PPS REVENUE DEBIT

## 2017-07-29 PROCEDURE — 665998 HH PPS REVENUE CREDIT

## 2017-07-30 PROCEDURE — 665998 HH PPS REVENUE CREDIT

## 2017-07-30 PROCEDURE — 665999 HH PPS REVENUE DEBIT

## 2017-07-31 ENCOUNTER — HOME CARE VISIT (OUTPATIENT)
Dept: HOME HEALTH SERVICES | Facility: HOME HEALTHCARE | Age: 77
End: 2017-07-31
Payer: MEDICARE

## 2017-07-31 VITALS
DIASTOLIC BLOOD PRESSURE: 74 MMHG | TEMPERATURE: 97.6 F | RESPIRATION RATE: 18 BRPM | HEART RATE: 64 BPM | BODY MASS INDEX: 24.89 KG/M2 | WEIGHT: 173.5 LBS | SYSTOLIC BLOOD PRESSURE: 115 MMHG

## 2017-07-31 PROCEDURE — 665999 HH PPS REVENUE DEBIT

## 2017-07-31 PROCEDURE — 665998 HH PPS REVENUE CREDIT

## 2017-07-31 PROCEDURE — G0495 RN CARE TRAIN/EDU IN HH: HCPCS

## 2017-07-31 ASSESSMENT — ENCOUNTER SYMPTOMS
RESPIRATORY SYMPTOMS COMMENTS: DENIES BREATHING PROBLEM
NAUSEA: DENIES
VOMITING: DENIES

## 2017-08-01 PROCEDURE — 665998 HH PPS REVENUE CREDIT

## 2017-08-01 PROCEDURE — 665999 HH PPS REVENUE DEBIT

## 2017-08-02 ENCOUNTER — HOME CARE VISIT (OUTPATIENT)
Dept: HOME HEALTH SERVICES | Facility: HOME HEALTHCARE | Age: 77
End: 2017-08-02
Payer: MEDICARE

## 2017-08-02 PROCEDURE — G0495 RN CARE TRAIN/EDU IN HH: HCPCS

## 2017-08-02 PROCEDURE — 665998 HH PPS REVENUE CREDIT

## 2017-08-02 PROCEDURE — 665999 HH PPS REVENUE DEBIT

## 2017-08-03 VITALS
BODY MASS INDEX: 24.82 KG/M2 | DIASTOLIC BLOOD PRESSURE: 70 MMHG | TEMPERATURE: 97.9 F | HEART RATE: 61 BPM | SYSTOLIC BLOOD PRESSURE: 120 MMHG | WEIGHT: 173 LBS | RESPIRATION RATE: 20 BRPM

## 2017-08-03 PROCEDURE — 665999 HH PPS REVENUE DEBIT

## 2017-08-03 PROCEDURE — 665998 HH PPS REVENUE CREDIT

## 2017-08-03 SDOH — ECONOMIC STABILITY: HOUSING INSECURITY: UNSAFE COOKING RANGE AREA: 0

## 2017-08-03 SDOH — ECONOMIC STABILITY: HOUSING INSECURITY: UNSAFE APPLIANCES: 0

## 2017-08-03 ASSESSMENT — ENCOUNTER SYMPTOMS: RESPIRATORY SYMPTOMS COMMENTS: NO S/S OF RESP DISTRESS

## 2017-08-04 ENCOUNTER — HOME CARE VISIT (OUTPATIENT)
Dept: HOME HEALTH SERVICES | Facility: HOME HEALTHCARE | Age: 77
End: 2017-08-04
Payer: MEDICARE

## 2017-08-04 PROCEDURE — G0495 RN CARE TRAIN/EDU IN HH: HCPCS

## 2017-08-04 PROCEDURE — 665998 HH PPS REVENUE CREDIT

## 2017-08-04 PROCEDURE — 665999 HH PPS REVENUE DEBIT

## 2017-08-05 PROCEDURE — 665998 HH PPS REVENUE CREDIT

## 2017-08-05 PROCEDURE — 665999 HH PPS REVENUE DEBIT

## 2017-08-06 VITALS
SYSTOLIC BLOOD PRESSURE: 120 MMHG | BODY MASS INDEX: 25.11 KG/M2 | TEMPERATURE: 206.6 F | HEART RATE: 60 BPM | WEIGHT: 175 LBS | DIASTOLIC BLOOD PRESSURE: 70 MMHG | RESPIRATION RATE: 18 BRPM

## 2017-08-06 PROCEDURE — 665999 HH PPS REVENUE DEBIT

## 2017-08-06 PROCEDURE — 665998 HH PPS REVENUE CREDIT

## 2017-08-06 SDOH — ECONOMIC STABILITY: HOUSING INSECURITY: UNSAFE COOKING RANGE AREA: 0

## 2017-08-06 SDOH — ECONOMIC STABILITY: HOUSING INSECURITY: UNSAFE APPLIANCES: 0

## 2017-08-06 ASSESSMENT — ENCOUNTER SYMPTOMS
RESPIRATORY SYMPTOMS COMMENTS: NO S/S OF RESP DISTRESS
DEBILITATING PAIN: 1

## 2017-08-07 ENCOUNTER — HOME CARE VISIT (OUTPATIENT)
Dept: HOME HEALTH SERVICES | Facility: HOME HEALTHCARE | Age: 77
End: 2017-08-07
Payer: MEDICARE

## 2017-08-07 VITALS
DIASTOLIC BLOOD PRESSURE: 60 MMHG | BODY MASS INDEX: 25.04 KG/M2 | RESPIRATION RATE: 20 BRPM | WEIGHT: 174.5 LBS | SYSTOLIC BLOOD PRESSURE: 110 MMHG | HEART RATE: 60 BPM

## 2017-08-07 PROCEDURE — G0495 RN CARE TRAIN/EDU IN HH: HCPCS

## 2017-08-07 PROCEDURE — 665999 HH PPS REVENUE DEBIT

## 2017-08-07 PROCEDURE — 665998 HH PPS REVENUE CREDIT

## 2017-08-07 SDOH — ECONOMIC STABILITY: HOUSING INSECURITY: UNSAFE APPLIANCES: 0

## 2017-08-07 SDOH — ECONOMIC STABILITY: HOUSING INSECURITY: UNSAFE COOKING RANGE AREA: 0

## 2017-08-08 PROCEDURE — 665999 HH PPS REVENUE DEBIT

## 2017-08-08 PROCEDURE — 665998 HH PPS REVENUE CREDIT

## 2017-08-09 ENCOUNTER — PATIENT OUTREACH (OUTPATIENT)
Dept: HEALTH INFORMATION MANAGEMENT | Facility: OTHER | Age: 77
End: 2017-08-09

## 2017-08-09 ENCOUNTER — HOME CARE VISIT (OUTPATIENT)
Dept: HOME HEALTH SERVICES | Facility: HOME HEALTHCARE | Age: 77
End: 2017-08-09
Payer: MEDICARE

## 2017-08-09 PROCEDURE — 665998 HH PPS REVENUE CREDIT

## 2017-08-09 PROCEDURE — G0299 HHS/HOSPICE OF RN EA 15 MIN: HCPCS

## 2017-08-09 PROCEDURE — 665999 HH PPS REVENUE DEBIT

## 2017-08-10 VITALS
TEMPERATURE: 98.2 F | DIASTOLIC BLOOD PRESSURE: 60 MMHG | RESPIRATION RATE: 18 BRPM | WEIGHT: 174.5 LBS | SYSTOLIC BLOOD PRESSURE: 120 MMHG | HEART RATE: 65 BPM | BODY MASS INDEX: 25.04 KG/M2

## 2017-08-10 PROCEDURE — 665999 HH PPS REVENUE DEBIT

## 2017-08-10 PROCEDURE — 665998 HH PPS REVENUE CREDIT

## 2017-08-10 SDOH — ECONOMIC STABILITY: HOUSING INSECURITY: UNSAFE COOKING RANGE AREA: 0

## 2017-08-10 SDOH — ECONOMIC STABILITY: HOUSING INSECURITY: UNSAFE APPLIANCES: 0

## 2017-08-10 ASSESSMENT — ENCOUNTER SYMPTOMS: RESPIRATORY SYMPTOMS COMMENTS: NO S/S OF RESP DISTRESS

## 2017-08-10 NOTE — PROGRESS NOTES
Attempt #:2    WebIZ Checked & Epic Updated: yes  HealthConnect Verified: no  Verify PCP: yes    Communication Preference Obtained: yes     Review Care Team: yes    Annual Wellness Visit Scheduling  1. Scheduling Status:Scheduled        Care Gap Scheduling (Attempt to Schedule EACH Overdue Care Gap!)     Health Maintenance Due   Topic Date Due   • RETINAL SCREENING  brian sent   • IMM ZOSTER VACCINE  Per wife-pt just had surgery   • Annual Wellness Visit  scheduled   • COLON CANCER SCREENING ANNUAL FIT  Per wife-pt just had surgery/will put off         Hookit Activation: already active  Hookit Majo: no  Virtual Visits: no  Opt In to Text Messages: no

## 2017-08-11 ENCOUNTER — HOME CARE VISIT (OUTPATIENT)
Dept: HOME HEALTH SERVICES | Facility: HOME HEALTHCARE | Age: 77
End: 2017-08-11
Payer: MEDICARE

## 2017-08-11 ENCOUNTER — OFFICE VISIT (OUTPATIENT)
Dept: MEDICAL GROUP | Facility: PHYSICIAN GROUP | Age: 77
End: 2017-08-11
Payer: MEDICARE

## 2017-08-11 VITALS
OXYGEN SATURATION: 96 % | HEIGHT: 70 IN | WEIGHT: 179 LBS | TEMPERATURE: 97.7 F | SYSTOLIC BLOOD PRESSURE: 120 MMHG | HEART RATE: 69 BPM | DIASTOLIC BLOOD PRESSURE: 60 MMHG | BODY MASS INDEX: 25.62 KG/M2

## 2017-08-11 DIAGNOSIS — M19.90 ARTHRITIS: ICD-10-CM

## 2017-08-11 DIAGNOSIS — E55.9 VITAMIN D DEFICIENCY DISEASE: ICD-10-CM

## 2017-08-11 DIAGNOSIS — E78.2 MIXED HYPERLIPIDEMIA: ICD-10-CM

## 2017-08-11 DIAGNOSIS — Z95.1 S/P CABG X 4: ICD-10-CM

## 2017-08-11 DIAGNOSIS — N40.2 NODULAR PROSTATE WITHOUT URINARY OBSTRUCTION: ICD-10-CM

## 2017-08-11 DIAGNOSIS — E11.9 TYPE 2 DIABETES MELLITUS WITHOUT COMPLICATION, WITHOUT LONG-TERM CURRENT USE OF INSULIN (HCC): ICD-10-CM

## 2017-08-11 DIAGNOSIS — Z86.010 HISTORY OF COLONIC POLYPS: ICD-10-CM

## 2017-08-11 DIAGNOSIS — I51.89 DIASTOLIC DYSFUNCTION: ICD-10-CM

## 2017-08-11 DIAGNOSIS — F41.9 ANXIETY: ICD-10-CM

## 2017-08-11 DIAGNOSIS — Z00.00 MEDICARE ANNUAL WELLNESS VISIT, SUBSEQUENT: Primary | ICD-10-CM

## 2017-08-11 DIAGNOSIS — Z12.11 SCREEN FOR COLON CANCER: ICD-10-CM

## 2017-08-11 DIAGNOSIS — Z63.0 COUNSELING FOR MARITAL AND PARTNER PROBLEMS: ICD-10-CM

## 2017-08-11 DIAGNOSIS — Z71.89 COUNSELING FOR MARITAL AND PARTNER PROBLEMS: ICD-10-CM

## 2017-08-11 PROCEDURE — 665998 HH PPS REVENUE CREDIT

## 2017-08-11 PROCEDURE — 665999 HH PPS REVENUE DEBIT

## 2017-08-11 PROCEDURE — G0439 PPPS, SUBSEQ VISIT: HCPCS | Performed by: NURSE PRACTITIONER

## 2017-08-11 PROCEDURE — G0495 RN CARE TRAIN/EDU IN HH: HCPCS

## 2017-08-11 RX ORDER — DOCUSATE SODIUM 100 MG/1
100 CAPSULE, LIQUID FILLED ORAL 2 TIMES DAILY
COMMUNITY
End: 2018-02-06

## 2017-08-11 RX ORDER — AMIODARONE HYDROCHLORIDE 200 MG/1
200 TABLET ORAL DAILY
COMMUNITY
End: 2018-02-06

## 2017-08-11 SDOH — SOCIAL STABILITY - SOCIAL INSECURITY: PROBLEMS IN RELATIONSHIP WITH SPOUSE OR PARTNER: Z63.0

## 2017-08-11 ASSESSMENT — PATIENT HEALTH QUESTIONNAIRE - PHQ9: CLINICAL INTERPRETATION OF PHQ2 SCORE: 0

## 2017-08-11 NOTE — ASSESSMENT & PLAN NOTE
Chronic condition managed with current medical regimen   Continue with current meds  Followed by Brandon Velázquez M.D..

## 2017-08-11 NOTE — PROGRESS NOTES
"CC:   Medicare Annual Wellness Visit    HPI:  Raf is a 76 y.o. here for Medicare Annual Wellness Visit    Patient Active Problem List    Diagnosis Date Noted   • S/P CABG x 4 07/26/2017     Priority: Medium   • Type 2 diabetes mellitus without complication, without long-term current use of insulin (CMS-HCC) 07/26/2017     Priority: Medium   • Diastolic dysfunction 07/26/2017     Priority: Medium   • CAD (coronary artery disease) 07/06/2017     Priority: Medium   • Mixed hyperlipidemia 07/27/2009     Priority: Medium   • Arthritis 04/14/2016     Priority: Low   • History of colonic polyps 12/29/2014     Priority: Low   • Vitamin D deficiency disease 12/13/2011     Priority: Low   • Nodular 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     • Pseudoephedrine-APAP-DM (DAYQUIL PO) Take  by mouth.     • docusate sodium (COLACE) 100 MG Cap Take 100 mg by mouth 2 times a day.     • metformin (GLUCOPHAGE) 500 MG Tab TAKE 1 TABLET BY MOUTH TWICE DAILY WITH MEALS 180 Tab 3   • furosemide (LASIX) 40 MG Tab Take 1 Tab by mouth every day. 30 Tab 11   • potassium chloride SA (KDUR) 20 MEQ Tab CR Take 1 Tab by mouth every day. 30 Tab 11   • atorvastatin (LIPITOR) 40 MG Tab Take 1 Tab by mouth every bedtime. 30 Tab 3   • metoprolol (LOPRESSOR) 25 MG Tab Take 1 Tab by mouth 2 Times a Day. 60 Tab 3   • clopidogrel (PLAVIX) 75 MG Tab Take 1 Tab by mouth every day. 30 Tab 3   • aspirin (ASA) 81 MG Chew Tab chewable tablet Take 81 mg by mouth every day. Indications: post cardiac surgery to prevent blood clots     • therapeutic multivitamin-minerals (THERAGRAN-M) Tab Take 1 Tab by mouth every day. Indications: supplement     • NON SPECIFIED Apply blue emu oil to painful shoulders (Patient taking differently: Apply \"Australian dream cream\" to painful shoulders) 1 Bottle 3   • " Glucosamine-Chondroit-Vit C-Mn (GLUCOSAMINE CHONDR 1500 COMPLX) Cap Take 1 Cap by mouth every day. 100 Cap 3   • Cholecalciferol (VITAMIN D3) 1000 UNIT TABS Take 1 Tab by mouth every day. 100 Tab 3     No current facility-administered medications for this visit.      Current supplements: no  Chronic narcotic pain medicines: no  Allergies: Review of patient's allergies indicates no known allergies.  Exercise: yes cardiac rehab  Current social contact/activities: Has support of family and friends      Screening:  Depression Screening    Little interest or pleasure in doing things?  0 - not at all  Feeling down, depressed , or hopeless? 0 - not at all  Trouble falling or staying asleep, or sleeping too much?     Feeling tired or having little energy?     Poor appetite or overeating?     Feeling bad about yourself - or that you are a failure or have let yourself or your family down?    Trouble concentrating on things, such as reading the newspaper or watching television?    Moving or speaking so slowly that other people could have noticed.  Or the opposite - being so fidgety or restless that you have been moving around a lot more than usual?     Thoughts that you would be better off dead, or of hurting yourself?     Patient Health Questionnaire Score:      If depressive symptoms identified deferred to follow up visit unless specifically addressed in assessment and plan.    Interpretation of PHQ-9 Total Score   Score Severity   1-4 No Depression   5-9 Mild Depression   10-14 Moderate Depression   15-19 Moderately Severe Depression   20-27 Severe Depression      Screening for Cognitive Impairment    Three Minute Recall (apple, watch, nithya)  2/3    Draw clock face with all 12 numbers set to the hand to show 10 minutes past 11 o'clock  1 4  Cognitive concerns identified deferred for follow up unless specifically addressed in assessment and plan.    Fall Risk Assessment    Has the patient had two or more falls in the last  year or any fall with injury in the last year?  No    Safety Assessment    Throw rugs on floor.  Yes  Handrails on all stairs.  Yes  Good lighting in all hallways.  Yes  Difficulty hearing.  No  Patient counseled about all safety risks that were identified.    Functional Assessment ADLs    Are there any barriers preventing you from cooking for yourself or meeting nutritional needs?  No.    Are there any barriers preventing you from driving safely or obtaining transportation?  No.    Are there any barriers preventing you from using a telephone or calling for help?  No.    Are there any barriers preventing you from shopping?  No.    Are there any barriers preventing you from taking care of your own finances?  No.    Are there any barriers preventing you from managing your medications?  No.    Are currently engaging any exercise or physical activity?  Yes.       Health Maintenance Summary                RETINAL SCREENING Overdue 8/17/1958     IMM ZOSTER VACCINE Overdue 8/17/2000     Annual Wellness Visit Overdue 10/22/2016      Done 10/22/2015      Patient has more history with this topic...    COLON CANCER SCREENING ANNUAL FIT Overdue 4/18/2017      Done 4/18/2016 OCCULT BLOOD FECES IMMUNOASSAY     Patient has more history with this topic...    IMM INFLUENZA Next Due 9/1/2017      Done 9/20/2016 Imm Admin: Influenza Vaccine Quad Inj (Preserved)     Patient has more history with this topic...    A1C SCREENING Next Due 1/5/2018      Done 7/5/2017 HEMOGLOBIN A1C (A)     Patient has more history with this topic...    DIABETES MONOFILAMENT / LE EXAM Next Due 4/17/2018      Done 4/17/2017 AMB DIABETIC MONOFILAMENT LOWER EXTREMITY EXAM    FASTING LIPID PROFILE Next Due 4/18/2018      Done 4/18/2017 LIPID PROFILE (A)     Patient has more history with this topic...    URINE ACR / MICROALBUMIN Next Due 4/18/2018      Done 4/18/2017 MICROALBUMIN CREAT RATIO URINE    SERUM CREATININE Next Due 7/10/2018      Done 7/10/2017 BASIC  "METABOLIC PANEL (A)     Patient has more history with this topic...    COLONOSCOPY Next Due 12/24/2019      Done 12/24/2014 AMB REFERRAL TO GI FOR COLONOSCOPY    IMM DTaP/Tdap/Td Vaccine Next Due 12/9/2021      Done 12/9/2011 Imm Admin: Tdap Vaccine          Patient Care Team:  Brandon Velázquez M.D. as PCP - [facility] as Home Health Provider  Anthony Gaffney M.D. as Consulting Physician (Cardiac Surgery)  Kevin Nino MD,[facility] as Consulting Physician (Cardiology)  [facility] R[facility], R.N. 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 He also has no past medical history of Encounter for long-term (current) use of other medications.   Past Surgical History   Procedure Laterality Date   • Appendectomy  1955   • Multiple coronary artery bypass endo vein harvest  7/6/2017     Procedure: MULTIPLE CORONARY ARTERY BYPASS ENDO VEIN HARVEST X2-4, PREVENA DRESSING ;  Surgeon: Anthony Gaffney M.D.;  Location: SURGERY [facility];  Service:    • Andres  7/6/2017     Procedure: ANDRES - INTRAOPERATIVE ;  Surgeon: Anthony Gaffney M.D.;  Location: SURGERY [facility];  Service:    • Cardiac cath         ROS:    Ostomy or other tubes or amputations: no  Chronic oxygen use no  Last eye exam 2016  : Denies incontinence.   Gait: Uses no assistive device   Hearing excellent.    Dentition good    Exam: Blood pressure 120/60, pulse 69, temperature 36.5 °C (97.7 °F), height 1.778 m (5' 10\"), weight " 81.194 kg (179 lb), SpO2 96 %. Body mass index is 25.68 kg/(m^2).  Alert, oriented in no acute distress.  Eye contact is good, speech goal directed, affect calm      Assessment and Plan. The following treatment and monitoring plan is recommended for all problems as listed below:   Vitamin D deficiency disease  Chronic condition managed with current medical regimen   Continue with current meds  Followed by Brandon Velázquez M.D..        Type 2 diabetes mellitus without complication, without long-term current use of insulin (CMS-[facility])  Chronic condition managed with current medical regimen  7/10/2017   Glucose 126 (H) 65 - 99 mg/dL Final   Home fasting 130-140   Continue with current meds  Followed by Brandon Velázquez M.D..        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        History of colonic polyps  Neg path  Doing yearly FIT    Diastolic dysfunction  Followed by cardiology q 6 months  Denies cardiac sxs  Continue with current medications     Counseling for marital and partner problems  Historical data    CAD (coronary artery disease)  Followed by cardiology q 6 months  CABG x4 7/2017, doing very well post op  Continue with current medications     Arthritis  Chronic condition managed with current medical regimen  Stable per review   Continue with current meds prn OTC  Followed by Brandon Velázquez M.D..        Anxiety  Historical data      Health Care Screening recommendations reviewed with patient today: per  Patient Instructions  DPA/Advanced directive: .has an advanced directive - recom a copy be provided    Next office visit for recheck of chronic medical conditions is due with Brandon Velázquez M.D. 8/16/2017

## 2017-08-11 NOTE — PATIENT INSTRUCTIONS
Continue with care through Brandon Velázquez M.D..  Next Medicare Annual Wellness Visit is due in one year.    Continue care with specialists you are seeing for your chronic problems.    Attached is some preventative information for you to read.          Fall Prevention and Home Safety  Falls cause injuries and can affect all age groups. It is possible to prevent falls.   HOW TO PREVENT FALLS  · Wear shoes with rubber soles that do not have an opening for your toes.   · Keep the inside and outside of your house well lit.   · Use night lights throughout your home.   · Remove clutter from floors.   · Clean up floor spills.   · Remove throw rugs or fasten them to the floor with carpet tape.   · Do not place electrical cords across pathways.   · Put grab bars by your tub, shower, and toilet. Do not use towel bars as grab bars.   · Put handrails on both sides of the stairway. Fix loose handrails.   · Do not climb on stools or stepladders, if possible.   · Do not wax your floors.   · Repair uneven or unsafe sidewalks, walkways, or stairs.   · Keep items you use a lot within reach.   · Be aware of pets.   · Keep emergency numbers next to the telephone.   · Put smoke detectors in your home and near bedrooms.   Ask your doctor what other things you can do to prevent falls.  Document Released: 10/14/2010 Document Revised: 06/18/2013 Document Reviewed: 03/19/2013  ExitCare® Patient Information ©2013 [employer].    Exercise to Stay Healthy      Exercise helps you become and stay healthy.  EXERCISE IDEAS AND TIPS  Choose exercises that:  · You enjoy.   · Fit into your day.   You do not need to exercise really hard to be healthy. You can do exercises at a slow or medium level and stay healthy. You can:  · Stretch before and after working out.   · Try yoga, Pilates, or orquidea chi.   · Lift weights.   · Walk fast, swim, jog, run, climb stairs, bicycle, dance, or rollerskate.   · Take aerobic classes.   Exercises that burn  about 150 calories:  · Running 1 ½ miles in 15 minutes.   · Playing volleyball for 45 to 60 minutes.   · Washing and waxing a car for 45 to 60 minutes.   · Playing touch football for 45 minutes.   · Walking 1 ¾ miles in 35 minutes.   · Pushing a stroller 1 ½ miles in 30 minutes.   · Playing basketball for 30 minutes.   · Raking leaves for 30 minutes.   · Bicycling 5 miles in 30 minutes.   · Walking 2 miles in 30 minutes.   · Dancing for 30 minutes.   · Shoveling snow for 15 minutes.   · Swimming laps for 20 minutes.   · Walking up stairs for 15 minutes.   · Bicycling 4 miles in 15 minutes.   · Gardening for 30 to 45 minutes.   · Jumping rope for 15 minutes.   · Washing windows or floors for 45 to 60 minutes.     One suggestion is to start walking for 10 minutes after each meal.  This will help with digestion and help you to metabolize your meal.       For Weight Loss -   Recommend portion sizes with more fruits/vegetables/high fiber foods.  Stay away from white bread/pastas/white rice/white potatoes.  Increase Fluid intake to 6-8 glasses of water daily.   Eliminate soda's (diet and regular) from your fluid intake.      Document Released: 01/20/2012 Document Revised: 03/11/2013 Document Reviewed: 01/20/2012  ExitCare® Patient Information ©2013 [employer].    Fat and Cholesterol Control Diet  Cholesterol is a wax-like substance. It comes from your liver and is found in certain foods. There is good (HDL) and bad (LDL) cholesterol. Too much cholesterol in your blood can affect your heart. Certain foods can lower or raise your cholesterol. Eat foods that are low in cholesterol.  Saturated and trans fats are bad fats found in foods that will raise your cholesterol. Do not eat foods that are high in saturated and trans fats.  FOODS HIGHER IN SATURATED AND TRANS FATS  · Dairy products, such as whole milk, eggs, cheese, cream, and butter.   · Fatty meats, such as hot dogs, sausage, and salami.   · Fried foods.   · Trans  fats which are found in margarine and pre-made cookies, crackers, and baked goods.   · Tropical oils, such as coconut and palm oils.   Read package labels at the store. Do not buy products that use saturated or trans fats or hydrogenated oils. Find foods labeled:  · Low-fat.   · Low-saturated fat.   · Trans-fat-free.   · Low-cholesterol.   FOODS LOWER IN CHOLESTEROL  ·  Fruit.   · Vegetables.   · Beans, peas, and lentils.   · Fish.   · Lean meat, such as chicken (without skin) or ground turkey.   · Grains, such as barley, rice, couscous, bulgur wheat, and pasta.   · Heart-healthy tub margarine.   PREPARING YOUR FOOD  · Broil, bake, steam, or roast foods. Do not sheridan food.   · Use non-stick cooking sprays.   · Use lemon or herbs to flavor food instead of using butter or stick margarine.   · Use nonfat yogurt, salsa, or low-fat dressings for salads.   Document Released: 06/18/2013 Document Reviewed: 06/18/2013  ExitCare® Patient Information ©2013 [employer].    Recommend annual flu vaccine.  Next due in Fall, 2015.  If you decide not to have the flu vaccine, recommend good handwashing and staying out of crowds during flu season.

## 2017-08-11 NOTE — ASSESSMENT & PLAN NOTE
Followed by cardiology q 6 months  CABG x4 7/2017, doing very well post op  Continue with current medications

## 2017-08-11 NOTE — MR AVS SNAPSHOT
"        Raf Shanks Pierce   2017 11:00 AM   Office Visit   MRN: [id number]    Department:  [facility]   Dept Phone:  [phone]    Description:  Male : 1940   Provider:  LORENA Boyce           Reason for Visit     Annual Exam subsequent      Allergies as of 2017     No Known Allergies      You were diagnosed with     Medicare annual wellness visit, subsequent   [608788]  -  Primary     Vitamin D deficiency disease   [287742]       Type 2 diabetes mellitus without complication, without long-term current use of insulin (CMS-[facility])   [5619957]       S/P CABG x 4   [139747]       Nodular prostate without urinary obstruction   [600.10.ICD-9-CM]       Mixed hyperlipidemia   [272.2.ICD-9-CM]       History of colonic polyps   [195886]       Diastolic dysfunction   [519871]       Counseling for marital and partner problems   [8844795]       Arthritis   [133705]       Anxiety   [037530]         Vital Signs     Blood Pressure Pulse Temperature Height Weight Body Mass Index    120/60 mmHg 69 36.5 °C (97.7 °F) 1.778 m (5' 10\") 81.194 kg (179 lb) 25.68 kg/m2    Oxygen Saturation Smoking Status                96% Never Smoker           Basic Information     Date Of Birth Sex Race Ethnicity Preferred Language    1940 Male White Non- English      Your appointments     Aug 11, 2017 12:30 PM   SN-HH-HOME VISIT with LAQUITA Mayorga [facility] (--)    [address] [place] [address]   [phone]            Aug 15, 2017 To Be Determined   SN-HH-HOME VISIT with LAQUITA Mayorga [facility] (--)    [address] [place] [address]   [phone]            Aug 16, 2017  8:20 AM   Established Patient with Brandon Velázquez M.D.   [facility] [place]    [address] [address] [phone]           You will be receiving a confirmation call a few days before your appointment from our automated call confirmation " system.            Aug 22, 2017 To Be Determined   SN-HH-HOME VISIT with Jojo Sidhu R.N.   [facility] (--)    [address]  [place] [address]   [phone]            Aug 22, 2017  9:30 AM   ORIENTATION with ICR RN   [facility]    [address]  [address]  [place] [id number] [phone]            Aug 22, 2017 10:30 AM   GROUP EDUCATION with ICR EDUCATION RM   [facility])    [address]  [address]  [place] [address] [phone]            Aug 29, 2017 To Be Determined   SN-HH-HOME VISIT with Jojo Sidhu R.N.   [facility] (--)    [address]  [place] [address]   [phone]            Sep 05, 2017 To Be Determined   SN-HH-OASIS DISCHARGE with Jojo Sidhu R.N.   [facility] (--)    [address]  [place] [address]   [phone]            Sep 13, 2017  7:40 AM   Established Patient with Brandon Velázquez M.D.   [facility] [place]    [address] [facility] [id number] [phone]           You will be receiving a confirmation call a few days before your appointment from our automated call confirmation system.            Oct 24, 2017 10:30 AM   FOLLOW UP with [facility] Nino MD,[facility]    [address]  [address]  [place] [address] [phone]              Problem List              ICD-10-CM Priority Class Noted - Resolved    Mixed hyperlipidemia E78.2 Medium  7/27/2009 - Present    Nodular prostate without urinary obstruction N40.2 Low  7/27/2009 - Present    Vitamin D deficiency disease E55.9 Low  12/13/2011 - Present    History of colonic polyps Z86.010 Low  12/29/2014 - Present    Arthritis M19.90 Low  4/14/2016 - Present    CAD (coronary artery disease) I25.10 Medium  7/6/2017 - Present    S/P CABG x 4 Z95.1 Medium  7/26/2017 - Present    Type 2 diabetes mellitus  without complication, without long-term current use of insulin (CMS-HCC) E11.9 Medium  7/26/2017 - Present    Diastolic dysfunction I51.9 Medium  7/26/2017 - Present      Health Maintenance        Date Due Completion Dates    RETINAL SCREENING 8/17/1958 ---    IMM ZOSTER VACCINE 8/17/2000 ---    COLON CANCER SCREENING ANNUAL FIT 4/18/2017 4/18/2016, 2/26/2013    IMM INFLUENZA (1) 9/1/2017 9/20/2016, 9/29/2015, 10/13/2011, 10/25/2009    A1C SCREENING 1/5/2018 7/5/2017, 10/20/2016    DIABETES MONOFILAMENT / LE EXAM 4/17/2018 4/17/2017    FASTING LIPID PROFILE 4/18/2018 4/18/2017, 3/15/2017, 10/20/2016, 1/23/2016, 1/22/2015, 2/19/2013, 8/17/2012, 12/9/2011, 9/23/2010, 3/18/2010, 8/19/2009, 10/27/2008, 5/3/2008, 11/6/2007, 3/23/2007, 12/18/2006, 11/9/2006    URINE ACR / MICROALBUMIN 4/18/2018 4/18/2017    SERUM CREATININE 7/10/2018 7/10/2017, 7/9/2017, 7/8/2017, 7/7/2017, 7/5/2017, 6/29/2017, 4/18/2017, 3/15/2017, 10/20/2016, 1/23/2016, 1/22/2015, 2/19/2013, 8/17/2012, 12/9/2011, 9/23/2010, 3/18/2010, 8/19/2009, 10/27/2008, 5/3/2008, 12/26/2006, 11/9/2006    COLONOSCOPY 12/24/2019 12/24/2014    IMM DTaP/Tdap/Td Vaccine (2 - Td) 12/9/2021 12/9/2011            Current Immunizations     13-VALENT PCV PREVNAR 2/18/2016    Influenza TIV (IM) 10/13/2011    Influenza Vaccine Pediatric 10/25/2009    Influenza Vaccine Quad Inj (Preserved) 9/20/2016, 9/29/2015    Pneumococcal polysaccharide vaccine (PPSV-23) 12/9/2011    Tdap Vaccine 12/9/2011      Below and/or attached are the medications your provider expects you to take. Review all of your home medications and newly ordered medications with your provider and/or pharmacist. Follow medication instructions as directed by your provider and/or pharmacist. Please keep your medication list with you and share with your provider. Update the information when medications are discontinued, doses are changed, or new medications (including over-the-counter products) are added; and carry  medication information at all times in the event of emergency situations     Allergies:  No Known Allergies          Medications  Valid as of: August 11, 2017 - 11:37 AM    Generic Name Brand Name Tablet Size Instructions for use    Amiodarone HCl (Tab) CORDARONE 200 MG Take 200 mg by mouth every day.        Aspirin (Chew Tab) ASA 81 MG Take 81 mg by mouth every day. Indications: post cardiac surgery to prevent blood clots        Atorvastatin Calcium (Tab) LIPITOR 40 MG Take 1 Tab by mouth every bedtime.        Cholecalciferol (Tab) vitamin D3 (cholecalciferol) 1000 UNIT Take 1 Tab by mouth every day.        Clopidogrel Bisulfate (Tab) PLAVIX 75 MG Take 1 Tab by mouth every day.        Docusate Sodium (Cap) COLACE 100 MG Take 100 mg by mouth 2 times a day.        Furosemide (Tab) LASIX 40 MG Take 1 Tab by mouth every day.        Glucosamine-Chondroit-Vit C-Mn (Cap) GLUCOSAMINE CHONDR 1500 COMPLX  Take 1 Cap by mouth every day.        MetFORMIN HCl (Tab) GLUCOPHAGE 500 MG TAKE 1 TABLET BY MOUTH TWICE DAILY WITH MEALS        Metoprolol Tartrate (Tab) LOPRESSOR 25 MG Take 1 Tab by mouth 2 Times a Day.        Multiple Vitamins-Minerals (Tab) THERAGRAN-M  Take 1 Tab by mouth every day. Indications: supplement        NON SPECIFIED   Apply blue emu oil to painful shoulders        Potassium Chloride Cathy CR (Tab CR) Kdur 20 MEQ Take 1 Tab by mouth every day.        Pseudoephedrine-APAP-DM   Take  by mouth.        .                 Medicines prescribed today were sent to:     [id number] - [place] - [address]    [address] [facility] [place] [address]    Phone: [phone] Fax: [phone]    Open 24 Hours?: No    [facility] - [place], [place] - [address] [address]    Phone: [phone] Fax: [phone]    Open 24 Hours?: No    [facility] - [place], [place] - [address] [address]    Phone:  [phone] Fax: [phone]    Open 24 Hours?: No    [facility] [address] [place] - [address] AT [facility] [address]    [address] [address]    Phone: [phone] Fax: [phone]    Open 24 Hours?: No      Medication refill instructions:       If your prescription bottle indicates you have medication refills left, it is not necessary to call your provider’s office. Please contact your pharmacy and they will refill your medication.    If your prescription bottle indicates you do not have any refills left, you may request refills at any time through one of the following ways: The online [employer] system (except Urgent Care), by calling your provider’s office, or by asking your pharmacy to contact your provider’s office with a refill request. Medication refills are processed only during regular business hours and may not be available until the next business day. Your provider may request additional information or to have a follow-up visit with you prior to refilling your medication.   *Please Note: Medication refills are assigned a new Rx number when refilled electronically. Your pharmacy may indicate that no refills were authorized even though a new prescription for the same medication is available at the pharmacy. Please request the medicine by name with the pharmacy before contacting your provider for a refill.        Instructions    Continue with care through Brandon Velázquez M.D..  Next Medicare Annual Wellness Visit is due in one year.    Continue care with specialists you are seeing for your chronic problems.    Attached is some preventative information for you to read.          Fall Prevention and Home Safety  Falls cause injuries and can affect all age groups. It is possible to prevent falls.   HOW TO PREVENT FALLS  · Wear shoes with rubber soles that do not have an opening for your toes.   · Keep the inside and outside of your house well lit.   · Use night lights  throughout your home.   · Remove clutter from floors.   · Clean up floor spills.   · Remove throw rugs or fasten them to the floor with carpet tape.   · Do not place electrical cords across pathways.   · Put grab bars by your tub, shower, and toilet. Do not use towel bars as grab bars.   · Put handrails on both sides of the stairway. Fix loose handrails.   · Do not climb on stools or stepladders, if possible.   · Do not wax your floors.   · Repair uneven or unsafe sidewalks, walkways, or stairs.   · Keep items you use a lot within reach.   · Be aware of pets.   · Keep emergency numbers next to the telephone.   · Put smoke detectors in your home and near bedrooms.   Ask your doctor what other things you can do to prevent falls.  Document Released: 10/14/2010 Document Revised: 06/18/2013 Document Reviewed: 03/19/2013  ExitCare® Patient Information ©2013 [employer].    Exercise to Stay Healthy      Exercise helps you become and stay healthy.  EXERCISE IDEAS AND TIPS  Choose exercises that:  · You enjoy.   · Fit into your day.   You do not need to exercise really hard to be healthy. You can do exercises at a slow or medium level and stay healthy. You can:  · Stretch before and after working out.   · Try yoga, Pilates, or orquidea chi.   · Lift weights.   · Walk fast, swim, jog, run, climb stairs, bicycle, dance, or rollerskate.   · Take aerobic classes.   Exercises that burn about 150 calories:  · Running 1 ½ miles in 15 minutes.   · Playing volleyball for 45 to 60 minutes.   · Washing and waxing a car for 45 to 60 minutes.   · Playing touch football for 45 minutes.   · Walking 1 ¾ miles in 35 minutes.   · Pushing a stroller 1 ½ miles in 30 minutes.   · Playing basketball for 30 minutes.   · Raking leaves for 30 minutes.   · Bicycling 5 miles in 30 minutes.   · Walking 2 miles in 30 minutes.   · Dancing for 30 minutes.   · Shoveling snow for 15 minutes.   · Swimming laps for 20 minutes.   · Walking up stairs for 15  minutes.   · Bicycling 4 miles in 15 minutes.   · Gardening for 30 to 45 minutes.   · Jumping rope for 15 minutes.   · Washing windows or floors for 45 to 60 minutes.     One suggestion is to start walking for 10 minutes after each meal.  This will help with digestion and help you to metabolize your meal.       For Weight Loss -   Recommend portion sizes with more fruits/vegetables/high fiber foods.  Stay away from white bread/pastas/white rice/white potatoes.  Increase Fluid intake to 6-8 glasses of water daily.   Eliminate soda's (diet and regular) from your fluid intake.      Document Released: 01/20/2012 Document Revised: 03/11/2013 Document Reviewed: 01/20/2012  ExitCare® Patient Information ©2013 [employer].    Fat and Cholesterol Control Diet  Cholesterol is a wax-like substance. It comes from your liver and is found in certain foods. There is good (HDL) and bad (LDL) cholesterol. Too much cholesterol in your blood can affect your heart. Certain foods can lower or raise your cholesterol. Eat foods that are low in cholesterol.  Saturated and trans fats are bad fats found in foods that will raise your cholesterol. Do not eat foods that are high in saturated and trans fats.  FOODS HIGHER IN SATURATED AND TRANS FATS  · Dairy products, such as whole milk, eggs, cheese, cream, and butter.   · Fatty meats, such as hot dogs, sausage, and salami.   · Fried foods.   · Trans fats which are found in margarine and pre-made cookies, crackers, and baked goods.   · Tropical oils, such as coconut and palm oils.   Read package labels at the store. Do not buy products that use saturated or trans fats or hydrogenated oils. Find foods labeled:  · Low-fat.   · Low-saturated fat.   · Trans-fat-free.   · Low-cholesterol.   FOODS LOWER IN CHOLESTEROL  ·  Fruit.   · Vegetables.   · Beans, peas, and lentils.   · Fish.   · Lean meat, such as chicken (without skin) or ground turkey.   · Grains, such as barley, rice, couscous, bulgur  wheat, and pasta.   · Heart-healthy tub margarine.   PREPARING YOUR FOOD  · Broil, bake, steam, or roast foods. Do not sheridan food.   · Use non-stick cooking sprays.   · Use lemon or herbs to flavor food instead of using butter or stick margarine.   · Use nonfat yogurt, salsa, or low-fat dressings for salads.   Document Released: 06/18/2013 Document Reviewed: 06/18/2013  ExitCare® Patient Information ©2013 [employer].    Recommend annual flu vaccine.  Next due in Fall, 2015.  If you decide not to have the flu vaccine, recommend good handwashing and staying out of crowds during flu season.                  [employer] Access Code: Activation code not generated  Current [employer] Status: Active

## 2017-08-11 NOTE — ASSESSMENT & PLAN NOTE
Chronic condition managed with current medical regimen  7/10/2017   Glucose 126 (H) 65 - 99 mg/dL Final   Home fasting 130-140   Continue with current meds  Followed by Brandon Velázquez M.D..

## 2017-08-11 NOTE — ASSESSMENT & PLAN NOTE
4/2017   Prostatic Specific Antigen Tot 4.54 (H) 0.00 - 4.00 ng/mL Final   Has an appt 10/2017 with urology  No freq noc voiding, good stream, able to empty bladder well

## 2017-08-11 NOTE — ASSESSMENT & PLAN NOTE
Since surg has been feeling well, good energy  Followed by cardiology q 6 months  Denies cardiac sxs  Continue with current medications

## 2017-08-11 NOTE — ASSESSMENT & PLAN NOTE
Chronic condition managed with current medical regimen  Labs reviewed    Continue with current meds  Followed by Brandon Velázquez M.D..

## 2017-08-11 NOTE — ASSESSMENT & PLAN NOTE
Chronic condition managed with current medical regimen  Stable per review   Continue with current meds prn OTC  Followed by Brandon Velázquez M.D..

## 2017-08-12 PROCEDURE — 665999 HH PPS REVENUE DEBIT

## 2017-08-12 PROCEDURE — 665998 HH PPS REVENUE CREDIT

## 2017-08-13 PROCEDURE — 665999 HH PPS REVENUE DEBIT

## 2017-08-13 PROCEDURE — 665998 HH PPS REVENUE CREDIT

## 2017-08-14 VITALS
BODY MASS INDEX: 25.54 KG/M2 | TEMPERATURE: 96.5 F | RESPIRATION RATE: 20 BRPM | SYSTOLIC BLOOD PRESSURE: 120 MMHG | HEART RATE: 66 BPM | WEIGHT: 178 LBS | DIASTOLIC BLOOD PRESSURE: 60 MMHG

## 2017-08-14 PROCEDURE — 665999 HH PPS REVENUE DEBIT

## 2017-08-14 PROCEDURE — 665998 HH PPS REVENUE CREDIT

## 2017-08-14 SDOH — ECONOMIC STABILITY: HOUSING INSECURITY: UNSAFE APPLIANCES: 0

## 2017-08-14 SDOH — ECONOMIC STABILITY: HOUSING INSECURITY: UNSAFE COOKING RANGE AREA: 0

## 2017-08-14 ASSESSMENT — ENCOUNTER SYMPTOMS: RESPIRATORY SYMPTOMS COMMENTS: NO S/ SOF RESP DISTRESS

## 2017-08-15 ENCOUNTER — HOME CARE VISIT (OUTPATIENT)
Dept: HOME HEALTH SERVICES | Facility: HOME HEALTHCARE | Age: 77
End: 2017-08-15
Payer: MEDICARE

## 2017-08-15 ENCOUNTER — TELEPHONE (OUTPATIENT)
Dept: MEDICAL GROUP | Facility: PHYSICIAN GROUP | Age: 77
End: 2017-08-15

## 2017-08-15 PROCEDURE — G0495 RN CARE TRAIN/EDU IN HH: HCPCS

## 2017-08-15 PROCEDURE — 665999 HH PPS REVENUE DEBIT

## 2017-08-15 PROCEDURE — 665998 HH PPS REVENUE CREDIT

## 2017-08-15 NOTE — TELEPHONE ENCOUNTER
ESTABLISHED PATIENT PRE-VISIT PLANNING     Note: Patient will not be contacted if there is no indication to call.     1.  Reviewed notes from the last few office visits within the medical group: Yes    2.  If any orders were placed at last visit or intended to be done for this visit (i.e. 6 mos follow-up), do we have Results/Consult Notes?        •  Labs - Labs were not ordered at last office visit.       •  Imaging - Imaging was not ordered at last office visit.       •  Referrals - No referrals were ordered at last office visit.    3. Is this appointment scheduled as a Hospital Follow-Up? No    4.  Immunizations were updated in Epic using WebIZ?: Epic matches WebIZ       •  Web Iz Recommendations: ZOSTAVAX (Shingles)    5.  Patient is due for the following Health Maintenance Topics:   Health Maintenance Due   Topic Date Due   • RETINAL SCREENING  08/17/1958   • IMM ZOSTER VACCINE  08/17/2000   • COLON CANCER SCREENING ANNUAL FIT  04/18/2017           6.  Patient was informed to arrive 15 min prior to their scheduled appointment and bring in their medication bottles.

## 2017-08-16 ENCOUNTER — HOSPITAL ENCOUNTER (OUTPATIENT)
Dept: LAB | Facility: MEDICAL CENTER | Age: 77
End: 2017-08-16
Attending: NURSE PRACTITIONER
Payer: MEDICARE

## 2017-08-16 ENCOUNTER — OFFICE VISIT (OUTPATIENT)
Dept: MEDICAL GROUP | Facility: PHYSICIAN GROUP | Age: 77
End: 2017-08-16
Payer: MEDICARE

## 2017-08-16 VITALS
WEIGHT: 179 LBS | DIASTOLIC BLOOD PRESSURE: 60 MMHG | SYSTOLIC BLOOD PRESSURE: 124 MMHG | HEART RATE: 70 BPM | TEMPERATURE: 97.9 F | RESPIRATION RATE: 14 BRPM | OXYGEN SATURATION: 96 % | BODY MASS INDEX: 25.62 KG/M2 | HEIGHT: 70 IN

## 2017-08-16 DIAGNOSIS — E11.9 TYPE 2 DIABETES MELLITUS WITHOUT COMPLICATION, WITHOUT LONG-TERM CURRENT USE OF INSULIN (HCC): ICD-10-CM

## 2017-08-16 DIAGNOSIS — I25.118 CORONARY ARTERY DISEASE OF NATIVE ARTERY OF NATIVE HEART WITH STABLE ANGINA PECTORIS (HCC): ICD-10-CM

## 2017-08-16 DIAGNOSIS — E78.2 MIXED HYPERLIPIDEMIA: ICD-10-CM

## 2017-08-16 DIAGNOSIS — N40.2 NODULAR PROSTATE WITHOUT URINARY OBSTRUCTION: ICD-10-CM

## 2017-08-16 DIAGNOSIS — I51.89 DIASTOLIC DYSFUNCTION: ICD-10-CM

## 2017-08-16 DIAGNOSIS — R06.09 DYSPNEA ON EXERTION: ICD-10-CM

## 2017-08-16 DIAGNOSIS — I25.9 ISCHEMIC HEART DISEASE: ICD-10-CM

## 2017-08-16 DIAGNOSIS — Z95.1 S/P CABG X 4: ICD-10-CM

## 2017-08-16 LAB
ALBUMIN SERPL BCP-MCNC: 4.2 G/DL (ref 3.2–4.9)
ALBUMIN/GLOB SERPL: 1.6 G/DL
ALP SERPL-CCNC: 86 U/L (ref 30–99)
ALT SERPL-CCNC: 39 U/L (ref 2–50)
ANION GAP SERPL CALC-SCNC: 9 MMOL/L (ref 0–11.9)
AST SERPL-CCNC: 28 U/L (ref 12–45)
BILIRUB SERPL-MCNC: 0.6 MG/DL (ref 0.1–1.5)
BNP SERPL-MCNC: 52 PG/ML (ref 0–100)
BUN SERPL-MCNC: 20 MG/DL (ref 8–22)
CALCIUM SERPL-MCNC: 9.8 MG/DL (ref 8.5–10.5)
CHLORIDE SERPL-SCNC: 106 MMOL/L (ref 96–112)
CHOLEST SERPL-MCNC: 149 MG/DL (ref 100–199)
CO2 SERPL-SCNC: 26 MMOL/L (ref 20–33)
CREAT SERPL-MCNC: 1.12 MG/DL (ref 0.5–1.4)
ERYTHROCYTE [DISTWIDTH] IN BLOOD BY AUTOMATED COUNT: 45.3 FL (ref 35.9–50)
GFR SERPL CREATININE-BSD FRML MDRD: >60 ML/MIN/1.73 M 2
GLOBULIN SER CALC-MCNC: 2.7 G/DL (ref 1.9–3.5)
GLUCOSE SERPL-MCNC: 123 MG/DL (ref 65–99)
HCT VFR BLD AUTO: 41.8 % (ref 42–52)
HDLC SERPL-MCNC: 34 MG/DL
HGB BLD-MCNC: 13.4 G/DL (ref 14–18)
LDLC SERPL CALC-MCNC: 85 MG/DL
MCH RBC QN AUTO: 29.6 PG (ref 27–33)
MCHC RBC AUTO-ENTMCNC: 32.1 G/DL (ref 33.7–35.3)
MCV RBC AUTO: 92.3 FL (ref 81.4–97.8)
PLATELET # BLD AUTO: 293 K/UL (ref 164–446)
PMV BLD AUTO: 10 FL (ref 9–12.9)
POTASSIUM SERPL-SCNC: 4.5 MMOL/L (ref 3.6–5.5)
PROT SERPL-MCNC: 6.9 G/DL (ref 6–8.2)
RBC # BLD AUTO: 4.53 M/UL (ref 4.7–6.1)
SODIUM SERPL-SCNC: 141 MMOL/L (ref 135–145)
TRIGL SERPL-MCNC: 151 MG/DL (ref 0–149)
WBC # BLD AUTO: 9.2 K/UL (ref 4.8–10.8)

## 2017-08-16 PROCEDURE — 83880 ASSAY OF NATRIURETIC PEPTIDE: CPT | Mod: GA

## 2017-08-16 PROCEDURE — 36415 COLL VENOUS BLD VENIPUNCTURE: CPT

## 2017-08-16 PROCEDURE — 99214 OFFICE O/P EST MOD 30 MIN: CPT | Performed by: FAMILY MEDICINE

## 2017-08-16 PROCEDURE — 665998 HH PPS REVENUE CREDIT

## 2017-08-16 PROCEDURE — 80061 LIPID PANEL: CPT

## 2017-08-16 PROCEDURE — 85027 COMPLETE CBC AUTOMATED: CPT

## 2017-08-16 PROCEDURE — 80053 COMPREHEN METABOLIC PANEL: CPT

## 2017-08-16 PROCEDURE — 665999 HH PPS REVENUE DEBIT

## 2017-08-16 RX ORDER — ATORVASTATIN CALCIUM 40 MG/1
40 TABLET, FILM COATED ORAL
Qty: 90 TAB | Refills: 3 | Status: SHIPPED | OUTPATIENT
Start: 2017-08-16 | End: 2017-08-21 | Stop reason: SDUPTHER

## 2017-08-16 RX ORDER — POTASSIUM CHLORIDE 20 MEQ/1
20 TABLET, EXTENDED RELEASE ORAL DAILY
Qty: 90 TAB | Refills: 3 | Status: SHIPPED | OUTPATIENT
Start: 2017-08-16 | End: 2018-02-06

## 2017-08-16 RX ORDER — FUROSEMIDE 40 MG/1
40 TABLET ORAL DAILY
Qty: 90 TAB | Refills: 3 | Status: SHIPPED | OUTPATIENT
Start: 2017-08-16 | End: 2018-02-06

## 2017-08-16 NOTE — MR AVS SNAPSHOT
"Raf Pelayo   2017 8:20 AM   Office Visit   MRN: [id number]    Department:  [facility]   Dept Phone:  [phone]    Description:  Male : 1940   Provider:  Brandon Velázquez M.D.           Reason for Visit     Follow-Up           Allergies as of 2017     No Known Allergies      You were diagnosed with     S/P CABG x 4   [112064]       Type 2 diabetes mellitus without complication, without long-term current use of insulin (CMS-[facility])   [7861998]       Mixed hyperlipidemia   [272.2.ICD-9-CM]       Nodular prostate without urinary obstruction   [600.10.ICD-9-CM]       Ischemic heart disease   [405682]       Dyspnea on exertion   [361602]         Vital Signs     Blood Pressure Pulse Temperature Respirations Height Weight    124/60 mmHg 70 36.6 °C (97.9 °F) 14 1.778 m (5' 10\") 81.194 kg (179 lb)    Body Mass Index Oxygen Saturation Smoking Status             25.68 kg/m2 96% Never Smoker          Basic Information     Date Of Birth Sex Race Ethnicity Preferred Language    1940 Male White Non- English      Your appointments     Aug 22, 2017 To Be Determined   SN-HH-HOME VISIT with [facility] (--)    [address]  [facility] [address]   [phone]            Aug 22, 2017  9:30 AM   ORIENTATION with ICR RN   [facility] ([facility])    [address]  [address]  [facility] [address] [phone]            Aug 22, 2017 10:30 AM   GROUP EDUCATION with [facility] EDUCATION [facility] ([facility])    [address]  [address]  [place] [address] [phone]            Aug 29, 2017 To Be Determined   SN-HH-HOME VISIT with LAQUITA [facility]org[facility] (--)    [address]  [place] [address]   [phone]            Sep 05, 2017 To Be Determined   SN-HH-OASIS DISCHARGE with [facility] (--)    [address]  [place] [address]   "   [phone]            Sep 13, 2017  7:40 AM   Established Patient with Brandon Velázquez M.D.   [facility] [facility]    [address] [facility] [place] [address]   [phone]           You will be receiving a confirmation call a few days before your appointment from our automated call confirmation system.            Oct 24, 2017 10:30 AM   FOLLOW UP with [facility]    [address]  [address]  [facility] [address]   [phone]            Nov 20, 2017  8:00 AM   Established Patient with Brandon Velázquez M.D.   [facility]    [address] [facility] [place] [address]   [phone]           You will be receiving a confirmation call a few days before your appointment from our automated call confirmation system.              Problem List              ICD-10-CM Priority Class Noted - Resolved    Mixed hyperlipidemia E78.2 Medium  7/27/2009 - Present    Nodular prostate without urinary obstruction N40.2 Low  7/27/2009 - Present    Vitamin D deficiency disease E55.9 Low  12/13/2011 - Present    History of colonic polyps Z86.010 Low  12/29/2014 - Present    Arthritis M19.90 Low  4/14/2016 - Present    CAD (coronary artery disease) I25.10 Medium  7/6/2017 - Present    S/P CABG x 4 Z95.1 Medium  7/26/2017 - Present    Type 2 diabetes mellitus without complication, without long-term current use of insulin (CMS-HCC) E11.9 Medium  7/26/2017 - Present    Diastolic dysfunction I51.9 Medium  7/26/2017 - Present      Health Maintenance        Date Due Completion Dates    RETINAL SCREENING 8/17/1958 ---    IMM ZOSTER VACCINE 8/17/2000 ---    COLON CANCER SCREENING ANNUAL FIT 4/18/2017 4/18/2016, 2/26/2013    IMM INFLUENZA (1) 9/1/2017 9/20/2016, 9/29/2015, 10/13/2011, 10/25/2009    A1C SCREENING 1/5/2018 7/5/2017, 10/20/2016    DIABETES MONOFILAMENT / LE EXAM 4/17/2018 4/17/2017    FASTING LIPID  PROFILE 4/18/2018 4/18/2017, 3/15/2017, 10/20/2016, 1/23/2016, 1/22/2015, 2/19/2013, 8/17/2012, 12/9/2011, 9/23/2010, 3/18/2010, 8/19/2009, 10/27/2008, 5/3/2008, 11/6/2007, 3/23/2007, 12/18/2006, 11/9/2006    URINE ACR / MICROALBUMIN 4/18/2018 4/18/2017    SERUM CREATININE 7/10/2018 7/10/2017, 7/9/2017, 7/8/2017, 7/7/2017, 7/5/2017, 6/29/2017, 4/18/2017, 3/15/2017, 10/20/2016, 1/23/2016, 1/22/2015, 2/19/2013, 8/17/2012, 12/9/2011, 9/23/2010, 3/18/2010, 8/19/2009, 10/27/2008, 5/3/2008, 12/26/2006, 11/9/2006    COLONOSCOPY 12/24/2019 12/24/2014    IMM DTaP/Tdap/Td Vaccine (2 - Td) 12/9/2021 12/9/2011            Current Immunizations     13-VALENT PCV PREVNAR 2/18/2016    Influenza TIV (IM) 10/13/2011    Influenza Vaccine Pediatric 10/25/2009    Influenza Vaccine Quad Inj (Preserved) 9/20/2016, 9/29/2015    Pneumococcal polysaccharide vaccine (PPSV-23) 12/9/2011    Tdap Vaccine 12/9/2011      Below and/or attached are the medications your provider expects you to take. Review all of your home medications and newly ordered medications with your provider and/or pharmacist. Follow medication instructions as directed by your provider and/or pharmacist. Please keep your medication list with you and share with your provider. Update the information when medications are discontinued, doses are changed, or new medications (including over-the-counter products) are added; and carry medication information at all times in the event of emergency situations     Allergies:  No Known Allergies          Medications  Valid as of: August 16, 2017 -  9:26 AM    Generic Name Brand Name Tablet Size Instructions for use    Amiodarone HCl (Tab) CORDARONE 200 MG Take 200 mg by mouth every day.        Aspirin (Chew Tab) ASA 81 MG Take 81 mg by mouth every day. Indications: post cardiac surgery to prevent blood clots        Atorvastatin Calcium (Tab) LIPITOR 40 MG Take 1 Tab by mouth every bedtime.        Cholecalciferol (Tab) vitamin D3  (cholecalciferol) 1000 UNIT Take 1 Tab by mouth every day.        Clopidogrel Bisulfate (Tab) PLAVIX 75 MG Take 1 Tab by mouth every day.        Docusate Sodium (Cap) COLACE 100 MG Take 100 mg by mouth 2 times a day.        Furosemide (Tab) LASIX 40 MG Take 1 Tab by mouth every day.        Glucosamine-Chondroit-Vit C-Mn (Cap) GLUCOSAMINE CHONDR 1500 COMPLX  Take 1 Cap by mouth every day.        MetFORMIN HCl (Tab) GLUCOPHAGE 500 MG TAKE 1 TABLET BY MOUTH TWICE DAILY WITH MEALS        Metoprolol Tartrate (Tab) LOPRESSOR 25 MG Take 1 Tab by mouth 2 Times a Day.        Multiple Vitamins-Minerals (Tab) THERAGRAN-M  Take 1 Tab by mouth every day. Indications: supplement        NON SPECIFIED   Apply blue emu oil to painful shoulders        Potassium Chloride Cathy CR (Tab CR) Kdur 20 MEQ Take 1 Tab by mouth every day.        Pseudoephedrine-APAP-DM   Take  by mouth.        .                 Medicines prescribed today were sent to:     [facility] [place] [address]    Phone: [phone] Fax: [phone]    Open 24 Hours?: No    [facility] - [place] [address]    Phone: [phone] Fax: [phone]    Open 24 Hours?: No    [facility] [id number] [place] [address] - [address] [facility]    [address] [facility] [address]    Phone: [phone] Fax: [phone]    Open 24 Hours?: No      Medication refill instructions:       If your prescription bottle indicates you have medication refills left, it is not necessary to call your provider’s office. Please contact your pharmacy and they will refill your medication.    If your prescription bottle indicates you do not have any refills left, you may request refills at any time through one of the following ways: The online Anybody[employer]There system (except Urgent Care), by calling your provider’s  office, or by asking your pharmacy to contact your provider’s office with a refill request. Medication refills are processed only during regular business hours and may not be available until the next business day. Your provider may request additional information or to have a follow-up visit with you prior to refilling your medication.   *Please Note: Medication refills are assigned a new Rx number when refilled electronically. Your pharmacy may indicate that no refills were authorized even though a new prescription for the same medication is available at the pharmacy. Please request the medicine by name with the pharmacy before contacting your provider for a refill.        Referral     A referral request has been sent to our patient care coordination department. Please allow 3-5 business days for us to process this request and contact you either by phone or mail. If you do not hear from us by the 5th business day, please call us at [phone].           [employer] Access Code: Activation code not generated  Current [employer] Status: Active

## 2017-08-16 NOTE — PROGRESS NOTES
Patient comes in after he had coronary bypass surgery due to coronary artery disease on July 6, 2017. He denies chest pains or shortness of breath. Shortness of breath had been his initial complaint which got him to the cardiologist for workup and eventual surgery.  The patient is in cardiac rehabilitation. He is doing well.  He needs refills of some of his medications.  His blood sugars are running from 110-130.  He continues to see his urologist for his nodular prostate.    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    • Chronic ischemic heart disease, unspecified    • Mixed hyperlipidemia    • Anxiety    • Arthritis    • Diabetes (CMS-[facility])      oral medication    • Breath shortness      current    • CAD (coronary artery disease)      CABG X4        Past Surgical History   Procedure Laterality Date   • Appendectomy  1955   • Multiple coronary artery bypass endo vein harvest  7/6/2017     Procedure: MULTIPLE CORONARY ARTERY BYPASS ENDO VEIN HARVEST X2-4, PREVENA DRESSING ;  Surgeon: Anthony Gaffney M.D.;  Location: SURGERY [facility];  Service:    • Andres  7/6/2017     Procedure: ANDRES - INTRAOPERATIVE ;  Surgeon: Anthony Gaffney M.D.;  Location: SURGERY [facility];  Service:    • Cardiac cath         No Known Allergies    Current Outpatient Prescriptions   Medication Sig Dispense Refill   • atorvastatin (LIPITOR) 40 MG Tab Take 1 Tab by mouth every bedtime. 90 Tab 3   • furosemide (LASIX) 40 MG Tab Take 1 Tab by mouth every day. 90 Tab 3   • potassium chloride SA (KDUR) 20 MEQ Tab CR Take 1 Tab by mouth every day. 90 Tab 3   • metoprolol (LOPRESSOR) 25 MG Tab Take 1 Tab by mouth 2 Times a Day. 180 Tab 3   • amiodarone (CORDARONE) 200 MG Tab Take 200 mg by mouth every day.     • metformin (GLUCOPHAGE) 500 MG Tab TAKE 1 TABLET BY MOUTH TWICE DAILY WITH MEALS 180 Tab 3   • clopidogrel (PLAVIX) 75 MG Tab Take 1 Tab by mouth every day. 30 Tab  "3   • aspirin (ASA) 81 MG Chew Tab chewable tablet Take 81 mg by mouth every day. Indications: post cardiac surgery to prevent blood clots     • therapeutic multivitamin-minerals (THERAGRAN-M) Tab Take 1 Tab by mouth every day. Indications: supplement     • Glucosamine-Chondroit-Vit C-Mn (GLUCOSAMINE CHONDR 1500 COMPLX) Cap Take 1 Cap by mouth every day. 100 Cap 3   • Cholecalciferol (VITAMIN D3) 1000 UNIT TABS Take 1 Tab by mouth every day. 100 Tab 3   • Pseudoephedrine-APAP-DM (DAYQUIL PO) Take  by mouth.     • docusate sodium (COLACE) 100 MG Cap Take 100 mg by mouth 2 times a day.     • NON SPECIFIED Apply blue emu oil to painful shoulders (Patient taking differently: Apply \"Australian dream cream\" to painful shoulders) 1 Bottle 3     No current facility-administered medications for this visit.        Family History   Problem Relation Age of Onset   • Stroke Mother    • Heart Disease Brother    • Heart Disease Father        Social History     Social History   • Marital Status:      Spouse Name: N/A   • Number of Children: N/A   • Years of Education: N/A     Occupational History   • Not on file.     Social History Main Topics   • Smoking status: Never Smoker    • Smokeless tobacco: Never Used   • Alcohol Use: 1.2 oz/week     2 Shots of liquor per week   • Drug Use: No   • Sexual Activity: No     Other Topics Concern   • Not on file     Social History Narrative          I reviewed the records from the patient's recent admission at [facility].     Physical Exam   Constitutional: He is oriented. He appears well-developed and well-nourished. No distress.   HENT:   Head: Normocephalic and atraumatic.   Right Ear: External ear normal. Ear canal and TM normal   Left Ear: External ear normal. Ear canal and TM normal   Nose: Nose normal.   Mouth/Throat: Oropharynx is clear and moist.   Eyes: Conjunctivae and extraocular motions are normal. Pupils are equal, round, and reactive to light.        Fundi benign " bilaterally   Neck: No thyromegaly present.   Cardiovascular: Normal rate, regular rhythm, normal heart sounds and intact distal pulses.  Exam reveals no gallop.    No murmur heard.  Pulmonary/Chest: Well-healed anterior median sternotomy scar and two drain scars inferior to this--breath sounds effort normal and breath sounds normal. No respiratory distress. He has no wheezes. He has no rales.   Abdominal: Soft. Bowel sounds are normal. No hepatosplenomegaly. He exhibits no distension. No tenderness. He has no rebound and no guarding.   Musculoskeletal: Normal range of motion. He exhibits no edema and no tenderness.   Lymphadenopathy:     He has no cervical adenopathy.  No supraclavicular adenopathy.   Neurological: He is alert and oriented. He has normal reflexes.        Babinskis downgoing bilaterally --The patient has intact sensation to monofilament light touch over both feet. No sores or ulcers are noted over the feet.    Skin: Skin is warm and dry. No rash noted. No erythema.   Psychiatric: He has a normal mood and appropriate affect. His behavior is normal. Judgment and thought content normal.     1. S/P CABG x 4 --doing well  furosemide (LASIX) 40 MG Tab    potassium chloride SA (KDUR) 20 MEQ Tab CR    metoprolol (LOPRESSOR) 25 MG Tab   2. Type 2 diabetes mellitus without complication, without long-term current use of insulin (CMS-HCC)--doing well  Diabetic Monofilament LE Exam    REFERRAL TO OPHTHALMOLOGY--patient says he'll probably go to the optometrist at [facility] for evaluation    3. Mixed hyperlipidemia  atorvastatin (LIPITOR) 40 MG Tab--one by mouth daily at bedtime. Patient given prescription to mail to [place] because it's too expensive in this country    4. 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 Ischemic heart disease  atorvastatin (LIPITOR) 40 MG Tab   6. Dyspnea on exertion  atorvastatin (LIPITOR) 40 MG Tab    furosemide (LASIX) 40 MG Tab    potassium  chloride SA (KDUR) 20 MEQ Tab CR     Recheck 3 months or when necessary    Please note that this dictation was created using voice recognition software. I have worked with consultants from the vendor as well as technical experts from [facility] to optimize the interface. I have made every reasonable attempt to correct obvious errors, but I expect that there are errors of grammar and possibly content that I did not discover before finalizing the note.

## 2017-08-17 VITALS
RESPIRATION RATE: 20 BRPM | TEMPERATURE: 207.5 F | WEIGHT: 178 LBS | DIASTOLIC BLOOD PRESSURE: 70 MMHG | BODY MASS INDEX: 25.54 KG/M2 | HEART RATE: 59 BPM | SYSTOLIC BLOOD PRESSURE: 120 MMHG

## 2017-08-17 PROCEDURE — 665999 HH PPS REVENUE DEBIT

## 2017-08-17 PROCEDURE — 665998 HH PPS REVENUE CREDIT

## 2017-08-17 SDOH — ECONOMIC STABILITY: HOUSING INSECURITY: UNSAFE APPLIANCES: 0

## 2017-08-17 SDOH — ECONOMIC STABILITY: HOUSING INSECURITY: UNSAFE COOKING RANGE AREA: 0

## 2017-08-17 ASSESSMENT — ENCOUNTER SYMPTOMS: RESPIRATORY SYMPTOMS COMMENTS: NO S/S OF RESP DISTRESS

## 2017-08-18 PROCEDURE — 665999 HH PPS REVENUE DEBIT

## 2017-08-18 PROCEDURE — 665998 HH PPS REVENUE CREDIT

## 2017-08-19 PROCEDURE — 665998 HH PPS REVENUE CREDIT

## 2017-08-19 PROCEDURE — 665999 HH PPS REVENUE DEBIT

## 2017-08-20 PROCEDURE — 665998 HH PPS REVENUE CREDIT

## 2017-08-20 PROCEDURE — 665999 HH PPS REVENUE DEBIT

## 2017-08-21 ENCOUNTER — HOME CARE VISIT (OUTPATIENT)
Dept: HOME HEALTH SERVICES | Facility: HOME HEALTHCARE | Age: 77
End: 2017-08-21
Payer: MEDICARE

## 2017-08-21 ENCOUNTER — TELEPHONE (OUTPATIENT)
Dept: CARDIOLOGY | Facility: MEDICAL CENTER | Age: 77
End: 2017-08-21

## 2017-08-21 VITALS
TEMPERATURE: 97.6 F | HEART RATE: 63 BPM | BODY MASS INDEX: 25.61 KG/M2 | WEIGHT: 178.5 LBS | DIASTOLIC BLOOD PRESSURE: 60 MMHG | SYSTOLIC BLOOD PRESSURE: 110 MMHG | RESPIRATION RATE: 18 BRPM

## 2017-08-21 DIAGNOSIS — E11.9 TYPE 2 DIABETES MELLITUS WITHOUT COMPLICATION, WITHOUT LONG-TERM CURRENT USE OF INSULIN (HCC): ICD-10-CM

## 2017-08-21 DIAGNOSIS — E78.5 DYSLIPIDEMIA: ICD-10-CM

## 2017-08-21 DIAGNOSIS — I25.9 ISCHEMIC HEART DISEASE: ICD-10-CM

## 2017-08-21 DIAGNOSIS — Z82.49 FAMILY HISTORY OF HEART DISEASE: ICD-10-CM

## 2017-08-21 DIAGNOSIS — R06.09 DYSPNEA ON EXERTION: ICD-10-CM

## 2017-08-21 DIAGNOSIS — Z95.1 S/P CABG X 4: ICD-10-CM

## 2017-08-21 DIAGNOSIS — Z82.3 FAMILY HISTORY OF STROKE OR TRANSIENT ISCHEMIC ATTACK IN MOTHER: ICD-10-CM

## 2017-08-21 DIAGNOSIS — I25.9 CHRONIC ISCHEMIC HEART DISEASE, UNSPECIFIED: ICD-10-CM

## 2017-08-21 DIAGNOSIS — I25.118 CORONARY ARTERY DISEASE OF NATIVE ARTERY OF NATIVE HEART WITH STABLE ANGINA PECTORIS (HCC): ICD-10-CM

## 2017-08-21 DIAGNOSIS — E78.2 MIXED HYPERLIPIDEMIA: ICD-10-CM

## 2017-08-21 DIAGNOSIS — I25.119 CORONARY ARTERY DISEASE INVOLVING NATIVE CORONARY ARTERY OF NATIVE HEART WITH ANGINA PECTORIS (HCC): ICD-10-CM

## 2017-08-21 PROCEDURE — 665999 HH PPS REVENUE DEBIT

## 2017-08-21 PROCEDURE — 665998 HH PPS REVENUE CREDIT

## 2017-08-21 PROCEDURE — G0162 HHC RN E&M PLAN SVS, 15 MIN: HCPCS

## 2017-08-21 RX ORDER — ATORVASTATIN CALCIUM 80 MG/1
80 TABLET, FILM COATED ORAL
Qty: 90 TAB | Refills: 3 | OUTPATIENT
Start: 2017-08-21 | End: 2018-02-06 | Stop reason: SDUPTHER

## 2017-08-21 SDOH — ECONOMIC STABILITY: HOUSING INSECURITY: UNSAFE APPLIANCES: 0

## 2017-08-21 SDOH — ECONOMIC STABILITY: HOUSING INSECURITY: UNSAFE COOKING RANGE AREA: 0

## 2017-08-21 ASSESSMENT — ACTIVITIES OF DAILY LIVING (ADL)
HOME_HEALTH_OASIS: 00
OASIS_M1830: 00

## 2017-08-21 NOTE — TELEPHONE ENCOUNTER
Notes Recorded by LORENA Armando on 8/21/2017 at 8:21 AM  LDL not at goal for CAD, recommend increasing lipitor to 80 mg QPM. Recheck CMP and lipid 6 weeks with this change. CMP shows elevated glucose and anemia-FU with PCP on this. Otherwise, good labs. SC    ----------------------------------------  Patient notified of CMP and lipid results per SC. He is OK with increasing Lipitor to 80 mg QPM and getting repeat blood work in 6 weeks.   Order placed for CMP and lipid-lab slips mailed to patients home address  Lipitor 80mg QPM #90 with 3 refills called into [facility]'s pharmacy

## 2017-08-22 ENCOUNTER — NON-PROVIDER VISIT (OUTPATIENT)
Dept: CARDIOLOGY | Facility: MEDICAL CENTER | Age: 77
End: 2017-08-22
Payer: MEDICARE

## 2017-08-22 ENCOUNTER — PATIENT OUTREACH (OUTPATIENT)
Dept: HEALTH INFORMATION MANAGEMENT | Facility: OTHER | Age: 77
End: 2017-08-22

## 2017-08-22 DIAGNOSIS — I25.799 CORONARY ARTERY DISEASE INVOLVING OTHER CORONARY ARTERY BYPASS GRAFT WITH ANGINA PECTORIS (HCC): ICD-10-CM

## 2017-08-22 DIAGNOSIS — Z95.1 S/P CABG X 4: ICD-10-CM

## 2017-08-22 PROCEDURE — 665999 HH PPS REVENUE DEBIT

## 2017-08-22 PROCEDURE — G0423 INTENS CARDIAC REHAB NO EXER: HCPCS | Mod: 59 | Performed by: FAMILY MEDICINE

## 2017-08-22 PROCEDURE — 665998 HH PPS REVENUE CREDIT

## 2017-08-22 PROCEDURE — G0422 INTENS CARDIAC REHAB W/EXERC: HCPCS | Performed by: FAMILY MEDICINE

## 2017-08-22 ASSESSMENT — PATIENT HEALTH QUESTIONNAIRE - PHQ9
7. TROUBLE CONCENTRATING ON THINGS, SUCH AS READING THE NEWSPAPER OR WATCHING TELEVISION: 0
SUM OF ALL RESPONSES TO PHQ9 QUESTIONS 1 AND 2: 0
8. MOVING OR SPEAKING SO SLOWLY THAT OTHER PEOPLE COULD HAVE NOTICED. OR THE OPPOSITE, BEING SO FIGETY OR RESTLESS THAT YOU HAVE BEEN MOVING AROUND A LOT MORE THAN USUAL: 0
2. FEELING DOWN, DEPRESSED, IRRITABLE, OR HOPELESS: 0
SUM OF ALL RESPONSES TO PHQ QUESTIONS 1-9: 3
3. TROUBLE FALLING OR STAYING ASLEEP OR SLEEPING TOO MUCH: 1
1. LITTLE INTEREST OR PLEASURE IN DOING THINGS: 0
6. FEELING BAD ABOUT YOURSELF - OR THAT YOU ARE A FAILURE OR HAVE LET YOURSELF OR YOUR FAMILY DOWN: 0
5. POOR APPETITE OR OVEREATING: 1
4. FEELING TIRED OR HAVING LITTLE ENERGY: 1
SUM OF ALL RESPONSES TO PHQ QUESTIONS 1-9: 3
9. THOUGHTS THAT YOU WOULD BE BETTER OFF DEAD, OR OF HURTING YOURSELF: 0

## 2017-08-22 NOTE — PROGRESS NOTES
Cardiac Rehab Individual Treatment Plan Assessment: Initial 2017 Session #     1   MRN: [id number]   Allergies: Review of patient's allergies indicates no known allergies.   Patient Name: Raf Pelayo : 1940 Risk Stratification: High    Diagnoses:   1. S/P CABG x 4     Age: 77 y.o. Physician: Brandon Velázquez M.D.    Date of Event: 17 Specialist: Paula   Risk Factors:  Hypertension, Hyperlipidemia, Diabetes, Family History, Sedentary Lifestyle, Age, Male > 45   Exercise Nutrition Education Psychosocial   Stages of change Stages of change Stages of change Stages of change   Preparation Preparation Preparation Preparation   Fitness Test Lipids Learning Barriers Outcome Survey Tools   DIST:    Available: Yes Learning Barriers: Vision FP QOL Overall Score: 28.7   Max HR:   Date: 17 Family Support PHQ-9: 3   RPE:   Total: 149 mg/dL Yes Nutrition Screen: 43 %   SPO2:   Tri mg/dL Lives: Spouse Intervention   MET:   HDL: 34 mg/dL Tobacco Use Behavioral Health Consult: No   EF= 45 (2017) LDL: 85 mg/dL History   Smoking status   • Never Smoker    Smokeless tobacco   • Never Used      Physician Referral: No   Ambulatory Status Diabetes Smoking Intervention Identifies Stressors: Yes   Fall Risk Assessed: Yes Diabetes: Yes Smoking Cessation Referral: N/A Drug Intervention: No   Exercise Ambulatory Status Assist Devices: None HbA1C: 7.2 % Date: 17 Ind. Education / Counseling: N/A Education   Exercise Prescription Monitors BS at Home: Yes Tobacco Adjunct: N/A Psychosocial Education: Coping Techniques, Positive Support System, S/S Depression   Mode: Treadmill, NuStep, UBE, Airdyne   Frequency: 2-3 times daily Random BS: 174 (2017) Education Intervention Target Goal   Frequency:  3 days/week Weight Management Healthy Heart Education: Class Schedule Given, Medications Reviewed, Patient Education Binder Provided, Risk Factors Discussed, Videos Viewed per Aisha  "Assess presence or absence of depression using a valid screening: Yes   Duration:  20-30 min Weight: 82.328 kg (181 lb 8 oz) Target Goal Use Stress Management: Yes   Intensity:  11-13 RPE Height: 177.8 cm (5' 10\") Complete Tobacco Cessation: Complete Tobacco Cessation: N/A Adverse Events: Yes   METS:  1.0-3.0 BMI (Calculated): 26.04 Educate / Review and have understanding of cardiac disease prevention: Educate / Review and have understanding of cardiac disease prevention: Yes Unexpected Events: Yes   Progression:  ^ increments of 1-3 to THR/RPE as tolerated Goal weight: 178 Medication Compliance: Yes    THR: THR: 20-30 BPM ^Resting HR History   Alcohol Use   • 1.2 oz/week   • 2 Shots of liquor per week         Angina with Exercise?  Angina with Exercise: No      Resistance Training?  Resistance Training: Yes      Hypertension      Diagnosed with HTN: Yes Intervention      Resting BP: 119/67 mmHg Dietician Consult/Class: Pending (scheduled)        Nurse/Patient Discussion: Yes     Intervention Diabetes Ed Referral: No     Home Exercise:  Yes Discuss Maintenance /Wt Loss: Yes     Mode: Walk Attend [employer] School: Pending (scheduled)     Duration: 30 minutes daily Dietary Goal: >=58 on rate your plate, low sodium     Frequency: 7 days active  Education     Education Nutrition Education: Healthy Eating, Sodium Reduction     Equipment Orientation, Exercise Safety, S/S to Report, RPE Scale, Warm Up / Cool Down, Physically Active Target Goal     Target Goal LDL-C < 100 if trig. > 200:  N/A       Start Individual Exercise Rx Yes LDL-C < 70 for high risk patient:  Yes       BP < 140/90 or < 130/80 if DM or CKD Yes Non HDL-C Should be < 130:  Yes       Aerobic activity 30 + min / day 5 days / wk Yes HbA1C < 7%: Yes         BMI < 25: Yes       Notes: Will monitor the first 3 sessions due to stopping Amiodarone recently  Initial Assessment:  Heart Sounds: S1S2 WNL       Lung Sounds: lung sounds clear throughout bilaterally     " "  Edema: donna present        Right Peripheral Pulses:  Carotid: 2+  Radial: 2+  Dorsalis Pedis: 2+  Posterior Tibialis: 2+   Left Peripheral Pulses:  Carotid: 2+  Radial: 2+  Dorsalis Pedis: 2+  Posterior Tibialis: 2+    Incision: clean, dry, healed      Color: pink       Frame Size: Medium        Cognitive Assessment: A&OX4, no cognitive deficits reported by patient.       Fall Assessment:    Gait: steady  Balance: steady  Upper Body: no pain or limitation with ROM  Lower Body: no pain or limitations with ROM   Recent Falls: none reported   Current Medications and Vaccinations: Reviewed  Patient Stated Goals: \"I would like to build endurance, learn more ideas about healthy eating and recipes.    Other: Raf comes in today with his wife.  He looks forward to starting the program.  He states that he is a very active person with a lot of hobbies.  He says that he works on his garage on wood projects and his car frequently and also walks a lot and is able to walk for 30 minutes without having to take a break.  He and his wife look forward to learning more about diet and taking cooking school.  He says that he is a diabetic so he watches his sugar but needs to learn more about a heart healthy diet.  He says that his hobbies keep him from feeling stressed so that isn't a big problem for him.  He mentions that he is going to stop taking Amiodarone tomorrow so we will monitor his first 3 visits, and he will need to check his glucose before and after exercise.          "

## 2017-08-23 ENCOUNTER — NON-PROVIDER VISIT (OUTPATIENT)
Dept: CARDIOLOGY | Facility: MEDICAL CENTER | Age: 77
End: 2017-08-23
Payer: MEDICARE

## 2017-08-23 VITALS
WEIGHT: 181 LBS | SYSTOLIC BLOOD PRESSURE: 105 MMHG | HEART RATE: 65 BPM | DIASTOLIC BLOOD PRESSURE: 57 MMHG | HEIGHT: 70 IN | BODY MASS INDEX: 25.91 KG/M2

## 2017-08-23 DIAGNOSIS — Z95.1 S/P CABG X 4: ICD-10-CM

## 2017-08-23 PROCEDURE — G0422 INTENS CARDIAC REHAB W/EXERC: HCPCS | Performed by: FAMILY MEDICINE

## 2017-08-23 PROCEDURE — 665999 HH PPS REVENUE DEBIT

## 2017-08-23 PROCEDURE — G0423 INTENS CARDIAC REHAB NO EXER: HCPCS | Mod: 59 | Performed by: FAMILY MEDICINE

## 2017-08-23 PROCEDURE — 665998 HH PPS REVENUE CREDIT

## 2017-08-23 ASSESSMENT — PAIN SCALES - GENERAL: PAINLEVEL: NO PAIN

## 2017-08-24 PROCEDURE — 665998 HH PPS REVENUE CREDIT

## 2017-08-24 PROCEDURE — 665999 HH PPS REVENUE DEBIT

## 2017-08-25 ENCOUNTER — APPOINTMENT (OUTPATIENT)
Dept: CARDIOLOGY | Facility: MEDICAL CENTER | Age: 77
End: 2017-08-25
Payer: MEDICARE

## 2017-08-25 ENCOUNTER — NON-PROVIDER VISIT (OUTPATIENT)
Dept: CARDIOLOGY | Facility: MEDICAL CENTER | Age: 77
End: 2017-08-25
Payer: MEDICARE

## 2017-08-25 VITALS
SYSTOLIC BLOOD PRESSURE: 102 MMHG | DIASTOLIC BLOOD PRESSURE: 50 MMHG | HEIGHT: 70 IN | BODY MASS INDEX: 25.98 KG/M2 | HEART RATE: 79 BPM | WEIGHT: 181.5 LBS

## 2017-08-25 DIAGNOSIS — Z95.1 S/P CABG X 4: ICD-10-CM

## 2017-08-25 PROCEDURE — G0422 INTENS CARDIAC REHAB W/EXERC: HCPCS | Performed by: FAMILY MEDICINE

## 2017-08-25 PROCEDURE — 665998 HH PPS REVENUE CREDIT

## 2017-08-25 PROCEDURE — 665999 HH PPS REVENUE DEBIT

## 2017-08-25 ASSESSMENT — PAIN SCALES - GENERAL: PAINLEVEL: NO PAIN

## 2017-08-25 NOTE — PROGRESS NOTES
Date: 8/25/2017    Initial Individual Treatment Plan review for the [facility] Intensive Cardiac Rehabilitation (ICR) Program.    Raf Pelayo, 77 y.o. male, with qualifying diagnosis/diagnoses of S/P CABG.  Co-morbid conditions include Hypertension, Hyperlipidemia, Diabetes, Family History, Sedentary Lifestyle, Age, Male > 45.       Primary Care Provider: Brandon Velázquez M.D.    Heart Specialist (s): Dr. Mitchell    Patient has successfully completed 1 Exercise Sessions, and an appropriate number of corresponding Education Sessions.  Patient is (continues to be) an excellent candidate for the [facility] Intensive Cardiac [facility] (ICR) Program.    I will review the Individual Treatment Plan again at 30 day post-initiation of ICR.    Colby Hopper MD, [facility]  , [facility] Intensive Cardiac [facility] (ICR) Program

## 2017-08-26 PROCEDURE — 665999 HH PPS REVENUE DEBIT

## 2017-08-26 PROCEDURE — 665998 HH PPS REVENUE CREDIT

## 2017-08-27 PROCEDURE — 665998 HH PPS REVENUE CREDIT

## 2017-08-27 PROCEDURE — 665999 HH PPS REVENUE DEBIT

## 2017-08-28 ENCOUNTER — NON-PROVIDER VISIT (OUTPATIENT)
Dept: CARDIOLOGY | Facility: MEDICAL CENTER | Age: 77
End: 2017-08-28
Payer: MEDICARE

## 2017-08-28 VITALS
HEIGHT: 70 IN | HEART RATE: 66 BPM | DIASTOLIC BLOOD PRESSURE: 60 MMHG | WEIGHT: 180 LBS | BODY MASS INDEX: 25.77 KG/M2 | SYSTOLIC BLOOD PRESSURE: 124 MMHG

## 2017-08-28 DIAGNOSIS — Z95.1 S/P CABG X 4: ICD-10-CM

## 2017-08-28 DIAGNOSIS — Z82.49 FAMILY HISTORY OF HEART DISEASE: ICD-10-CM

## 2017-08-28 DIAGNOSIS — E11.9 TYPE 2 DIABETES MELLITUS WITHOUT COMPLICATION, WITHOUT LONG-TERM CURRENT USE OF INSULIN (HCC): Primary | ICD-10-CM

## 2017-08-28 PROCEDURE — 665998 HH PPS REVENUE CREDIT

## 2017-08-28 PROCEDURE — G0422 INTENS CARDIAC REHAB W/EXERC: HCPCS | Performed by: FAMILY MEDICINE

## 2017-08-28 PROCEDURE — 665999 HH PPS REVENUE DEBIT

## 2017-08-28 PROCEDURE — G0423 INTENS CARDIAC REHAB NO EXER: HCPCS | Mod: 59 | Performed by: FAMILY MEDICINE

## 2017-08-28 ASSESSMENT — PAIN SCALES - GENERAL: PAINLEVEL: NO PAIN

## 2017-08-29 ENCOUNTER — PATIENT OUTREACH (OUTPATIENT)
Dept: HEALTH INFORMATION MANAGEMENT | Facility: OTHER | Age: 77
End: 2017-08-29

## 2017-08-29 PROCEDURE — 665999 HH PPS REVENUE DEBIT

## 2017-08-29 PROCEDURE — 665998 HH PPS REVENUE CREDIT

## 2017-08-29 NOTE — PROGRESS NOTES
Spoke with patient's spouse and explained the program and it's benefits.  She declined at this time due to busy schedule.  Left my phone number  and instructed to call with any questions.

## 2017-08-30 ENCOUNTER — NON-PROVIDER VISIT (OUTPATIENT)
Dept: CARDIOLOGY | Facility: MEDICAL CENTER | Age: 77
End: 2017-08-30
Payer: MEDICARE

## 2017-08-30 VITALS
HEIGHT: 70 IN | HEART RATE: 91 BPM | DIASTOLIC BLOOD PRESSURE: 63 MMHG | SYSTOLIC BLOOD PRESSURE: 117 MMHG | BODY MASS INDEX: 25.62 KG/M2 | WEIGHT: 179 LBS

## 2017-08-30 DIAGNOSIS — Z95.1 S/P CABG X 4: ICD-10-CM

## 2017-08-30 PROCEDURE — 665998 HH PPS REVENUE CREDIT

## 2017-08-30 PROCEDURE — G0423 INTENS CARDIAC REHAB NO EXER: HCPCS | Mod: 59 | Performed by: FAMILY MEDICINE

## 2017-08-30 PROCEDURE — 665999 HH PPS REVENUE DEBIT

## 2017-08-30 PROCEDURE — G0422 INTENS CARDIAC REHAB W/EXERC: HCPCS | Performed by: FAMILY MEDICINE

## 2017-08-30 ASSESSMENT — PAIN SCALES - GENERAL: PAINLEVEL: NO PAIN

## 2017-08-31 PROCEDURE — 665999 HH PPS REVENUE DEBIT

## 2017-08-31 PROCEDURE — 665998 HH PPS REVENUE CREDIT

## 2017-09-01 ENCOUNTER — NON-PROVIDER VISIT (OUTPATIENT)
Dept: CARDIOLOGY | Facility: MEDICAL CENTER | Age: 77
End: 2017-09-01

## 2017-09-01 ENCOUNTER — NON-PROVIDER VISIT (OUTPATIENT)
Dept: CARDIOLOGY | Facility: MEDICAL CENTER | Age: 77
End: 2017-09-01
Payer: MEDICARE

## 2017-09-01 DIAGNOSIS — Z95.1 S/P CABG X 4: ICD-10-CM

## 2017-09-01 PROCEDURE — 665999 HH PPS REVENUE DEBIT

## 2017-09-01 PROCEDURE — 665998 HH PPS REVENUE CREDIT

## 2017-09-01 PROCEDURE — G0423 INTENS CARDIAC REHAB NO EXER: HCPCS | Mod: 59 | Performed by: FAMILY MEDICINE

## 2017-09-01 NOTE — PROGRESS NOTES
Raf Pelayo attending cooking school.  Today  prepared pumpkin pancakes.    Patient received handouts and nutrition information regarding the specific recipes.

## 2017-09-02 PROCEDURE — 665998 HH PPS REVENUE CREDIT

## 2017-09-02 PROCEDURE — 665999 HH PPS REVENUE DEBIT

## 2017-09-03 PROCEDURE — 665999 HH PPS REVENUE DEBIT

## 2017-09-03 PROCEDURE — 665998 HH PPS REVENUE CREDIT

## 2017-09-04 PROCEDURE — 665998 HH PPS REVENUE CREDIT

## 2017-09-04 PROCEDURE — 665999 HH PPS REVENUE DEBIT

## 2017-09-05 PROCEDURE — 665999 HH PPS REVENUE DEBIT

## 2017-09-05 PROCEDURE — 665998 HH PPS REVENUE CREDIT

## 2017-09-06 ENCOUNTER — NON-PROVIDER VISIT (OUTPATIENT)
Dept: CARDIOLOGY | Facility: MEDICAL CENTER | Age: 77
End: 2017-09-06
Payer: MEDICARE

## 2017-09-06 VITALS
HEIGHT: 70 IN | BODY MASS INDEX: 25.62 KG/M2 | DIASTOLIC BLOOD PRESSURE: 70 MMHG | SYSTOLIC BLOOD PRESSURE: 127 MMHG | HEART RATE: 62 BPM | WEIGHT: 179 LBS

## 2017-09-06 DIAGNOSIS — Z95.1 S/P CABG X 4: ICD-10-CM

## 2017-09-06 PROCEDURE — G0422 INTENS CARDIAC REHAB W/EXERC: HCPCS | Performed by: FAMILY MEDICINE

## 2017-09-06 PROCEDURE — 665999 HH PPS REVENUE DEBIT

## 2017-09-06 PROCEDURE — 665998 HH PPS REVENUE CREDIT

## 2017-09-06 PROCEDURE — G0423 INTENS CARDIAC REHAB NO EXER: HCPCS | Mod: 59 | Performed by: FAMILY MEDICINE

## 2017-09-06 ASSESSMENT — PAIN SCALES - GENERAL: PAINLEVEL: NO PAIN

## 2017-09-07 PROCEDURE — 665998 HH PPS REVENUE CREDIT

## 2017-09-07 PROCEDURE — 665999 HH PPS REVENUE DEBIT

## 2017-09-08 ENCOUNTER — NON-PROVIDER VISIT (OUTPATIENT)
Dept: CARDIOLOGY | Facility: MEDICAL CENTER | Age: 77
End: 2017-09-08

## 2017-09-08 PROCEDURE — 665998 HH PPS REVENUE CREDIT

## 2017-09-08 PROCEDURE — 665999 HH PPS REVENUE DEBIT

## 2017-09-08 NOTE — PROGRESS NOTES
Cardiac Rehab Individual Treatment Plan Assessment: Discharge 17 Session #     6   MRN: [id number]   Allergies: Review of patient's allergies indicates no known allergies.   Patient Name: Raf Pelayo : 1940 Risk Stratification: High    Diagnoses: No diagnosis found. Age: 77 y.o. Physician: Brandon Velázquez M.D.    Date of Event: 17 Specialist: Paula   Risk Factors:  Hypertension, Hyperlipidemia, Diabetes, Family History, Sedentary Lifestyle, Age, Male > 45   Exercise Nutrition Education Psychosocial   Stages of change Stages of change Stages of change Stages of change   Relapse Relapse Relapse Relapse   Fitness Test Lipids Learning Barriers Outcome Survey Tools   DIST:            Max HR:   Date:   Family Support     RPE:   Total:         SPO2:   Trig:     Intervention   MET:   HDL:   Tobacco Use     EF=   LDL:   History   Smoking Status   • Never Smoker   Smokeless Tobacco   • Never Used          Ambulatory Status Diabetes Smoking Intervention                       Education   Exercise Prescription                 Education Intervention Target Goal   Frequency:    Weight Management       Duration:      Target Goal     Intensity:      Complete Tobacco Cessation:       METS:      Educate / Review and have understanding of cardiac disease prevention:       Progression:           THR:   History   Alcohol Use   • 1.2 oz/week   • 2 Shots of liquor per week         Angina with Exercise?         Resistance Training?         Hypertension        Intervention                       Intervention       Home Exercise:          Mode:         Duration:         Frequency:   Education     Education         Target Goal     Target Goal LDL-C < 100 if trig. > 200:          Start Individual Exercise Rx   LDL-C < 70 for high risk patient:          BP < 140/90 or < 130/80 if DM or CKD   Non HDL-C Should be < 130:          Aerobic activity 30 + min / day 5 days / wk                     Notes: Oswald  is discharging from the program.  He feels that he is feeling so well and doing so good that he doesn't need to do the program at this time.

## 2017-09-11 NOTE — PROGRESS NOTES
"Date: 9/11/2017    Discharge Individual Treatment Plan review for the [facility] Intensive Cardiac Rehabilitation (ICR) Program.    Raf Pelayo, 77 y.o. male, with qualifying diagnosis/diagnoses of S/P CABG.  Co-morbid conditions include Hypertension, Hyperlipidemia, Diabetes, Family History, Sedentary Lifestyle, Age, Male > 45.         Primary Care Provider: Brandon Velázquez M.D.     Heart Specialist (s): Dr. Mitchell    Patient has successfully completed only 6 Exercise Sessions, and an appropriate number of corresponding Education Sessions.  Patient is (continues to be) an excellent candidate for the [facility] [place] Intensive Cardiac Rehabilitation (ICR) Program.    Per ICR Staff: \"...Oswald is discharging from the program. He feels that he is feeling so well and doing so good that he doesn't need to do the program at this time...\" and therefore did not complete the recommended 72 ICR sessions.     Colby Hopper MD, [facility]  , [facility] [place] Intensive Cardiac Rehabilitation (ICR) Program    "

## 2017-09-23 ENCOUNTER — OFFICE VISIT (OUTPATIENT)
Dept: URGENT CARE | Facility: PHYSICIAN GROUP | Age: 77
End: 2017-09-23
Payer: MEDICARE

## 2017-09-23 VITALS
DIASTOLIC BLOOD PRESSURE: 64 MMHG | HEIGHT: 70 IN | RESPIRATION RATE: 14 BRPM | HEART RATE: 74 BPM | TEMPERATURE: 99.4 F | OXYGEN SATURATION: 95 % | SYSTOLIC BLOOD PRESSURE: 124 MMHG | BODY MASS INDEX: 25.56 KG/M2 | WEIGHT: 178.5 LBS

## 2017-09-23 DIAGNOSIS — S41.112A SKIN TEAR OF LEFT UPPER EXTREMITY: ICD-10-CM

## 2017-09-23 PROCEDURE — 99213 OFFICE O/P EST LOW 20 MIN: CPT | Performed by: PHYSICIAN ASSISTANT

## 2017-09-23 ASSESSMENT — PAIN SCALES - GENERAL: PAINLEVEL: NO PAIN

## 2017-09-27 ASSESSMENT — ENCOUNTER SYMPTOMS
ABDOMINAL PAIN: 0
NAUSEA: 0
DIZZINESS: 0
CHILLS: 0
HEADACHES: 0
VOMITING: 0
DIARRHEA: 0
SHORTNESS OF BREATH: 0
FEVER: 0
MUSCULOSKELETAL NEGATIVE: 1

## 2017-09-27 NOTE — PROGRESS NOTES
"Subjective:      Raf Pelayo is a 77 y.o. male who presents with Laceration (Laceration on left arm. that  will not stop bleeding - pt is pn plavix )        Patient is accompanied by his wife.     Laceration    The incident occurred 12 to 24 hours ago (24 hours). The laceration is located on the right leg. The laceration is 1 cm in size. The laceration mechanism was a blunt object. The pain is at a severity of 2/10. The pain is mild. He reports no foreign bodies present. His tetanus status is UTD.     Patient presents to urgent care reporting a skin tear to his left forearm that occurred today. He states it wouldn't stop bleeding, prompting him to come into urgent care. He is currently taking Plavix. It has since stopped bleeding since coming to urgent care. He denies any pain surrounding the area.     Review of Systems   Constitutional: Negative for chills and fever.   Respiratory: Negative for shortness of breath.    Cardiovascular: Negative for chest pain.   Gastrointestinal: Negative for abdominal pain, diarrhea, nausea and vomiting.   Genitourinary: Negative.    Musculoskeletal: Negative.    Skin:        Positive for skin tear.    Neurological: Negative for dizziness and headaches.          Objective:     /64   Pulse 74   Temp 37.4 °C (99.4 °F)   Resp 14   Ht 1.778 m (5' 10\")   Wt 81 kg (178 lb 8 oz)   SpO2 95%   BMI 25.61 kg/m²      Physical Exam   Constitutional: He is oriented to person, place, and time. He appears well-developed and well-nourished. No distress.   HENT:   Head: Normocephalic and atraumatic.   Eyes: Pupils are equal, round, and reactive to light.   Neck: Normal range of motion.   Cardiovascular: Normal rate.    Pulmonary/Chest: Effort normal.   Musculoskeletal: Normal range of motion.   Neurological: He is alert and oriented to person, place, and time.   Skin: Skin is warm and dry. He is not diaphoretic.   Superficial skin tear with underlying hematoma present on " "extensor aspect of left forearm.    Psychiatric: He has a normal mood and affect. His behavior is normal.   Nursing note and vitals reviewed.         PMH:  has a past medical history of Anxiety; Arthritis; Breath shortness; CAD (coronary artery disease); Chronic ischemic heart disease, unspecified; Diabetes (CMS-HCC); Mixed hyperlipidemia; and Nodular prostate without urinary obstruction. He also has no past medical history of Encounter for long-term (current) use of other medications.  MEDS:   Current Outpatient Prescriptions:   •  atorvastatin (LIPITOR) 80 MG tablet, Take 1 Tab by mouth every bedtime., Disp: 90 Tab, Rfl: 3  •  furosemide (LASIX) 40 MG Tab, Take 1 Tab by mouth every day., Disp: 90 Tab, Rfl: 3  •  metoprolol (LOPRESSOR) 25 MG Tab, Take 1 Tab by mouth 2 Times a Day., Disp: 180 Tab, Rfl: 3  •  amiodarone (CORDARONE) 200 MG Tab, Take 200 mg by mouth every day., Disp: , Rfl:   •  Pseudoephedrine-APAP-DM (DAYQUIL PO), Take  by mouth., Disp: , Rfl:   •  docusate sodium (COLACE) 100 MG Cap, Take 100 mg by mouth 2 times a day., Disp: , Rfl:   •  metformin (GLUCOPHAGE) 500 MG Tab, TAKE 1 TABLET BY MOUTH TWICE DAILY WITH MEALS, Disp: 180 Tab, Rfl: 3  •  clopidogrel (PLAVIX) 75 MG Tab, Take 1 Tab by mouth every day., Disp: 30 Tab, Rfl: 3  •  aspirin (ASA) 81 MG Chew Tab chewable tablet, Take 81 mg by mouth every day. Indications: post cardiac surgery to prevent blood clots, Disp: , Rfl:   •  therapeutic multivitamin-minerals (THERAGRAN-M) Tab, Take 1 Tab by mouth every day. Indications: supplement, Disp: , Rfl:   •  Cholecalciferol (VITAMIN D3) 1000 UNIT TABS, Take 1 Tab by mouth every day., Disp: 100 Tab, Rfl: 3  •  potassium chloride SA (KDUR) 20 MEQ Tab CR, Take 1 Tab by mouth every day., Disp: 90 Tab, Rfl: 3  •  NON SPECIFIED, Apply blue emu oil to painful shoulders (Patient taking differently: Apply \"Australian dream cream\" to painful shoulders), Disp: 1 Bottle, Rfl: 3  •  Glucosamine-Chondroit-Vit C-Mn " (GLUCOSAMINE CHONDR 1500 COMPLX) Cap, Take 1 Cap by mouth every day., Disp: 100 Cap, Rfl: 3  ALLERGIES: No Known Allergies  SURGHX:   Past Surgical History:   Procedure Laterality Date   • MULTIPLE CORONARY ARTERY BYPASS ENDO VEIN HARVEST  7/6/2017    Procedure: MULTIPLE CORONARY ARTERY BYPASS ENDO VEIN HARVEST X2-4, PREVENA DRESSING ;  Surgeon: Anthony Gaffney M.D.;  Location: SURGERY [facility];  Service:    • ROHINI  7/6/2017    Procedure: ROHINI - INTRAOPERATIVE ;  Surgeon: Anthony Gaffney M.D.;  Location: SURGERY [facility];  Service:    • APPENDECTOMY  1955   • CARDIAC CATH       SOCHX:  reports that he has never smoked. He has never used smokeless tobacco. He reports that he drinks about 1.2 oz of alcohol per week . He reports that he does not use drugs.  FH: family history includes Heart Disease in his brother and father; Stroke in his mother.       Assessment/Plan:     1. Skin tear of left upper extremity    Wound was cleansed with hibiclens and water, then flushed extensively with sterile saline. Polysporin and dressing placed. Encouraged patient to monitor the area closely for any recurrent bleeding or signs of infection. The patient demonstrated a good understanding and agreed with the treatment plan.

## 2017-10-03 DIAGNOSIS — Z23 NEED FOR SHINGLES VACCINE: ICD-10-CM

## 2017-10-07 ENCOUNTER — OFFICE VISIT (OUTPATIENT)
Dept: URGENT CARE | Facility: PHYSICIAN GROUP | Age: 77
End: 2017-10-07
Payer: MEDICARE

## 2017-10-07 ENCOUNTER — HOSPITAL ENCOUNTER (OUTPATIENT)
Dept: RADIOLOGY | Facility: MEDICAL CENTER | Age: 77
End: 2017-10-07
Attending: PHYSICIAN ASSISTANT
Payer: MEDICARE

## 2017-10-07 VITALS
DIASTOLIC BLOOD PRESSURE: 60 MMHG | TEMPERATURE: 97.1 F | OXYGEN SATURATION: 94 % | BODY MASS INDEX: 25.91 KG/M2 | RESPIRATION RATE: 14 BRPM | HEIGHT: 70 IN | HEART RATE: 62 BPM | WEIGHT: 181 LBS | SYSTOLIC BLOOD PRESSURE: 118 MMHG

## 2017-10-07 DIAGNOSIS — K40.90 UNILATERAL INGUINAL HERNIA WITHOUT OBSTRUCTION OR GANGRENE, RECURRENCE NOT SPECIFIED: ICD-10-CM

## 2017-10-07 DIAGNOSIS — R10.31 RIGHT INGUINAL PAIN: ICD-10-CM

## 2017-10-07 PROCEDURE — 99214 OFFICE O/P EST MOD 30 MIN: CPT | Performed by: PHYSICIAN ASSISTANT

## 2017-10-07 PROCEDURE — 76857 US EXAM PELVIC LIMITED: CPT

## 2017-10-09 ENCOUNTER — HOSPITAL ENCOUNTER (OUTPATIENT)
Dept: LAB | Facility: MEDICAL CENTER | Age: 77
End: 2017-10-09
Attending: UROLOGY
Payer: MEDICARE

## 2017-10-09 LAB — PSA SERPL-MCNC: 5.81 NG/ML (ref 0–4)

## 2017-10-09 PROCEDURE — 36415 COLL VENOUS BLD VENIPUNCTURE: CPT

## 2017-10-09 PROCEDURE — 84153 ASSAY OF PSA TOTAL: CPT

## 2017-10-10 ENCOUNTER — HOSPITAL ENCOUNTER (OUTPATIENT)
Dept: LAB | Facility: MEDICAL CENTER | Age: 77
End: 2017-10-10
Attending: NURSE PRACTITIONER
Payer: MEDICARE

## 2017-10-10 DIAGNOSIS — I25.118 CORONARY ARTERY DISEASE OF NATIVE ARTERY OF NATIVE HEART WITH STABLE ANGINA PECTORIS (HCC): ICD-10-CM

## 2017-10-10 DIAGNOSIS — Z95.1 S/P CABG X 4: ICD-10-CM

## 2017-10-10 DIAGNOSIS — E78.2 MIXED HYPERLIPIDEMIA: ICD-10-CM

## 2017-10-10 LAB
ALBUMIN SERPL BCP-MCNC: 4.2 G/DL (ref 3.2–4.9)
ALBUMIN/GLOB SERPL: 1.7 G/DL
ALP SERPL-CCNC: 64 U/L (ref 30–99)
ALT SERPL-CCNC: 23 U/L (ref 2–50)
ANION GAP SERPL CALC-SCNC: 9 MMOL/L (ref 0–11.9)
AST SERPL-CCNC: 20 U/L (ref 12–45)
BILIRUB SERPL-MCNC: 0.6 MG/DL (ref 0.1–1.5)
BUN SERPL-MCNC: 18 MG/DL (ref 8–22)
CALCIUM SERPL-MCNC: 9.7 MG/DL (ref 8.5–10.5)
CHLORIDE SERPL-SCNC: 105 MMOL/L (ref 96–112)
CHOLEST SERPL-MCNC: 104 MG/DL (ref 100–199)
CO2 SERPL-SCNC: 26 MMOL/L (ref 20–33)
CREAT SERPL-MCNC: 0.94 MG/DL (ref 0.5–1.4)
GFR SERPL CREATININE-BSD FRML MDRD: >60 ML/MIN/1.73 M 2
GLOBULIN SER CALC-MCNC: 2.5 G/DL (ref 1.9–3.5)
GLUCOSE SERPL-MCNC: 109 MG/DL (ref 65–99)
HDLC SERPL-MCNC: 30 MG/DL
LDLC SERPL CALC-MCNC: 52 MG/DL
POTASSIUM SERPL-SCNC: 4 MMOL/L (ref 3.6–5.5)
PROT SERPL-MCNC: 6.7 G/DL (ref 6–8.2)
SODIUM SERPL-SCNC: 140 MMOL/L (ref 135–145)
TRIGL SERPL-MCNC: 110 MG/DL (ref 0–149)

## 2017-10-10 PROCEDURE — 80053 COMPREHEN METABOLIC PANEL: CPT

## 2017-10-10 PROCEDURE — 36415 COLL VENOUS BLD VENIPUNCTURE: CPT

## 2017-10-10 PROCEDURE — 80061 LIPID PANEL: CPT

## 2017-10-10 ASSESSMENT — ENCOUNTER SYMPTOMS
EYE REDNESS: 0
JOINT SWELLING: 0
ABDOMINAL PAIN: 1
MYALGIAS: 0
CHANGE IN BOWEL HABIT: 0
COUGH: 0
VOMITING: 0
SWOLLEN GLANDS: 0
CHILLS: 0
FEVER: 0
HEADACHES: 0
SORE THROAT: 0
WHEEZING: 0
EYE DISCHARGE: 0
NECK PAIN: 0

## 2017-10-10 NOTE — PROGRESS NOTES
"Subjective:      Raf Pelayo is a 77 y.o. male who presents with Groin Pain (bump in groin area x2 weeks)            Pt is 78 y/o male who presents with right \"lump into his lower abd\" and upper groin for the last few days. He denies any hx of same. He \"can't think of anything that he might of done\" or hx of same. He denies any redness, increased warmth, or changed to bowel or bladder with such.           Groin Pain   This is a new problem. The current episode started in the past 7 days. The problem occurs daily. The problem has been waxing and waning. Associated symptoms include abdominal pain. Pertinent negatives include no change in bowel habit, chest pain, chills, congestion, coughing, fever, headaches, joint swelling, myalgias, neck pain, rash, sore throat, swollen glands, urinary symptoms or vomiting. Exacerbated by: Certain postitions. He has tried nothing for the symptoms.       Review of Systems   Constitutional: Negative for chills and fever.   HENT: Negative for congestion and sore throat.    Eyes: Negative for discharge and redness.   Respiratory: Negative for cough and wheezing.    Cardiovascular: Negative for chest pain and leg swelling.   Gastrointestinal: Positive for abdominal pain. Negative for change in bowel habit and vomiting.   Genitourinary: Negative for dysuria, frequency and urgency.   Musculoskeletal: Negative for joint swelling, myalgias and neck pain.   Skin: Negative for rash.   Neurological: Negative for headaches.          Objective:     /60   Pulse 62   Temp 36.2 °C (97.1 °F)   Resp 14   Ht 1.778 m (5' 10\")   Wt 82.1 kg (181 lb)   SpO2 94%   BMI 25.97 kg/m²    PMH:  has a past medical history of Anxiety; Arthritis; Breath shortness; CAD (coronary artery disease); Chronic ischemic heart disease, unspecified; Diabetes (CMS-HCC); Mixed hyperlipidemia; and Nodular prostate without urinary obstruction. He also has no past medical history of Encounter for " "long-term (current) use of other medications.  MEDS:   Current Outpatient Prescriptions:   •  atorvastatin (LIPITOR) 80 MG tablet, Take 1 Tab by mouth every bedtime., Disp: 90 Tab, Rfl: 3  •  furosemide (LASIX) 40 MG Tab, Take 1 Tab by mouth every day., Disp: 90 Tab, Rfl: 3  •  metoprolol (LOPRESSOR) 25 MG Tab, Take 1 Tab by mouth 2 Times a Day., Disp: 180 Tab, Rfl: 3  •  metformin (GLUCOPHAGE) 500 MG Tab, TAKE 1 TABLET BY MOUTH TWICE DAILY WITH MEALS, Disp: 180 Tab, Rfl: 3  •  clopidogrel (PLAVIX) 75 MG Tab, Take 1 Tab by mouth every day., Disp: 30 Tab, Rfl: 3  •  aspirin (ASA) 81 MG Chew Tab chewable tablet, Take 81 mg by mouth every day. Indications: post cardiac surgery to prevent blood clots, Disp: , Rfl:   •  therapeutic multivitamin-minerals (THERAGRAN-M) Tab, Take 1 Tab by mouth every day. Indications: supplement, Disp: , Rfl:   •  Cholecalciferol (VITAMIN D3) 1000 UNIT TABS, Take 1 Tab by mouth every day., Disp: 100 Tab, Rfl: 3  •  potassium chloride SA (KDUR) 20 MEQ Tab CR, Take 1 Tab by mouth every day., Disp: 90 Tab, Rfl: 3  •  amiodarone (CORDARONE) 200 MG Tab, Take 200 mg by mouth every day., Disp: , Rfl:   •  Pseudoephedrine-APAP-DM (DAYQUIL PO), Take  by mouth., Disp: , Rfl:   •  docusate sodium (COLACE) 100 MG Cap, Take 100 mg by mouth 2 times a day., Disp: , Rfl:   •  NON SPECIFIED, Apply blue emu oil to painful shoulders (Patient taking differently: Apply \"Australian dream cream\" to painful shoulders), Disp: 1 Bottle, Rfl: 3  •  Glucosamine-Chondroit-Vit C-Mn (GLUCOSAMINE CHONDR 1500 COMPLX) Cap, Take 1 Cap by mouth every day., Disp: 100 Cap, Rfl: 3  ALLERGIES: No Known Allergies  SURGHX:   Past Surgical History:   Procedure Laterality Date   • MULTIPLE CORONARY ARTERY BYPASS ENDO VEIN HARVEST  7/6/2017    Procedure: MULTIPLE CORONARY ARTERY BYPASS ENDO VEIN HARVEST X2-4, PREVENA DRESSING ;  Surgeon: Anthony Gaffney M.D.;  Location: SURGERY [facility];  Service:    • ROHINI  7/6/2017    " Procedure: ROHINI - INTRAOPERATIVE ;  Surgeon: Anthony Gaffney M.D.;  Location: SURGERY [facility];  Service:    • APPENDECTOMY  1955   • CARDIAC CATH       SOCHX:  reports that he has never smoked. He has never used smokeless tobacco. He reports that he drinks about 1.2 oz of alcohol per week . He reports that he does not use drugs.  FH: Family history was reviewed, no pertinent findings to report    Physical Exam   Constitutional: He is oriented to person, place, and time. He appears well-developed and well-nourished.   HENT:   Head: Normocephalic and atraumatic.   Eyes: EOM are normal. Pupils are equal, round, and reactive to light.   Neck: Normal range of motion. Neck supple.   Cardiovascular: Normal rate and regular rhythm.    Pulmonary/Chest: Effort normal. No respiratory distress.   Abdominal: Soft. A hernia is present. Hernia confirmed positive in the right inguinal area.       Genitourinary: Testes normal and penis normal. Cremasteric reflex is not absent on the right side.         Neurological: He is alert and oriented to person, place, and time.   Skin: Skin is warm. Capillary refill takes less than 2 seconds. No pallor.   Psychiatric: He has a normal mood and affect. His behavior is normal.   Vitals reviewed.            US:  Targeted ultrasound evaluation of the right the inguinal canal demonstrates a direct hernia containing fat. It is reducible.  Assessment/Plan:     1. Right inguinal pain  - US-INGUINAL HERNIA; Future    2. Unilateral inguinal hernia without obstruction or gangrene, recurrence not specified  Discussed results with the patient- encouraged the avoidance of heavy lifting- hernia is without indication of incarceration or strangulation at this time.   Referral to surgery was made for consultation.   S/S were discussed for urgent recheck. OTC pain meds as discussed.     Patient given precautionary s/sx that mandate immediate follow up and evaluation in the ED. Advised of risks of not  doing so.    DDX, Supportive care, and indications for immediate follow-up discussed with patient.    Instructed to return to clinic or nearest emergency department if we are not available for any change in condition, further concerns, or worsening of symptoms.    The patient demonstrated a good understanding and agreed with the treatment plan.

## 2017-10-11 ENCOUNTER — TELEPHONE (OUTPATIENT)
Dept: CARDIOLOGY | Facility: MEDICAL CENTER | Age: 77
End: 2017-10-11

## 2017-10-25 PROBLEM — C61 PROSTATE CANCER (HCC): Status: ACTIVE | Noted: 2017-10-25

## 2017-11-07 ENCOUNTER — OFFICE VISIT (OUTPATIENT)
Dept: CARDIOLOGY | Facility: MEDICAL CENTER | Age: 77
End: 2017-11-07
Payer: MEDICARE

## 2017-11-07 VITALS — HEART RATE: 70 BPM | OXYGEN SATURATION: 92 % | HEIGHT: 70 IN | BODY MASS INDEX: 26.05 KG/M2 | WEIGHT: 182 LBS

## 2017-11-07 DIAGNOSIS — E11.9 TYPE 2 DIABETES MELLITUS WITHOUT COMPLICATION, WITHOUT LONG-TERM CURRENT USE OF INSULIN (HCC): ICD-10-CM

## 2017-11-07 DIAGNOSIS — R06.09 DYSPNEA ON EXERTION: ICD-10-CM

## 2017-11-07 DIAGNOSIS — Z01.810 PRE-OPERATIVE CARDIOVASCULAR EXAMINATION: ICD-10-CM

## 2017-11-07 DIAGNOSIS — E78.2 MIXED HYPERLIPIDEMIA: ICD-10-CM

## 2017-11-07 DIAGNOSIS — I25.9 ISCHEMIC HEART DISEASE: ICD-10-CM

## 2017-11-07 DIAGNOSIS — Z95.1 S/P CABG X 4: ICD-10-CM

## 2017-11-07 PROCEDURE — 99214 OFFICE O/P EST MOD 30 MIN: CPT | Performed by: INTERNAL MEDICINE

## 2017-11-07 NOTE — LETTER
[facility]   [address]   [address]  [place] [address]  Phone: [phone]  Fax: [phone]              Raf Pelayo  1940    Encounter Date: 11/7/2017    Brnadon Velázquez M.D.    Thank you for the referral. I had the pleasure of seeing Raf Pelayo today in cardiology clinic. I've attached my visit note below. If you have any questions please feel free to give me a call anytime.      Kevin Nino MD, PhD, [facility]  Cardiology and Lipidology  [facility]                                                                    PROGRESS NOTE:  .  Chief Complaint   Patient presents with   • Hyperlipidemia     CAD, CABG in 6/2017    This patient is an established male who is here today to discuss:  Pre-OP for hernia and prostate  Feels great and no longer even MESA    Patient Active Problem List    Diagnosis Date Noted   • S/P CABG x 4 07/26/2017     Priority: Medium   • Type 2 diabetes mellitus without complication, without long-term current use of insulin (CMS-HCC) 07/26/2017     Priority: Medium   • Diastolic dysfunction 07/26/2017     Priority: Medium   • CAD (coronary artery disease) 07/06/2017     Priority: Medium   • Mixed hyperlipidemia 07/27/2009     Priority: Medium   • Arthritis 04/14/2016     Priority: Low   • History of colonic polyps 12/29/2014     Priority: Low   • Vitamin D deficiency disease 12/13/2011     Priority: Low   • Nodular prostate without urinary obstruction 07/27/2009     Priority: Low   • Prostate cancer (CMS-HCC) 10/25/2017   • Family history of stroke or transient ischemic attack in mother 08/21/2017   • Family history of heart disease 08/21/2017   • Chronic ischemic heart disease, unspecified        Past Medical History:   Diagnosis Date   • Anxiety    • Arthritis    • Breath shortness     current    • CAD (coronary artery disease)     CABG X4   •  Chronic ischemic heart disease, unspecified    • Diabetes (CMS-[facility])     oral medication    • Mixed hyperlipidemia    • Nodular prostate without urinary obstruction      Past Surgical History:   Procedure Laterality Date   • MULTIPLE CORONARY ARTERY BYPASS ENDO VEIN HARVEST  7/6/2017    Procedure: MULTIPLE CORONARY ARTERY BYPASS ENDO VEIN HARVEST X2-4, PREVENA DRESSING ;  Surgeon: Anthony Gaffney M.D.;  Location: SURGERY [facility];  Service:    • ROHINI  7/6/2017    Procedure: ROHINI - INTRAOPERATIVE ;  Surgeon: Anthony Gaffney M.D.;  Location: SURGERY [facility];  Service:    • APPENDECTOMY  1955   • CARDIAC CATH       Social History     Social History   • Marital status:      Spouse name: N/A   • Number of children: N/A   • Years of education: N/A     Social History Main Topics   • Smoking status: Never Smoker   • Smokeless tobacco: Never Used   • Alcohol use 1.2 oz/week     2 Shots of liquor per week   • Drug use: No   • Sexual activity: No     Other Topics Concern   • Not on file     Social History Narrative   • No narrative on file     Family History   Problem Relation Age of Onset   • Stroke Mother    • Heart Disease Father    • Heart Disease Brother        Current Outpatient Prescriptions   Medication Sig Dispense Refill   • atorvastatin (LIPITOR) 80 MG tablet Take 1 Tab by mouth every bedtime. 90 Tab 3   • furosemide (LASIX) 40 MG Tab Take 1 Tab by mouth every day. 90 Tab 3   • potassium chloride SA (KDUR) 20 MEQ Tab CR Take 1 Tab by mouth every day. 90 Tab 3   • metoprolol (LOPRESSOR) 25 MG Tab Take 1 Tab by mouth 2 Times a Day. 180 Tab 3   • docusate sodium (COLACE) 100 MG Cap Take 100 mg by mouth 2 times a day.     • metformin (GLUCOPHAGE) 500 MG Tab TAKE 1 TABLET BY MOUTH TWICE DAILY WITH MEALS 180 Tab 3   • aspirin (ASA) 81 MG Chew Tab chewable tablet Take 81 mg by mouth every day. Indications: post cardiac surgery to prevent blood clots     • therapeutic multivitamin-minerals (THERAGRAN-M)  "Tab Take 1 Tab by mouth every day. Indications: supplement     • NON SPECIFIED Apply blue emu oil to painful shoulders (Patient taking differently: Apply \"Australian dream cream\" to painful shoulders) 1 Bottle 3   • Glucosamine-Chondroit-Vit C-Mn (GLUCOSAMINE CHONDR 1500 COMPLX) Cap Take 1 Cap by mouth every day. 100 Cap 3   • Cholecalciferol (VITAMIN D3) 1000 UNIT TABS Take 1 Tab by mouth every day. 100 Tab 3   • amiodarone (CORDARONE) 200 MG Tab Take 200 mg by mouth every day.     • Pseudoephedrine-APAP-DM (DAYQUIL PO) Take  by mouth.     • clopidogrel (PLAVIX) 75 MG Tab Take 1 Tab by mouth every day. 30 Tab 3     No current facility-administered medications for this visit.      Review of patient's allergies indicates no known allergies.    Review of Systems:     Constitutional: Denies fevers, Denies weight changes  Eyes: Denies changes in vision, no eye pain  Ears/Nose/Throat/Mouth: Denies nasal congestion or sore throat   Cardiovascular: Denies chest pain or palpitations   Respiratory: Denies shortness of breath , Denies cough  Gastrointestinal/Hepatic: Denies abdominal pain, nausea, vomiting, diarrhea, constipation or GI bleeding   Genitourinary: Denies bladder dysfunction, dysuria or frequency  Musculoskeletal/Rheum: Denies  joint pain and swelling   Skin/Breast: Denies rash, denies breast lumps or discharge  Neurological: Denies headache, confusion, memory loss or focal weakness/parasthesias  Psychiatric: denies mood disorder   Endocrine: denies hx of diabetes or thyroid dysfunction  Heme/Oncology/Lymph Nodes: Denies enlarged lymph nodes, denies brusing or known bleeding disorder  Allergic/Immunologic: Denies hx of allergies      All other systems were reviewed and are negative (AMA/CMS criteria)      Pulse 70, height 1.778 m (5' 10\"), weight 82.6 kg (182 lb), SpO2 92 %.  General Appearance:   Well developed, Well nourished, No acute distress, Non-toxic appearance.   HENT:  Normocephalic, Atraumatic, " Oropharynx moist mucous membranes, Dentition: Mallampati 3 OP, Nose normal.    Eyes:  PERRLA, EOMI, Conjunctiva normal, No discharge.  Neck:  Normal range of motion, No cervical tenderness, Supple, No stridor, no JVD .  No thyromegaly.  No carotid bruit.  Cardiovascular:  Normal heart rate, IRR rhythm,  S1, S2, no S3,  S4; No gallops; No murmurs, No rubs, .   Extremitites with intact distal pulses, no cyanosis, clubbing or edema.  No heaves, thrills, HJR;  Peripheral pulses: carotid 2+, brachial 2+, radial 2+, ulnar 2+, femoral 2+, popliteal 2+, PT 2+, DP 2+;  Lungs:  Respiratory effort is normal. Normal breath sounds, breath sounds clear to auscultation bilaterally,  no rales, no rhonchi, no wheezing.   Abdomen: Bowel sounds normal, Soft, No tenderness, No guarding, No rebound, No masses, No hepatosplenomegaly.  Skin: Warm, Dry, No erythema, No rash, no induration or crepitus.  Neurologic: Alert & oriented x 3, Normal motor function, Normal sensory function, No focal deficits noted, cranial nerves II through XII are normal,  normal gait.  Psychiatric: Affect normal, Judgment normal, Mood normal..    Assessment and Plan.   77 y.o. male has CABG in 6/2017  Currently no cardiac sx's  Hernia and prostate surg are relatively low CV risk on gen anesthesia  Rec: monitor his blood sugar carefully jose-OP periode; Pls continue all cardiac meds; Thx    1. S/P CABG x 4  asx    2. Pre-operative cardiovascular examination  See above    3. Ischemic heart disease  reviewed    4. Dyspnea on exertion  better    5. Type 2 diabetes mellitus without complication, without long-term current use of insulin (CMS-HCC)  PCP    6. Mixed hyperlipidemia  recheck      Return to clinic in  3 , months    1. S/P CABG x 4     2. Pre-operative cardiovascular examination     3. Ischemic heart disease     4. Dyspnea on exertion     5. Type 2 diabetes mellitus without complication, without long-term current use of insulin (CMS-HCC)     6. Mixed  hyperlipidemia           Brandon Velázquez M.D.  [address]  [place] [address]  VIA In Basket     Raj Martinez M.D.  [address]  [place] [address]  VIA Facsimile: [phone]     Peyman Allan M.D.  [address]  [address]  [place] [address]  VIA Facsimile: [phone]

## 2017-11-07 NOTE — PROGRESS NOTES
.  Chief Complaint   Patient presents with   • Hyperlipidemia     CAD, CABG in 6/2017    This patient is an established male who is here today to discuss:  Pre-OP for hernia and prostate  Feels great and no longer even MESA    Patient Active Problem List    Diagnosis Date Noted   • S/P CABG x 4 07/26/2017     Priority: Medium   • Type 2 diabetes mellitus without complication, without long-term current use of insulin (CMS-HCC) 07/26/2017     Priority: Medium   • Diastolic dysfunction 07/26/2017     Priority: Medium   • CAD (coronary artery disease) 07/06/2017     Priority: Medium   • Mixed hyperlipidemia 07/27/2009     Priority: Medium   • Arthritis 04/14/2016     Priority: Low   • History of colonic polyps 12/29/2014     Priority: Low   • Vitamin D deficiency disease 12/13/2011     Priority: Low   • Nodular prostate without urinary obstruction 07/27/2009     Priority: Low   • Prostate cancer (CMS-HCC) 10/25/2017   • Family history of stroke or transient ischemic attack in mother 08/21/2017   • Family history of heart disease 08/21/2017   • Chronic ischemic heart disease, unspecified        Past Medical History:   Diagnosis Date   • Anxiety    • Arthritis    • Breath shortness     current    • CAD (coronary artery disease)     CABG X4   • Chronic ischemic heart disease, unspecified    • Diabetes (CMS-HCC)     oral medication    • Mixed hyperlipidemia    • Nodular prostate without urinary obstruction      Past Surgical History:   Procedure Laterality Date   • MULTIPLE CORONARY ARTERY BYPASS ENDO VEIN HARVEST  7/6/2017    Procedure: MULTIPLE CORONARY ARTERY BYPASS ENDO VEIN HARVEST X2-4, PREVENA DRESSING ;  Surgeon: Anthony Gaffney M.D.;  Location: SURGERY [facility];  Service:    • ROHINI  7/6/2017    Procedure: ROHINI - INTRAOPERATIVE ;  Surgeon: Anthony Gaffney M.D.;  Location: SURGERY [facility];  Service:    • APPENDECTOMY  1955   • CARDIAC CATH       Social History     Social History   • Marital status:  "     Spouse name: N/A   • Number of children: N/A   • Years of education: N/A     Social History Main Topics   • Smoking status: Never Smoker   • Smokeless tobacco: Never Used   • Alcohol use 1.2 oz/week     2 Shots of liquor per week   • Drug use: No   • Sexual activity: No     Other Topics Concern   • Not on file     Social History Narrative   • No narrative on file     Family History   Problem Relation Age of Onset   • Stroke Mother    • Heart Disease Father    • Heart Disease Brother        Current Outpatient Prescriptions   Medication Sig Dispense Refill   • atorvastatin (LIPITOR) 80 MG tablet Take 1 Tab by mouth every bedtime. 90 Tab 3   • furosemide (LASIX) 40 MG Tab Take 1 Tab by mouth every day. 90 Tab 3   • potassium chloride SA (KDUR) 20 MEQ Tab CR Take 1 Tab by mouth every day. 90 Tab 3   • metoprolol (LOPRESSOR) 25 MG Tab Take 1 Tab by mouth 2 Times a Day. 180 Tab 3   • docusate sodium (COLACE) 100 MG Cap Take 100 mg by mouth 2 times a day.     • metformin (GLUCOPHAGE) 500 MG Tab TAKE 1 TABLET BY MOUTH TWICE DAILY WITH MEALS 180 Tab 3   • aspirin (ASA) 81 MG Chew Tab chewable tablet Take 81 mg by mouth every day. Indications: post cardiac surgery to prevent blood clots     • therapeutic multivitamin-minerals (THERAGRAN-M) Tab Take 1 Tab by mouth every day. Indications: supplement     • NON SPECIFIED Apply blue emu oil to painful shoulders (Patient taking differently: Apply \"Australian dream cream\" to painful shoulders) 1 Bottle 3   • Glucosamine-Chondroit-Vit C-Mn (GLUCOSAMINE CHONDR 1500 COMPLX) Cap Take 1 Cap by mouth every day. 100 Cap 3   • Cholecalciferol (VITAMIN D3) 1000 UNIT TABS Take 1 Tab by mouth every day. 100 Tab 3   • amiodarone (CORDARONE) 200 MG Tab Take 200 mg by mouth every day.     • Pseudoephedrine-APAP-DM (DAYQUIL PO) Take  by mouth.     • clopidogrel (PLAVIX) 75 MG Tab Take 1 Tab by mouth every day. 30 Tab 3     No current facility-administered medications for this visit.  " "    Review of patient's allergies indicates no known allergies.    Review of Systems:     Constitutional: Denies fevers, Denies weight changes  Eyes: Denies changes in vision, no eye pain  Ears/Nose/Throat/Mouth: Denies nasal congestion or sore throat   Cardiovascular: Denies chest pain or palpitations   Respiratory: Denies shortness of breath , Denies cough  Gastrointestinal/Hepatic: Denies abdominal pain, nausea, vomiting, diarrhea, constipation or GI bleeding   Genitourinary: Denies bladder dysfunction, dysuria or frequency  Musculoskeletal/Rheum: Denies  joint pain and swelling   Skin/Breast: Denies rash, denies breast lumps or discharge  Neurological: Denies headache, confusion, memory loss or focal weakness/parasthesias  Psychiatric: denies mood disorder   Endocrine: denies hx of diabetes or thyroid dysfunction  Heme/Oncology/Lymph Nodes: Denies enlarged lymph nodes, denies brusing or known bleeding disorder  Allergic/Immunologic: Denies hx of allergies      All other systems were reviewed and are negative (AMA/CMS criteria)      Pulse 70, height 1.778 m (5' 10\"), weight 82.6 kg (182 lb), SpO2 92 %.  General Appearance:   Well developed, Well nourished, No acute distress, Non-toxic appearance.   HENT:  Normocephalic, Atraumatic, Oropharynx moist mucous membranes, Dentition: Mallampati 3 OP, Nose normal.    Eyes:  PERRLA, EOMI, Conjunctiva normal, No discharge.  Neck:  Normal range of motion, No cervical tenderness, Supple, No stridor, no JVD .  No thyromegaly.  No carotid bruit.  Cardiovascular:  Normal heart rate, IRR rhythm,  S1, S2, no S3,  S4; No gallops; No murmurs, No rubs, .   Extremitites with intact distal pulses, no cyanosis, clubbing or edema.  No heaves, thrills, HJR;  Peripheral pulses: carotid 2+, brachial 2+, radial 2+, ulnar 2+, femoral 2+, popliteal 2+, PT 2+, DP 2+;  Lungs:  Respiratory effort is normal. Normal breath sounds, breath sounds clear to auscultation bilaterally,  no rales, no " rhonchi, no wheezing.   Abdomen: Bowel sounds normal, Soft, No tenderness, No guarding, No rebound, No masses, No hepatosplenomegaly.  Skin: Warm, Dry, No erythema, No rash, no induration or crepitus.  Neurologic: Alert & oriented x 3, Normal motor function, Normal sensory function, No focal deficits noted, cranial nerves II through XII are normal,  normal gait.  Psychiatric: Affect normal, Judgment normal, Mood normal..    Assessment and Plan.   77 y.o. male has CABG in 6/2017  Currently no cardiac sx's  Hernia and prostate surg are relatively low CV risk on gen anesthesia  Rec: monitor his blood sugar carefully jose-OP periode; Pls continue all cardiac meds; Thx    1. S/P CABG x 4  asx    2. Pre-operative cardiovascular examination  See above    3. Ischemic heart disease  reviewed    4. Dyspnea on exertion  better    5. Type 2 diabetes mellitus without complication, without long-term current use of insulin (CMS-[facility])  PCP    6. Mixed hyperlipidemia  recheck      Return to clinic in  3 , months    1. S/P CABG x 4     2. Pre-operative cardiovascular examination     3. Ischemic heart disease     4. Dyspnea on exertion     5. Type 2 diabetes mellitus without complication, without long-term current use of insulin (CMS-[facility])     6. Mixed hyperlipidemia

## 2017-11-20 ENCOUNTER — OFFICE VISIT (OUTPATIENT)
Dept: MEDICAL GROUP | Facility: PHYSICIAN GROUP | Age: 77
End: 2017-11-20
Payer: MEDICARE

## 2017-11-20 VITALS
SYSTOLIC BLOOD PRESSURE: 124 MMHG | WEIGHT: 187 LBS | BODY MASS INDEX: 26.77 KG/M2 | OXYGEN SATURATION: 97 % | HEART RATE: 78 BPM | DIASTOLIC BLOOD PRESSURE: 62 MMHG | HEIGHT: 70 IN | TEMPERATURE: 97.9 F

## 2017-11-20 DIAGNOSIS — E11.9 TYPE 2 DIABETES MELLITUS WITHOUT COMPLICATION, WITHOUT LONG-TERM CURRENT USE OF INSULIN (HCC): ICD-10-CM

## 2017-11-20 DIAGNOSIS — Z95.1 S/P CABG X 4: ICD-10-CM

## 2017-11-20 DIAGNOSIS — C61 PROSTATE CANCER (HCC): ICD-10-CM

## 2017-11-20 DIAGNOSIS — K40.90 RIGHT INGUINAL HERNIA: ICD-10-CM

## 2017-11-20 PROCEDURE — 99214 OFFICE O/P EST MOD 30 MIN: CPT | Performed by: FAMILY MEDICINE

## 2017-11-20 ASSESSMENT — PAIN SCALES - GENERAL: PAINLEVEL: 3=SLIGHT PAIN

## 2017-11-20 NOTE — PROGRESS NOTES
Patient comes in. He is doing well. He is not having chest pains or shortness of breath. Since he had his coronary bypass surgery he says his energy level has increased quite a bit.  He just had some workers placed in his prostate in preparation for receiving radiation therapy for his diagnosis of prostate cancer. He is hopeful that things will go well and so am I.  His blood sugars have been between 110 and 140.  His right inguinal hernia is present but is not bothering him. He is going to get this repaired after he is done dealing with the issues with his prostate.    I reviewed the following    Past Medical History:   Diagnosis Date   • Anxiety    • Arthritis    • Breath shortness     current    • CAD (coronary artery disease)     CABG X4   • Chronic ischemic heart disease, unspecified    • Diabetes (CMS-HCC)     oral medication    • Mixed hyperlipidemia    • Nodular prostate without urinary obstruction         Past Surgical History:   Procedure Laterality Date   • MULTIPLE CORONARY ARTERY BYPASS ENDO VEIN HARVEST  7/6/2017    Procedure: MULTIPLE CORONARY ARTERY BYPASS ENDO VEIN HARVEST X2-4, PREVENA DRESSING ;  Surgeon: Anthony Gaffney M.D.;  Location: SURGERY [facility];  Service:    • ROHINI  7/6/2017    Procedure: ROHINI - INTRAOPERATIVE ;  Surgeon: Anthony Gaffney M.D.;  Location: SURGERY [facility];  Service:    • APPENDECTOMY  1955   • CARDIAC CATH         No Known Allergies    Current Outpatient Prescriptions   Medication Sig Dispense Refill   • metoprolol (LOPRESSOR) 25 MG Tab Take 1 Tab by mouth 2 Times a Day. 180 Tab 3   • atorvastatin (LIPITOR) 80 MG tablet Take 1 Tab by mouth every bedtime. 90 Tab 3   • furosemide (LASIX) 40 MG Tab Take 1 Tab by mouth every day. 90 Tab 3   • potassium chloride SA (KDUR) 20 MEQ Tab CR Take 1 Tab by mouth every day. 90 Tab 3   • docusate sodium (COLACE) 100 MG Cap Take 100 mg by mouth 2 times a day.     • metformin (GLUCOPHAGE) 500 MG Tab TAKE 1 TABLET BY MOUTH  "TWICE DAILY WITH MEALS 180 Tab 3   • clopidogrel (PLAVIX) 75 MG Tab Take 1 Tab by mouth every day. 30 Tab 3   • aspirin (ASA) 81 MG Chew Tab chewable tablet Take 81 mg by mouth every day. Indications: post cardiac surgery to prevent blood clots     • therapeutic multivitamin-minerals (THERAGRAN-M) Tab Take 1 Tab by mouth every day. Indications: supplement     • NON SPECIFIED Apply blue emu oil to painful shoulders (Patient taking differently: Apply \"Australian dream cream\" to painful shoulders) 1 Bottle 3   • Glucosamine-Chondroit-Vit C-Mn (GLUCOSAMINE CHONDR 1500 COMPLX) Cap Take 1 Cap by mouth every day. 100 Cap 3   • Cholecalciferol (VITAMIN D3) 1000 UNIT TABS Take 1 Tab by mouth every day. 100 Tab 3   • amiodarone (CORDARONE) 200 MG Tab Take 200 mg by mouth every day.     • Pseudoephedrine-APAP-DM (DAYQUIL PO) Take  by mouth.       No current facility-administered medications for this visit.         Family History   Problem Relation Age of Onset   • Stroke Mother    • Heart Disease Father    • Heart Disease Brother        Social History     Social History   • Marital status:      Spouse name: N/A   • Number of children: N/A   • Years of education: N/A     Occupational History   • Not on file.     Social History Main Topics   • Smoking status: Never Smoker   • Smokeless tobacco: Never Used   • Alcohol use 1.2 oz/week     2 Shots of liquor per week   • Drug use: No   • Sexual activity: No     Other Topics Concern   • Not on file     Social History Narrative   • No narrative on file      Physical Exam   Constitutional: He is oriented. He appears well-developed and well-nourished. No distress.   HENT:   Head: Normocephalic and atraumatic.   Right Ear: External ear normal. Ear canal and TM normal   Left Ear: External ear normal. Ear canal and TM normal   Nose: Nose normal.   Mouth/Throat: Oropharynx is clear and moist.   Eyes: Conjunctivae and extraocular motions are normal. Pupils are equal, round, and " reactive to light.        Fundi benign bilaterally   Neck: No thyromegaly present.   Cardiovascular: Normal rate, regular rhythm, normal heart sounds and intact distal pulses.  Exam reveals no gallop.    No murmur heard.  Pulmonary/Chest: Effort normal and breath sounds normal. No respiratory distress. He has no wheezes. He has no rales.   Abdominal: Soft. Bowel sounds are normal. No hepatosplenomegaly. He exhibits no distension. No tenderness. He has no rebound and no guarding.  He has a quiescent right inguinal hernia   Musculoskeletal: Normal range of motion. He exhibits no edema and no tenderness.   Lymphadenopathy:     He has no cervical adenopathy.  No supraclavicular adenopathy.   Neurological: He is alert and oriented. He has normal reflexes.        Babinskis downgoing bilaterally--The patient has intact sensation to monofilament light touch over both feet. No sores or ulcers are noted over the feet.    Skin: Skin is warm and dry. No rash noted. No erythema.   Psychiatric: He has a normal mood and appropriate affect. His behavior is normal. Judgment and thought content normal.     1. Prostate cancer (CMS-HCC)   Continue to see urologist and radiation oncologist     Sees Dr Tran   2. S/P CABG x 4   Doing well    3. Type 2 diabetes mellitus without complication, without long-term current use of insulin (CMS-HCC) --doing well  Diabetic Monofilament LE Exam   4. Right inguinal hernia   Follow-up with Dr. Allan After his prostate issues have settled down.     Sees Dr Allan     Recheck with me 3 months or when necessary    Please note that this dictation was created using voice recognition software. I have worked with consultants from the vendor as well as technical experts from [facility] to optimize the interface. I have made every reasonable attempt to correct obvious errors, but I expect that there are errors of grammar and possibly content that I did not discover before finalizing the note.

## 2017-11-20 NOTE — LETTER
[facility]  Brandon Velázquez M.D.  [address]  [place] [address]  Fax: [phone]   Authorization for Release/Disclosure of   Protected Health Information   Name: ANNMARIE SALAZAR : 1940 SSN: [id number]   Address: [address] [place] [address]  [place] [address] Phone:    [phone] (home)    I authorize the entity listed below to release/disclose the PHI below to:   [facility]/Brandon Velázquez M.D. and Branodn Velázquez M.D.   Provider or Entity Name: Dr Gabriel     Address   City, State, Zip   Phone:      Fax:[phone]     Reason for request: continuity of care   Information to be released:    [  ] LAST COLONOSCOPY,  including any PATH REPORT and follow-up  [  ] LAST FIT/COLOGUARD RESULT [  ] LAST DEXA  [  ] LAST MAMMOGRAM  [  ] LAST PAP  [  ] LAST LABS [ X ] RETINA EXAM REPORT  [  ] IMMUNIZATION RECORDS  [  ] Release all info      [  ] Check here and initial the line next to each item to release ALL health information INCLUDING  _____ Care and treatment for drug and / or alcohol abuse  _____ HIV testing, infection status, or AIDS  _____ Genetic Testing    DATES OF SERVICE OR TIME PERIOD TO BE DISCLOSED: _____________  I understand and acknowledge that:  * This Authorization may be revoked at any time by you in writing, except if your health information has already been used or disclosed.  * Your health information that will be used or disclosed as a result of you signing this authorization could be re-disclosed by the recipient. If this occurs, your re-disclosed health information may no longer be protected by State or Federal laws.  * You may refuse to sign this Authorization. Your refusal will not affect your ability to obtain treatment.  * This Authorization becomes effective upon signing and will  on (date) __________.      If no date is indicated, this Authorization will  one (1) year from the signature date.    Name: Annmarie Shanks  Pierce    Signature:   Date:     11/20/2017       PLEASE FAX REQUESTED RECORDS BACK TO: [phone]

## 2018-02-06 ENCOUNTER — OFFICE VISIT (OUTPATIENT)
Dept: CARDIOLOGY | Facility: MEDICAL CENTER | Age: 78
End: 2018-02-06
Payer: MEDICARE

## 2018-02-06 VITALS
WEIGHT: 190 LBS | OXYGEN SATURATION: 93 % | HEIGHT: 70 IN | DIASTOLIC BLOOD PRESSURE: 60 MMHG | BODY MASS INDEX: 27.2 KG/M2 | HEART RATE: 67 BPM | SYSTOLIC BLOOD PRESSURE: 124 MMHG

## 2018-02-06 DIAGNOSIS — E78.2 MIXED HYPERLIPIDEMIA: ICD-10-CM

## 2018-02-06 DIAGNOSIS — E11.9 TYPE 2 DIABETES MELLITUS WITHOUT COMPLICATION, WITHOUT LONG-TERM CURRENT USE OF INSULIN (HCC): ICD-10-CM

## 2018-02-06 DIAGNOSIS — Z95.1 S/P CABG X 4: ICD-10-CM

## 2018-02-06 DIAGNOSIS — I25.119 CORONARY ARTERY DISEASE INVOLVING NATIVE CORONARY ARTERY OF NATIVE HEART WITH ANGINA PECTORIS (HCC): ICD-10-CM

## 2018-02-06 DIAGNOSIS — C61 PROSTATE CANCER (HCC): ICD-10-CM

## 2018-02-06 PROCEDURE — 99214 OFFICE O/P EST MOD 30 MIN: CPT | Performed by: NURSE PRACTITIONER

## 2018-02-06 RX ORDER — ATORVASTATIN CALCIUM 80 MG/1
80 TABLET, FILM COATED ORAL
Qty: 90 TAB | Refills: 3 | Status: SHIPPED | OUTPATIENT
Start: 2018-02-06 | End: 2019-02-25 | Stop reason: SDUPTHER

## 2018-02-06 ASSESSMENT — ENCOUNTER SYMPTOMS
HEADACHES: 0
CHILLS: 0
LOSS OF CONSCIOUSNESS: 0
DIZZINESS: 0
ORTHOPNEA: 0
ABDOMINAL PAIN: 0
PND: 0
MYALGIAS: 0
BRUISES/BLEEDS EASILY: 0
PALPITATIONS: 0
SHORTNESS OF BREATH: 0
FEVER: 0

## 2018-02-06 NOTE — LETTER
[facility]   [address]   [address]  [place] [address]  Phone: [phone]  Fax: [phone]              Raf Pelayo  1940    Encounter Date: 2/6/2018    PAULINO Mercado          PROGRESS NOTE:  Subjective:   Raf Pelayo is a 77 y.o. male who presents today for three month follow-up of CABG x 4.    Raf is a 77 year old male with history of CAD, status post CABG x 4 in July 2017, hypertension, hyperlipidemia, and diabetes mellitus, previously followed by Dr. Nino. Last fall 2017, he developed a right inguinal hernia, which was going to be surgically repaired, but soon after that, he was diagnosed with prostate cancer. He is scheduled to start radiation therapy for prostate cancer, and hernia has been deferred for now. It will be reconsidered after he completes radiation therapy.    From a cardiac standpoint, he is doing very well: no chest pain, pressure or discomfort; no palpitations; no shortness of breath, orthopnea or PND; no dizziness or syncope; no edema. BP is stable, running 130-140 systolic at home. Glucose runs 130-140. He remains active.    Past Medical History:   Diagnosis Date   • Anxiety    • Arthritis    • CAD (coronary artery disease) 07/06/2017    CABG x 4 (LIMA to distal diagonal, rSVG to distal OM1, rSVG to distal OM2, rSVG to distal LAD) by Dr. Gaffney.   • Diabetes (CMS-HCC)     oral medication    • Hypertension    • Inguinal hernia    • Mixed hyperlipidemia    • Nodular prostate without urinary obstruction    • Prostate cancer (CMS-HCC)      Past Surgical History:   Procedure Laterality Date   • MULTIPLE CORONARY ARTERY BYPASS ENDO VEIN HARVEST  7/6/2017    Procedure: MULTIPLE CORONARY ARTERY BYPASS ENDO VEIN HARVEST X2-4, PREVENA DRESSING ;  Surgeon: Anthony Gaffney M.D.;  Location: SURGERY [facility];  Service:    • ROHINI  7/6/2017    Procedure: ROHINI - INTRAOPERATIVE ;   Surgeon: Anthony Gaffney M.D.;  Location: SURGERY [facility];  Service:    • APPENDECTOMY  1955   • TONSILLECTOMY       Family History   Problem Relation Age of Onset   • Stroke Mother    • Heart Disease Father    • Heart Disease Brother      History   Smoking Status   • Never Smoker   Smokeless Tobacco   • Never Used     No Known Allergies  Outpatient Encounter Prescriptions as of 2/6/2018   Medication Sig Dispense Refill   • atorvastatin (LIPITOR) 80 MG tablet Take 1 Tab by mouth every bedtime. 90 Tab 3   • metFORMIN (GLUCOPHAGE) 500 MG Tab TAKE 1 TABLET BY MOUTH TWICE DAILY WITH MEALS 180 Tab 3   • metoprolol (LOPRESSOR) 25 MG Tab Take 1 Tab by mouth 2 Times a Day. 180 Tab 3   • aspirin (ASA) 81 MG Chew Tab chewable tablet Take 81 mg by mouth every day. Indications: post cardiac surgery to prevent blood clots     • therapeutic multivitamin-minerals (THERAGRAN-M) Tab Take 1 Tab by mouth every day. Indications: supplement     • Glucosamine-Chondroit-Vit C-Mn (GLUCOSAMINE CHONDR 1500 COMPLX) Cap Take 1 Cap by mouth every day. 100 Cap 3   • Cholecalciferol (VITAMIN D3) 1000 UNIT TABS Take 1 Tab by mouth every day. 100 Tab 3   • [DISCONTINUED] metoprolol (LOPRESSOR) 25 MG Tab Take 1 Tab by mouth 2 Times a Day. 180 Tab 3   • [DISCONTINUED] atorvastatin (LIPITOR) 80 MG tablet Take 1 Tab by mouth every bedtime. 90 Tab 3   • [DISCONTINUED] furosemide (LASIX) 40 MG Tab Take 1 Tab by mouth every day. 90 Tab 3   • [DISCONTINUED] potassium chloride SA (KDUR) 20 MEQ Tab CR Take 1 Tab by mouth every day. 90 Tab 3   • [DISCONTINUED] amiodarone (CORDARONE) 200 MG Tab Take 200 mg by mouth every day.     • [DISCONTINUED] Pseudoephedrine-APAP-DM (DAYQUIL PO) Take  by mouth.     • [DISCONTINUED] docusate sodium (COLACE) 100 MG Cap Take 100 mg by mouth 2 times a day.     • [DISCONTINUED] metformin (GLUCOPHAGE) 500 MG Tab TAKE 1 TABLET BY MOUTH TWICE DAILY WITH MEALS 180 Tab 3   • [DISCONTINUED] clopidogrel (PLAVIX) 75 MG Tab Take  "1 Tab by mouth every day. 30 Tab 3   • [DISCONTINUED] NON SPECIFIED Apply blue emu oil to painful shoulders (Patient taking differently: Apply \"Australian dream cream\" to painful shoulders) 1 Bottle 3     No facility-administered encounter medications on file as of 2/6/2018.      Review of Systems   Constitutional: Negative for chills and fever.   Respiratory: Negative for shortness of breath.    Cardiovascular: Negative for chest pain, palpitations, orthopnea, leg swelling and PND.   Gastrointestinal: Negative for abdominal pain.   Musculoskeletal: Negative for myalgias.   Neurological: Negative for dizziness, loss of consciousness and headaches.   Endo/Heme/Allergies: Does not bruise/bleed easily.        Objective:   /60   Pulse 67   Ht 1.778 m (5' 10\")   Wt 86.2 kg (190 lb)   SpO2 93%   BMI 27.26 kg/m²      Physical Exam   Constitutional: He is oriented to person, place, and time. He appears well-developed and well-nourished. No distress.   HENT:   Head: Normocephalic.   Eyes: Conjunctivae and EOM are normal.   Neck: Normal range of motion. Neck supple. No JVD present.   Cardiovascular: Normal rate, regular rhythm, normal heart sounds and intact distal pulses.  Exam reveals no gallop and no friction rub.    No murmur heard.  Pulmonary/Chest: Effort normal and breath sounds normal.   Sternotomy scar present.   Abdominal: Soft. Bowel sounds are normal.   Musculoskeletal: Normal range of motion. He exhibits no edema.   Neurological: He is alert and oriented to person, place, and time.   Skin: Skin is warm and dry.   Psychiatric: He has a normal mood and affect. His behavior is normal.     Lab Results   Component Value Date/Time    WBC 9.2 08/16/2017 10:13 AM    RBC 4.53 (L) 08/16/2017 10:13 AM    HEMOGLOBIN 13.4 (L) 08/16/2017 10:13 AM    HEMATOCRIT 41.8 (L) 08/16/2017 10:13 AM    MCV 92.3 08/16/2017 10:13 AM    MCH 29.6 08/16/2017 10:13 AM    MCHC 32.1 (L) 08/16/2017 10:13 AM    MPV 10.0 08/16/2017 10:13 " AM      Lab Results   Component Value Date/Time    CHOLSTRLTOT 104 10/10/2017 06:33 AM    LDL 52 10/10/2017 06:33 AM    HDL 30 (A) 10/10/2017 06:33 AM    TRIGLYCERIDE 110 10/10/2017 06:33 AM       Lab Results   Component Value Date/Time    SODIUM 140 10/10/2017 06:33 AM    POTASSIUM 4.0 10/10/2017 06:33 AM    CHLORIDE 105 10/10/2017 06:33 AM    CO2 26 10/10/2017 06:33 AM    GLUCOSE 109 (H) 10/10/2017 06:33 AM    BUN 18 10/10/2017 06:33 AM    CREATININE 0.94 10/10/2017 06:33 AM    CREATININE 1.0 10/27/2008 08:20 AM     Lab Results   Component Value Date/Time    ALKPHOSPHAT 64 10/10/2017 06:33 AM    ASTSGOT 20 10/10/2017 06:33 AM    ALTSGPT 23 10/10/2017 06:33 AM    TBILIRUBIN 0.6 10/10/2017 06:33 AM        Assessment:     1. Coronary artery disease involving native coronary artery of native heart with angina pectoris (CMS-HCC)  metoprolol (LOPRESSOR) 25 MG Tab   2. S/P CABG x 4  metoprolol (LOPRESSOR) 25 MG Tab   3. Mixed hyperlipidemia  atorvastatin (LIPITOR) 80 MG tablet   4. Type 2 diabetes mellitus without complication, without long-term current use of insulin (CMS-HCC)  metFORMIN (GLUCOPHAGE) 500 MG Tab   5. Prostate cancer (CMS-HCC)         Medical Decision Making:  Today's Assessment / Status / Plan:     1. CAD, status post CABG x 4 in July 2017, doing very well. Continue ASA, BB and statin.    2. Hyperlipidemia, treated with statin. LDL is excellent at 52; he does have low HDL fraction.    3. Diabetes mellitus, treated with Metformin, stable, well controlled.    4. Prostate cancer, followed by urology, with radiation treatment pending very soon.    5. Right inguinal hernia, surgical repair deferred for now.    Continue same cardiac medications for now. FU in 6 months with Dr. TOMAS Roach to establish care; FU sooner if clinical condition changes.    Collaborating MD: Mirta Barrow

## 2018-02-06 NOTE — PROGRESS NOTES
Subjective:   Raf Pelayo is a 77 y.o. male who presents today for three month follow-up of CABG x 4.    Raf is a 77 year old male with history of CAD, status post CABG x 4 in July 2017, hypertension, hyperlipidemia, and diabetes mellitus, previously followed by Dr. Nino. Last fall 2017, he developed a right inguinal hernia, which was going to be surgically repaired, but soon after that, he was diagnosed with prostate cancer. He is scheduled to start radiation therapy for prostate cancer, and hernia has been deferred for now. It will be reconsidered after he completes radiation therapy.    From a cardiac standpoint, he is doing very well: no chest pain, pressure or discomfort; no palpitations; no shortness of breath, orthopnea or PND; no dizziness or syncope; no edema. BP is stable, running 130-140 systolic at home. Glucose runs 130-140. He remains active.    Past Medical History:   Diagnosis Date   • Anxiety    • Arthritis    • CAD (coronary artery disease) 07/06/2017    CABG x 4 (LIMA to distal diagonal, rSVG to distal OM1, rSVG to distal OM2, rSVG to distal LAD) by Dr. Gaffney.   • Diabetes (CMS-HCC)     oral medication    • Hypertension    • Inguinal hernia    • Mixed hyperlipidemia    • Nodular prostate without urinary obstruction    • Prostate cancer (CMS-HCC)      Past Surgical History:   Procedure Laterality Date   • MULTIPLE CORONARY ARTERY BYPASS ENDO VEIN HARVEST  7/6/2017    Procedure: MULTIPLE CORONARY ARTERY BYPASS ENDO VEIN HARVEST X2-4, PREVENA DRESSING ;  Surgeon: Anthony Gaffney M.D.;  Location: SURGERY [facility];  Service:    • ROHINI  7/6/2017    Procedure: ROHINI - INTRAOPERATIVE ;  Surgeon: Anthony Gaffney M.D.;  Location: SURGERY [facility];  Service:    • APPENDECTOMY  1955   • TONSILLECTOMY       Family History   Problem Relation Age of Onset   • Stroke Mother    • Heart Disease Father    • Heart Disease Brother      History   Smoking Status   • Never Smoker  "  Smokeless Tobacco   • Never Used     No Known Allergies  Outpatient Encounter Prescriptions as of 2/6/2018   Medication Sig Dispense Refill   • atorvastatin (LIPITOR) 80 MG tablet Take 1 Tab by mouth every bedtime. 90 Tab 3   • metFORMIN (GLUCOPHAGE) 500 MG Tab TAKE 1 TABLET BY MOUTH TWICE DAILY WITH MEALS 180 Tab 3   • metoprolol (LOPRESSOR) 25 MG Tab Take 1 Tab by mouth 2 Times a Day. 180 Tab 3   • aspirin (ASA) 81 MG Chew Tab chewable tablet Take 81 mg by mouth every day. Indications: post cardiac surgery to prevent blood clots     • therapeutic multivitamin-minerals (THERAGRAN-M) Tab Take 1 Tab by mouth every day. Indications: supplement     • Glucosamine-Chondroit-Vit C-Mn (GLUCOSAMINE CHONDR 1500 COMPLX) Cap Take 1 Cap by mouth every day. 100 Cap 3   • Cholecalciferol (VITAMIN D3) 1000 UNIT TABS Take 1 Tab by mouth every day. 100 Tab 3   • [DISCONTINUED] metoprolol (LOPRESSOR) 25 MG Tab Take 1 Tab by mouth 2 Times a Day. 180 Tab 3   • [DISCONTINUED] atorvastatin (LIPITOR) 80 MG tablet Take 1 Tab by mouth every bedtime. 90 Tab 3   • [DISCONTINUED] furosemide (LASIX) 40 MG Tab Take 1 Tab by mouth every day. 90 Tab 3   • [DISCONTINUED] potassium chloride SA (KDUR) 20 MEQ Tab CR Take 1 Tab by mouth every day. 90 Tab 3   • [DISCONTINUED] amiodarone (CORDARONE) 200 MG Tab Take 200 mg by mouth every day.     • [DISCONTINUED] Pseudoephedrine-APAP-DM (DAYQUIL PO) Take  by mouth.     • [DISCONTINUED] docusate sodium (COLACE) 100 MG Cap Take 100 mg by mouth 2 times a day.     • [DISCONTINUED] metformin (GLUCOPHAGE) 500 MG Tab TAKE 1 TABLET BY MOUTH TWICE DAILY WITH MEALS 180 Tab 3   • [DISCONTINUED] clopidogrel (PLAVIX) 75 MG Tab Take 1 Tab by mouth every day. 30 Tab 3   • [DISCONTINUED] NON SPECIFIED Apply blue emu oil to painful shoulders (Patient taking differently: Apply \"Australian dream cream\" to painful shoulders) 1 Bottle 3     No facility-administered encounter medications on file as of 2/6/2018.      Review " "of Systems   Constitutional: Negative for chills and fever.   Respiratory: Negative for shortness of breath.    Cardiovascular: Negative for chest pain, palpitations, orthopnea, leg swelling and PND.   Gastrointestinal: Negative for abdominal pain.   Musculoskeletal: Negative for myalgias.   Neurological: Negative for dizziness, loss of consciousness and headaches.   Endo/Heme/Allergies: Does not bruise/bleed easily.        Objective:   /60   Pulse 67   Ht 1.778 m (5' 10\")   Wt 86.2 kg (190 lb)   SpO2 93%   BMI 27.26 kg/m²     Physical Exam   Constitutional: He is oriented to person, place, and time. He appears well-developed and well-nourished. No distress.   HENT:   Head: Normocephalic.   Eyes: Conjunctivae and EOM are normal.   Neck: Normal range of motion. Neck supple. No JVD present.   Cardiovascular: Normal rate, regular rhythm, normal heart sounds and intact distal pulses.  Exam reveals no gallop and no friction rub.    No murmur heard.  Pulmonary/Chest: Effort normal and breath sounds normal.   Sternotomy scar present.   Abdominal: Soft. Bowel sounds are normal.   Musculoskeletal: Normal range of motion. He exhibits no edema.   Neurological: He is alert and oriented to person, place, and time.   Skin: Skin is warm and dry.   Psychiatric: He has a normal mood and affect. His behavior is normal.     Lab Results   Component Value Date/Time    WBC 9.2 08/16/2017 10:13 AM    RBC 4.53 (L) 08/16/2017 10:13 AM    HEMOGLOBIN 13.4 (L) 08/16/2017 10:13 AM    HEMATOCRIT 41.8 (L) 08/16/2017 10:13 AM    MCV 92.3 08/16/2017 10:13 AM    MCH 29.6 08/16/2017 10:13 AM    MCHC 32.1 (L) 08/16/2017 10:13 AM    MPV 10.0 08/16/2017 10:13 AM      Lab Results   Component Value Date/Time    CHOLSTRLTOT 104 10/10/2017 06:33 AM    LDL 52 10/10/2017 06:33 AM    HDL 30 (A) 10/10/2017 06:33 AM    TRIGLYCERIDE 110 10/10/2017 06:33 AM       Lab Results   Component Value Date/Time    SODIUM 140 10/10/2017 06:33 AM    POTASSIUM 4.0 " 10/10/2017 06:33 AM    CHLORIDE 105 10/10/2017 06:33 AM    CO2 26 10/10/2017 06:33 AM    GLUCOSE 109 (H) 10/10/2017 06:33 AM    BUN 18 10/10/2017 06:33 AM    CREATININE 0.94 10/10/2017 06:33 AM    CREATININE 1.0 10/27/2008 08:20 AM     Lab Results   Component Value Date/Time    ALKPHOSPHAT 64 10/10/2017 06:33 AM    ASTSGOT 20 10/10/2017 06:33 AM    ALTSGPT 23 10/10/2017 06:33 AM    TBILIRUBIN 0.6 10/10/2017 06:33 AM        Assessment:     1. Coronary artery disease involving native coronary artery of native heart with angina pectoris (CMS-HCC)  metoprolol (LOPRESSOR) 25 MG Tab   2. S/P CABG x 4  metoprolol (LOPRESSOR) 25 MG Tab   3. Mixed hyperlipidemia  atorvastatin (LIPITOR) 80 MG tablet   4. Type 2 diabetes mellitus without complication, without long-term current use of insulin (CMS-HCC)  metFORMIN (GLUCOPHAGE) 500 MG Tab   5. Prostate cancer (CMS-HCC)         Medical Decision Making:  Today's Assessment / Status / Plan:     1. CAD, status post CABG x 4 in July 2017, doing very well. Continue ASA, BB and statin.    2. Hyperlipidemia, treated with statin. LDL is excellent at 52; he does have low HDL fraction.    3. Diabetes mellitus, treated with Metformin, stable, well controlled.    4. Prostate cancer, followed by urology, with radiation treatment pending very soon.    5. Right inguinal hernia, surgical repair deferred for now.    Continue same cardiac medications for now. FU in 6 months with Dr. TOMAS Roach to establish care; FU sooner if clinical condition changes.    Collaborating MD: Mirta

## 2018-02-20 ENCOUNTER — OFFICE VISIT (OUTPATIENT)
Dept: MEDICAL GROUP | Facility: PHYSICIAN GROUP | Age: 78
End: 2018-02-20
Payer: MEDICARE

## 2018-02-20 VITALS
WEIGHT: 195 LBS | SYSTOLIC BLOOD PRESSURE: 134 MMHG | HEIGHT: 70 IN | BODY MASS INDEX: 27.92 KG/M2 | HEART RATE: 67 BPM | RESPIRATION RATE: 14 BRPM | TEMPERATURE: 97.3 F | DIASTOLIC BLOOD PRESSURE: 58 MMHG | OXYGEN SATURATION: 97 %

## 2018-02-20 DIAGNOSIS — E11.9 TYPE 2 DIABETES MELLITUS WITHOUT COMPLICATION, WITHOUT LONG-TERM CURRENT USE OF INSULIN (HCC): ICD-10-CM

## 2018-02-20 DIAGNOSIS — Z95.1 S/P CABG X 4: ICD-10-CM

## 2018-02-20 DIAGNOSIS — K40.90 RIGHT INGUINAL HERNIA: ICD-10-CM

## 2018-02-20 DIAGNOSIS — C61 PROSTATE CANCER (HCC): ICD-10-CM

## 2018-02-20 DIAGNOSIS — I25.10 CORONARY ARTERY DISEASE INVOLVING NATIVE CORONARY ARTERY OF NATIVE HEART WITHOUT ANGINA PECTORIS: ICD-10-CM

## 2018-02-20 PROCEDURE — 99214 OFFICE O/P EST MOD 30 MIN: CPT | Performed by: FAMILY MEDICINE

## 2018-02-20 NOTE — PROGRESS NOTES
Patient comes in with numerous issues. He no longer has chest pains. He has no shortness of breath.  The patient's prostate cancer is being treated with radiation and is tolerating that well.  He does have a right inguinal hernia which needs to be repaired in the near future but only after his radiation therapy is done for his prostate, according to his surgeon.  The patient says blood sugars are around 120-140.    I reviewed the following    Past Medical History:   Diagnosis Date   • Anxiety    • Arthritis    • CAD (coronary artery disease) 07/06/2017    CABG x 4 (LIMA to distal diagonal, rSVG to distal OM1, rSVG to distal OM2, rSVG to distal LAD) by Dr. Gaffney.   • Diabetes (CMS-[facility])     oral medication    • Hypertension    • Inguinal hernia    • Mixed hyperlipidemia    • Nodular prostate without urinary obstruction    • Prostate cancer (CMS-HCC)         Past Surgical History:   Procedure Laterality Date   • MULTIPLE CORONARY ARTERY BYPASS ENDO VEIN HARVEST  7/6/2017    Procedure: MULTIPLE CORONARY ARTERY BYPASS ENDO VEIN HARVEST X2-4, PREVENA DRESSING ;  Surgeon: Anthony Gaffney M.D.;  Location: SURGERY [facility];  Service:    • ROHINI  7/6/2017    Procedure: ROHINI - INTRAOPERATIVE ;  Surgeon: Anthony Gaffney M.D.;  Location: SURGERY [facility];  Service:    • APPENDECTOMY  1955   • TONSILLECTOMY         No Known Allergies    Current Outpatient Prescriptions   Medication Sig Dispense Refill   • atorvastatin (LIPITOR) 80 MG tablet Take 1 Tab by mouth every bedtime. 90 Tab 3   • metFORMIN (GLUCOPHAGE) 500 MG Tab TAKE 1 TABLET BY MOUTH TWICE DAILY WITH MEALS 180 Tab 3   • metoprolol (LOPRESSOR) 25 MG Tab Take 1 Tab by mouth 2 Times a Day. 180 Tab 3   • aspirin (ASA) 81 MG Chew Tab chewable tablet Take 81 mg by mouth every day. Indications: post cardiac surgery to prevent blood clots     • therapeutic multivitamin-minerals (THERAGRAN-M) Tab Take 1 Tab by mouth every day. Indications: supplement     •  Glucosamine-Chondroit-Vit C-Mn (GLUCOSAMINE CHONDR 1500 COMPLX) Cap Take 1 Cap by mouth every day. 100 Cap 3   • Cholecalciferol (VITAMIN D3) 1000 UNIT TABS Take 1 Tab by mouth every day. 100 Tab 3     No current facility-administered medications for this visit.         Family History   Problem Relation Age of Onset   • Stroke Mother    • Heart Disease Father    • Heart Disease Brother        Social History     Social History   • Marital status:      Spouse name: N/A   • Number of children: N/A   • Years of education: N/A     Occupational History   • Not on file.     Social History Main Topics   • Smoking status: Never Smoker   • Smokeless tobacco: Never Used   • Alcohol use 1.2 oz/week     2 Shots of liquor per week   • Drug use: No   • Sexual activity: No     Other Topics Concern   • Not on file     Social History Narrative   • No narrative on file      Physical Exam   Constitutional: He is oriented. He appears well-developed and well-nourished. No distress.   HENT:   Head: Normocephalic and atraumatic.   Right Ear: External ear normal. Ear canal and TM normal   Left Ear: External ear normal. Ear canal and TM normal   Nose: Nose normal.   Mouth/Throat: Oropharynx is clear and moist.   Eyes: Conjunctivae and extraocular motions are normal. Pupils are equal, round, and reactive to light.        Fundi benign bilaterally   Neck: No thyromegaly present.   Cardiovascular: Normal rate, regular rhythm, normal heart sounds and intact distal pulses.  Exam reveals no gallop.    No murmur heard.  Pulmonary/Chest: Effort normal and breath sounds normal. No respiratory distress. He has no wheezes. He has no rales.   Abdominal: Soft. Bowel sounds are normal. No hepatosplenomegaly. He exhibits no distension. No tenderness. He has no rebound and no guarding.   Musculoskeletal: Normal range of motion. He exhibits no edema and no tenderness.   Lymphadenopathy:     He has no cervical adenopathy.  No supraclavicular adenopathy.    Neurological: He is alert and oriented. He has normal reflexes.        Babinskis downgoing bilaterally --The patient has intact sensation to monofilament light touch over both feet. No sores or ulcers are noted over the feet.    Skin: Skin is warm and dry. No rash noted. No erythema.   Psychiatric: He has a normal mood and appropriate affect. His behavior is normal. Judgment and thought content normal.    1. S/P CABG x 4   Doing well    2. Coronary artery disease involving native coronary artery of native heart without angina pectoris   Doing well    3. Prostate cancer (CMS-HCC)   Continue radiation treatment    4. Type 2 diabetes mellitus without complication, without long-term current use of insulin--under reasonable control (CMS-HCC)  Diabetic Monofilament LE Exam   5. Right inguinal hernia   This will be repaired in another month to 6 weeks.      Please note that this dictation was created using voice recognition software. I have worked with consultants from the vendor as well as technical experts from [facility] to optimize the interface. I have made every reasonable attempt to correct obvious errors, but I expect that there are errors of grammar and possibly content that I did not discover before finalizing the note.    Recheck 3 months or when necessary

## 2018-05-22 ENCOUNTER — OFFICE VISIT (OUTPATIENT)
Dept: MEDICAL GROUP | Facility: PHYSICIAN GROUP | Age: 78
End: 2018-05-22
Payer: MEDICARE

## 2018-05-22 VITALS
BODY MASS INDEX: 27.92 KG/M2 | SYSTOLIC BLOOD PRESSURE: 142 MMHG | DIASTOLIC BLOOD PRESSURE: 72 MMHG | HEART RATE: 70 BPM | OXYGEN SATURATION: 95 % | HEIGHT: 70 IN | TEMPERATURE: 97.7 F | RESPIRATION RATE: 14 BRPM | WEIGHT: 195 LBS

## 2018-05-22 DIAGNOSIS — L98.9 LESION OF SKIN OF FACE: ICD-10-CM

## 2018-05-22 DIAGNOSIS — I25.10 CORONARY ARTERY DISEASE INVOLVING NATIVE CORONARY ARTERY OF NATIVE HEART WITHOUT ANGINA PECTORIS: ICD-10-CM

## 2018-05-22 DIAGNOSIS — Z95.1 S/P CABG X 4: ICD-10-CM

## 2018-05-22 DIAGNOSIS — E11.9 TYPE 2 DIABETES MELLITUS WITHOUT COMPLICATION, WITHOUT LONG-TERM CURRENT USE OF INSULIN (HCC): ICD-10-CM

## 2018-05-22 DIAGNOSIS — C61 PROSTATE CANCER (HCC): ICD-10-CM

## 2018-05-22 DIAGNOSIS — E78.2 MIXED HYPERLIPIDEMIA: ICD-10-CM

## 2018-05-22 PROCEDURE — 99214 OFFICE O/P EST MOD 30 MIN: CPT | Performed by: FAMILY MEDICINE

## 2018-05-22 NOTE — PROGRESS NOTES
Patient comes in with several issues.  He brings in blood sugar readings which sometimes get as high as 209 in the mornings.  He always checks a fasting level.  His best reading is 122 but his averages are around 150.  I think we need to improve the control of his blood sugar.  His blood pressure is not too bad.  He has no chest pains or shortness of breath.  He is going to be seeing his urologist in the near future with regards to his history of prostate cancer.  He has a lump on his left cheek that he would like to have removed.  It does not bleed.    I reviewed the following    Past Medical History:   Diagnosis Date   • Anxiety    • Arthritis    • CAD (coronary artery disease) 07/06/2017    CABG x 4 (LIMA to distal diagonal, rSVG to distal OM1, rSVG to distal OM2, rSVG to distal LAD) by Dr. Gaffney.   • Diabetes (HCC)     oral medication    • Hypertension    • Inguinal hernia    • Mixed hyperlipidemia    • Nodular prostate without urinary obstruction    • Prostate cancer (HCC)         Past Surgical History:   Procedure Laterality Date   • MULTIPLE CORONARY ARTERY BYPASS ENDO VEIN HARVEST  7/6/2017    Procedure: MULTIPLE CORONARY ARTERY BYPASS ENDO VEIN HARVEST X2-4, PREVENA DRESSING ;  Surgeon: Anthony Gaffney M.D.;  Location: SURGERY [facility];  Service:    • ROHINI  7/6/2017    Procedure: ROHINI - INTRAOPERATIVE ;  Surgeon: Anthony Gaffney M.D.;  Location: SURGERY [facility];  Service:    • APPENDECTOMY  1955   • TONSILLECTOMY         No Known Allergies    Current Outpatient Prescriptions   Medication Sig Dispense Refill   • metFORMIN (GLUCOPHAGE) 500 MG Tab TAKE 2 TABS WITH BREAKFAST AND 1 TAB WITH DINNER 180 Tab 3   • atorvastatin (LIPITOR) 80 MG tablet Take 1 Tab by mouth every bedtime. 90 Tab 3   • metoprolol (LOPRESSOR) 25 MG Tab Take 1 Tab by mouth 2 Times a Day. 180 Tab 3   • aspirin (ASA) 81 MG Chew Tab chewable tablet Take 81 mg by mouth every day. Indications: post cardiac surgery to prevent blood  clots     • therapeutic multivitamin-minerals (THERAGRAN-M) Tab Take 1 Tab by mouth every day. Indications: supplement     • Glucosamine-Chondroit-Vit C-Mn (GLUCOSAMINE CHONDR 1500 COMPLX) Cap Take 1 Cap by mouth every day. 100 Cap 3   • Cholecalciferol (VITAMIN D3) 1000 UNIT TABS Take 1 Tab by mouth every day. 100 Tab 3     No current facility-administered medications for this visit.         Family History   Problem Relation Age of Onset   • Stroke Mother    • Heart Disease Father    • Heart Disease Brother        Social History     Social History   • Marital status:      Spouse name: N/A   • Number of children: N/A   • Years of education: N/A     Occupational History   • Not on file.     Social History Main Topics   • Smoking status: Never Smoker   • Smokeless tobacco: Never Used   • Alcohol use 1.2 oz/week     2 Shots of liquor per week   • Drug use: No   • Sexual activity: No     Other Topics Concern   • Not on file     Social History Narrative   • No narrative on file      Physical Exam   Constitutional: He is oriented. He appears well-developed and well-nourished. No distress.   HENT:   Head: Normocephalic and atraumatic.   Right Ear: External ear normal. Ear canal and TM normal   Left Ear: External ear normal. Ear canal and TM normal   Nose: Nose normal.   Mouth/Throat: Oropharynx is clear and moist.   Eyes: Conjunctivae and extraocular motions are normal. Pupils are equal, round, and reactive to light.        Fundi benign bilaterally   Neck: No thyromegaly present.   Cardiovascular: Normal rate, regular rhythm, normal heart sounds and intact distal pulses.  Exam reveals no gallop.    No murmur heard.  Pulmonary/Chest: Effort normal and breath sounds normal. No respiratory distress. He has no wheezes. He has no rales.   Abdominal: Soft. Bowel sounds are normal. No hepatosplenomegaly. He exhibits no distension. No tenderness. He has no rebound and no guarding.   Musculoskeletal: Normal range of motion.  He exhibits no edema and no tenderness.   Lymphadenopathy:     He has no cervical adenopathy.  No supraclavicular adenopathy.   Neurological: He is alert and oriented. He has normal reflexes.        Babinskis downgoing bilaterally--The patient has intact sensation to monofilament light touch over both feet. No sores or ulcers are noted over the feet.     Skin: Epidermal inclusion cyst on left cheek skin is warm and dry. No rash noted. No erythema.   Psychiatric: He has a normal mood and appropriate affect. His behavior is normal. Judgment and thought content normal.    1. S/P CABG x 4   doing well   2. Coronary artery disease involving native coronary artery of native heart without angina pectoris   stable   3. Mixed hyperlipidemia   doing well   4. Prostate cancer (HCC)   continue to see urologist   5. Type 2 diabetes mellitus without complication, without long-term current use of insulin (HCC)--we need better control of this metFORMIN (GLUCOPHAGE) 500 MG Tab--increased dose to 2 p.o. in the morning with breakfast and 1 p.o. in the evening with dinner    Diabetic Monofilament LE Exam   6. Lesion of skin of face  REFERRAL TO PLASTIC SURGERY--Dr. Immanuel Stapleton     Please note that this dictation was created using voice recognition software. I have worked with consultants from the vendor as well as technical experts from [facility] [employer] to optimize the interface. I have made every reasonable attempt to correct obvious errors, but I expect that there are errors of grammar and possibly content that I did not discover before finalizing the note.      Recheck 2 months or as needed

## 2018-07-18 ENCOUNTER — HOSPITAL ENCOUNTER (OUTPATIENT)
Facility: MEDICAL CENTER | Age: 78
End: 2018-07-18
Attending: PLASTIC SURGERY
Payer: MEDICARE

## 2018-07-18 PROCEDURE — 88305 TISSUE EXAM BY PATHOLOGIST: CPT

## 2018-07-23 ENCOUNTER — OFFICE VISIT (OUTPATIENT)
Dept: MEDICAL GROUP | Facility: PHYSICIAN GROUP | Age: 78
End: 2018-07-23
Payer: MEDICARE

## 2018-07-23 VITALS
HEIGHT: 70 IN | SYSTOLIC BLOOD PRESSURE: 122 MMHG | DIASTOLIC BLOOD PRESSURE: 62 MMHG | WEIGHT: 191 LBS | RESPIRATION RATE: 14 BRPM | BODY MASS INDEX: 27.35 KG/M2 | HEART RATE: 80 BPM | OXYGEN SATURATION: 95 % | TEMPERATURE: 97.7 F

## 2018-07-23 DIAGNOSIS — E11.9 TYPE 2 DIABETES MELLITUS WITHOUT COMPLICATION, WITHOUT LONG-TERM CURRENT USE OF INSULIN (HCC): ICD-10-CM

## 2018-07-23 DIAGNOSIS — I25.10 CORONARY ARTERY DISEASE INVOLVING NATIVE CORONARY ARTERY OF NATIVE HEART WITHOUT ANGINA PECTORIS: ICD-10-CM

## 2018-07-23 DIAGNOSIS — Z95.1 S/P CABG X 4: ICD-10-CM

## 2018-07-23 DIAGNOSIS — H91.93 DECREASED HEARING OF BOTH EARS: ICD-10-CM

## 2018-07-23 DIAGNOSIS — E78.2 MIXED HYPERLIPIDEMIA: ICD-10-CM

## 2018-07-23 DIAGNOSIS — I10 ESSENTIAL HYPERTENSION: ICD-10-CM

## 2018-07-23 PROCEDURE — 99214 OFFICE O/P EST MOD 30 MIN: CPT | Performed by: FAMILY MEDICINE

## 2018-07-23 RX ORDER — LISINOPRIL 10 MG/1
10 TABLET ORAL DAILY
Qty: 90 TAB | Refills: 3 | Status: SHIPPED | OUTPATIENT
Start: 2018-07-23 | End: 2018-09-25

## 2018-07-23 NOTE — PROGRESS NOTES
Patient comes in to reassess his blood sugars.  He is doing better with the increased dose of metformin.  He brings in but sugar readings which are in the 136-150 range now.  His blood pressure, however, has been going up some with ranges as high as 150/92.  He has no chest pains or shortness of breath.  His wife complains that he has decreased hearing and she would like to have his hearing checked.    I reviewed the following    Past Medical History:   Diagnosis Date   • Anxiety    • Arthritis    • CAD (coronary artery disease) 07/06/2017    CABG x 4 (LIMA to distal diagonal, rSVG to distal OM1, rSVG to distal OM2, rSVG to distal LAD) by Dr. Gaffney.   • Diabetes (HCC)     oral medication    • Hypertension    • Inguinal hernia    • Mixed hyperlipidemia    • Nodular prostate without urinary obstruction    • Prostate cancer (HCC)         Past Surgical History:   Procedure Laterality Date   • MULTIPLE CORONARY ARTERY BYPASS ENDO VEIN HARVEST  7/6/2017    Procedure: MULTIPLE CORONARY ARTERY BYPASS ENDO VEIN HARVEST X2-4, PREVENA DRESSING ;  Surgeon: Anthony Gaffney M.D.;  Location: SURGERY [facility];  Service:    • ROHINI  7/6/2017    Procedure: ROHINI - INTRAOPERATIVE ;  Surgeon: Anthony Gaffney M.D.;  Location: SURGERY [facility];  Service:    • APPENDECTOMY  1955   • TONSILLECTOMY         No Known Allergies    Current Outpatient Prescriptions   Medication Sig Dispense Refill   • lisinopril (PRINIVIL) 10 MG Tab Take 1 Tab by mouth every day. 90 Tab 3   • metFORMIN (GLUCOPHAGE) 500 MG Tab TAKE 2 TABS WITH BREAKFAST AND 1 TAB WITH DINNER 180 Tab 3   • atorvastatin (LIPITOR) 80 MG tablet Take 1 Tab by mouth every bedtime. 90 Tab 3   • metoprolol (LOPRESSOR) 25 MG Tab Take 1 Tab by mouth 2 Times a Day. 180 Tab 3   • aspirin (ASA) 81 MG Chew Tab chewable tablet Take 81 mg by mouth every day. Indications: post cardiac surgery to prevent blood clots     • therapeutic multivitamin-minerals (THERAGRAN-M) Tab Take 1 Tab by  mouth every day. Indications: supplement     • Glucosamine-Chondroit-Vit C-Mn (GLUCOSAMINE CHONDR 1500 COMPLX) Cap Take 1 Cap by mouth every day. 100 Cap 3   • Cholecalciferol (VITAMIN D3) 1000 UNIT TABS Take 1 Tab by mouth every day. 100 Tab 3     No current facility-administered medications for this visit.         Family History   Problem Relation Age of Onset   • Stroke Mother    • Heart Disease Father    • Heart Disease Brother        Social History     Social History   • Marital status:      Spouse name: N/A   • Number of children: N/A   • Years of education: N/A     Occupational History   • Not on file.     Social History Main Topics   • Smoking status: Never Smoker   • Smokeless tobacco: Never Used   • Alcohol use 1.2 oz/week     2 Shots of liquor per week   • Drug use: No   • Sexual activity: No     Other Topics Concern   • Not on file     Social History Narrative   • No narrative on file      Physical Exam   Constitutional: He is oriented. He appears well-developed and well-nourished. No distress.   HENT:   Head: Normocephalic and atraumatic.   Right Ear: External ear normal. Ear canal and TM normal   Left Ear: External ear normal. Ear canal and TM normal   Nose: Nose normal.   Mouth/Throat: Oropharynx is clear and moist.   Eyes: Conjunctivae and extraocular motions are normal. Pupils are equal, round, and reactive to light.        Fundi benign bilaterally   Neck: No thyromegaly present.   Cardiovascular: Normal rate, regular rhythm, normal heart sounds and intact distal pulses.  Exam reveals no gallop.    No murmur heard.  Pulmonary/Chest: Effort normal and breath sounds normal. No respiratory distress. He has no wheezes. He has no rales.   Abdominal: Soft. Bowel sounds are normal. No hepatosplenomegaly. He exhibits no distension. No tenderness. He has no rebound and no guarding.   Musculoskeletal: Normal range of motion. He exhibits no edema and no tenderness.   Lymphadenopathy:     He has no  cervical adenopathy.  No supraclavicular adenopathy.   Neurological: He is alert and oriented. He has normal reflexes.        Babinskis downgoing bilaterally--The patient has intact sensation to monofilament light touch over both feet. No sores or ulcers are noted over the feet.     Skin: Skin is warm and dry. No rash noted. No erythema.   Psychiatric: He has a normal mood and appropriate affect. His behavior is normal. Judgment and thought content normal.    1. S/P CABG x 4   doing well    2. Coronary artery disease involving native coronary artery of native heart without angina pectoris   doing well   3. Mixed hyperlipidemia   doing well   4. Type 2 diabetes mellitus without complication, without long-term current use of insulin ([facility])  lisinopril (PRINIVIL) 10 MG Tab--begin 1 p.o. daily.  Patient was warned about a cough.    Diabetic Monofilament LE Exam   5. Essential hypertension  lisinopril (PRINIVIL) 10 MG Tab   6. Decreased hearing of both ears   patient will go to the audiology department to [facility] for this.       Recheck with me 2 months or as needed    Please note that this dictation was created using voice recognition software. I have worked with consultants from the vendor as well as technical experts from [employer] [facility] [employer] to optimize the interface. I have made every reasonable attempt to correct obvious errors, but I expect that there are errors of grammar and possibly content that I did not discover before finalizing the note.

## 2018-08-14 ENCOUNTER — OFFICE VISIT (OUTPATIENT)
Dept: CARDIOLOGY | Facility: MEDICAL CENTER | Age: 78
End: 2018-08-14
Payer: MEDICARE

## 2018-08-14 VITALS
BODY MASS INDEX: 27.63 KG/M2 | HEART RATE: 68 BPM | SYSTOLIC BLOOD PRESSURE: 110 MMHG | WEIGHT: 193 LBS | HEIGHT: 70 IN | OXYGEN SATURATION: 94 % | DIASTOLIC BLOOD PRESSURE: 50 MMHG

## 2018-08-14 DIAGNOSIS — E78.2 MIXED HYPERLIPIDEMIA: ICD-10-CM

## 2018-08-14 DIAGNOSIS — I51.89 DIASTOLIC DYSFUNCTION: ICD-10-CM

## 2018-08-14 DIAGNOSIS — Z82.49 FAMILY HISTORY OF HEART DISEASE: ICD-10-CM

## 2018-08-14 DIAGNOSIS — Z95.1 S/P CABG X 4: ICD-10-CM

## 2018-08-14 DIAGNOSIS — I25.10 CORONARY ARTERY DISEASE INVOLVING NATIVE CORONARY ARTERY OF NATIVE HEART WITHOUT ANGINA PECTORIS: ICD-10-CM

## 2018-08-14 PROCEDURE — 99213 OFFICE O/P EST LOW 20 MIN: CPT | Performed by: INTERNAL MEDICINE

## 2018-08-14 RX ORDER — FUROSEMIDE 40 MG/1
40 TABLET ORAL DAILY
COMMUNITY
End: 2022-04-06

## 2018-08-14 RX ORDER — DOCUSATE SODIUM 100 MG/1
100 CAPSULE, LIQUID FILLED ORAL DAILY
COMMUNITY
End: 2019-09-06

## 2018-08-15 ENCOUNTER — PATIENT OUTREACH (OUTPATIENT)
Dept: HEALTH INFORMATION MANAGEMENT | Facility: OTHER | Age: 78
End: 2018-08-15

## 2018-08-15 NOTE — PROGRESS NOTES
Chief Complaint   Patient presents with   • Coronary Artery Disease       Subjective:   Raf Pelayo is a 77 y.o. male who presents today for follow-up of CAD status post CABG times 4 July 2017, hypertension and diabetes.  This is her first visit he is a former patient of Dr. brannon.  He is feeling well and has recovered well from his surgery.  He has no exertional chest discomfort.  He is moderately to mildly active only.    Denies any other cardiovascular symptoms including chest pain, shortness of breath, dyspnea on exertion, lightheadedness, syncope or presyncope, lower extremity edema, PND, orthopnea or palpitations.      Past Medical History:   Diagnosis Date   • Anxiety    • Arthritis    • CAD (coronary artery disease) 07/06/2017    CABG x 4 (LIMA to distal diagonal, rSVG to distal OM1, rSVG to distal OM2, rSVG to distal LAD) by Dr. Gaffney.   • Diabetes (HCC)     oral medication    • Hypertension    • Inguinal hernia    • Mixed hyperlipidemia    • Nodular prostate without urinary obstruction    • Prostate cancer (HCC)      Past Surgical History:   Procedure Laterality Date   • MULTIPLE CORONARY ARTERY BYPASS ENDO VEIN HARVEST  7/6/2017    Procedure: MULTIPLE CORONARY ARTERY BYPASS ENDO VEIN HARVEST X2-4, PREVENA DRESSING ;  Surgeon: Anthony Gaffney M.D.;  Location: SURGERY [facility] [place];  Service:    • ROHINI  7/6/2017    Procedure: ROHINI - INTRAOPERATIVE ;  Surgeon: Anthony Gaffney M.D.;  Location: SURGERY [facility] [place];  Service:    • APPENDECTOMY  1955   • TONSILLECTOMY       Family History   Problem Relation Age of Onset   • Stroke Mother    • Heart Disease Father    • Heart Disease Brother      Social History     Social History   • Marital status:      Spouse name: N/A   • Number of children: N/A   • Years of education: N/A     Occupational History   • Not on file.     Social History Main Topics   • Smoking status: Never Smoker   • Smokeless tobacco: Never Used   • Alcohol use 1.2  "oz/week     2 Shots of liquor per week   • Drug use: No   • Sexual activity: No     Other Topics Concern   • Not on file     Social History Narrative   • No narrative on file     No Known Allergies  Outpatient Encounter Prescriptions as of 8/14/2018   Medication Sig Dispense Refill   • furosemide (LASIX) 40 MG Tab Take 40 mg by mouth every day.     • POTASSIUM CHLORIDE PO Take 20 mEq by mouth every day.     • docusate sodium (COLACE) 100 MG Cap Take 100 mg by mouth every day.     • Ascorbic Acid (VITAMIN C PO) Take  by mouth.     • metFORMIN (GLUCOPHAGE) 500 MG Tab TAKE 2 TABS WITH BREAKFAST AND 1 TAB WITH DINNER 180 Tab 3   • atorvastatin (LIPITOR) 80 MG tablet Take 1 Tab by mouth every bedtime. 90 Tab 3   • metoprolol (LOPRESSOR) 25 MG Tab Take 1 Tab by mouth 2 Times a Day. 180 Tab 3   • aspirin (ASA) 81 MG Chew Tab chewable tablet Take 81 mg by mouth every day. Indications: post cardiac surgery to prevent blood clots     • therapeutic multivitamin-minerals (THERAGRAN-M) Tab Take 1 Tab by mouth every day. Indications: supplement     • Glucosamine-Chondroit-Vit C-Mn (GLUCOSAMINE CHONDR 1500 COMPLX) Cap Take 1 Cap by mouth every day. 100 Cap 3   • Cholecalciferol (VITAMIN D3) 1000 UNIT TABS Take 1 Tab by mouth every day. 100 Tab 3   • lisinopril (PRINIVIL) 10 MG Tab Take 1 Tab by mouth every day. 90 Tab 3     No facility-administered encounter medications on file as of 8/14/2018.      Review of Systems   All other systems reviewed and are negative.       Objective:   /50   Pulse 68   Ht 1.778 m (5' 10\")   Wt 87.5 kg (193 lb)   SpO2 94%   BMI 27.69 kg/m²     Physical Exam   Constitutional: He is oriented to person, place, and time. He appears well-developed and well-nourished. No distress.   HENT:   Head: Normocephalic and atraumatic.   Right Ear: External ear normal.   Left Ear: External ear normal.   Eyes: Pupils are equal, round, and reactive to light. Conjunctivae and EOM are normal. Right eye exhibits " no discharge. Left eye exhibits no discharge. No scleral icterus.   Neck: Normal range of motion. Neck supple. No JVD present. No tracheal deviation present. No thyromegaly present.   Cardiovascular: Normal rate, regular rhythm and intact distal pulses.  PMI is not displaced.  Exam reveals no gallop and no friction rub.    No murmur heard.  Pulses:       Carotid pulses are 2+ on the right side, and 2+ on the left side.       Radial pulses are 2+ on the left side.        Popliteal pulses are 2+ on the right side, and 2+ on the left side.        Dorsalis pedis pulses are 2+ on the right side, and 2+ on the left side.        Posterior tibial pulses are 2+ on the right side, and 2+ on the left side.   Pulmonary/Chest: Effort normal and breath sounds normal. No respiratory distress. He has no wheezes. He has no rales. He exhibits no tenderness.   Well-healed midline sternal incision   Abdominal: Soft. Bowel sounds are normal. He exhibits no distension. There is no tenderness.   Musculoskeletal: Normal range of motion. He exhibits no edema, tenderness or deformity.   Neurological: He is alert and oriented to person, place, and time. No cranial nerve deficit (cranial nerves II through XII grossly intact). Coordination normal.   Skin: Skin is warm and dry. No rash noted. He is not diaphoretic. No erythema. No pallor.   Psychiatric: He has a normal mood and affect. His behavior is normal. Thought content normal.   Vitals reviewed.    LABS:  Lab Results   Component Value Date/Time    CHOLSTRLTOT 104 10/10/2017 06:33 AM    LDL 52 10/10/2017 06:33 AM    HDL 30 (A) 10/10/2017 06:33 AM    TRIGLYCERIDE 110 10/10/2017 06:33 AM       Lab Results   Component Value Date/Time    WBC 9.2 08/16/2017 10:13 AM    RBC 4.53 (L) 08/16/2017 10:13 AM    HEMOGLOBIN 13.4 (L) 08/16/2017 10:13 AM    HEMATOCRIT 41.8 (L) 08/16/2017 10:13 AM    MCV 92.3 08/16/2017 10:13 AM    NEUTSPOLYS 66.00 07/05/2017 03:05 PM    LYMPHOCYTES 23.00 07/05/2017 03:05  PM    MONOCYTES 7.60 07/05/2017 03:05 PM    EOSINOPHILS 2.20 07/05/2017 03:05 PM    BASOPHILS 0.60 07/05/2017 03:05 PM    HYPOCHROMIA 1+ 02/19/2013 10:35 AM     Lab Results   Component Value Date/Time    SODIUM 140 10/10/2017 06:33 AM    POTASSIUM 4.0 10/10/2017 06:33 AM    CHLORIDE 105 10/10/2017 06:33 AM    CO2 26 10/10/2017 06:33 AM    GLUCOSE 109 (H) 10/10/2017 06:33 AM    BUN 18 10/10/2017 06:33 AM    CREATININE 0.94 10/10/2017 06:33 AM    CREATININE 1.0 10/27/2008 08:20 AM     Lab Results   Component Value Date    HBA1C 7.2 (H) 07/05/2017      Lab Results   Component Value Date/Time    ALKPHOSPHAT 64 10/10/2017 06:33 AM    ASTSGOT 20 10/10/2017 06:33 AM    ALTSGPT 23 10/10/2017 06:33 AM    TBILIRUBIN 0.6 10/10/2017 06:33 AM      Lab Results   Component Value Date/Time    BNPBTYPENAT 52 08/16/2017 10:13 AM      No results found for: TSH  Lab Results   Component Value Date/Time    PROTHROMBTM 16.7 (H) 07/06/2017 12:27 PM    INR 1.31 (H) 07/06/2017 12:27 PM          During reviewed imaging  Assessment:     1. Coronary artery disease involving native coronary artery of native heart without angina pectoris     2. S/P CABG x 4     3. Mixed hyperlipidemia     4. Family history of heart disease     5. Diastolic dysfunction         Medical Decision Making:  Today's Assessment / Status / Plan:       Cholesterol profile is good, blood pressure is good, therapy is appropriate.  He feels well and has no cardiac symptoms.  Recommend follow-up in 1 year and increased activity and directed at cardiovascular fitness.

## 2018-08-15 NOTE — PROGRESS NOTES
Outcome: Left Message    Please transfer to Patient Outreach Team at [phone] when patient returns call.    WebIZ Checked & Epic Updated:  yes    HealthConnect Verified: no    Attempt # 1

## 2018-08-16 NOTE — TELEPHONE ENCOUNTER
Requested Prescriptions     Signed Prescriptions Disp Refills   • ONE TOUCH ULTRA TEST strip 100 Strip 3     Sig: USE TO TEST BLOOD SUGARS ONCE DAILY     Authorizing Provider: JOSE ALEJANDRO ALARCON   • ONETOUCH DELICA LANCETS FINE Misc 100 Each 3     Sig: USE TO TEST BLOOD SUGARS ONCE DAILY     Authorizing Provider: JOSE ALEJANDRO ALARCON A.P.R.N.

## 2018-08-31 NOTE — PROGRESS NOTES
1. Attempt #:Final    2. WebIZ Checked & Epic Updated: Yes  3. HealthConnect Verified: no  4. Verify PCP: yes    5. Communication Preference Obtained: yes    6. Diabetes Visit Scheduling  Scheduling Status:Scheduled/already scheduled      7. Care Gap Scheduling (Attempt to Schedule EACH Overdue Care Gap!)    Health Maintenance Due   Topic Date Due   • RETINAL SCREENING  08/17/1958   • IMM HEP B VACCINE (1 of 3 - Risk 3-dose series) 08/17/1959   • COLON CANCER SCREENING ANNUAL FIT  04/18/2017   • IMM ZOSTER VACCINES (2 of 3) 11/29/2017   • A1C SCREENING  01/05/2018   • URINE ACR / MICROALBUMIN  04/18/2018   • Annual Wellness Visit  08/12/2018   • IMM INFLUENZA (1) 09/01/2018        8. Patient was directed to Health and Wellness Website: no     - Patient plans to schedule appointment for Retinal Eye Exam.    9. Screened for [employer] Prescription? yes  10. [employer] Activation: already active  11. [employer] Majo: no  12. Virtual Visits: no  13. Opt In to Text Messages: no

## 2018-09-20 DIAGNOSIS — Z95.1 S/P CABG X 4: ICD-10-CM

## 2018-09-20 DIAGNOSIS — R06.09 DYSPNEA ON EXERTION: ICD-10-CM

## 2018-09-20 RX ORDER — POTASSIUM CHLORIDE 20 MEQ/1
TABLET, EXTENDED RELEASE ORAL
Qty: 90 TAB | Refills: 3 | Status: SHIPPED | OUTPATIENT
Start: 2018-09-20 | End: 2019-04-17 | Stop reason: CLARIF

## 2018-09-25 ENCOUNTER — OFFICE VISIT (OUTPATIENT)
Dept: MEDICAL GROUP | Facility: PHYSICIAN GROUP | Age: 78
End: 2018-09-25
Payer: MEDICARE

## 2018-09-25 VITALS
HEART RATE: 68 BPM | DIASTOLIC BLOOD PRESSURE: 62 MMHG | HEIGHT: 70 IN | BODY MASS INDEX: 27.06 KG/M2 | TEMPERATURE: 97.2 F | RESPIRATION RATE: 14 BRPM | WEIGHT: 189 LBS | OXYGEN SATURATION: 96 % | SYSTOLIC BLOOD PRESSURE: 130 MMHG

## 2018-09-25 DIAGNOSIS — I25.10 CORONARY ARTERY DISEASE INVOLVING NATIVE CORONARY ARTERY OF NATIVE HEART WITHOUT ANGINA PECTORIS: ICD-10-CM

## 2018-09-25 DIAGNOSIS — C61 PROSTATE CANCER (HCC): ICD-10-CM

## 2018-09-25 DIAGNOSIS — Z23 NEEDS FLU SHOT: ICD-10-CM

## 2018-09-25 DIAGNOSIS — I10 ESSENTIAL HYPERTENSION: ICD-10-CM

## 2018-09-25 DIAGNOSIS — E78.2 MIXED HYPERLIPIDEMIA: ICD-10-CM

## 2018-09-25 DIAGNOSIS — E11.9 TYPE 2 DIABETES MELLITUS WITHOUT COMPLICATION, WITHOUT LONG-TERM CURRENT USE OF INSULIN (HCC): ICD-10-CM

## 2018-09-25 PROCEDURE — G0008 ADMIN INFLUENZA VIRUS VAC: HCPCS | Performed by: FAMILY MEDICINE

## 2018-09-25 PROCEDURE — 99214 OFFICE O/P EST MOD 30 MIN: CPT | Mod: 25 | Performed by: FAMILY MEDICINE

## 2018-09-25 PROCEDURE — 90662 IIV NO PRSV INCREASED AG IM: CPT | Performed by: FAMILY MEDICINE

## 2018-09-25 RX ORDER — LOSARTAN POTASSIUM 25 MG/1
25 TABLET ORAL DAILY
Qty: 30 TAB | Refills: 3 | Status: SHIPPED | OUTPATIENT
Start: 2018-09-25 | End: 2019-01-27 | Stop reason: SDUPTHER

## 2018-09-25 ASSESSMENT — PATIENT HEALTH QUESTIONNAIRE - PHQ9: CLINICAL INTERPRETATION OF PHQ2 SCORE: 0

## 2018-09-25 NOTE — PROGRESS NOTES
Patient comes in with several issues.  He is due for lab work.  He feels well.  He has no chest pains and no shortness of breath.  He requests a flu shot.  He has developed a cough on lisinopril.  Otherwise he feels well.    I reviewed the following    Past Medical History:   Diagnosis Date   • Anxiety    • Arthritis    • CAD (coronary artery disease) 07/06/2017    CABG x 4 (LIMA to distal diagonal, rSVG to distal OM1, rSVG to distal OM2, rSVG to distal LAD) by Dr. Gaffney.   • Diabetes (HCC)     oral medication    • Hypertension    • Inguinal hernia    • Mixed hyperlipidemia    • Nodular prostate without urinary obstruction    • Prostate cancer (HCC)         Past Surgical History:   Procedure Laterality Date   • MULTIPLE CORONARY ARTERY BYPASS ENDO VEIN HARVEST  7/6/2017    Procedure: MULTIPLE CORONARY ARTERY BYPASS ENDO VEIN HARVEST X2-4, PREVENA DRESSING ;  Surgeon: Anthony Gaffney M.D.;  Location: SURGERY [facility];  Service:    • ROHINI  7/6/2017    Procedure: ROHINI - INTRAOPERATIVE ;  Surgeon: Anthony Gaffney M.D.;  Location: SURGERY [facility] [place];  Service:    • APPENDECTOMY  1955   • TONSILLECTOMY         Allergies   Allergen Reactions   • Lisinopril      Cough         Current Outpatient Prescriptions   Medication Sig Dispense Refill   • losartan (COZAAR) 25 MG Tab Take 1 Tab by mouth every day. 30 Tab 3   • potassium chloride SA (KDUR) 20 MEQ Tab CR TAKE 1 TABLET BY MOUTH EVERY DAY 90 Tab 3   • ONE TOUCH ULTRA TEST strip USE TO TEST BLOOD SUGARS ONCE DAILY 100 Strip 3   • ONETOUCH DELICA LANCETS FINE Misc USE TO TEST BLOOD SUGARS ONCE DAILY 100 Each 3   • docusate sodium (COLACE) 100 MG Cap Take 100 mg by mouth every day.     • Ascorbic Acid (VITAMIN C PO) Take  by mouth.     • metFORMIN (GLUCOPHAGE) 500 MG Tab TAKE 2 TABS WITH BREAKFAST AND 1 TAB WITH DINNER 180 Tab 3   • atorvastatin (LIPITOR) 80 MG tablet Take 1 Tab by mouth every bedtime. 90 Tab 3   • metoprolol (LOPRESSOR) 25 MG Tab Take 1 Tab by  mouth 2 Times a Day. 180 Tab 3   • aspirin (ASA) 81 MG Chew Tab chewable tablet Take 81 mg by mouth every day. Indications: post cardiac surgery to prevent blood clots     • therapeutic multivitamin-minerals (THERAGRAN-M) Tab Take 1 Tab by mouth every day. Indications: supplement     • Glucosamine-Chondroit-Vit C-Mn (GLUCOSAMINE CHONDR 1500 COMPLX) Cap Take 1 Cap by mouth every day. 100 Cap 3   • Cholecalciferol (VITAMIN D3) 1000 UNIT TABS Take 1 Tab by mouth every day. 100 Tab 3   • furosemide (LASIX) 40 MG Tab Take 40 mg by mouth every day.     • POTASSIUM CHLORIDE PO Take 20 mEq by mouth every day.       No current facility-administered medications for this visit.         Family History   Problem Relation Age of Onset   • Stroke Mother    • Heart Disease Father    • Heart Disease Brother        Social History     Social History   • Marital status:      Spouse name: N/A   • Number of children: N/A   • Years of education: N/A     Occupational History   • Not on file.     Social History Main Topics   • Smoking status: Never Smoker   • Smokeless tobacco: Never Used   • Alcohol use 1.2 oz/week     2 Shots of liquor per week   • Drug use: No   • Sexual activity: No     Other Topics Concern   • Not on file     Social History Narrative   • No narrative on file      Physical Exam   Constitutional: He is oriented. He appears well-developed and well-nourished. No distress.   HENT:   Head: Normocephalic and atraumatic.   Right Ear: External ear normal. Ear canal and TM normal   Left Ear: External ear normal. Ear canal and TM normal   Nose: Nose normal.   Mouth/Throat: Oropharynx is clear and moist.   Eyes: Conjunctivae and extraocular motions are normal. Pupils are equal, round, and reactive to light.        Fundi benign bilaterally   Neck: No thyromegaly present.   Cardiovascular: Normal rate, regular rhythm, normal heart sounds and intact distal pulses.  Exam reveals no gallop.    No murmur heard.  Pulmonary/Chest:  Effort normal and breath sounds normal. No respiratory distress. He has no wheezes. He has no rales.   Abdominal: Soft. Bowel sounds are normal. No hepatosplenomegaly. He exhibits no distension. No tenderness. He has no rebound and no guarding.   Musculoskeletal: Normal range of motion. He exhibits no edema and no tenderness.   Lymphadenopathy:     He has no cervical adenopathy.  No supraclavicular adenopathy.   Neurological: He is alert and oriented. He has normal reflexes.        Babinskis downgoing bilaterally   Skin: Skin is warm and dry. No rash noted. No erythema.   Psychiatric: He has a normal mood and appropriate affect. His behavior is normal. Judgment and thought content normal.    1. Essential hypertension--controlled, but the patient has developed a cough on lisinopril. losartan (COZAAR) 25 MG Tab--change to this at 1 p.o. daily.  Discontinue lisinopril.   2. Mixed hyperlipidemia  COMP METABOLIC PANEL    LIPID PROFILE   3. Coronary artery disease involving native coronary artery of native heart without angina pectoris   doing well   4. Type 2 diabetes mellitus without complication, without long-term current use of insulin (HCC) --doing well COMP METABOLIC PANEL    LIPID PROFILE    MICROALBUMIN CREAT RATIO URINE    HEMOGLOBIN A1C   5. Needs flu shot  Influenza Vaccine, High Dose (65+ Only)   6. Prostate cancer (HCC)   continue to see urologist.     Please note that this dictation was created using voice recognition software. I have worked with consultants from the vendor as well as technical experts from [employer] [facility] to optimize the interface. I have made every reasonable attempt to correct obvious errors, but I expect that there are errors of grammar and possibly content that I did not discover before finalizing the note.    Recheck 3 months or as needed

## 2018-10-23 ENCOUNTER — TELEPHONE (OUTPATIENT)
Dept: MEDICAL GROUP | Facility: PHYSICIAN GROUP | Age: 78
End: 2018-10-23

## 2018-10-23 NOTE — TELEPHONE ENCOUNTER
----- Message from Raf Pelayo sent at 10/22/2018  7:34 PM PDT -----  Regarding: Prescription Question  Contact: [phone]  Dr. Velázquez:  Oswald [facility] a [facility]  Simeon said he had no remaining refills, but your office says he was prescribed a year's amount.  Anyway, can he have a new prescription, please.  Thanks, Estefanía Pelayo   [phone]

## 2018-10-23 NOTE — TELEPHONE ENCOUNTER
1. Caller Name: Raf Pelayo                                         Call Back Number: [phone] (home)       Patient approves a detailed voicemail message: N\A    Spoke with Wife Estefanía to advise her I spoke with the pharmacy The prescription was called in to soon. It is ready for refill so the pharmacy is processing it now will contact Pt to .

## 2018-11-09 ENCOUNTER — OFFICE VISIT (OUTPATIENT)
Dept: URGENT CARE | Facility: PHYSICIAN GROUP | Age: 78
End: 2018-11-09
Payer: MEDICARE

## 2018-11-09 VITALS
WEIGHT: 188 LBS | OXYGEN SATURATION: 96 % | BODY MASS INDEX: 26.92 KG/M2 | TEMPERATURE: 97.6 F | HEART RATE: 60 BPM | HEIGHT: 70 IN | DIASTOLIC BLOOD PRESSURE: 70 MMHG | RESPIRATION RATE: 14 BRPM | SYSTOLIC BLOOD PRESSURE: 120 MMHG

## 2018-11-09 DIAGNOSIS — H93.19 TINNITUS, UNSPECIFIED LATERALITY: ICD-10-CM

## 2018-11-09 PROCEDURE — 99213 OFFICE O/P EST LOW 20 MIN: CPT | Performed by: PHYSICIAN ASSISTANT

## 2018-11-09 ASSESSMENT — ENCOUNTER SYMPTOMS
STRIDOR: 0
WHEEZING: 0
ABDOMINAL PAIN: 0
SHORTNESS OF BREATH: 0
BLURRED VISION: 0
SINUS PAIN: 0
SPUTUM PRODUCTION: 0
CHILLS: 0
DIARRHEA: 0
ORTHOPNEA: 0
SORE THROAT: 0
MYALGIAS: 0
FEVER: 0
PALPITATIONS: 0
COUGH: 0
VOMITING: 0
NAUSEA: 0

## 2018-11-09 NOTE — PROGRESS NOTES
"Subjective:   Raf Pelayo is a 78 y.o. male who presents for Ringing in Ear (ringing in both ears started yesterday )        Other   Pertinent negatives include no abdominal pain, chest pain, chills, congestion, coughing, fever, myalgias, nausea, rash, sore throat or vomiting.     nots onset of tinnitus yesterday afternoon, denies other s/sx thus far, denies PMH of similar, noted maybe trace momentarily in the past, denies fever/chills, trace cough - minimal, c/o occasional ear pain about one week s/p some pain to touch outer right ear, none of that today, notes some wax build up but no pain now, denies new or change in dizziness. Denies feeling sick. Denies nausea/vomiting/abdpain/diarrhea.  Patient denies concerning symptoms of chest pain palpitations leg swelling shortness of breath.  In general he feels well.  He has noted approximately just over 1 day of tinnitus to bilateral ears.  He denies feeling sinus congestion pressure.  He denies past medical history of seasonal allergies.  He denies ear pain discharge or drainage.    Review of Systems   Constitutional: Negative for chills and fever.   HENT: Positive for tinnitus. Negative for congestion, ear discharge, ear pain, hearing loss, nosebleeds, sinus pain and sore throat.    Eyes: Negative for blurred vision.   Respiratory: Negative for cough, sputum production, shortness of breath, wheezing and stridor.    Cardiovascular: Negative for chest pain, palpitations, orthopnea and leg swelling.   Gastrointestinal: Negative for abdominal pain, diarrhea, nausea and vomiting.   Musculoskeletal: Negative for myalgias.   Skin: Negative for rash.   Endo/Heme/Allergies: Negative for environmental allergies.     Allergies   Allergen Reactions   • Lisinopril      Cough        Objective:   /70   Pulse 60   Temp 36.4 °C (97.6 °F) (Temporal)   Resp 14   Ht 1.778 m (5' 10\")   Wt 85.3 kg (188 lb)   SpO2 96%   BMI 26.98 kg/m²   Physical Exam "   Constitutional: He is oriented to person, place, and time. He appears well-developed and well-nourished. No distress.   HENT:   Head: Normocephalic and atraumatic.   Right Ear: Tympanic membrane, external ear and ear canal normal.   Left Ear: Tympanic membrane, external ear and ear canal normal.   Nose: Nose normal.   Mouth/Throat: Uvula is midline and mucous membranes are normal. Posterior oropharyngeal erythema ( mild PND) present. No oropharyngeal exudate, posterior oropharyngeal edema or tonsillar abscesses.   Bilateral EAC with patent canals slight bulge but generally normal appearance of tympanic membranes bilaterally   Eyes: Conjunctivae are normal. Right eye exhibits no discharge. Left eye exhibits no discharge. No scleral icterus.   Neck: Neck supple. No JVD present.   Cardiovascular: Normal rate, regular rhythm and intact distal pulses.   No extrasystoles are present.   Pulmonary/Chest: Effort normal. No respiratory distress. He has no decreased breath sounds. He has no wheezes. He has no rhonchi. He has no rales.   Musculoskeletal: Normal range of motion.        Right lower leg: He exhibits no edema.        Left lower leg: He exhibits no edema.   Lymphadenopathy:     He has no cervical adenopathy.   Neurological: He is alert and oriented to person, place, and time. Coordination normal.   Skin: Skin is warm and dry. He is not diaphoretic. No pallor.   Psychiatric: He has a normal mood and affect.   Nursing note and vitals reviewed.        Assessment/Plan:   1. Tinnitus, unspecified laterality  - REFERRAL TO ENT  Supportive care is reviewed with patient/caregiver - recommend to push PO fluids and electrolytes, Nsaids/tylenol, netti pot/saline irrig, humidifier in home, flonase, ponaris, OTC antihistamine - f/u w/ ENT w/ lack of resolution or to ER w/ new s/sx - ER precautions with any worsening symptoms are reviewed with patient/caregiver and they do express understanding    Differential diagnosis,  natural history, supportive care, and indications for immediate follow-up discussed.

## 2018-11-12 DIAGNOSIS — Z95.1 S/P CABG X 4: ICD-10-CM

## 2018-11-12 DIAGNOSIS — I25.119 CORONARY ARTERY DISEASE INVOLVING NATIVE CORONARY ARTERY OF NATIVE HEART WITH ANGINA PECTORIS (HCC): ICD-10-CM

## 2018-11-27 ENCOUNTER — HOSPITAL ENCOUNTER (OUTPATIENT)
Dept: LAB | Facility: MEDICAL CENTER | Age: 78
End: 2018-11-27
Attending: FAMILY MEDICINE
Payer: MEDICARE

## 2018-11-27 DIAGNOSIS — E11.9 TYPE 2 DIABETES MELLITUS WITHOUT COMPLICATION, WITHOUT LONG-TERM CURRENT USE OF INSULIN (HCC): ICD-10-CM

## 2018-11-27 DIAGNOSIS — E78.2 MIXED HYPERLIPIDEMIA: ICD-10-CM

## 2018-11-27 LAB
CREAT UR-MCNC: 125.8 MG/DL
EST. AVERAGE GLUCOSE BLD GHB EST-MCNC: 166 MG/DL
HBA1C MFR BLD: 7.4 % (ref 0–5.6)
MICROALBUMIN UR-MCNC: 1.9 MG/DL
MICROALBUMIN/CREAT UR: 15 MG/G (ref 0–30)

## 2018-11-27 PROCEDURE — 83036 HEMOGLOBIN GLYCOSYLATED A1C: CPT | Mod: GA

## 2018-11-27 PROCEDURE — 80053 COMPREHEN METABOLIC PANEL: CPT

## 2018-11-27 PROCEDURE — 82043 UR ALBUMIN QUANTITATIVE: CPT

## 2018-11-27 PROCEDURE — 80061 LIPID PANEL: CPT

## 2018-11-27 PROCEDURE — 36415 COLL VENOUS BLD VENIPUNCTURE: CPT

## 2018-11-27 PROCEDURE — 82570 ASSAY OF URINE CREATININE: CPT

## 2018-11-28 LAB
ALBUMIN SERPL BCP-MCNC: 4.2 G/DL (ref 3.2–4.9)
ALBUMIN/GLOB SERPL: 1.9 G/DL
ALP SERPL-CCNC: 78 U/L (ref 30–99)
ALT SERPL-CCNC: 29 U/L (ref 2–50)
ANION GAP SERPL CALC-SCNC: 7 MMOL/L (ref 0–11.9)
AST SERPL-CCNC: 20 U/L (ref 12–45)
BILIRUB SERPL-MCNC: 0.6 MG/DL (ref 0.1–1.5)
BUN SERPL-MCNC: 18 MG/DL (ref 8–22)
CALCIUM SERPL-MCNC: 9.2 MG/DL (ref 8.5–10.5)
CHLORIDE SERPL-SCNC: 108 MMOL/L (ref 96–112)
CHOLEST SERPL-MCNC: 98 MG/DL (ref 100–199)
CO2 SERPL-SCNC: 25 MMOL/L (ref 20–33)
CREAT SERPL-MCNC: 0.98 MG/DL (ref 0.5–1.4)
FASTING STATUS PATIENT QL REPORTED: NORMAL
GLOBULIN SER CALC-MCNC: 2.2 G/DL (ref 1.9–3.5)
GLUCOSE SERPL-MCNC: 122 MG/DL (ref 65–99)
HDLC SERPL-MCNC: 33 MG/DL
LDLC SERPL CALC-MCNC: 52 MG/DL
POTASSIUM SERPL-SCNC: 4.3 MMOL/L (ref 3.6–5.5)
PROT SERPL-MCNC: 6.4 G/DL (ref 6–8.2)
SODIUM SERPL-SCNC: 140 MMOL/L (ref 135–145)
TRIGL SERPL-MCNC: 66 MG/DL (ref 0–149)

## 2018-12-04 ENCOUNTER — OFFICE VISIT (OUTPATIENT)
Dept: MEDICAL GROUP | Facility: PHYSICIAN GROUP | Age: 78
End: 2018-12-04
Payer: MEDICARE

## 2018-12-04 VITALS
RESPIRATION RATE: 16 BRPM | SYSTOLIC BLOOD PRESSURE: 132 MMHG | WEIGHT: 189 LBS | DIASTOLIC BLOOD PRESSURE: 70 MMHG | OXYGEN SATURATION: 99 % | HEART RATE: 77 BPM | BODY MASS INDEX: 27.06 KG/M2 | HEIGHT: 70 IN | TEMPERATURE: 97.7 F

## 2018-12-04 DIAGNOSIS — Z95.1 S/P CABG X 4: ICD-10-CM

## 2018-12-04 DIAGNOSIS — H93.13 TINNITUS OF BOTH EARS: ICD-10-CM

## 2018-12-04 DIAGNOSIS — C61 PROSTATE CANCER (HCC): ICD-10-CM

## 2018-12-04 DIAGNOSIS — E11.9 TYPE 2 DIABETES MELLITUS WITHOUT COMPLICATION, WITHOUT LONG-TERM CURRENT USE OF INSULIN (HCC): ICD-10-CM

## 2018-12-04 DIAGNOSIS — E78.2 MIXED HYPERLIPIDEMIA: ICD-10-CM

## 2018-12-04 PROCEDURE — 99214 OFFICE O/P EST MOD 30 MIN: CPT | Performed by: FAMILY MEDICINE

## 2018-12-05 NOTE — PROGRESS NOTES
Patient comes in to reassess his diabetes.  He brings in good blood sugar readings which are in the 130-150 range.  He is gotten more serious about following the diabetic diet.  He does not have chest pains.  He does not have shortness of breath.  He still complains of tinnitus.  He is going to be going to [facility] in the near future about this.    I reviewed the following    Past Medical History:   Diagnosis Date   • Anxiety    • Arthritis    • CAD (coronary artery disease) 07/06/2017    CABG x 4 (LIMA to distal diagonal, rSVG to distal OM1, rSVG to distal OM2, rSVG to distal LAD) by Dr. Gaffney.   • Diabetes (HCC)     oral medication    • Hypertension    • Inguinal hernia    • Mixed hyperlipidemia    • Nodular prostate without urinary obstruction    • Prostate cancer (HCC)         Past Surgical History:   Procedure Laterality Date   • MULTIPLE CORONARY ARTERY BYPASS ENDO VEIN HARVEST  7/6/2017    Procedure: MULTIPLE CORONARY ARTERY BYPASS ENDO VEIN HARVEST X2-4, PREVENA DRESSING ;  Surgeon: Anthony Gaffney M.D.;  Location: SURGERY [facility];  Service:    • ROHINI  7/6/2017    Procedure: ROHINI - INTRAOPERATIVE ;  Surgeon: Anthony Gaffney M.D.;  Location: SURGERY [facility];  Service:    • APPENDECTOMY  1955   • TONSILLECTOMY         Allergies   Allergen Reactions   • Lisinopril      Cough         Current Outpatient Prescriptions   Medication Sig Dispense Refill   • metFORMIN (GLUCOPHAGE) 500 MG Tab TAKE 2 TABS WITH BREAKFAST AND 1 TAB WITH DINNER 270 Tab 3   • metoprolol (LOPRESSOR) 25 MG Tab Take 1 Tab by mouth 2 Times a Day. 180 Tab 2   • losartan (COZAAR) 25 MG Tab Take 1 Tab by mouth every day. 30 Tab 3   • potassium chloride SA (KDUR) 20 MEQ Tab CR TAKE 1 TABLET BY MOUTH EVERY DAY 90 Tab 3   • ONE TOUCH ULTRA TEST strip USE TO TEST BLOOD SUGARS ONCE DAILY 100 Strip 3   • ONETOUCH DELICA LANCETS FINE Misc USE TO TEST BLOOD SUGARS ONCE DAILY 100 Each 3   • furosemide (LASIX) 40 MG Tab Take 40 mg by mouth  every day.     • POTASSIUM CHLORIDE PO Take 20 mEq by mouth every day.     • docusate sodium (COLACE) 100 MG Cap Take 100 mg by mouth every day.     • Ascorbic Acid (VITAMIN C PO) Take  by mouth.     • atorvastatin (LIPITOR) 80 MG tablet Take 1 Tab by mouth every bedtime. 90 Tab 3   • aspirin (ASA) 81 MG Chew Tab chewable tablet Take 81 mg by mouth every day. Indications: post cardiac surgery to prevent blood clots     • therapeutic multivitamin-minerals (THERAGRAN-M) Tab Take 1 Tab by mouth every day. Indications: supplement     • Glucosamine-Chondroit-Vit C-Mn (GLUCOSAMINE CHONDR 1500 COMPLX) Cap Take 1 Cap by mouth every day. 100 Cap 3   • Cholecalciferol (VITAMIN D3) 1000 UNIT TABS Take 1 Tab by mouth every day. 100 Tab 3     No current facility-administered medications for this visit.         Family History   Problem Relation Age of Onset   • Stroke Mother    • Heart Disease Father    • Heart Disease Brother        Social History     Social History   • Marital status:      Spouse name: N/A   • Number of children: N/A   • Years of education: N/A     Occupational History   • Not on file.     Social History Main Topics   • Smoking status: Never Smoker   • Smokeless tobacco: Never Used   • Alcohol use 1.2 oz/week     2 Shots of liquor per week   • Drug use: No   • Sexual activity: No     Other Topics Concern   • Not on file     Social History Narrative   • No narrative on file      Hospital Outpatient Visit on 11/27/2018   Component Date Value   • Sodium 11/27/2018 140    • Potassium 11/27/2018 4.3    • Chloride 11/27/2018 108    • Co2 11/27/2018 25    • Anion Gap 11/27/2018 7.0    • Glucose 11/27/2018 122*   • Bun 11/27/2018 18    • Creatinine 11/27/2018 0.98    • Calcium 11/27/2018 9.2    • AST(SGOT) 11/27/2018 20    • ALT(SGPT) 11/27/2018 29    • Alkaline Phosphatase 11/27/2018 78    • Total Bilirubin 11/27/2018 0.6    • Albumin 11/27/2018 4.2    • Total Protein 11/27/2018 6.4    • Globulin 11/27/2018 2.2     • A-G Ratio 11/27/2018 1.9    • Cholesterol,Tot 11/27/2018 98*   • Triglycerides 11/27/2018 66    • HDL 11/27/2018 33*   • LDL 11/27/2018 52    • Creatinine, Urine 11/27/2018 125.80    • Microalbumin, Urine Rand* 11/27/2018 1.9    • Micro Alb Creat Ratio 11/27/2018 15    • Glycohemoglobin 11/27/2018 7.4*   • Est Avg Glucose 11/27/2018 166    • Fasting Status 11/27/2018 Fasting    • GFR If  11/27/2018 >60    • GFR If Non  Ameri* 11/27/2018 >60        Physical Exam   Constitutional: He is oriented. He appears well-developed and well-nourished. No distress.   HENT:   Head: Normocephalic and atraumatic.   Right Ear: External ear normal. Ear canal and TM normal   Left Ear: External ear normal. Ear canal and TM normal   Nose: Nose normal.   Mouth/Throat: Oropharynx is clear and moist.   Eyes: Conjunctivae and extraocular motions are normal. Pupils are equal, round, and reactive to light.        Fundi benign bilaterally   Neck: No thyromegaly present.   Cardiovascular: Normal rate, regular rhythm, normal heart sounds and intact distal pulses.  Exam reveals no gallop.    No murmur heard.  Pulmonary/Chest: Effort normal and breath sounds normal. No respiratory distress. He has no wheezes. He has no rales.   Abdominal: Soft. Bowel sounds are normal. No hepatosplenomegaly. He exhibits no distension. No tenderness. He has no rebound and no guarding.   Musculoskeletal: Normal range of motion. He exhibits no edema and no tenderness.   Lymphadenopathy:     He has no cervical adenopathy.  No supraclavicular adenopathy.   Neurological: He is alert and oriented. He has normal reflexes.        Babinskis downgoing bilaterally--The patient has intact sensation to monofilament light touch over both feet. No sores or ulcers are noted over the feet.     Skin: Skin is warm and dry. No rash noted. No erythema.   Psychiatric: He has a normal mood and appropriate affect. His behavior is normal. Judgment and thought  content normal.     1. Mixed hyperlipidemia   doing well   2. S/P CABG x 4   doing well   3. Type 2 diabetes mellitus without complication, without long-term current use of insulin (HCC) --doing well metFORMIN (GLUCOPHAGE) 500 MG Tab--continue to with breakfast and one with dinner   4. Prostate cancer (HCC)   stable   5. Tinnitus of both ears   patient will go to [facility] in the near future   Recheck with me 3 months or as needed    Please note that this dictation was created using voice recognition software. I have worked with consultants from the vendor as well as technical experts from [employer] to optimize the interface. I have made every reasonable attempt to correct obvious errors, but I expect that there are errors of grammar and possibly content that I did not discover before finalizing the note.

## 2019-01-27 DIAGNOSIS — I10 ESSENTIAL HYPERTENSION: ICD-10-CM

## 2019-01-28 RX ORDER — LOSARTAN POTASSIUM 25 MG/1
TABLET ORAL
Qty: 30 TAB | Refills: 2 | Status: SHIPPED | OUTPATIENT
Start: 2019-01-28 | End: 2019-03-05 | Stop reason: SDUPTHER

## 2019-01-28 NOTE — TELEPHONE ENCOUNTER
Requested Prescriptions     Signed Prescriptions Disp Refills   • losartan (COZAAR) 25 MG Tab 30 Tab 2     Sig: TAKE 1 TABLET BY MOUTH EVERY DAY     Authorizing Provider: JOSE ALEJANDRO ALARCON A.P.R.N.

## 2019-02-25 DIAGNOSIS — E78.2 MIXED HYPERLIPIDEMIA: ICD-10-CM

## 2019-02-26 RX ORDER — ATORVASTATIN CALCIUM 80 MG/1
TABLET, FILM COATED ORAL
Qty: 90 TAB | Refills: 2 | Status: SHIPPED | OUTPATIENT
Start: 2019-02-26 | End: 2019-11-25 | Stop reason: SDUPTHER

## 2019-03-05 ENCOUNTER — OFFICE VISIT (OUTPATIENT)
Dept: MEDICAL GROUP | Facility: PHYSICIAN GROUP | Age: 79
End: 2019-03-05
Payer: MEDICARE

## 2019-03-05 VITALS
HEIGHT: 70 IN | SYSTOLIC BLOOD PRESSURE: 128 MMHG | OXYGEN SATURATION: 96 % | TEMPERATURE: 97.7 F | WEIGHT: 195 LBS | DIASTOLIC BLOOD PRESSURE: 68 MMHG | BODY MASS INDEX: 27.92 KG/M2 | HEART RATE: 60 BPM | RESPIRATION RATE: 16 BRPM

## 2019-03-05 DIAGNOSIS — Z95.1 S/P CABG X 4: ICD-10-CM

## 2019-03-05 DIAGNOSIS — E78.2 MIXED HYPERLIPIDEMIA: ICD-10-CM

## 2019-03-05 DIAGNOSIS — I25.10 CORONARY ARTERY DISEASE INVOLVING NATIVE CORONARY ARTERY OF NATIVE HEART WITHOUT ANGINA PECTORIS: ICD-10-CM

## 2019-03-05 DIAGNOSIS — I10 ESSENTIAL HYPERTENSION: ICD-10-CM

## 2019-03-05 DIAGNOSIS — C61 PROSTATE CANCER (HCC): ICD-10-CM

## 2019-03-05 DIAGNOSIS — E11.9 TYPE 2 DIABETES MELLITUS WITHOUT COMPLICATION, WITHOUT LONG-TERM CURRENT USE OF INSULIN (HCC): ICD-10-CM

## 2019-03-05 PROCEDURE — 99214 OFFICE O/P EST MOD 30 MIN: CPT | Performed by: FAMILY MEDICINE

## 2019-03-05 RX ORDER — LOSARTAN POTASSIUM 25 MG/1
25 TABLET ORAL
Qty: 90 TAB | Refills: 3 | Status: SHIPPED | OUTPATIENT
Start: 2019-03-05 | End: 2019-06-18 | Stop reason: SDUPTHER

## 2019-03-05 ASSESSMENT — PATIENT HEALTH QUESTIONNAIRE - PHQ9: CLINICAL INTERPRETATION OF PHQ2 SCORE: 0

## 2019-03-06 NOTE — PATIENT INSTRUCTIONS
Patient given written instructions regarding labs,  medications, referrals, dietary and lifestyle management, and return visit.    Brandon Velázqeuz MD

## 2019-03-06 NOTE — PROGRESS NOTES
Patient comes in and feels well.  He is running blood pressures of 130/ 7/70.  His blood sugars are between 132 and 137.  He feels well.  His recent PSA was excellent at 1.27 with his urologist.  He has no chest pains and no shortness of breath.    I reviewed the following    Past Medical History:   Diagnosis Date   • Anxiety    • Arthritis    • CAD (coronary artery disease) 07/06/2017    CABG x 4 (LIMA to distal diagonal, rSVG to distal OM1, rSVG to distal OM2, rSVG to distal LAD) by Dr. Gaffney.   • Diabetes (HCC)     oral medication    • Hypertension    • Inguinal hernia    • Mixed hyperlipidemia    • Nodular prostate without urinary obstruction    • Prostate cancer (HCC)         Past Surgical History:   Procedure Laterality Date   • MULTIPLE CORONARY ARTERY BYPASS ENDO VEIN HARVEST  7/6/2017    Procedure: MULTIPLE CORONARY ARTERY BYPASS ENDO VEIN HARVEST X2-4, PREVENA DRESSING ;  Surgeon: Anthony Gaffney M.D.;  Location: SURGERY [facility];  Service:    • ROHINI  7/6/2017    Procedure: ROHINI - INTRAOPERATIVE ;  Surgeon: Anthony Gaffney M.D.;  Location: SURGERY [facility];  Service:    • APPENDECTOMY  1955   • TONSILLECTOMY         Allergies   Allergen Reactions   • Lisinopril      Cough         Current Outpatient Prescriptions   Medication Sig Dispense Refill   • losartan (COZAAR) 25 MG Tab Take 1 Tab by mouth every day. 90 Tab 3   • atorvastatin (LIPITOR) 80 MG tablet TAKE 1 TABLET BY MOUTH EVERY NIGHT AT BEDTIME 90 Tab 2   • metFORMIN (GLUCOPHAGE) 500 MG Tab TAKE 2 TABS WITH BREAKFAST AND 1 TAB WITH DINNER 270 Tab 3   • metoprolol (LOPRESSOR) 25 MG Tab Take 1 Tab by mouth 2 Times a Day. 180 Tab 2   • potassium chloride SA (KDUR) 20 MEQ Tab CR TAKE 1 TABLET BY MOUTH EVERY DAY 90 Tab 3   • ONE TOUCH ULTRA TEST strip USE TO TEST BLOOD SUGARS ONCE DAILY 100 Strip 3   • ONETOUCH DELICA LANCETS FINE Misc USE TO TEST BLOOD SUGARS ONCE DAILY 100 Each 3   • docusate sodium (COLACE) 100 MG Cap Take 100 mg by  mouth every day.     • Ascorbic Acid (VITAMIN C PO) Take  by mouth.     • aspirin (ASA) 81 MG Chew Tab chewable tablet Take 81 mg by mouth every day. Indications: post cardiac surgery to prevent blood clots     • therapeutic multivitamin-minerals (THERAGRAN-M) Tab Take 1 Tab by mouth every day. Indications: supplement     • Glucosamine-Chondroit-Vit C-Mn (GLUCOSAMINE CHONDR 1500 COMPLX) Cap Take 1 Cap by mouth every day. 100 Cap 3   • Cholecalciferol (VITAMIN D3) 1000 UNIT TABS Take 1 Tab by mouth every day. 100 Tab 3   • furosemide (LASIX) 40 MG Tab Take 40 mg by mouth every day.     • POTASSIUM CHLORIDE PO Take 20 mEq by mouth every day.       No current facility-administered medications for this visit.         Family History   Problem Relation Age of Onset   • Stroke Mother    • Heart Disease Father    • Heart Disease Brother        Social History     Social History   • Marital status:      Spouse name: N/A   • Number of children: N/A   • Years of education: N/A     Occupational History   • Not on file.     Social History Main Topics   • Smoking status: Never Smoker   • Smokeless tobacco: Never Used   • Alcohol use 1.2 oz/week     2 Shots of liquor per week   • Drug use: No   • Sexual activity: No     Other Topics Concern   • Not on file     Social History Narrative   • No narrative on file      Physical Exam   Constitutional: He is oriented. He appears well-developed and well-nourished. No distress.   HENT:   Head: Normocephalic and atraumatic.   Right Ear: External ear normal. Ear canal and TM normal   Left Ear: External ear normal. Ear canal and TM normal   Nose: Nose normal.   Mouth/Throat: Oropharynx is clear and moist.   Eyes: Conjunctivae and extraocular motions are normal. Pupils are equal, round, and reactive to light.        Fundi benign bilaterally   Neck: No thyromegaly present.   Cardiovascular: Normal rate, regular rhythm, normal heart sounds and intact distal pulses.  Exam reveals no gallop.     No murmur heard.  Pulmonary/Chest: Effort normal and breath sounds normal. No respiratory distress. He has no wheezes. He has no rales.   Abdominal: Soft. Bowel sounds are normal. No hepatosplenomegaly. He exhibits no distension. No tenderness. He has no rebound and no guarding.   Musculoskeletal: Normal range of motion. He exhibits no edema and no tenderness.   Lymphadenopathy:     He has no cervical adenopathy.  No supraclavicular adenopathy.   Neurological: He is alert and oriented. He has normal reflexes.        Babinskis downgoing bilaterally   Skin: Skin is warm and dry. No rash noted. No erythema.   Psychiatric: He has a normal mood and appropriate affect. His behavior is normal. Judgment and thought content normal.    1. Mixed hyperlipidemia   doing well   2. Coronary artery disease involving native coronary artery of native heart without angina pectoris   doing well   3. S/P CABG x 4   doing well   4. Type 2 diabetes mellitus without complication, without long-term current use of insulin (HCC)  Diabetic Monofilament LE Exam   5. Prostate cancer ([facility])   doing well.  Continue to see urologist   6. Essential hypertension  losartan (COZAAR) 25 MG Tab--refill     Recheck with me 3 months or as needed    Please note that this dictation was created using voice recognition software. I have worked with consultants from the vendor as well as technical experts from [employer] to optimize the interface. I have made every reasonable attempt to correct obvious errors, but I expect that there are errors of grammar and possibly content that I did not discover before finalizing the note.

## 2019-03-25 DIAGNOSIS — E11.9 TYPE 2 DIABETES MELLITUS WITHOUT COMPLICATION, WITHOUT LONG-TERM CURRENT USE OF INSULIN (HCC): ICD-10-CM

## 2019-04-17 DIAGNOSIS — I51.89 DIASTOLIC DYSFUNCTION: ICD-10-CM

## 2019-04-17 RX ORDER — POTASSIUM CHLORIDE 1500 MG/1
20 TABLET, EXTENDED RELEASE ORAL DAILY
Qty: 90 TAB | Refills: 3
Start: 2019-04-17 | End: 2020-10-01 | Stop reason: SDUPTHER

## 2019-04-23 ENCOUNTER — OFFICE VISIT (OUTPATIENT)
Dept: URGENT CARE | Facility: PHYSICIAN GROUP | Age: 79
End: 2019-04-23
Payer: MEDICARE

## 2019-04-23 VITALS
WEIGHT: 187 LBS | OXYGEN SATURATION: 96 % | SYSTOLIC BLOOD PRESSURE: 140 MMHG | HEIGHT: 70 IN | HEART RATE: 84 BPM | DIASTOLIC BLOOD PRESSURE: 76 MMHG | BODY MASS INDEX: 26.77 KG/M2 | TEMPERATURE: 97.8 F

## 2019-04-23 DIAGNOSIS — R21 RASH: ICD-10-CM

## 2019-04-23 PROCEDURE — 99214 OFFICE O/P EST MOD 30 MIN: CPT | Performed by: PHYSICIAN ASSISTANT

## 2019-04-23 ASSESSMENT — ENCOUNTER SYMPTOMS
CHILLS: 0
FEVER: 0

## 2019-04-23 NOTE — PROGRESS NOTES
Subjective:   Raf Pelayo is a 78 y.o. male who presents today with   Chief Complaint   Patient presents with   • Rash     right leg x 3 weeks       Rash   This is a new problem. The current episode started 1 to 4 weeks ago. The problem has been gradually improving since onset. The affected locations include the right lower leg. The rash is characterized by itchiness, blistering and redness. He was exposed to nothing. Pertinent negatives include no fever. Past treatments include nothing.   Patient's rash has improved over the past 3 weeks.  He states that it was more red at the beginning.  Patient denies any other associated symptoms and the rash is contained to his right lower leg.    PMH:  has a past medical history of Anxiety; Arthritis; CAD (coronary artery disease) (07/06/2017); Diabetes (HCC); Hypertension; Inguinal hernia; Mixed hyperlipidemia; Nodular prostate without urinary obstruction; and Prostate cancer (HCC). He also has no past medical history of Encounter for long-term (current) use of other medications.  MEDS:   Current Outpatient Prescriptions:   •  hydrocortisone 2.5 % Cream topical cream, Apply thin layer to affected area twice daily, Disp: 1 Tube, Rfl: 0  •  potassium Chloride ER (K-TAB) 20 MEQ Tab CR tablet, Take 1 Tab by mouth every day., Disp: 90 Tab, Rfl: 3  •  losartan (COZAAR) 25 MG Tab, Take 1 Tab by mouth every day., Disp: 90 Tab, Rfl: 3  •  metFORMIN (GLUCOPHAGE) 500 MG Tab, TAKE 2 TABS WITH BREAKFAST AND 1 TAB WITH DINNER, Disp: 270 Tab, Rfl: 3  •  metoprolol (LOPRESSOR) 25 MG Tab, Take 1 Tab by mouth 2 Times a Day., Disp: 180 Tab, Rfl: 2  •  metFORMIN (GLUCOPHAGE) 500 MG Tab, TAKE 2 TABLETS BY MOUTH WITH BREAKFAST AND 1 TABLET BY MOUTH WITH DINNER, Disp: 270 Tab, Rfl: 3  •  atorvastatin (LIPITOR) 80 MG tablet, TAKE 1 TABLET BY MOUTH EVERY NIGHT AT BEDTIME, Disp: 90 Tab, Rfl: 2  •  ONE TOUCH ULTRA TEST strip, USE TO TEST BLOOD SUGARS ONCE DAILY, Disp: 100 Strip, Rfl:  "3  •  ONETOUCH DELICA LANCETS FINE Misc, USE TO TEST BLOOD SUGARS ONCE DAILY, Disp: 100 Each, Rfl: 3  •  furosemide (LASIX) 40 MG Tab, Take 40 mg by mouth every day., Disp: , Rfl:   •  docusate sodium (COLACE) 100 MG Cap, Take 100 mg by mouth every day., Disp: , Rfl:   •  Ascorbic Acid (VITAMIN C PO), Take  by mouth., Disp: , Rfl:   •  aspirin (ASA) 81 MG Chew Tab chewable tablet, Take 81 mg by mouth every day. Indications: post cardiac surgery to prevent blood clots, Disp: , Rfl:   •  therapeutic multivitamin-minerals (THERAGRAN-M) Tab, Take 1 Tab by mouth every day. Indications: supplement, Disp: , Rfl:   •  Glucosamine-Chondroit-Vit C-Mn (GLUCOSAMINE CHONDR 1500 COMPLX) Cap, Take 1 Cap by mouth every day., Disp: 100 Cap, Rfl: 3  •  Cholecalciferol (VITAMIN D3) 1000 UNIT TABS, Take 1 Tab by mouth every day., Disp: 100 Tab, Rfl: 3  ALLERGIES:   Allergies   Allergen Reactions   • Lisinopril      Cough       SURGHX:   Past Surgical History:   Procedure Laterality Date   • MULTIPLE CORONARY ARTERY BYPASS ENDO VEIN HARVEST  7/6/2017    Procedure: MULTIPLE CORONARY ARTERY BYPASS ENDO VEIN HARVEST X2-4, PREVENA DRESSING ;  Surgeon: Anthony Gaffney M.D.;  Location: SURGERY [facility];  Service:    • ROHINI  7/6/2017    Procedure: ROHINI - INTRAOPERATIVE ;  Surgeon: Anthony Gaffney M.D.;  Location: SURGERY [facility];  Service:    • APPENDECTOMY  1955   • TONSILLECTOMY       SOCHX:  reports that he has never smoked. He has never used smokeless tobacco. He reports that he drinks about 1.2 oz of alcohol per week . He reports that he does not use drugs.  FH: Reviewed with patient, not pertinent to this visit.       Review of Systems   Constitutional: Negative for chills and fever.   Skin: Positive for itching and rash.   All other systems reviewed and are negative.       Objective:   /76   Pulse 84   Temp 36.6 °C (97.8 °F) (Temporal)   Ht 1.778 m (5' 10\")   Wt 84.8 kg (187 lb)   SpO2 96%   BMI 26.83 kg/m² "   Physical Exam   Constitutional: Vital signs are normal. He appears well-developed and well-nourished. No distress.   HENT:   Head: Normocephalic and atraumatic.   Right Ear: Hearing normal.   Left Ear: Hearing normal.   Eyes: Pupils are equal, round, and reactive to light.   Cardiovascular: Normal rate, regular rhythm and normal heart sounds.    Pulmonary/Chest: Effort normal.   Musculoskeletal:   Normal movement in all 4 extremities   Neurological: He is alert. Coordination normal.   Skin: Skin is warm and dry. Rash noted. Rash is papular and vesicular.        Erythematous vesicular rash on right lower extremity. No fluctuance or discharge.    Psychiatric: He has a normal mood and affect.   Nursing note and vitals reviewed.        Assessment/Plan:   Assessment    1. Rash  - hydrocortisone 2.5 % Cream topical cream; Apply thin layer to affected area twice daily  Dispense: 1 Tube; Refill: 0  Discussed with patient this is likely a nonspecific skin eruption that could be caused by an allergen or irritant. Patient is a diabetic and I discussed that it could take a while for the rash to resolve.   Encouraged patient to keep the area covered when using the cream twice daily.   Differential diagnosis, natural history, supportive care, and indications for immediate follow-up discussed.   Patient given instructions and understanding of medications and treatment.    If not improving in 3-5 days, F/U with PCP or return to UC if symptoms worsen.    Patient agreeable to plan.      Simba Jean Baptiste PA-C

## 2019-06-18 ENCOUNTER — OFFICE VISIT (OUTPATIENT)
Dept: MEDICAL GROUP | Facility: PHYSICIAN GROUP | Age: 79
End: 2019-06-18
Payer: MEDICARE

## 2019-06-18 VITALS
OXYGEN SATURATION: 93 % | HEART RATE: 72 BPM | SYSTOLIC BLOOD PRESSURE: 132 MMHG | BODY MASS INDEX: 26.92 KG/M2 | RESPIRATION RATE: 16 BRPM | WEIGHT: 188 LBS | DIASTOLIC BLOOD PRESSURE: 60 MMHG | TEMPERATURE: 97.3 F | HEIGHT: 70 IN

## 2019-06-18 DIAGNOSIS — E78.2 MIXED HYPERLIPIDEMIA: ICD-10-CM

## 2019-06-18 DIAGNOSIS — I10 ESSENTIAL HYPERTENSION: ICD-10-CM

## 2019-06-18 DIAGNOSIS — E11.9 TYPE 2 DIABETES MELLITUS WITHOUT COMPLICATION, WITHOUT LONG-TERM CURRENT USE OF INSULIN (HCC): ICD-10-CM

## 2019-06-18 DIAGNOSIS — Z95.1 S/P CABG X 4: ICD-10-CM

## 2019-06-18 DIAGNOSIS — I25.10 CORONARY ARTERY DISEASE INVOLVING NATIVE CORONARY ARTERY OF NATIVE HEART WITHOUT ANGINA PECTORIS: ICD-10-CM

## 2019-06-18 PROCEDURE — 99214 OFFICE O/P EST MOD 30 MIN: CPT | Performed by: FAMILY MEDICINE

## 2019-06-18 RX ORDER — LOSARTAN POTASSIUM 50 MG/1
50 TABLET ORAL
Qty: 90 TAB | Refills: 3 | Status: SHIPPED | OUTPATIENT
Start: 2019-06-18 | End: 2020-06-12

## 2019-06-19 NOTE — PROGRESS NOTES
Patient comes in and is due for lab work.  He has no chest pains no shortness of breath.  He does bring in blood pressure readings which sometimes show he gets as high as 151/99.  I think we need to increase the dose of his losartan from 25 mg a day up to 50 mg a day.  He is gotten hearing aids and is not sure if he likes them or not.  His wife is afraid of him because he has had angry outbursts at her.  I told him this and advised him that he needed to be more gentle and kind with her.  He said he will do this.    I reviewed the following    Past Medical History:   Diagnosis Date   • Anxiety    • Arthritis    • CAD (coronary artery disease) 07/06/2017    CABG x 4 (LIMA to distal diagonal, rSVG to distal OM1, rSVG to distal OM2, rSVG to distal LAD) by Dr. Gaffney.   • Diabetes (HCC)     oral medication    • Hypertension    • Inguinal hernia    • Mixed hyperlipidemia    • Nodular prostate without urinary obstruction    • Prostate cancer (HCC)         Past Surgical History:   Procedure Laterality Date   • MULTIPLE CORONARY ARTERY BYPASS ENDO VEIN HARVEST  7/6/2017    Procedure: MULTIPLE CORONARY ARTERY BYPASS ENDO VEIN HARVEST X2-4, PREVENA DRESSING ;  Surgeon: Anthony Gaffney M.D.;  Location: SURGERY [facility];  Service:    • ROHINI  7/6/2017    Procedure: ROHINI - INTRAOPERATIVE ;  Surgeon: Anthony Gaffney M.D.;  Location: SURGERY [facility];  Service:    • APPENDECTOMY  1955   • TONSILLECTOMY         Allergies   Allergen Reactions   • Lisinopril      Cough         Current Outpatient Prescriptions   Medication Sig Dispense Refill   • losartan (COZAAR) 50 MG Tab Take 1 Tab by mouth every day. 90 Tab 3   • hydrocortisone 2.5 % Cream topical cream Apply thin layer to affected area twice daily 1 Tube 0   • potassium Chloride ER (K-TAB) 20 MEQ Tab CR tablet Take 1 Tab by mouth every day. 90 Tab 3   • atorvastatin (LIPITOR) 80 MG tablet TAKE 1 TABLET BY MOUTH EVERY NIGHT AT BEDTIME 90 Tab 2   • metFORMIN (GLUCOPHAGE)  500 MG Tab TAKE 2 TABS WITH BREAKFAST AND 1 TAB WITH DINNER 270 Tab 3   • metoprolol (LOPRESSOR) 25 MG Tab Take 1 Tab by mouth 2 Times a Day. 180 Tab 2   • ONE TOUCH ULTRA TEST strip USE TO TEST BLOOD SUGARS ONCE DAILY 100 Strip 3   • ONETOUCH DELICA LANCETS FINE Misc USE TO TEST BLOOD SUGARS ONCE DAILY 100 Each 3   • furosemide (LASIX) 40 MG Tab Take 40 mg by mouth every day.     • Ascorbic Acid (VITAMIN C PO) Take  by mouth.     • aspirin (ASA) 81 MG Chew Tab chewable tablet Take 81 mg by mouth every day. Indications: post cardiac surgery to prevent blood clots     • therapeutic multivitamin-minerals (THERAGRAN-M) Tab Take 1 Tab by mouth every day. Indications: supplement     • Glucosamine-Chondroit-Vit C-Mn (GLUCOSAMINE CHONDR 1500 COMPLX) Cap Take 1 Cap by mouth every day. 100 Cap 3   • Cholecalciferol (VITAMIN D3) 1000 UNIT TABS Take 1 Tab by mouth every day. 100 Tab 3   • docusate sodium (COLACE) 100 MG Cap Take 100 mg by mouth every day.       No current facility-administered medications for this visit.         Family History   Problem Relation Age of Onset   • Stroke Mother    • Heart Disease Father    • Heart Disease Brother        Social History     Social History   • Marital status:      Spouse name: N/A   • Number of children: N/A   • Years of education: N/A     Occupational History   • Not on file.     Social History Main Topics   • Smoking status: Never Smoker   • Smokeless tobacco: Never Used   • Alcohol use 1.2 oz/week     2 Shots of liquor per week   • Drug use: No   • Sexual activity: No     Other Topics Concern   • Not on file     Social History Narrative   • No narrative on file      Physical Exam   Constitutional: He is oriented. He appears well-developed and well-nourished. No distress.   HENT:   Head: Normocephalic and atraumatic.   Right Ear: External ear normal. Ear canal and TM normal   Left Ear: External ear normal. Ear canal and TM normal   Nose: Nose normal.   Mouth/Throat:  Oropharynx is clear and moist.   Eyes: Conjunctivae and extraocular motions are normal. Pupils are equal, round, and reactive to light.        Fundi benign bilaterally   Neck: No thyromegaly present.   Cardiovascular: Normal rate, regular rhythm, normal heart sounds and intact distal pulses.  Exam reveals no gallop.    No murmur heard.  Pulmonary/Chest: Effort normal and breath sounds normal. No respiratory distress. He has no wheezes. He has no rales.   Abdominal: Soft. Bowel sounds are normal. No hepatosplenomegaly. He exhibits no distension. No tenderness. He has no rebound and no guarding.   Musculoskeletal: Normal range of motion. He exhibits no edema and no tenderness.   Lymphadenopathy:     He has no cervical adenopathy.  No supraclavicular adenopathy.   Neurological: He is alert and oriented. He has normal reflexes.        Babinskis downgoing bilaterally --The patient has intact sensation to monofilament light touch over both feet. No sores or ulcers are noted over the feet.    Skin: Skin is warm and dry. No rash noted. No erythema.   Psychiatric: He has a normal mood and appropriate affect. His behavior is normal. Judgment and thought content normal.     1. Essential hypertension --control not optimal losartan (COZAAR) 50 MG Tab--increased to this dose 1 p.o. daily    CBC WITH DIFFERENTIAL   2. Mixed hyperlipidemia --needs reassessment CBC WITH DIFFERENTIAL    Comp Metabolic Panel    Lipid Profile   3. Coronary artery disease involving native coronary artery of native heart without angina pectoris --doing well Comp Metabolic Panel    Lipid Profile   4. S/P CABG x 4--doing well Comp Metabolic Panel    Lipid Profile   5. Type 2 diabetes mellitus without complication, without long-term current use of insulin ([facility])  Comp Metabolic Panel    Lipid Profile    MICROALBUMIN CREAT RATIO URINE    HEMOGLOBIN A1C    Diabetic Monofilament LE Exam     Recheck with me 3 months or as needed    Please note that this  dictation was created using voice recognition software. I have worked with consultants from the vendor as well as technical experts from [facility] [employer] to optimize the interface. I have made every reasonable attempt to correct obvious errors, but I expect that there are errors of grammar and possibly content that I did not discover before finalizing the note.

## 2019-07-02 ENCOUNTER — HOSPITAL ENCOUNTER (OUTPATIENT)
Dept: LAB | Facility: MEDICAL CENTER | Age: 79
End: 2019-07-02
Attending: FAMILY MEDICINE
Payer: MEDICARE

## 2019-07-02 DIAGNOSIS — E78.2 MIXED HYPERLIPIDEMIA: ICD-10-CM

## 2019-07-02 DIAGNOSIS — I25.10 CORONARY ARTERY DISEASE INVOLVING NATIVE CORONARY ARTERY OF NATIVE HEART WITHOUT ANGINA PECTORIS: ICD-10-CM

## 2019-07-02 DIAGNOSIS — I10 ESSENTIAL HYPERTENSION: ICD-10-CM

## 2019-07-02 DIAGNOSIS — Z95.1 S/P CABG X 4: ICD-10-CM

## 2019-07-02 DIAGNOSIS — E11.9 TYPE 2 DIABETES MELLITUS WITHOUT COMPLICATION, WITHOUT LONG-TERM CURRENT USE OF INSULIN (HCC): ICD-10-CM

## 2019-07-02 LAB
ALBUMIN SERPL BCP-MCNC: 4.3 G/DL (ref 3.2–4.9)
ALBUMIN/GLOB SERPL: 1.8 G/DL
ALP SERPL-CCNC: 79 U/L (ref 30–99)
ALT SERPL-CCNC: 38 U/L (ref 2–50)
ANION GAP SERPL CALC-SCNC: 8 MMOL/L (ref 0–11.9)
AST SERPL-CCNC: 30 U/L (ref 12–45)
BASOPHILS # BLD AUTO: 0.8 % (ref 0–1.8)
BASOPHILS # BLD: 0.06 K/UL (ref 0–0.12)
BILIRUB SERPL-MCNC: 0.7 MG/DL (ref 0.1–1.5)
BUN SERPL-MCNC: 18 MG/DL (ref 8–22)
CALCIUM SERPL-MCNC: 9.5 MG/DL (ref 8.5–10.5)
CHLORIDE SERPL-SCNC: 108 MMOL/L (ref 96–112)
CHOLEST SERPL-MCNC: 102 MG/DL (ref 100–199)
CO2 SERPL-SCNC: 24 MMOL/L (ref 20–33)
CREAT SERPL-MCNC: 1.02 MG/DL (ref 0.5–1.4)
CREAT UR-MCNC: 129.2 MG/DL
EOSINOPHIL # BLD AUTO: 0.2 K/UL (ref 0–0.51)
EOSINOPHIL NFR BLD: 2.6 % (ref 0–6.9)
ERYTHROCYTE [DISTWIDTH] IN BLOOD BY AUTOMATED COUNT: 46.8 FL (ref 35.9–50)
EST. AVERAGE GLUCOSE BLD GHB EST-MCNC: 148 MG/DL
FASTING STATUS PATIENT QL REPORTED: NORMAL
GLOBULIN SER CALC-MCNC: 2.4 G/DL (ref 1.9–3.5)
GLUCOSE SERPL-MCNC: 121 MG/DL (ref 65–99)
HBA1C MFR BLD: 6.8 % (ref 0–5.6)
HCT VFR BLD AUTO: 43.9 % (ref 42–52)
HDLC SERPL-MCNC: 32 MG/DL
HGB BLD-MCNC: 13.7 G/DL (ref 14–18)
IMM GRANULOCYTES # BLD AUTO: 0.03 K/UL (ref 0–0.11)
IMM GRANULOCYTES NFR BLD AUTO: 0.4 % (ref 0–0.9)
LDLC SERPL CALC-MCNC: 49 MG/DL
LYMPHOCYTES # BLD AUTO: 1.19 K/UL (ref 1–4.8)
LYMPHOCYTES NFR BLD: 15.4 % (ref 22–41)
MCH RBC QN AUTO: 30.3 PG (ref 27–33)
MCHC RBC AUTO-ENTMCNC: 31.2 G/DL (ref 33.7–35.3)
MCV RBC AUTO: 97.1 FL (ref 81.4–97.8)
MICROALBUMIN UR-MCNC: 4.1 MG/DL
MICROALBUMIN/CREAT UR: 32 MG/G (ref 0–30)
MONOCYTES # BLD AUTO: 0.75 K/UL (ref 0–0.85)
MONOCYTES NFR BLD AUTO: 9.7 % (ref 0–13.4)
NEUTROPHILS # BLD AUTO: 5.5 K/UL (ref 1.82–7.42)
NEUTROPHILS NFR BLD: 71.1 % (ref 44–72)
NRBC # BLD AUTO: 0 K/UL
NRBC BLD-RTO: 0 /100 WBC
PLATELET # BLD AUTO: 229 K/UL (ref 164–446)
PMV BLD AUTO: 10.2 FL (ref 9–12.9)
POTASSIUM SERPL-SCNC: 3.9 MMOL/L (ref 3.6–5.5)
PROT SERPL-MCNC: 6.7 G/DL (ref 6–8.2)
RBC # BLD AUTO: 4.52 M/UL (ref 4.7–6.1)
SODIUM SERPL-SCNC: 140 MMOL/L (ref 135–145)
TRIGL SERPL-MCNC: 106 MG/DL (ref 0–149)
WBC # BLD AUTO: 7.7 K/UL (ref 4.8–10.8)

## 2019-07-02 PROCEDURE — 85025 COMPLETE CBC W/AUTO DIFF WBC: CPT

## 2019-07-02 PROCEDURE — 80053 COMPREHEN METABOLIC PANEL: CPT

## 2019-07-02 PROCEDURE — 82570 ASSAY OF URINE CREATININE: CPT

## 2019-07-02 PROCEDURE — 36415 COLL VENOUS BLD VENIPUNCTURE: CPT

## 2019-07-02 PROCEDURE — 80061 LIPID PANEL: CPT

## 2019-07-02 PROCEDURE — 82043 UR ALBUMIN QUANTITATIVE: CPT

## 2019-07-02 PROCEDURE — 83036 HEMOGLOBIN GLYCOSYLATED A1C: CPT | Mod: GA

## 2019-08-14 DIAGNOSIS — Z12.5 PROSTATE CANCER SCREENING: ICD-10-CM

## 2019-08-21 ENCOUNTER — HOSPITAL ENCOUNTER (OUTPATIENT)
Dept: LAB | Facility: MEDICAL CENTER | Age: 79
End: 2019-08-21
Attending: FAMILY MEDICINE
Payer: MEDICARE

## 2019-08-21 DIAGNOSIS — Z12.5 PROSTATE CANCER SCREENING: ICD-10-CM

## 2019-08-21 LAB — PSA SERPL-MCNC: 0.72 NG/ML (ref 0–4)

## 2019-08-21 PROCEDURE — 84153 ASSAY OF PSA TOTAL: CPT | Mod: GA

## 2019-08-21 PROCEDURE — 36415 COLL VENOUS BLD VENIPUNCTURE: CPT | Mod: GA

## 2019-09-06 ENCOUNTER — OFFICE VISIT (OUTPATIENT)
Dept: CARDIOLOGY | Facility: MEDICAL CENTER | Age: 79
End: 2019-09-06
Payer: MEDICARE

## 2019-09-06 VITALS
WEIGHT: 184 LBS | HEART RATE: 66 BPM | DIASTOLIC BLOOD PRESSURE: 62 MMHG | HEIGHT: 70 IN | OXYGEN SATURATION: 95 % | SYSTOLIC BLOOD PRESSURE: 130 MMHG | BODY MASS INDEX: 26.34 KG/M2

## 2019-09-06 DIAGNOSIS — E78.2 MIXED HYPERLIPIDEMIA: ICD-10-CM

## 2019-09-06 DIAGNOSIS — E11.9 TYPE 2 DIABETES MELLITUS WITHOUT COMPLICATION, WITHOUT LONG-TERM CURRENT USE OF INSULIN (HCC): ICD-10-CM

## 2019-09-06 DIAGNOSIS — Z95.1 S/P CABG X 4: ICD-10-CM

## 2019-09-06 DIAGNOSIS — I25.10 CORONARY ARTERY DISEASE INVOLVING NATIVE CORONARY ARTERY OF NATIVE HEART WITHOUT ANGINA PECTORIS: ICD-10-CM

## 2019-09-06 PROBLEM — Z82.49 FAMILY HISTORY OF HEART DISEASE: Status: RESOLVED | Noted: 2017-08-21 | Resolved: 2019-09-06

## 2019-09-06 PROBLEM — Z82.3 FAMILY HISTORY OF STROKE OR TRANSIENT ISCHEMIC ATTACK IN MOTHER: Status: RESOLVED | Noted: 2017-08-21 | Resolved: 2019-09-06

## 2019-09-06 PROBLEM — I51.89 DIASTOLIC DYSFUNCTION: Status: RESOLVED | Noted: 2017-07-26 | Resolved: 2019-09-06

## 2019-09-06 PROCEDURE — 99214 OFFICE O/P EST MOD 30 MIN: CPT | Performed by: INTERNAL MEDICINE

## 2019-09-06 RX ORDER — LOSARTAN POTASSIUM 25 MG/1
TABLET ORAL
Refills: 3 | COMMUNITY
Start: 2019-08-26 | End: 2019-09-10

## 2019-09-06 NOTE — PROGRESS NOTES
Chief Complaint   Patient presents with   • Coronary Artery Disease       Subjective:   Raf Pealyo is a 77 y.o. male who presents today for follow-up of CAD status post CABG times 4 July 2017, hypertension and diabetes.      In the interim continues to feel well exercise and take his medications.  Blood pressure is well controlled.  Medical regimen is appropriate.    Denies any other cardiovascular symptoms including chest pain, shortness of breath, dyspnea on exertion, lightheadedness, syncope or presyncope, lower extremity edema, PND, orthopnea or palpitations.      Past Medical History:   Diagnosis Date   • Anxiety    • Arthritis    • CAD (coronary artery disease) 07/06/2017    CABG x 4 (LIMA to distal diagonal, rSVG to distal OM1, rSVG to distal OM2, rSVG to distal LAD) by Dr. Gaffney.   • Diabetes (HCC)     oral medication    • Hypertension    • Inguinal hernia    • Mixed hyperlipidemia    • Nodular prostate without urinary obstruction    • Prostate cancer (HCC)      Past Surgical History:   Procedure Laterality Date   • MULTIPLE CORONARY ARTERY BYPASS ENDO VEIN HARVEST  7/6/2017    Procedure: MULTIPLE CORONARY ARTERY BYPASS ENDO VEIN HARVEST X2-4, PREVENA DRESSING ;  Surgeon: Anthony Gaffney M.D.;  Location: SURGERY [facility];  Service:    • ROHINI  7/6/2017    Procedure: ROHINI - INTRAOPERATIVE ;  Surgeon: Anthony Gaffney M.D.;  Location: SURGERY [facility];  Service:    • APPENDECTOMY  1955   • TONSILLECTOMY       Family History   Problem Relation Age of Onset   • Stroke Mother    • Heart Disease Father    • Heart Disease Brother      Social History     Socioeconomic History   • Marital status:      Spouse name: Not on file   • Number of children: Not on file   • Years of education: Not on file   • Highest education level: Not on file   Occupational History   • Not on file   Social Needs   • Financial resource strain: Not on file   • Food insecurity:     Worry: Not on file      Inability: Not on file   • Transportation needs:     Medical: Not on file     Non-medical: Not on file   Tobacco Use   • Smoking status: Never Smoker   • Smokeless tobacco: Never Used   Substance and Sexual Activity   • Alcohol use: Yes     Alcohol/week: 1.2 oz     Types: 2 Shots of liquor per week   • Drug use: No   • Sexual activity: Never     Partners: Female     Birth control/protection: Post-Menopausal   Lifestyle   • Physical activity:     Days per week: Not on file     Minutes per session: Not on file   • Stress: Not on file   Relationships   • Social connections:     Talks on phone: Not on file     Gets together: Not on file     Attends Christianity service: Not on file     Active member of club or organization: Not on file     Attends meetings of clubs or organizations: Not on file     Relationship status: Not on file   • Intimate partner violence:     Fear of current or ex partner: Not on file     Emotionally abused: Not on file     Physically abused: Not on file     Forced sexual activity: Not on file   Other Topics Concern   • Not on file   Social History Narrative   • Not on file     No Known Allergies  Outpatient Encounter Medications as of 9/6/2019   Medication Sig Dispense Refill   • losartan (COZAAR) 25 MG Tab TK 1 T PO QD  3   • losartan (COZAAR) 50 MG Tab Take 1 Tab by mouth every day. 90 Tab 3   • hydrocortisone 2.5 % Cream topical cream Apply thin layer to affected area twice daily 1 Tube 0   • potassium Chloride ER (K-TAB) 20 MEQ Tab CR tablet Take 1 Tab by mouth every day. 90 Tab 3   • atorvastatin (LIPITOR) 80 MG tablet TAKE 1 TABLET BY MOUTH EVERY NIGHT AT BEDTIME 90 Tab 2   • metFORMIN (GLUCOPHAGE) 500 MG Tab TAKE 2 TABS WITH BREAKFAST AND 1 TAB WITH DINNER 270 Tab 3   • metoprolol (LOPRESSOR) 25 MG Tab Take 1 Tab by mouth 2 Times a Day. 180 Tab 2   • ONE TOUCH ULTRA TEST strip USE TO TEST BLOOD SUGARS ONCE DAILY 100 Strip 3   • ONETOUCH DELICA LANCETS FINE Misc USE TO TEST BLOOD SUGARS ONCE  "DAILY 100 Each 3   • furosemide (LASIX) 40 MG Tab Take 40 mg by mouth every day.     • Ascorbic Acid (VITAMIN C PO) Take  by mouth.     • aspirin (ASA) 81 MG Chew Tab chewable tablet Take 81 mg by mouth every day. Indications: post cardiac surgery to prevent blood clots     • therapeutic multivitamin-minerals (THERAGRAN-M) Tab Take 1 Tab by mouth every day. Indications: supplement     • Glucosamine-Chondroit-Vit C-Mn (GLUCOSAMINE CHONDR 1500 COMPLX) Cap Take 1 Cap by mouth every day. 100 Cap 3   • Cholecalciferol (VITAMIN D3) 1000 UNIT TABS Take 1 Tab by mouth every day. 100 Tab 3   • docusate sodium (COLACE) 100 MG Cap Take 100 mg by mouth every day.       No facility-administered encounter medications on file as of 9/6/2019.      Review of Systems   All other systems reviewed and are negative.       Objective:   /62 (BP Location: Left arm, Patient Position: Sitting, BP Cuff Size: Adult)   Pulse 66   Ht 1.778 m (5' 10\")   Wt 83.5 kg (184 lb)   SpO2 95%   BMI 26.40 kg/m²     Physical Exam   Constitutional: He is oriented to person, place, and time. He appears well-developed and well-nourished. No distress.   HENT:   Head: Normocephalic and atraumatic.   Right Ear: External ear normal.   Left Ear: External ear normal.   Eyes: Pupils are equal, round, and reactive to light. Conjunctivae and EOM are normal. Right eye exhibits no discharge. Left eye exhibits no discharge. No scleral icterus.   Neck: Normal range of motion. Neck supple. No JVD present. No tracheal deviation present. No thyromegaly present.   Cardiovascular: Normal rate, regular rhythm and intact distal pulses. PMI is not displaced. Exam reveals no gallop and no friction rub.   No murmur heard.  Pulses:       Carotid pulses are 2+ on the right side, and 2+ on the left side.       Radial pulses are 2+ on the left side.        Popliteal pulses are 2+ on the right side, and 2+ on the left side.        Dorsalis pedis pulses are 2+ on the right " side, and 2+ on the left side.        Posterior tibial pulses are 2+ on the right side, and 2+ on the left side.   Pulmonary/Chest: Effort normal and breath sounds normal. No respiratory distress. He has no wheezes. He has no rales. He exhibits no tenderness.   Well-healed midline sternal incision   Abdominal: Soft. Bowel sounds are normal. He exhibits no distension. There is no tenderness.   Musculoskeletal: Normal range of motion. He exhibits no edema, tenderness or deformity.   Neurological: He is alert and oriented to person, place, and time. No cranial nerve deficit (cranial nerves II through XII grossly intact). Coordination normal.   Skin: Skin is warm and dry. No rash noted. He is not diaphoretic. No erythema. No pallor.   Psychiatric: He has a normal mood and affect. His behavior is normal. Thought content normal.   Vitals reviewed.  No change since prior 8/14/2018    LABS:  Lab Results   Component Value Date/Time    CHOLSTRLTOT 102 07/02/2019 07:32 AM    LDL 49 07/02/2019 07:32 AM    HDL 32 (A) 07/02/2019 07:32 AM    TRIGLYCERIDE 106 07/02/2019 07:32 AM       Lab Results   Component Value Date/Time    WBC 7.7 07/02/2019 07:32 AM    RBC 4.52 (L) 07/02/2019 07:32 AM    HEMOGLOBIN 13.7 (L) 07/02/2019 07:32 AM    HEMATOCRIT 43.9 07/02/2019 07:32 AM    MCV 97.1 07/02/2019 07:32 AM    NEUTSPOLYS 71.10 07/02/2019 07:32 AM    LYMPHOCYTES 15.40 (L) 07/02/2019 07:32 AM    MONOCYTES 9.70 07/02/2019 07:32 AM    EOSINOPHILS 2.60 07/02/2019 07:32 AM    BASOPHILS 0.80 07/02/2019 07:32 AM    HYPOCHROMIA 1+ 02/19/2013 10:35 AM     Lab Results   Component Value Date/Time    SODIUM 140 07/02/2019 07:32 AM    POTASSIUM 3.9 07/02/2019 07:32 AM    CHLORIDE 108 07/02/2019 07:32 AM    CO2 24 07/02/2019 07:32 AM    GLUCOSE 121 (H) 07/02/2019 07:32 AM    BUN 18 07/02/2019 07:32 AM    CREATININE 1.02 07/02/2019 07:32 AM    CREATININE 1.0 10/27/2008 08:20 AM     Lab Results   Component Value Date    HBA1C 6.8 (H) 07/02/2019      Lab  Results   Component Value Date/Time    ALKPHOSPHAT 79 07/02/2019 07:32 AM    ASTSGOT 30 07/02/2019 07:32 AM    ALTSGPT 38 07/02/2019 07:32 AM    TBILIRUBIN 0.7 07/02/2019 07:32 AM      Lab Results   Component Value Date/Time    BNPBTYPENAT 52 08/16/2017 10:13 AM      No results found for: TSH  Lab Results   Component Value Date/Time    PROTHROMBTM 16.7 (H) 07/06/2017 12:27 PM    INR 1.31 (H) 07/06/2017 12:27 PM          Assessment:     1. Coronary artery disease involving native coronary artery of native heart without angina pectoris     2. S/P CABG x 4     3. Mixed hyperlipidemia     4. Type 2 diabetes mellitus without complication, without long-term current use of insulin (HCC)         Medical Decision Making:  Today's Assessment / Status / Plan:       Cholesterol profile is acceptable, blood pressure is good, medical therapy is appropriate.  Has no symptoms and is active.  Recommend continuing diet lifestyle interventions and current medical therapy.  Follow-up yearly.

## 2019-09-10 ENCOUNTER — OFFICE VISIT (OUTPATIENT)
Dept: MEDICAL GROUP | Facility: PHYSICIAN GROUP | Age: 79
End: 2019-09-10
Payer: MEDICARE

## 2019-09-10 VITALS
DIASTOLIC BLOOD PRESSURE: 62 MMHG | RESPIRATION RATE: 12 BRPM | SYSTOLIC BLOOD PRESSURE: 124 MMHG | WEIGHT: 197 LBS | HEART RATE: 59 BPM | TEMPERATURE: 98.4 F | OXYGEN SATURATION: 96 % | BODY MASS INDEX: 28.2 KG/M2 | HEIGHT: 70 IN

## 2019-09-10 DIAGNOSIS — E11.9 TYPE 2 DIABETES MELLITUS WITHOUT COMPLICATION, WITHOUT LONG-TERM CURRENT USE OF INSULIN (HCC): ICD-10-CM

## 2019-09-10 DIAGNOSIS — Z95.1 S/P CABG X 4: ICD-10-CM

## 2019-09-10 DIAGNOSIS — E78.2 MIXED HYPERLIPIDEMIA: ICD-10-CM

## 2019-09-10 DIAGNOSIS — Z20.5 EXPOSURE TO HEPATITIS C: ICD-10-CM

## 2019-09-10 DIAGNOSIS — I25.10 CORONARY ARTERY DISEASE INVOLVING NATIVE CORONARY ARTERY OF NATIVE HEART WITHOUT ANGINA PECTORIS: ICD-10-CM

## 2019-09-10 PROCEDURE — 99214 OFFICE O/P EST MOD 30 MIN: CPT | Performed by: FAMILY MEDICINE

## 2019-09-11 NOTE — PROGRESS NOTES
Patient comes in to reassess his diabetes.  He feels well.  He is not having problems with any of his medications.  His blood sugars are running around 110-120.  He has no chest pains no shortness of breath.  He was born in 1940.  I am going to check him for hepatitis C.    I reviewed the following    Past Medical History:   Diagnosis Date   • Anxiety    • Arthritis    • CAD (coronary artery disease) 07/06/2017    CABG x 4 (LIMA to distal diagonal, rSVG to distal OM1, rSVG to distal OM2, rSVG to distal LAD) by Dr. Gaffney.   • Diabetes (HCC)     oral medication    • Hypertension    • Inguinal hernia    • Mixed hyperlipidemia    • Nodular prostate without urinary obstruction    • Prostate cancer (HCC)         Past Surgical History:   Procedure Laterality Date   • MULTIPLE CORONARY ARTERY BYPASS ENDO VEIN HARVEST  7/6/2017    Procedure: MULTIPLE CORONARY ARTERY BYPASS ENDO VEIN HARVEST X2-4, PREVENA DRESSING ;  Surgeon: Anthony Gaffney M.D.;  Location: SURGERY [facility];  Service:    • ROHINI  7/6/2017    Procedure: ROHINI - INTRAOPERATIVE ;  Surgeon: Anthony Gaffney M.D.;  Location: SURGERY [facility];  Service:    • APPENDECTOMY  1955   • TONSILLECTOMY         No Known Allergies    Current Outpatient Medications   Medication Sig Dispense Refill   • losartan (COZAAR) 50 MG Tab Take 1 Tab by mouth every day. 90 Tab 3   • potassium Chloride ER (K-TAB) 20 MEQ Tab CR tablet Take 1 Tab by mouth every day. 90 Tab 3   • atorvastatin (LIPITOR) 80 MG tablet TAKE 1 TABLET BY MOUTH EVERY NIGHT AT BEDTIME 90 Tab 2   • metFORMIN (GLUCOPHAGE) 500 MG Tab TAKE 2 TABS WITH BREAKFAST AND 1 TAB WITH DINNER 270 Tab 3   • metoprolol (LOPRESSOR) 25 MG Tab Take 1 Tab by mouth 2 Times a Day. 180 Tab 2   • ONE TOUCH ULTRA TEST strip USE TO TEST BLOOD SUGARS ONCE DAILY 100 Strip 3   • ONETOUCH DELICA LANCETS FINE Misc USE TO TEST BLOOD SUGARS ONCE DAILY 100 Each 3   • furosemide (LASIX) 40 MG Tab Take 40 mg by mouth every day.     •  Ascorbic Acid (VITAMIN C PO) Take  by mouth.     • aspirin (ASA) 81 MG Chew Tab chewable tablet Take 81 mg by mouth every day. Indications: post cardiac surgery to prevent blood clots     • therapeutic multivitamin-minerals (THERAGRAN-M) Tab Take 1 Tab by mouth every day. Indications: supplement     • Glucosamine-Chondroit-Vit C-Mn (GLUCOSAMINE CHONDR 1500 COMPLX) Cap Take 1 Cap by mouth every day. 100 Cap 3   • Cholecalciferol (VITAMIN D3) 1000 UNIT TABS Take 1 Tab by mouth every day. 100 Tab 3   • hydrocortisone 2.5 % Cream topical cream Apply thin layer to affected area twice daily 1 Tube 0     No current facility-administered medications for this visit.         Family History   Problem Relation Age of Onset   • Stroke Mother    • Heart Disease Father    • Heart Disease Brother        Social History     Socioeconomic History   • Marital status:      Spouse name: Not on file   • Number of children: Not on file   • Years of education: Not on file   • Highest education level: Not on file   Occupational History   • Not on file   Social Needs   • Financial resource strain: Not on file   • Food insecurity:     Worry: Not on file     Inability: Not on file   • Transportation needs:     Medical: Not on file     Non-medical: Not on file   Tobacco Use   • Smoking status: Never Smoker   • Smokeless tobacco: Never Used   Substance and Sexual Activity   • Alcohol use: Yes     Alcohol/week: 1.2 oz     Types: 2 Shots of liquor per week   • Drug use: No   • Sexual activity: Never     Partners: Female     Birth control/protection: Post-Menopausal   Lifestyle   • Physical activity:     Days per week: Not on file     Minutes per session: Not on file   • Stress: Not on file   Relationships   • Social connections:     Talks on phone: Not on file     Gets together: Not on file     Attends Confucianist service: Not on file     Active member of club or organization: Not on file     Attends meetings of clubs or organizations: Not on  file     Relationship status: Not on file   • Intimate partner violence:     Fear of current or ex partner: Not on file     Emotionally abused: Not on file     Physically abused: Not on file     Forced sexual activity: Not on file   Other Topics Concern   • Not on file   Social History Narrative   • Not on file      Hospital Outpatient Visit on 08/21/2019   Component Date Value   • Prostatic Specific Antig* 08/21/2019 0.72      Physical Exam   Constitutional: He is oriented. He appears well-developed and well-nourished. No distress.   HENT:   Head: Normocephalic and atraumatic.   Right Ear: External ear normal. Ear canal and TM normal   Left Ear: External ear normal. Ear canal and TM normal   Nose: Nose normal.   Mouth/Throat: Oropharynx is clear and moist.   Eyes: Conjunctivae and extraocular motions are normal. Pupils are equal, round, and reactive to light.        Fundi benign bilaterally   Neck: No thyromegaly present.   Cardiovascular: Normal rate, regular rhythm, normal heart sounds and intact distal pulses.  Exam reveals no gallop.    No murmur heard.  Pulmonary/Chest: Effort normal and breath sounds normal. No respiratory distress. He has no wheezes. He has no rales.   Abdominal: Soft. Bowel sounds are normal. No hepatosplenomegaly. He exhibits no distension. No tenderness. He has no rebound and no guarding.   Musculoskeletal: Normal range of motion. He exhibits no edema and no tenderness.   Lymphadenopathy:     He has no cervical adenopathy.  No supraclavicular adenopathy.   Neurological: He is alert and oriented. He has normal reflexes.       The patient has intact sensation to monofilament light touch over both feet. No sores or ulcers are noted over the feet.     Skin: Skin is warm and dry. No rash noted. No erythema.   Psychiatric: He has a normal mood and appropriate affect. His behavior is normal. Judgment and thought content normal.     1. Exposure to hepatitis C  HEP C VIRUS ANTIBODY   2. Type 2  diabetes mellitus without complication, without long-term current use of insulin (HCC) --doing well Diabetic Monofilament LE Exam   3. Coronary artery disease involving native coronary artery of native heart without angina pectoris --doing well    4. Mixed hyperlipidemia --doing well    5. S/P CABG x 4 --doing well      Recheck with me 3 months or as needed    Please note that this dictation was created using voice recognition software. I have worked with consultants from the vendor as well as technical experts from [facility] [employer] to optimize the interface. I have made every reasonable attempt to correct obvious errors, but I expect that there are errors of grammar and possibly content that I did not discover before finalizing the note.

## 2019-09-24 DIAGNOSIS — R06.09 DYSPNEA ON EXERTION: ICD-10-CM

## 2019-09-24 DIAGNOSIS — Z95.1 S/P CABG X 4: ICD-10-CM

## 2019-09-24 RX ORDER — POTASSIUM CHLORIDE 1500 MG/1
TABLET, EXTENDED RELEASE ORAL
Qty: 90 TAB | Refills: 3 | Status: SHIPPED | OUTPATIENT
Start: 2019-09-24 | End: 2020-06-12

## 2019-09-25 ENCOUNTER — HOSPITAL ENCOUNTER (OUTPATIENT)
Dept: LAB | Facility: MEDICAL CENTER | Age: 79
End: 2019-09-25
Attending: FAMILY MEDICINE
Payer: MEDICARE

## 2019-09-25 DIAGNOSIS — Z20.5 EXPOSURE TO HEPATITIS C: ICD-10-CM

## 2019-09-25 PROCEDURE — 36415 COLL VENOUS BLD VENIPUNCTURE: CPT

## 2019-09-25 PROCEDURE — 86803 HEPATITIS C AB TEST: CPT

## 2019-09-26 LAB — HCV AB SER QL: NEGATIVE

## 2019-10-28 DIAGNOSIS — E11.9 TYPE 2 DIABETES MELLITUS WITHOUT COMPLICATION, WITHOUT LONG-TERM CURRENT USE OF INSULIN (HCC): ICD-10-CM

## 2019-10-28 NOTE — TELEPHONE ENCOUNTER
Was the patient seen in the last year in this department? Yes LOV 09/10/19    Does patient have an active prescription for medications requested? No     Received Request Via: Pharmacy

## 2019-11-25 DIAGNOSIS — E78.2 MIXED HYPERLIPIDEMIA: ICD-10-CM

## 2019-11-25 RX ORDER — ATORVASTATIN CALCIUM 80 MG/1
80 TABLET, FILM COATED ORAL DAILY
Qty: 90 TAB | Refills: 3 | Status: SHIPPED | OUTPATIENT
Start: 2019-11-25 | End: 2020-10-29

## 2020-01-14 ENCOUNTER — OFFICE VISIT (OUTPATIENT)
Dept: MEDICAL GROUP | Facility: PHYSICIAN GROUP | Age: 80
End: 2020-01-14
Payer: MEDICARE

## 2020-01-14 VITALS
WEIGHT: 189 LBS | HEART RATE: 62 BPM | RESPIRATION RATE: 16 BRPM | SYSTOLIC BLOOD PRESSURE: 124 MMHG | HEIGHT: 70 IN | BODY MASS INDEX: 27.06 KG/M2 | OXYGEN SATURATION: 96 % | DIASTOLIC BLOOD PRESSURE: 64 MMHG | TEMPERATURE: 97.6 F

## 2020-01-14 DIAGNOSIS — Z95.1 S/P CABG X 4: ICD-10-CM

## 2020-01-14 DIAGNOSIS — E78.2 MIXED HYPERLIPIDEMIA: ICD-10-CM

## 2020-01-14 DIAGNOSIS — E11.9 TYPE 2 DIABETES MELLITUS WITHOUT COMPLICATION, WITHOUT LONG-TERM CURRENT USE OF INSULIN (HCC): ICD-10-CM

## 2020-01-14 DIAGNOSIS — C61 PROSTATE CANCER (HCC): ICD-10-CM

## 2020-01-14 DIAGNOSIS — I25.10 CORONARY ARTERY DISEASE INVOLVING NATIVE CORONARY ARTERY OF NATIVE HEART WITHOUT ANGINA PECTORIS: ICD-10-CM

## 2020-01-14 DIAGNOSIS — R41.3 MEMORY DIFFICULTIES: ICD-10-CM

## 2020-01-14 PROCEDURE — 99214 OFFICE O/P EST MOD 30 MIN: CPT | Performed by: FAMILY MEDICINE

## 2020-01-14 RX ORDER — DONEPEZIL HYDROCHLORIDE 5 MG/1
5 TABLET, FILM COATED ORAL DAILY
Qty: 30 TAB | Refills: 3 | Status: SHIPPED | OUTPATIENT
Start: 2020-01-14 | End: 2020-05-07 | Stop reason: SDUPTHER

## 2020-01-15 NOTE — PROGRESS NOTES
Patient comes in to reassess his diabetes.  He says his blood sugars are doing well.  He brings them in and I see numbers between 111 and 173.  Patient has no chest pains no shortness of breath.  He is due for lab work.  The patient's wife mentioned to me that she was worried about his memory.  The patient agreed that his memory is not as good as it was.  He is willing to try some Aricept.  He denies headaches.    I reviewed the following    Past Medical History:   Diagnosis Date   • Anxiety    • Arthritis    • CAD (coronary artery disease) 07/06/2017    CABG x 4 (LIMA to distal diagonal, rSVG to distal OM1, rSVG to distal OM2, rSVG to distal LAD) by Dr. Gaffney.   • Diabetes (HCC)     oral medication    • Hypertension    • Inguinal hernia    • Mixed hyperlipidemia    • Nodular prostate without urinary obstruction    • Prostate cancer (HCC)         Past Surgical History:   Procedure Laterality Date   • MULTIPLE CORONARY ARTERY BYPASS ENDO VEIN HARVEST  7/6/2017    Procedure: MULTIPLE CORONARY ARTERY BYPASS ENDO VEIN HARVEST X2-4, PREVENA DRESSING ;  Surgeon: Anthony Gaffney M.D.;  Location: SURGERY [facility];  Service:    • ROHINI  7/6/2017    Procedure: ROHINI - INTRAOPERATIVE ;  Surgeon: Anthony Gaffney M.D.;  Location: SURGERY [facility];  Service:    • APPENDECTOMY  1955   • TONSILLECTOMY         No Known Allergies    Current Outpatient Medications   Medication Sig Dispense Refill   • donepezil (ARICEPT) 5 MG Tab Take 1 Tab by mouth every day. 30 Tab 3   • metoprolol (LOPRESSOR) 25 MG Tab Take 1 Tab by mouth 2 times a day. 180 Tab 3   • atorvastatin (LIPITOR) 80 MG tablet Take 1 Tab by mouth every day. 90 Tab 3   • ONE TOUCH ULTRA TEST strip TEST BLOOD SUGAR LEVELS EVERY  Strip 3   • losartan (COZAAR) 50 MG Tab Take 1 Tab by mouth every day. 90 Tab 3   • hydrocortisone 2.5 % Cream topical cream Apply thin layer to affected area twice daily 1 Tube 0   • potassium Chloride ER (K-TAB) 20 MEQ Tab CR  tablet Take 1 Tab by mouth every day. 90 Tab 3   • metFORMIN (GLUCOPHAGE) 500 MG Tab TAKE 2 TABS WITH BREAKFAST AND 1 TAB WITH DINNER 270 Tab 3   • ONETOUCH DELICA LANCETS FINE Misc USE TO TEST BLOOD SUGARS ONCE DAILY 100 Each 3   • furosemide (LASIX) 40 MG Tab Take 40 mg by mouth every day.     • Ascorbic Acid (VITAMIN C PO) Take  by mouth.     • aspirin (ASA) 81 MG Chew Tab chewable tablet Take 81 mg by mouth every day. Indications: post cardiac surgery to prevent blood clots     • therapeutic multivitamin-minerals (THERAGRAN-M) Tab Take 1 Tab by mouth every day. Indications: supplement     • Glucosamine-Chondroit-Vit C-Mn (GLUCOSAMINE CHONDR 1500 COMPLX) Cap Take 1 Cap by mouth every day. 100 Cap 3   • Cholecalciferol (VITAMIN D3) 1000 UNIT TABS Take 1 Tab by mouth every day. 100 Tab 3   • potassium Chloride ER (K-TAB) 20 MEQ Tab CR tablet TAKE 1 TABLET BY MOUTH EVERY DAY 90 Tab 3     No current facility-administered medications for this visit.         Family History   Problem Relation Age of Onset   • Stroke Mother    • Heart Disease Father    • Heart Disease Brother        Social History     Socioeconomic History   • Marital status:      Spouse name: Not on file   • Number of children: Not on file   • Years of education: Not on file   • Highest education level: Not on file   Occupational History   • Not on file   Social Needs   • Financial resource strain: Not on file   • Food insecurity:     Worry: Not on file     Inability: Not on file   • Transportation needs:     Medical: Not on file     Non-medical: Not on file   Tobacco Use   • Smoking status: Never Smoker   • Smokeless tobacco: Never Used   Substance and Sexual Activity   • Alcohol use: Yes     Alcohol/week: 1.2 oz     Types: 2 Shots of liquor per week   • Drug use: No   • Sexual activity: Never     Partners: Female     Birth control/protection: Post-Menopausal   Lifestyle   • Physical activity:     Days per week: Not on file     Minutes per  session: Not on file   • Stress: Not on file   Relationships   • Social connections:     Talks on phone: Not on file     Gets together: Not on file     Attends [employer] service: Not on file     Active member of club or organization: Not on file     Attends meetings of clubs or organizations: Not on file     Relationship status: Not on file   • Intimate partner violence:     Fear of current or ex partner: Not on file     Emotionally abused: Not on file     Physically abused: Not on file     Forced sexual activity: Not on file   Other Topics Concern   • Not on file   Social History Narrative   • Not on file      Physical Exam   Constitutional: He is oriented. He appears well-developed and well-nourished. No distress.   HENT:   Head: Normocephalic and atraumatic.   Right Ear: External ear normal. Ear canal and TM normal   Left Ear: External ear normal. Ear canal and TM normal   Nose: Nose normal.   Mouth/Throat: Oropharynx is clear and moist.   Eyes: Conjunctivae and extraocular motions are normal. Pupils are equal, round, and reactive to light.        Fundi benign bilaterally   Neck: No thyromegaly present.   Cardiovascular: Normal rate, regular rhythm, normal heart sounds and intact distal pulses.  Exam reveals no gallop.    No murmur heard.  Pulmonary/Chest: Effort normal and breath sounds normal. No respiratory distress. He has no wheezes. He has no rales.   Abdominal: Soft. Bowel sounds are normal. No hepatosplenomegaly. He exhibits no distension. No tenderness. He has no rebound and no guarding.   Musculoskeletal: Normal range of motion. He exhibits no edema and no tenderness.   Lymphadenopathy:     He has no cervical adenopathy.  No supraclavicular adenopathy.   Neurological: He is alert and oriented. He has normal reflexes.        Babinskis downgoing bilaterally --The patient has intact sensation to monofilament light touch over both feet. No sores or ulcers are noted over the feet.    Skin: Skin is warm and  dry. No rash noted. No erythema.   Psychiatric: He has a normal mood and appropriate affect. His behavior is normal. Judgment and thought content normal.     1. Memory difficulties  donepezil (ARICEPT) 5 MG Tab--begin 1 p.o. daily   2. Mixed hyperlipidemia --needs reassessment Comp Metabolic Panel    Lipid Profile   3. Coronary artery disease involving native coronary artery of native heart without angina pectoris --needs reassessment Comp Metabolic Panel    Lipid Profile   4. Prostate cancer ([facility])   continue to see urologist and radiation therapist   5. S/P CABG x 4   doing well   6. Type 2 diabetes mellitus without complication, without long-term current use of insulin ([facility])--needs reassessment Comp Metabolic Panel    Lipid Profile    MICROALBUMIN CREAT RATIO URINE    HEMOGLOBIN A1C    Diabetic Monofilament LE Exam     Recheck 3 months or as needed    Please note that this dictation was created using voice recognition software. I have worked with consultants from the vendor as well as technical experts from [employer] [employer] to optimize the interface. I have made every reasonable attempt to correct obvious errors, but I expect that there are errors of grammar and possibly content that I did not discover before finalizing the note.

## 2020-04-13 ENCOUNTER — HOSPITAL ENCOUNTER (OUTPATIENT)
Dept: LAB | Facility: MEDICAL CENTER | Age: 80
End: 2020-04-13
Attending: FAMILY MEDICINE
Payer: MEDICARE

## 2020-04-13 DIAGNOSIS — I25.10 CORONARY ARTERY DISEASE INVOLVING NATIVE CORONARY ARTERY OF NATIVE HEART WITHOUT ANGINA PECTORIS: ICD-10-CM

## 2020-04-13 DIAGNOSIS — E78.2 MIXED HYPERLIPIDEMIA: ICD-10-CM

## 2020-04-13 DIAGNOSIS — E11.9 TYPE 2 DIABETES MELLITUS WITHOUT COMPLICATION, WITHOUT LONG-TERM CURRENT USE OF INSULIN (HCC): ICD-10-CM

## 2020-04-13 LAB
ALBUMIN SERPL BCP-MCNC: 4.3 G/DL (ref 3.2–4.9)
ALBUMIN/GLOB SERPL: 2 G/DL
ALP SERPL-CCNC: 78 U/L (ref 30–99)
ALT SERPL-CCNC: 27 U/L (ref 2–50)
ANION GAP SERPL CALC-SCNC: 15 MMOL/L (ref 7–16)
AST SERPL-CCNC: 20 U/L (ref 12–45)
BILIRUB SERPL-MCNC: 0.5 MG/DL (ref 0.1–1.5)
BUN SERPL-MCNC: 28 MG/DL (ref 8–22)
CALCIUM SERPL-MCNC: 9.3 MG/DL (ref 8.5–10.5)
CHLORIDE SERPL-SCNC: 105 MMOL/L (ref 96–112)
CHOLEST SERPL-MCNC: 125 MG/DL (ref 100–199)
CO2 SERPL-SCNC: 20 MMOL/L (ref 20–33)
CREAT SERPL-MCNC: 0.96 MG/DL (ref 0.5–1.4)
CREAT UR-MCNC: 78.5 MG/DL
EST. AVERAGE GLUCOSE BLD GHB EST-MCNC: 160 MG/DL
GLOBULIN SER CALC-MCNC: 2.1 G/DL (ref 1.9–3.5)
GLUCOSE SERPL-MCNC: 134 MG/DL (ref 65–99)
HBA1C MFR BLD: 7.2 % (ref 0–5.6)
HDLC SERPL-MCNC: 35 MG/DL
LDLC SERPL CALC-MCNC: 56 MG/DL
MICROALBUMIN UR-MCNC: 2.5 MG/DL
MICROALBUMIN/CREAT UR: 32 MG/G (ref 0–30)
POTASSIUM SERPL-SCNC: 4.3 MMOL/L (ref 3.6–5.5)
PROT SERPL-MCNC: 6.4 G/DL (ref 6–8.2)
SODIUM SERPL-SCNC: 140 MMOL/L (ref 135–145)
TRIGL SERPL-MCNC: 172 MG/DL (ref 0–149)

## 2020-04-13 PROCEDURE — 82043 UR ALBUMIN QUANTITATIVE: CPT

## 2020-04-13 PROCEDURE — 82570 ASSAY OF URINE CREATININE: CPT

## 2020-04-13 PROCEDURE — 83036 HEMOGLOBIN GLYCOSYLATED A1C: CPT | Mod: GA

## 2020-04-13 PROCEDURE — 80053 COMPREHEN METABOLIC PANEL: CPT

## 2020-04-13 PROCEDURE — 36415 COLL VENOUS BLD VENIPUNCTURE: CPT | Mod: GA

## 2020-04-13 PROCEDURE — 80061 LIPID PANEL: CPT

## 2020-05-07 DIAGNOSIS — R41.3 MEMORY DIFFICULTIES: ICD-10-CM

## 2020-05-07 RX ORDER — DONEPEZIL HYDROCHLORIDE 5 MG/1
5 TABLET, FILM COATED ORAL DAILY
Qty: 90 TAB | Refills: 3 | Status: SHIPPED | OUTPATIENT
Start: 2020-05-07 | End: 2020-06-12 | Stop reason: SDUPTHER

## 2020-05-07 NOTE — TELEPHONE ENCOUNTER
Received request via: Pharmacy    Was the patient seen in the last year in this department? Yes LOV 01/14/2020    Does the patient have an active prescription (recently filled or refills available) for medication(s) requested? No

## 2020-06-12 ENCOUNTER — OFFICE VISIT (OUTPATIENT)
Dept: MEDICAL GROUP | Facility: PHYSICIAN GROUP | Age: 80
End: 2020-06-12
Payer: MEDICARE

## 2020-06-12 VITALS
TEMPERATURE: 97.5 F | RESPIRATION RATE: 16 BRPM | HEIGHT: 70 IN | WEIGHT: 192 LBS | BODY MASS INDEX: 27.49 KG/M2 | DIASTOLIC BLOOD PRESSURE: 60 MMHG | OXYGEN SATURATION: 94 % | SYSTOLIC BLOOD PRESSURE: 132 MMHG | HEART RATE: 71 BPM

## 2020-06-12 DIAGNOSIS — E78.2 MIXED HYPERLIPIDEMIA: ICD-10-CM

## 2020-06-12 DIAGNOSIS — I10 ESSENTIAL HYPERTENSION: ICD-10-CM

## 2020-06-12 DIAGNOSIS — Z76.89 ENCOUNTER TO ESTABLISH CARE: ICD-10-CM

## 2020-06-12 DIAGNOSIS — E55.9 VITAMIN D DEFICIENCY: ICD-10-CM

## 2020-06-12 DIAGNOSIS — R41.3 MEMORY DIFFICULTIES: ICD-10-CM

## 2020-06-12 DIAGNOSIS — E11.9 TYPE 2 DIABETES MELLITUS WITHOUT COMPLICATION, WITHOUT LONG-TERM CURRENT USE OF INSULIN (HCC): ICD-10-CM

## 2020-06-12 PROBLEM — Z97.4 HEARING AID WORN: Status: ACTIVE | Noted: 2020-06-12

## 2020-06-12 PROCEDURE — 99214 OFFICE O/P EST MOD 30 MIN: CPT | Performed by: NURSE PRACTITIONER

## 2020-06-12 RX ORDER — DONEPEZIL HYDROCHLORIDE 10 MG/1
10 TABLET, FILM COATED ORAL DAILY
Qty: 30 TAB | Refills: 2 | Status: SHIPPED | OUTPATIENT
Start: 2020-06-12 | End: 2020-11-16 | Stop reason: SDUPTHER

## 2020-06-12 RX ORDER — LOSARTAN POTASSIUM 50 MG/1
TABLET ORAL
Qty: 90 TAB | Refills: 3 | Status: SHIPPED | OUTPATIENT
Start: 2020-06-12 | End: 2021-06-21 | Stop reason: SDUPTHER

## 2020-06-12 SDOH — HEALTH STABILITY: MENTAL HEALTH: HOW OFTEN DO YOU HAVE A DRINK CONTAINING ALCOHOL?: 2-4 TIMES A MONTH

## 2020-06-12 SDOH — HEALTH STABILITY: MENTAL HEALTH: HOW MANY STANDARD DRINKS CONTAINING ALCOHOL DO YOU HAVE ON A TYPICAL DAY?: 1 OR 2

## 2020-06-12 SDOH — HEALTH STABILITY: MENTAL HEALTH: HOW OFTEN DO YOU HAVE 6 OR MORE DRINKS ON ONE OCCASION?: NEVER

## 2020-06-12 ASSESSMENT — PATIENT HEALTH QUESTIONNAIRE - PHQ9: CLINICAL INTERPRETATION OF PHQ2 SCORE: 0

## 2020-06-12 ASSESSMENT — FIBROSIS 4 INDEX: FIB4 SCORE: 1.33

## 2020-06-12 NOTE — ASSESSMENT & PLAN NOTE
Chronic medical problem.  Patient is taking last lab results:  Atorvastatin 80 mg. He is tolerating the medication. Denies myalgias.  Last lab results:  Results for IRA SALAZAR (MRN [id number]) as of 6/12/2020 12:24   Ref. Range 4/13/2020 07:32   Cholesterol,Tot Latest Ref Range: 100 - 199 mg/dL 125   Triglycerides Latest Ref Range: 0 - 149 mg/dL 172 (H)   HDL Latest Ref Range: >=40 mg/dL 35 (A)   LDL Latest Ref Range: <100 mg/dL 56

## 2020-06-12 NOTE — ASSESSMENT & PLAN NOTE
Chronic medical problem.  New to examiner.  Patient is taking metformin 5 mg, 2 tabs with breakfast and 1 tab with dinner.  He is checking blood sugars.  He brings in blood sugar readings ranging from 128-168.  He denies any low blood sugars.

## 2020-06-12 NOTE — ASSESSMENT & PLAN NOTE
Chronic medical problem.  New to examiner.  Patient is taking donepezil 5 mg that was started in January 2020.  Patient states that his wife would like to inform me that he is having memory difficulties.  Patient denies any memory issues.

## 2020-06-12 NOTE — ASSESSMENT & PLAN NOTE
Chronic medical problem.  New to examiner.  Patient is taking losartan 50 mg daily and metoprolol 25 mg BID. He is followed by cardiology Dr. Mitchell.  He is checking his blood pressures at home.  He brings in a BP log which show systolic blood pressures ranging 116-163 and diastolic blood pressures ranging 69-93.  His pulse rate ranges 57-69.  He denies any chest pain, shortness of breath, dizziness, palpitations, or headaches.

## 2020-06-12 NOTE — TELEPHONE ENCOUNTER
Received request via: Pharmacy    Was the patient seen in the last year in this department? Yes lov 6/12/2020    Does the patient have an active prescription (recently filled or refills available) for medication(s) requested? No

## 2020-06-12 NOTE — ASSESSMENT & PLAN NOTE
Chronic medical problem. New to examiner. Patient is currently taking Vitamin D 1000 units daily.

## 2020-06-12 NOTE — PROGRESS NOTES
Subjective:     CC:  Diagnoses of Encounter to establish care, Type 2 diabetes mellitus without complication, without long-term current use of insulin ([facility]), Essential hypertension, Mixed hyperlipidemia, Memory difficulties, and Vitamin D deficiency disease were pertinent to this visit.    HISTORY OF THE PRESENT ILLNESS: Patient is a 79 y.o. male. This pleasant patient is here today to establish care and discuss the following. His prior PCP was Dr. Velázquez.    Vitamin D deficiency disease  Chronic medical problem. New to examiner. Patient is currently taking Vitamin D 1000 units daily.    Type 2 diabetes mellitus without complication, without long-term current use of insulin ([facility])  Chronic medical problem.  New to examiner.  Patient is taking metformin 5 mg, 2 tabs with breakfast and 1 tab with dinner.  He is checking blood sugars.  He brings in blood sugar readings ranging from 128-168.  He denies any low blood sugars.    Mixed hyperlipidemia  Chronic medical problem.  Patient is taking last lab results:  Atorvastatin 80 mg. He is tolerating the medication. Denies myalgias.  Last lab results:  Results for IRA SALAZAR (MRN [id number]) as of 6/12/2020 12:24   Ref. Range 4/13/2020 07:32   Cholesterol,Tot Latest Ref Range: 100 - 199 mg/dL 125   Triglycerides Latest Ref Range: 0 - 149 mg/dL 172 (H)   HDL Latest Ref Range: >=40 mg/dL 35 (A)   LDL Latest Ref Range: <100 mg/dL 56       Essential hypertension  Chronic medical problem.  New to examiner.  Patient is taking losartan 50 mg daily and metoprolol 25 mg BID. He is followed by cardiology Dr. Mitchell.  He is checking his blood pressures at home.  He brings in a BP log which show systolic blood pressures ranging 116-163 and diastolic blood pressures ranging 69-93.  His pulse rate ranges 57-69.  He denies any chest pain, shortness of breath, dizziness, palpitations, or headaches.    Memory difficulties  Chronic medical problem.  New to examiner.   Patient is taking donepezil 5 mg that was started in January 2020.  Patient states that his wife would like to inform me that he is having memory difficulties.  Patient denies any memory issues.        Allergies: Other environmental    Current Outpatient Medications Ordered in Epic   Medication Sig Dispense Refill   • donepezil (ARICEPT) 10 MG tablet Take 1 Tab by mouth every day. 30 Tab 2   • metFORMIN (GLUCOPHAGE) 500 MG Tab TAKE 2 TABS WITH BREAKFAST AND 1 TAB WITH DINNER 270 Tab 3   • metoprolol (LOPRESSOR) 25 MG Tab Take 1 Tab by mouth 2 times a day. 180 Tab 3   • atorvastatin (LIPITOR) 80 MG tablet Take 1 Tab by mouth every day. 90 Tab 3   • ONE TOUCH ULTRA TEST strip TEST BLOOD SUGAR LEVELS EVERY  Strip 3   • hydrocortisone 2.5 % Cream topical cream Apply thin layer to affected area twice daily 1 Tube 0   • potassium Chloride ER (K-TAB) 20 MEQ Tab CR tablet Take 1 Tab by mouth every day. 90 Tab 3   • ONETOUCH DELICA LANCETS FINE Misc USE TO TEST BLOOD SUGARS ONCE DAILY 100 Each 3   • Ascorbic Acid (VITAMIN C PO) Take  by mouth.     • aspirin (ASA) 81 MG Chew Tab chewable tablet Take 81 mg by mouth every day. Indications: post cardiac surgery to prevent blood clots     • therapeutic multivitamin-minerals (THERAGRAN-M) Tab Take 1 Tab by mouth every day. Indications: supplement     • Glucosamine-Chondroit-Vit C-Mn (GLUCOSAMINE CHONDR 1500 COMPLX) Cap Take 1 Cap by mouth every day. 100 Cap 3   • Cholecalciferol (VITAMIN D3) 1000 UNIT TABS Take 1 Tab by mouth every day. 100 Tab 3   • losartan (COZAAR) 50 MG Tab TAKE 1 TABLET BY MOUTH EVERY DAY 90 Tab 3   • furosemide (LASIX) 40 MG Tab Take 40 mg by mouth every day.       No current [facility]-ordered facility-administered medications on file.        Past Medical History:   Diagnosis Date   • Anxiety    • Arthritis    • CAD (coronary artery disease) 07/06/2017    CABG x 4 (LIMA to distal diagonal, rSVG to distal OM1, rSVG to distal OM2, rSVG to distal LAD) by   "Gulshan.   • Diabetes (HCC)     oral medication    • Hypertension    • Inguinal hernia    • Mixed hyperlipidemia    • Nodular prostate without urinary obstruction    • Prostate cancer (HCC)        Past Surgical History:   Procedure Laterality Date   • ROHINI  7/6/2017    Procedure: ROHINI - INTRAOPERATIVE ;  Surgeon: Anthony Gaffney M.D.;  Location: SURGERY [facility];  Service:    • MULTIPLE CORONARY ARTERY BYPASS ENDO VEIN HARVEST  7/6/2017    Procedure: MULTIPLE CORONARY ARTERY BYPASS ENDO VEIN HARVEST X2-4, PREVENA DRESSING ;  Surgeon: Anthony Gaffney M.D.;  Location: SURGERY [facility];  Service:    • APPENDECTOMY  1955   • TONSILLECTOMY         Social History     Tobacco Use   • Smoking status: Never Smoker   • Smokeless tobacco: Never Used   Substance Use Topics   • Alcohol use: Yes     Alcohol/week: 1.2 oz     Types: 2 Shots of liquor per week     Frequency: 2-4 times a month     Drinks per session: 1 or 2     Binge frequency: Never   • Drug use: No       Social History     Social History Narrative   • Not on file       Family History   Problem Relation Age of Onset   • Stroke Mother    • Heart Disease Father    • Heart Disease Brother        Health Maintenance: Due for retinal screen.  Due for annual next visit.  Due for zoster vaccine.    ROS:   Gen: no fevers/chills, no changes in weight  Eyes: no changes in vision  ENT: no sore throat, no ear pain, + decreased hearing   Pulm: no sob, no cough  CV: no chest pain, no palpitations, no leg swelling  GI: no nausea/vomiting, no diarrhea  : no dysuria  MSk: no myalgias, no falls  Skin: no rash  Neuro: no headaches, no dizziness, +memory issues  Heme/Lymph: no easy bruising      Objective:     Vital signs reviewed  Exam: /60   Pulse 71   Temp 36.4 °C (97.5 °F) (Temporal)   Resp 16   Ht 1.778 m (5' 10\")   Wt 87.1 kg (192 lb)   SpO2 94%  Body mass index is 27.55 kg/m².    General: Normal appearing. No distress.  HENT: Normocephalic. Ears normal " shape and contour, canals are clear bilaterally, tympanic membranes are benign.   Eyes: Eyes conjunctiva clear lids without ptosis, pupils equal and reactive to light accommodation, lids normal.  Neck: Supple without JVD. Thyroid is not enlarged.  Pulmonary: Clear to ausculation.  Normal effort. No rales, ronchi, or wheezing.  Cardiovascular: Regular rate and rhythm without murmur. Radial pulses are intact and equal bilaterally.  Abdomen: Soft, nontender, nondistended. Normal bowel sounds.   Neurologic: Grossly nonfocal  Lymph: No cervical or supraclavicular lymph nodes are palpable  Skin: Warm and dry.  No obvious lesions.  Musculoskeletal: Normal gait. No extremity cyanosis, clubbing, or edema.  Psych: Normal mood and affect. Alert and oriented x3. Judgment and insight is normal.    Assessment & Plan:   79 y.o. male with the following -    1. Encounter to establish care  New problem to examiner.  Care established.    2. Type 2 diabetes mellitus without complication, without long-term current use of insulin (HCC)  New problem to examiner.  Continue metformin.  Continue to decrease sugar intake.  Continue home blood sugar monitoring.  He will be due for retinal exam at next appointment. Due for repeat A1C in 10/2020  Monitor and follow.    3. Essential hypertension  New problem to examiner.  Continue losartan and metoprolol.  Continue home blood pressure monitoring.  BP at goal today.  Monitor and follow.    4. Mixed hyperlipidemia  New problem to examiner.  Continue atorvastatin.  Up-to-date on labs.  Monitor and follow.    5. Memory difficulties  New problem to examiner.  Continue donezepil, try increased dose.  Patient will return in about a month to have memory testing done.  Discussed with patient that if this test is abnormal I will refer him to neurology for further testing.  Patient is in agreement and verbalized understanding.  He had recent lab work in April that was stable.  Monitor and follow.  -  donepezil (ARICEPT) 10 MG tablet; Take 1 Tab by mouth every day.  Dispense: 30 Tab; Refill: 2    6. Vitamin D deficiency disease  New problem to examiner.  Continue vitamin D.  Monitor and follow.      Return in about 4 weeks (around 7/10/2020) for memory testing .    Please note that this dictation was created using voice recognition software. I have made every reasonable attempt to correct obvious errors, but I expect that there are errors of grammar and possibly content that I did not discover before finalizing the note.

## 2020-06-12 NOTE — TELEPHONE ENCOUNTER
Requested Prescriptions     Signed Prescriptions Disp Refills   • losartan (COZAAR) 50 MG Tab 90 Tab 3     Sig: TAKE 1 TABLET BY MOUTH EVERY DAY     Authorizing Provider: PETER PAZ A.P.R.N.

## 2020-07-17 ENCOUNTER — OFFICE VISIT (OUTPATIENT)
Dept: MEDICAL GROUP | Facility: PHYSICIAN GROUP | Age: 80
End: 2020-07-17
Payer: MEDICARE

## 2020-07-17 VITALS
HEART RATE: 74 BPM | SYSTOLIC BLOOD PRESSURE: 124 MMHG | OXYGEN SATURATION: 93 % | BODY MASS INDEX: 28.06 KG/M2 | WEIGHT: 196 LBS | DIASTOLIC BLOOD PRESSURE: 60 MMHG | RESPIRATION RATE: 18 BRPM | TEMPERATURE: 97.6 F | HEIGHT: 70 IN

## 2020-07-17 DIAGNOSIS — R41.3 MEMORY DIFFICULTIES: ICD-10-CM

## 2020-07-17 DIAGNOSIS — Z02.89 ENCOUNTER FOR COMPLETION OF FORM WITH PATIENT: ICD-10-CM

## 2020-07-17 PROCEDURE — 99213 OFFICE O/P EST LOW 20 MIN: CPT | Performed by: NURSE PRACTITIONER

## 2020-07-17 ASSESSMENT — FIBROSIS 4 INDEX: FIB4 SCORE: 1.33

## 2020-07-17 NOTE — ASSESSMENT & PLAN NOTE
Chronic medical problem. He is here today for memory testing. He would like his [facility] paperwork filled out as well.

## 2020-07-18 NOTE — PROGRESS NOTES
Subjective:     CC: memory testing    HPI:   Raf presents today with:    Memory difficulties  Chronic medical problem. He is here today for memory testing. He would like his DMV paperwork filled out as well.       Past Medical History:   Diagnosis Date   • Anxiety    • Arthritis    • CAD (coronary artery disease) 07/06/2017    CABG x 4 (LIMA to distal diagonal, rSVG to distal OM1, rSVG to distal OM2, rSVG to distal LAD) by Dr. Gaffney.   • Diabetes (HCC)     oral medication    • Hypertension    • Inguinal hernia    • Mixed hyperlipidemia    • Nodular prostate without urinary obstruction    • Prostate cancer (HCC)        Social History     Tobacco Use   • Smoking status: Never Smoker   • Smokeless tobacco: Never Used   Substance Use Topics   • Alcohol use: Yes     Alcohol/week: 1.2 oz     Types: 2 Shots of liquor per week     Frequency: 2-4 times a month     Drinks per session: 1 or 2     Binge frequency: Never   • Drug use: No       Current Outpatient Medications Ordered in Epic   Medication Sig Dispense Refill   • losartan (COZAAR) 50 MG Tab TAKE 1 TABLET BY MOUTH EVERY DAY 90 Tab 3   • donepezil (ARICEPT) 10 MG tablet Take 1 Tab by mouth every day. 30 Tab 2   • metFORMIN (GLUCOPHAGE) 500 MG Tab TAKE 2 TABS WITH BREAKFAST AND 1 TAB WITH DINNER 270 Tab 3   • metoprolol (LOPRESSOR) 25 MG Tab Take 1 Tab by mouth 2 times a day. 180 Tab 3   • atorvastatin (LIPITOR) 80 MG tablet Take 1 Tab by mouth every day. 90 Tab 3   • ONE TOUCH ULTRA TEST strip TEST BLOOD SUGAR LEVELS EVERY  Strip 3   • hydrocortisone 2.5 % Cream topical cream Apply thin layer to affected area twice daily 1 Tube 0   • potassium Chloride ER (K-TAB) 20 MEQ Tab CR tablet Take 1 Tab by mouth every day. 90 Tab 3   • ONETOUCH DELICA LANCETS FINE Misc USE TO TEST BLOOD SUGARS ONCE DAILY 100 Each 3   • furosemide (LASIX) 40 MG Tab Take 40 mg by mouth every day.     • Ascorbic Acid (VITAMIN C PO) Take  by mouth.     • aspirin (ASA) 81 MG Chew Tab  "chewable tablet Take 81 mg by mouth every day. Indications: post cardiac surgery to prevent blood clots     • therapeutic multivitamin-minerals (THERAGRAN-M) Tab Take 1 Tab by mouth every day. Indications: supplement     • Glucosamine-Chondroit-Vit C-Mn (GLUCOSAMINE CHONDR 1500 COMPLX) Cap Take 1 Cap by mouth every day. 100 Cap 3   • Cholecalciferol (VITAMIN D3) 1000 UNIT TABS Take 1 Tab by mouth every day. 100 Tab 3     No current Epic-ordered facility-administered medications on file.        Allergies:  Other environmental    Health Maintenance: due for zoster vaccine, AWV and retinal screen.    ROS:  Gen: no fevers/chills, no changes in weight  Eyes: no changes in vision  ENT: no sore throat, no ear pain  Pulm: no sob, no cough  CV: no chest pain, no palpitations  GI: no nausea/vomiting  MSk: no myalgias  Skin: no rash  Neuro: no headaches, no dizziness    Objective:     Vital signs reviewed  Exam:  /60 (BP Location: Left arm, Patient Position: Sitting, BP Cuff Size: Adult)   Pulse 74   Temp 36.4 °C (97.6 °F) (Temporal)   Resp 18   Ht 1.778 m (5' 10\")   Wt 88.9 kg (196 lb)   SpO2 93%   BMI 28.12 kg/m²  Body mass index is 28.12 kg/m².    Gen: Alert and oriented, No apparent distress. Dressed appropriately for weather.   Neck: Neck is supple without lymphadenopathy.  Lungs: Normal effort, CTA bilaterally, no wheezes, rhonchi, or rales  CV: Regular rate and rhythm. No murmurs, rubs, or gallops. Radial pulses 2 + bilaterally.   Ext: No clubbing, cyanosis, edema.  Neuro:  Alert and oriented to self, place, time and situation. Good eye contact. Smiling.     MMSE    -What is the:  Year, season, date, day, month.               5/5 points    -Where are we:   State, county, town, hospital, floor.      5/5 points    -3 objects immediate recall.                                             3/3 points    -World backwards or serial sevens.                                 5/5 points    -Three-minute recall.          " "                                               2/3 points    -Name 2 objects.                                                               2/2 points    -Repeat the following.      \"No ifs and is or buts\"                                                    1/1 point    -Follow a 3 stage command.      Paper right hand, fold in half, place on floor.                 3/3 points    -Follow a written command.       Close your eyes                                                          1/1 point    -Write a complete sentence.                                           0/1 point    -Copy a design.                                                                1/1 point    Total                                                                                  28/30    Cutoff of 24 indicating dementia.      Assessment & Plan:     79 y.o. male with the following -     1. Memory difficulties  Chronic stable medical problem. MMSE 28 today. Oriented x 4 today. Discussed with patient that we will continue to monitor. He verbalized understanding. Monitor and follow.     2. Encounter for completion of form with patient  New problem to examiner. DMV paperwork for license renew completed. He will have his eye doctor fill out vision portion.       Return in about 3 months (around 10/17/2020) for Diabetes, A1C.    Please note that this dictation was created using voice recognition software. I have made every reasonable attempt to correct obvious errors, but I expect that there are errors of grammar and possibly content that I did not discover before finalizing the note.      "

## 2020-10-01 ENCOUNTER — OFFICE VISIT (OUTPATIENT)
Dept: CARDIOLOGY | Facility: MEDICAL CENTER | Age: 80
End: 2020-10-01
Payer: MEDICARE

## 2020-10-01 VITALS
DIASTOLIC BLOOD PRESSURE: 70 MMHG | SYSTOLIC BLOOD PRESSURE: 132 MMHG | HEIGHT: 70 IN | HEART RATE: 70 BPM | OXYGEN SATURATION: 96 % | WEIGHT: 197.2 LBS | BODY MASS INDEX: 28.23 KG/M2

## 2020-10-01 DIAGNOSIS — E11.9 TYPE 2 DIABETES MELLITUS WITHOUT COMPLICATION, WITHOUT LONG-TERM CURRENT USE OF INSULIN (HCC): ICD-10-CM

## 2020-10-01 DIAGNOSIS — R01.1 UNDIAGNOSED CARDIAC MURMURS: ICD-10-CM

## 2020-10-01 DIAGNOSIS — I25.119 CORONARY ARTERY DISEASE INVOLVING NATIVE CORONARY ARTERY OF NATIVE HEART WITH ANGINA PECTORIS (HCC): ICD-10-CM

## 2020-10-01 DIAGNOSIS — I10 ESSENTIAL HYPERTENSION: ICD-10-CM

## 2020-10-01 DIAGNOSIS — Z95.1 S/P CABG X 4: ICD-10-CM

## 2020-10-01 DIAGNOSIS — E78.2 MIXED HYPERLIPIDEMIA: ICD-10-CM

## 2020-10-01 DIAGNOSIS — E55.9 VITAMIN D DEFICIENCY: ICD-10-CM

## 2020-10-01 PROCEDURE — 99214 OFFICE O/P EST MOD 30 MIN: CPT | Performed by: INTERNAL MEDICINE

## 2020-10-01 RX ORDER — POTASSIUM CHLORIDE 1500 MG/1
20 TABLET, EXTENDED RELEASE ORAL DAILY
Qty: 90 TAB | Refills: 3 | Status: SHIPPED | OUTPATIENT
Start: 2020-10-01 | End: 2021-10-11

## 2020-10-01 ASSESSMENT — FIBROSIS 4 INDEX: FIB4 SCORE: 1.34

## 2020-10-01 NOTE — PROGRESS NOTES
Chief Complaint   Patient presents with   • Coronary Artery Disease     Coronary artery disease involving native coronary artery of native heart without angina pectoris       Subjective:   Raf Pelayo is a 80 y.o. male who presents today for follow-up of CAD status post CABG times 4 July 2017, hypertension and diabetes.      Interim since last visit continues to take his medications appropriately asymptomatic and was started on donezepil for memory issues but is having no current issues with memory as far as I can tell now that he is on current medical therapy.      Past Medical History:   Diagnosis Date   • Anxiety    • Arthritis    • CAD (coronary artery disease) 07/06/2017    CABG x 4 (LIMA to distal diagonal, rSVG to distal OM1, rSVG to distal OM2, rSVG to distal LAD) by Dr. Gaffney.   • Diabetes (HCC)     oral medication    • Hypertension    • Inguinal hernia    • Mixed hyperlipidemia    • Nodular prostate without urinary obstruction    • Prostate cancer (HCC)      Past Surgical History:   Procedure Laterality Date   • RHOINI  7/6/2017    Procedure: ROHINI - INTRAOPERATIVE ;  Surgeon: Anthony Gaffney M.D.;  Location: SURGERY [facility];  Service:    • MULTIPLE CORONARY ARTERY BYPASS ENDO VEIN HARVEST  7/6/2017    Procedure: MULTIPLE CORONARY ARTERY BYPASS ENDO VEIN HARVEST X2-4, PREVENA DRESSING ;  Surgeon: Anthony Gaffney M.D.;  Location: SURGERY [facility];  Service:    • APPENDECTOMY  1955   • TONSILLECTOMY       Family History   Problem Relation Age of Onset   • Stroke Mother    • Heart Disease Father    • Heart Disease Brother      Social History     Socioeconomic History   • Marital status:      Spouse name: Not on file   • Number of children: Not on file   • Years of education: Not on file   • Highest education level: Not on file   Occupational History   • Not on file   Social Needs   • Financial resource strain: Not on file   • Food insecurity     Worry: Not on file      Inability: Not on file   • Transportation needs     Medical: Not on file     Non-medical: Not on file   Tobacco Use   • Smoking status: Never Smoker   • Smokeless tobacco: Never Used   Substance and Sexual Activity   • Alcohol use: Yes     Alcohol/week: 1.2 oz     Types: 2 Shots of liquor per week     Frequency: 2-4 times a month     Drinks per session: 1 or 2     Binge frequency: Never   • Drug use: No   • Sexual activity: Never     Partners: Female     Birth control/protection: Post-Menopausal   Lifestyle   • Physical activity     Days per week: Not on file     Minutes per session: Not on file   • Stress: Not on file   Relationships   • Social connections     Talks on phone: Not on file     Gets together: Not on file     Attends Orthodoxy service: Not on file     Active member of club or organization: Not on file     Attends meetings of clubs or organizations: Not on file     Relationship status: Not on file   • Intimate partner violence     Fear of current or ex partner: Not on file     Emotionally abused: Not on file     Physically abused: Not on file     Forced sexual activity: Not on file   Other Topics Concern   • Not on file   Social History Narrative   • Not on file     Allergies   Allergen Reactions   • Other Environmental Runny Nose     SAW DUST     Outpatient Encounter Medications as of 10/1/2020   Medication Sig Dispense Refill   • potassium Chloride ER (K-TAB) 20 MEQ Tab CR tablet Take 1 Tab by mouth every day. 90 Tab 3   • losartan (COZAAR) 50 MG Tab TAKE 1 TABLET BY MOUTH EVERY DAY 90 Tab 3   • donepezil (ARICEPT) 10 MG tablet Take 1 Tab by mouth every day. 30 Tab 2   • metFORMIN (GLUCOPHAGE) 500 MG Tab TAKE 2 TABS WITH BREAKFAST AND 1 TAB WITH DINNER 270 Tab 3   • metoprolol (LOPRESSOR) 25 MG Tab Take 1 Tab by mouth 2 times a day. 180 Tab 3   • atorvastatin (LIPITOR) 80 MG tablet Take 1 Tab by mouth every day. 90 Tab 3   • ONE TOUCH ULTRA TEST strip TEST BLOOD SUGAR LEVELS EVERY  Strip 3   •  "ONETOUCH DELICA LANCETS FINE Misc USE TO TEST BLOOD SUGARS ONCE DAILY 100 Each 3   • Ascorbic Acid (VITAMIN C PO) Take  by mouth.     • aspirin (ASA) 81 MG Chew Tab chewable tablet Take 81 mg by mouth every day. Indications: post cardiac surgery to prevent blood clots     • therapeutic multivitamin-minerals (THERAGRAN-M) Tab Take 1 Tab by mouth every day. Indications: supplement     • Glucosamine-Chondroit-Vit C-Mn (GLUCOSAMINE CHONDR 1500 COMPLX) Cap Take 1 Cap by mouth every day. 100 Cap 3   • Cholecalciferol (VITAMIN D3) 1000 UNIT TABS Take 1 Tab by mouth every day. 100 Tab 3   • hydrocortisone 2.5 % Cream topical cream Apply thin layer to affected area twice daily (Patient not taking: Reported on 10/1/2020) 1 Tube 0   • [DISCONTINUED] potassium Chloride ER (K-TAB) 20 MEQ Tab CR tablet Take 1 Tab by mouth every day. 90 Tab 3   • furosemide (LASIX) 40 MG Tab Take 40 mg by mouth every day.       No facility-administered encounter medications on file as of 10/1/2020.      Review of Systems   All other systems reviewed and are negative.       Objective:   /70 (BP Location: Left arm, Patient Position: Sitting, BP Cuff Size: Adult)   Pulse 70   Ht 1.778 m (5' 10\")   Wt 89.4 kg (197 lb 3.2 oz)   SpO2 96%   BMI 28.30 kg/m²     Physical Exam   Constitutional: He is oriented to person, place, and time. He appears well-developed and well-nourished. No distress.   HENT:   Head: Normocephalic and atraumatic.   Right Ear: External ear normal.   Left Ear: External ear normal.   Eyes: Pupils are equal, round, and reactive to light. Conjunctivae and EOM are normal. Right eye exhibits no discharge. Left eye exhibits no discharge. No scleral icterus.   Neck: Normal range of motion. Neck supple. No JVD present. No tracheal deviation present. No thyromegaly present.   Cardiovascular: Normal rate, regular rhythm and intact distal pulses. PMI is not displaced. Exam reveals no gallop and no friction rub.   Murmur ( 2/6 " systolic ejection murmur left upper sternal border) heard.  Pulses:       Carotid pulses are 2+ on the right side and 2+ on the left side.       Radial pulses are 2+ on the left side.        Popliteal pulses are 2+ on the right side and 2+ on the left side.        Dorsalis pedis pulses are 2+ on the right side and 2+ on the left side.        Posterior tibial pulses are 2+ on the right side and 2+ on the left side.   Pulmonary/Chest: Effort normal and breath sounds normal. No respiratory distress. He has no wheezes. He has no rales. He exhibits no tenderness.   Well-healed midline sternal incision   Abdominal: Soft. Bowel sounds are normal. He exhibits no distension. There is no abdominal tenderness.   Musculoskeletal: Normal range of motion.         General: No tenderness, deformity or edema.   Neurological: He is alert and oriented to person, place, and time. No cranial nerve deficit (cranial nerves II through XII grossly intact). Coordination normal.   Skin: Skin is warm and dry. No rash noted. He is not diaphoretic. No erythema. No pallor.   Psychiatric: He has a normal mood and affect. His behavior is normal. Thought content normal.   Vitals reviewed.    LABS:  Lab Results   Component Value Date/Time    CHOLSTRLTOT 125 04/13/2020 07:32 AM    LDL 56 04/13/2020 07:32 AM    HDL 35 (A) 04/13/2020 07:32 AM    TRIGLYCERIDE 172 (H) 04/13/2020 07:32 AM       Lab Results   Component Value Date/Time    WBC 7.7 07/02/2019 07:32 AM    RBC 4.52 (L) 07/02/2019 07:32 AM    HEMOGLOBIN 13.7 (L) 07/02/2019 07:32 AM    HEMATOCRIT 43.9 07/02/2019 07:32 AM    MCV 97.1 07/02/2019 07:32 AM    NEUTSPOLYS 71.10 07/02/2019 07:32 AM    LYMPHOCYTES 15.40 (L) 07/02/2019 07:32 AM    MONOCYTES 9.70 07/02/2019 07:32 AM    EOSINOPHILS 2.60 07/02/2019 07:32 AM    BASOPHILS 0.80 07/02/2019 07:32 AM    HYPOCHROMIA 1+ 02/19/2013 10:35 AM     Lab Results   Component Value Date/Time    SODIUM 140 04/13/2020 07:32 AM    POTASSIUM 4.3 04/13/2020 07:32  AM    CHLORIDE 105 04/13/2020 07:32 AM    CO2 20 04/13/2020 07:32 AM    GLUCOSE 134 (H) 04/13/2020 07:32 AM    BUN 28 (H) 04/13/2020 07:32 AM    CREATININE 0.96 04/13/2020 07:32 AM    CREATININE 1.0 10/27/2008 08:20 AM     Lab Results   Component Value Date    HBA1C 7.2 (H) 04/13/2020      Lab Results   Component Value Date/Time    ALKPHOSPHAT 78 04/13/2020 07:32 AM    ASTSGOT 20 04/13/2020 07:32 AM    ALTSGPT 27 04/13/2020 07:32 AM    TBILIRUBIN 0.5 04/13/2020 07:32 AM      Lab Results   Component Value Date/Time    BNPBTYPENAT 52 08/16/2017 10:13 AM      No results found for: TSH  Lab Results   Component Value Date/Time    PROTHROMBTM 16.7 (H) 07/06/2017 12:27 PM    INR 1.31 (H) 07/06/2017 12:27 PM          Assessment:     1. S/P CABG x 4     2. Type 2 diabetes mellitus without complication, without long-term current use of insulin ([facility])     3. Coronary artery disease involving native coronary artery of native heart with angina pectoris (HCC)     4. Mixed hyperlipidemia     5. 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  A1c is near optimal and he is working on this with his primary physician.  He has a murmur I have not previously heard and we will assess this with echocardiogram.  He is asymptomatic.  He is on good medical therapy.  Recommend continued follow-up yearly with cardiology.

## 2020-10-01 NOTE — TELEPHONE ENCOUNTER
Phone Number Called: [phone] (home)       Call outcome: Spoke to patient regarding message below.    Message: appt rescheduled and pt is advised of orders.

## 2020-10-01 NOTE — TELEPHONE ENCOUNTER
Received request via: Pharmacy    Was the patient seen in the last year in this department? Yes LOV 07/17/2020    Does the patient have an active prescription (recently filled or refills available) for medication(s) requested? No

## 2020-10-01 NOTE — TELEPHONE ENCOUNTER
Requested Prescriptions     Signed Prescriptions Disp Refills   • potassium Chloride ER (K-TAB) 20 MEQ Tab CR tablet 90 Tab 3     Sig: Take 1 Tab by mouth every day.     Authorizing Provider: PETER PAZ A.P.R.N.

## 2020-10-12 ENCOUNTER — HOSPITAL ENCOUNTER (OUTPATIENT)
Dept: LAB | Facility: MEDICAL CENTER | Age: 80
End: 2020-10-12
Attending: NURSE PRACTITIONER
Payer: MEDICARE

## 2020-10-12 DIAGNOSIS — E55.9 VITAMIN D DEFICIENCY: ICD-10-CM

## 2020-10-12 DIAGNOSIS — I10 ESSENTIAL HYPERTENSION: ICD-10-CM

## 2020-10-12 DIAGNOSIS — E11.9 TYPE 2 DIABETES MELLITUS WITHOUT COMPLICATION, WITHOUT LONG-TERM CURRENT USE OF INSULIN (HCC): ICD-10-CM

## 2020-10-12 LAB
25(OH)D3 SERPL-MCNC: 51 NG/ML (ref 30–100)
ANION GAP SERPL CALC-SCNC: 12 MMOL/L (ref 7–16)
BUN SERPL-MCNC: 16 MG/DL (ref 8–22)
CALCIUM SERPL-MCNC: 9.6 MG/DL (ref 8.5–10.5)
CHLORIDE SERPL-SCNC: 104 MMOL/L (ref 96–112)
CO2 SERPL-SCNC: 23 MMOL/L (ref 20–33)
CREAT SERPL-MCNC: 1.03 MG/DL (ref 0.5–1.4)
EST. AVERAGE GLUCOSE BLD GHB EST-MCNC: 169 MG/DL
FASTING STATUS PATIENT QL REPORTED: NORMAL
GLUCOSE SERPL-MCNC: 134 MG/DL (ref 65–99)
HBA1C MFR BLD: 7.5 % (ref 0–5.6)
POTASSIUM SERPL-SCNC: 4.6 MMOL/L (ref 3.6–5.5)
SODIUM SERPL-SCNC: 139 MMOL/L (ref 135–145)

## 2020-10-12 PROCEDURE — 82306 VITAMIN D 25 HYDROXY: CPT

## 2020-10-12 PROCEDURE — 80048 BASIC METABOLIC PNL TOTAL CA: CPT

## 2020-10-12 PROCEDURE — 83036 HEMOGLOBIN GLYCOSYLATED A1C: CPT | Mod: GA

## 2020-10-12 PROCEDURE — 36415 COLL VENOUS BLD VENIPUNCTURE: CPT

## 2020-10-15 ENCOUNTER — OFFICE VISIT (OUTPATIENT)
Dept: MEDICAL GROUP | Facility: PHYSICIAN GROUP | Age: 80
End: 2020-10-15
Payer: MEDICARE

## 2020-10-15 VITALS
HEART RATE: 73 BPM | BODY MASS INDEX: 28.35 KG/M2 | DIASTOLIC BLOOD PRESSURE: 66 MMHG | SYSTOLIC BLOOD PRESSURE: 130 MMHG | HEIGHT: 70 IN | TEMPERATURE: 97.1 F | OXYGEN SATURATION: 95 % | RESPIRATION RATE: 16 BRPM | WEIGHT: 198 LBS

## 2020-10-15 DIAGNOSIS — E11.9 TYPE 2 DIABETES MELLITUS WITHOUT COMPLICATION, WITHOUT LONG-TERM CURRENT USE OF INSULIN (HCC): ICD-10-CM

## 2020-10-15 DIAGNOSIS — E78.2 MIXED HYPERLIPIDEMIA: ICD-10-CM

## 2020-10-15 DIAGNOSIS — E55.9 VITAMIN D DEFICIENCY: ICD-10-CM

## 2020-10-15 DIAGNOSIS — I10 ESSENTIAL HYPERTENSION: ICD-10-CM

## 2020-10-15 PROCEDURE — 99214 OFFICE O/P EST MOD 30 MIN: CPT | Performed by: NURSE PRACTITIONER

## 2020-10-15 ASSESSMENT — FIBROSIS 4 INDEX: FIB4 SCORE: 1.34

## 2020-10-15 NOTE — ASSESSMENT & PLAN NOTE
Chronic medical problem. He is taking metformin 500 mg, 2 tabs with breakfast and 1 tab with dinner. He has upcoming appointment with Dr. Gabriel for his retinal exam.  He is active and is watching his diet.  He had recent labs that he would like to review.  Last lab results:  Results for IRA SALAZAR (MRN [id number]) as of 10/15/2020 09:52   Ref. Range 10/12/2020 08:01   Glycohemoglobin Latest Ref Range: 0.0 - 5.6 % 7.5 (H)

## 2020-10-15 NOTE — ASSESSMENT & PLAN NOTE
Chronic medical problem. He is taking metoprolol 25 mg BID and losartan 50 mg daily. His initial BP today is 140/66.  He is checking his blood pressure at home and brings in readings today.  His blood pressure at home is 130s to 150s over 60s to 70s.  He is not having any chest pain, shortness of breath, dizziness, blurred vision, palpitations, or headaches.

## 2020-10-15 NOTE — ASSESSMENT & PLAN NOTE
Current medical problem.  He is taking atorvastatin 80 mg daily.  He is tolerating the medication.  Denies myalgias.

## 2020-10-15 NOTE — ASSESSMENT & PLAN NOTE
Chronic medical problem. He is taking vitamin d 1,000 units daily.  He had recent labs that he would like to review.  Last lab results:  Results for IRA SALAZAR (MRN [id number]) as of 10/15/2020 10:14   Ref. Range 10/12/2020 08:01   25-Hydroxy   Vitamin D 25 Latest Ref Range: 30 - 100 ng/mL 51

## 2020-10-15 NOTE — PROGRESS NOTES
Subjective:     CC: Lab results    HPI:   Raf presents today with the following:    Type 2 diabetes mellitus without complication, without long-term current use of insulin (CMS-HCC)  Chronic medical problem. He is taking metformin 500 mg, 2 tabs with breakfast and 1 tab with dinner. He has upcoming appointment with Dr. Gabriel for his retinal exam.  He is active and is watching his diet.  He had recent labs that he would like to review.  Last lab results:  Results for IRA SALAZAR (MRN [id number]) as of 10/15/2020 09:52   Ref. Range 10/12/2020 08:01   Glycohemoglobin Latest Ref Range: 0.0 - 5.6 % 7.5 (H)       Essential hypertension  Chronic medical problem. He is taking metoprolol 25 mg BID and losartan 50 mg daily. His initial BP today is 140/66.  He is checking his blood pressure at home and brings in readings today.  His blood pressure at home is 130s to 150s over 60s to 70s.  He is not having any chest pain, shortness of breath, dizziness, blurred vision, palpitations, or headaches.    Vitamin D deficiency disease  Chronic medical problem. He is taking vitamin d 1,000 units daily.  He had recent labs that he would like to review.  Last lab results:  Results for IRA SALAZAR (MRN [id number]) as of 10/15/2020 10:14   Ref. Range 10/12/2020 08:01   25-Hydroxy   Vitamin D 25 Latest Ref Range: 30 - 100 ng/mL 51       Mixed hyperlipidemia  Current medical problem.  He is taking atorvastatin 80 mg daily.  He is tolerating the medication.  Denies myalgias.      Past Medical History:   Diagnosis Date   • Anxiety    • Arthritis    • CAD (coronary artery disease) 07/06/2017    CABG x 4 (LIMA to distal diagonal, rSVG to distal OM1, rSVG to distal OM2, rSVG to distal LAD) by Dr. Gaffney.   • Diabetes (HCC)     oral medication    • Hypertension    • Inguinal hernia    • Mixed hyperlipidemia    • Nodular prostate without urinary obstruction    • Prostate cancer (HCC)        Social History     Tobacco Use   •  Smoking status: Never Smoker   • Smokeless tobacco: Never Used   Substance Use Topics   • Alcohol use: Yes     Alcohol/week: 1.2 oz     Types: 2 Shots of liquor per week     Frequency: 2-4 times a month     Drinks per session: 1 or 2     Binge frequency: Never   • Drug use: No       Current Outpatient Medications Ordered in Epic   Medication Sig Dispense Refill   • potassium Chloride ER (K-TAB) 20 MEQ Tab CR tablet Take 1 Tab by mouth every day. 90 Tab 3   • losartan (COZAAR) 50 MG Tab TAKE 1 TABLET BY MOUTH EVERY DAY 90 Tab 3   • donepezil (ARICEPT) 10 MG tablet Take 1 Tab by mouth every day. 30 Tab 2   • metFORMIN (GLUCOPHAGE) 500 MG Tab TAKE 2 TABS WITH BREAKFAST AND 1 TAB WITH DINNER 270 Tab 3   • metoprolol (LOPRESSOR) 25 MG Tab Take 1 Tab by mouth 2 times a day. 180 Tab 3   • atorvastatin (LIPITOR) 80 MG tablet Take 1 Tab by mouth every day. 90 Tab 3   • ONE TOUCH ULTRA TEST strip TEST BLOOD SUGAR LEVELS EVERY  Strip 3   • hydrocortisone 2.5 % Cream topical cream Apply thin layer to affected area twice daily 1 Tube 0   • ONETOUCH DELICA LANCETS FINE Misc USE TO TEST BLOOD SUGARS ONCE DAILY 100 Each 3   • Ascorbic Acid (VITAMIN C PO) Take  by mouth.     • aspirin (ASA) 81 MG Chew Tab chewable tablet Take 81 mg by mouth every day. Indications: post cardiac surgery to prevent blood clots     • therapeutic multivitamin-minerals (THERAGRAN-M) Tab Take 1 Tab by mouth every day. Indications: supplement     • Glucosamine-Chondroit-Vit C-Mn (GLUCOSAMINE CHONDR 1500 COMPLX) Cap Take 1 Cap by mouth every day. 100 Cap 3   • Cholecalciferol (VITAMIN D3) 1000 UNIT TABS Take 1 Tab by mouth every day. 100 Tab 3   • furosemide (LASIX) 40 MG Tab Take 40 mg by mouth every day.       No current [facility]-ordered facility-administered medications on file.        Allergies:  Other environmental    Health Maintenance: Due for retinal screening.  He states that he has upcoming appointment with his ophthalmologist for retinal  "screening.    ROS:  Gen: no fevers/chills, no changes in weight  Eyes: no blurry vision  ENT: no sore throat  Pulm: no sob, no cough  CV: no chest pain, no palpitations  GI: no nausea/vomiting, no diarrhea  : no dysuria  MSk: no myalgias, no falls  Skin: no rash  Neuro: no headaches, no dizziness    Objective:     Vital signs reviewed  Exam:  /66   Pulse 73   Temp 36.2 °C (97.1 °F) (Temporal)   Resp 16   Ht 1.778 m (5' 10\")   Wt 89.8 kg (198 lb)   SpO2 95%   BMI 28.41 kg/m²  Body mass index is 28.41 kg/m².    Gen: Alert and oriented, No apparent distress.  Neck: Neck is supple without lymphadenopathy.  Lungs: Normal effort, CTA bilaterally, no wheezes, rhonchi, or rales  CV: Regular rate and rhythm with systolic murmur. No rubs or gallops.  Ext: No clubbing, cyanosis, edema.      Assessment & Plan:     80 y.o. male with the following -     1. Type 2 diabetes mellitus without complication, without long-term current use of insulin (HCC)  Chronic stable medical problem.  Continue Metformin.  His A1c is stable.  He is up-to-date on health maintenance.  Discussed decreasing and watching his carbohydrate intake.  Discussed continuing with diet and exercise.  Monitor and follow.    2. Essential hypertension  Chronic stable medical problem.  On repeat by me his blood pressure was 130/66.  Continue with metoprolol and losartan.  Continue follow-up with cardiology.  He has upcoming echocardiogram scheduled for next week.  Monitor and follow.    3. Mixed hyperlipidemia  Chronic stable medical problem.  Continue atorvastatin.  He is up-to-date on labs.  Monitor and follow.    4. Vitamin D deficiency disease  Chronic stable medical problem.  Continue vitamin D.  His labs are stable.  Monitor and follow.    Return in about 6 months (around 4/15/2021) for Diabetes, A1C, HTN.    Please note that this dictation was created using voice recognition software. I have made every reasonable attempt to correct obvious " errors, but I expect that there are errors of grammar and possibly content that I did not discover before finalizing the note.

## 2020-10-23 ENCOUNTER — HOSPITAL ENCOUNTER (OUTPATIENT)
Dept: CARDIOLOGY | Facility: MEDICAL CENTER | Age: 80
End: 2020-10-23
Attending: INTERNAL MEDICINE
Payer: MEDICARE

## 2020-10-23 DIAGNOSIS — R01.1 UNDIAGNOSED CARDIAC MURMURS: ICD-10-CM

## 2020-10-23 PROCEDURE — 93306 TTE W/DOPPLER COMPLETE: CPT

## 2020-10-24 LAB
LV EJECT FRACT  99904: 65
LV EJECT FRACT MOD 2C 99903: 64.31
LV EJECT FRACT MOD 4C 99902: 64.23
LV EJECT FRACT MOD BP 99901: 64.29

## 2020-10-24 PROCEDURE — 93306 TTE W/DOPPLER COMPLETE: CPT | Mod: 26 | Performed by: INTERNAL MEDICINE

## 2020-10-29 DIAGNOSIS — E78.2 MIXED HYPERLIPIDEMIA: ICD-10-CM

## 2020-10-29 RX ORDER — ATORVASTATIN CALCIUM 80 MG/1
TABLET, FILM COATED ORAL
Qty: 90 TAB | Refills: 3 | Status: SHIPPED | OUTPATIENT
Start: 2020-10-29 | End: 2021-09-27

## 2020-11-16 DIAGNOSIS — R41.3 MEMORY DIFFICULTIES: ICD-10-CM

## 2020-11-16 RX ORDER — DONEPEZIL HYDROCHLORIDE 10 MG/1
10 TABLET, FILM COATED ORAL DAILY
Qty: 90 TAB | Refills: 2 | Status: SHIPPED | OUTPATIENT
Start: 2020-11-16 | End: 2021-08-25 | Stop reason: SDUPTHER

## 2020-11-16 NOTE — TELEPHONE ENCOUNTER
Requested Prescriptions     Signed Prescriptions Disp Refills   • donepezil (ARICEPT) 10 MG tablet 90 Tab 2     Sig: Take 1 Tab by mouth every day.     Authorizing Provider: PETER PAZ A.P.R.N.

## 2020-11-16 NOTE — TELEPHONE ENCOUNTER
Received request via: Pharmacy    Was the patient seen in the last year in this department? Yes LOV 10/15/2020    Does the patient have an active prescription (recently filled or refills available) for medication(s) requested? No

## 2020-11-18 DIAGNOSIS — I25.119 CORONARY ARTERY DISEASE INVOLVING NATIVE CORONARY ARTERY OF NATIVE HEART WITH ANGINA PECTORIS (HCC): ICD-10-CM

## 2020-11-18 DIAGNOSIS — Z95.1 S/P CABG X 4: ICD-10-CM

## 2020-12-01 ENCOUNTER — PATIENT MESSAGE (OUTPATIENT)
Dept: CARDIOLOGY | Facility: MEDICAL CENTER | Age: 80
End: 2020-12-01

## 2020-12-01 DIAGNOSIS — I25.119 CORONARY ARTERY DISEASE INVOLVING NATIVE CORONARY ARTERY OF NATIVE HEART WITH ANGINA PECTORIS (HCC): ICD-10-CM

## 2020-12-01 DIAGNOSIS — Z95.1 S/P CABG X 4: ICD-10-CM

## 2021-01-11 DIAGNOSIS — Z23 NEED FOR VACCINATION: ICD-10-CM

## 2021-01-23 ENCOUNTER — IMMUNIZATION (OUTPATIENT)
Dept: FAMILY PLANNING/WOMEN'S HEALTH CLINIC | Facility: IMMUNIZATION CENTER | Age: 81
End: 2021-01-23
Attending: INTERNAL MEDICINE
Payer: MEDICARE

## 2021-01-23 DIAGNOSIS — Z23 ENCOUNTER FOR VACCINATION: Primary | ICD-10-CM

## 2021-01-23 DIAGNOSIS — Z23 NEED FOR VACCINATION: ICD-10-CM

## 2021-01-23 PROCEDURE — 91300 PFIZER SARS-COV-2 VACCINE: CPT

## 2021-01-23 PROCEDURE — 0001A PFIZER SARS-COV-2 VACCINE: CPT

## 2021-01-27 ENCOUNTER — PATIENT MESSAGE (OUTPATIENT)
Dept: MEDICAL GROUP | Facility: PHYSICIAN GROUP | Age: 81
End: 2021-01-27

## 2021-01-27 DIAGNOSIS — D22.9 CHANGE IN SKIN MOLE: ICD-10-CM

## 2021-02-13 ENCOUNTER — IMMUNIZATION (OUTPATIENT)
Dept: FAMILY PLANNING/WOMEN'S HEALTH CLINIC | Facility: IMMUNIZATION CENTER | Age: 81
End: 2021-02-13
Attending: INTERNAL MEDICINE
Payer: MEDICARE

## 2021-02-13 DIAGNOSIS — Z23 ENCOUNTER FOR VACCINATION: Primary | ICD-10-CM

## 2021-02-13 PROCEDURE — 0002A PFIZER SARS-COV-2 VACCINE: CPT | Performed by: INTERNAL MEDICINE

## 2021-02-13 PROCEDURE — 91300 PFIZER SARS-COV-2 VACCINE: CPT | Performed by: INTERNAL MEDICINE

## 2021-04-13 ENCOUNTER — APPOINTMENT (RX ONLY)
Dept: URBAN - METROPOLITAN AREA CLINIC 22 | Facility: CLINIC | Age: 81
Setting detail: DERMATOLOGY
End: 2021-04-13

## 2021-04-13 DIAGNOSIS — L57.0 ACTINIC KERATOSIS: ICD-10-CM

## 2021-04-13 DIAGNOSIS — L30.8 OTHER SPECIFIED DERMATITIS: ICD-10-CM | Status: INADEQUATELY CONTROLLED

## 2021-04-13 DIAGNOSIS — D18.0 HEMANGIOMA: ICD-10-CM

## 2021-04-13 DIAGNOSIS — L81.4 OTHER MELANIN HYPERPIGMENTATION: ICD-10-CM

## 2021-04-13 DIAGNOSIS — L82.0 INFLAMED SEBORRHEIC KERATOSIS: ICD-10-CM

## 2021-04-13 DIAGNOSIS — L82.1 OTHER SEBORRHEIC KERATOSIS: ICD-10-CM

## 2021-04-13 DIAGNOSIS — Z71.89 OTHER SPECIFIED COUNSELING: ICD-10-CM

## 2021-04-13 DIAGNOSIS — D22 MELANOCYTIC NEVI: ICD-10-CM

## 2021-04-13 PROBLEM — D22.61 MELANOCYTIC NEVI OF RIGHT UPPER LIMB, INCLUDING SHOULDER: Status: ACTIVE | Noted: 2021-04-13

## 2021-04-13 PROBLEM — D22.5 MELANOCYTIC NEVI OF TRUNK: Status: ACTIVE | Noted: 2021-04-13

## 2021-04-13 PROBLEM — D18.01 HEMANGIOMA OF SKIN AND SUBCUTANEOUS TISSUE: Status: ACTIVE | Noted: 2021-04-13

## 2021-04-13 PROBLEM — D22.62 MELANOCYTIC NEVI OF LEFT UPPER LIMB, INCLUDING SHOULDER: Status: ACTIVE | Noted: 2021-04-13

## 2021-04-13 PROCEDURE — ? SUNSCREEN RECOMMENDATIONS

## 2021-04-13 PROCEDURE — ? MEDICATION COUNSELING

## 2021-04-13 PROCEDURE — ? TREATMENT REGIMEN

## 2021-04-13 PROCEDURE — ? LIQUID NITROGEN

## 2021-04-13 PROCEDURE — ? ADDITIONAL NOTES

## 2021-04-13 PROCEDURE — 17003 DESTRUCT PREMALG LES 2-14: CPT

## 2021-04-13 PROCEDURE — 99204 OFFICE O/P NEW MOD 45 MIN: CPT | Mod: 25

## 2021-04-13 PROCEDURE — 17000 DESTRUCT PREMALG LESION: CPT

## 2021-04-13 PROCEDURE — ? PRESCRIPTION

## 2021-04-13 PROCEDURE — ? COUNSELING

## 2021-04-13 RX ORDER — TRIAMCINOLONE ACETONIDE 1 MG/G
1 CREAM TOPICAL BID
Qty: 1 | Refills: 2 | Status: ERX | COMMUNITY
Start: 2021-04-13

## 2021-04-13 RX ADMIN — TRIAMCINOLONE ACETONIDE 1: 1 CREAM TOPICAL at 00:00

## 2021-04-13 ASSESSMENT — LOCATION SIMPLE DESCRIPTION DERM
LOCATION SIMPLE: LEFT CHEEK
LOCATION SIMPLE: ABDOMEN
LOCATION SIMPLE: RIGHT LOWER BACK
LOCATION SIMPLE: RIGHT TEMPLE
LOCATION SIMPLE: RIGHT HAND
LOCATION SIMPLE: RIGHT EAR
LOCATION SIMPLE: LEFT FOREHEAD
LOCATION SIMPLE: RIGHT ANTERIOR NECK
LOCATION SIMPLE: RIGHT UPPER BACK
LOCATION SIMPLE: LEFT EAR
LOCATION SIMPLE: RIGHT FOREARM
LOCATION SIMPLE: LEFT HAND
LOCATION SIMPLE: LEFT FOREARM
LOCATION SIMPLE: CHEST

## 2021-04-13 ASSESSMENT — LOCATION DETAILED DESCRIPTION DERM
LOCATION DETAILED: RIGHT ULNAR DORSAL HAND
LOCATION DETAILED: RIGHT SUPERIOR MEDIAL MIDBACK
LOCATION DETAILED: LEFT SUPERIOR HELIX
LOCATION DETAILED: LEFT INFERIOR CENTRAL MALAR CHEEK
LOCATION DETAILED: LEFT INFERIOR FOREHEAD
LOCATION DETAILED: LEFT DISTAL DORSAL FOREARM
LOCATION DETAILED: RIGHT MEDIAL UPPER BACK
LOCATION DETAILED: LEFT ULNAR DORSAL HAND
LOCATION DETAILED: RIGHT TRAGUS
LOCATION DETAILED: LEFT PROXIMAL DORSAL FOREARM
LOCATION DETAILED: EPIGASTRIC SKIN
LOCATION DETAILED: RIGHT LATERAL SUPERIOR CHEST
LOCATION DETAILED: LEFT SUPERIOR CRUS OF ANTIHELIX
LOCATION DETAILED: RIGHT SUPERIOR HELIX
LOCATION DETAILED: RIGHT SUPERIOR UPPER BACK
LOCATION DETAILED: RIGHT CLAVICULAR NECK
LOCATION DETAILED: RIGHT LATERAL TEMPLE
LOCATION DETAILED: RIGHT PROXIMAL DORSAL FOREARM
LOCATION DETAILED: RIGHT CENTRAL TEMPLE

## 2021-04-13 ASSESSMENT — LOCATION ZONE DERM
LOCATION ZONE: EAR
LOCATION ZONE: TRUNK
LOCATION ZONE: HAND
LOCATION ZONE: FACE
LOCATION ZONE: ARM
LOCATION ZONE: NECK

## 2021-04-13 NOTE — HPI: SKIN LESION
Is This A New Presentation, Or A Follow-Up?: Skin Lesion Referral
How Severe Is Your Skin Lesion?: moderate
Has Your Skin Lesion Been Treated?: not been treated
Who Is Your Referring Provider?: Alec Mojica

## 2021-04-13 NOTE — PROCEDURE: LIQUID NITROGEN
Render Post-Care Instructions In Note?: no
Post-Care Instructions: I reviewed with the patient in detail post-care instructions. Patient is to wear sunprotection, and avoid picking at any of the treated lesions. Pt may apply Vaseline to crusted or scabbing areas.
Number Of Freeze-Thaw Cycles: 2 freeze-thaw cycles
Detail Level: Detailed
Duration Of Freeze Thaw-Cycle (Seconds): 3
Consent: The patient's consent was obtained including but not limited to risks of crusting, scabbing, blistering, scarring, darker or lighter pigmentary change, recurrence, incomplete removal and infection.

## 2021-04-13 NOTE — PROCEDURE: ADDITIONAL NOTES
Detail Level: Detailed
Render Risk Assessment In Note?: no
Additional Notes: Recommened gentle cleansers and moisturizers daily, like Cetaphil or Cerave. \\nRecommended applying Triamcinolone cream, then Vaseline or CeraVe lotion.  May use cotton glove at night.
Additional Notes: LN2 freeze

## 2021-04-13 NOTE — PROCEDURE: MEDICATION COUNSELING
Gabapentin Counseling: I discussed with the patient the risks of gabapentin including but not limited to dizziness, somnolence, fatigue and ataxia.
Cephalexin Pregnancy And Lactation Text: This medication is Pregnancy Category B and considered safe during pregnancy.  It is also excreted in breast milk but can be used safely for shorter doses.
Acitretin Counseling:  I discussed with the patient the risks of acitretin including but not limited to hair loss, dry lips/skin/eyes, liver damage, hyperlipidemia, depression/suicidal ideation, photosensitivity.  Serious rare side effects can include but are not limited to pancreatitis, pseudotumor cerebri, bony changes, clot formation/stroke/heart attack.  Patient understands that alcohol is contraindicated since it can result in liver toxicity and significantly prolong the elimination of the drug by many years.
Azithromycin Counseling:  I discussed with the patient the risks of azithromycin including but not limited to GI upset, allergic reaction, drug rash, diarrhea, and yeast infections.
Drysol Counseling:  I discussed with the patient the risks of drysol/aluminum chloride including but not limited to skin rash, itching, irritation, burning.
Hydroxyzine Pregnancy And Lactation Text: This medication is not safe during pregnancy and should not be taken. It is also excreted in breast milk and breast feeding isn't recommended.
Xeljanz Counseling: I discussed with the patient the risks of Xeljanz therapy including increased risk of infection, liver issues, headache, diarrhea, or cold symptoms. Live vaccines should be avoided. They were instructed to call if they have any problems.
Zyclara Counseling:  I discussed with the patient the risks of imiquimod including but not limited to erythema, scaling, itching, weeping, crusting, and pain.  Patient understands that the inflammatory response to imiquimod is variable from person to person and was educated regarded proper titration schedule.  If flu-like symptoms develop, patient knows to discontinue the medication and contact us.
Tetracycline Pregnancy And Lactation Text: This medication is Pregnancy Category D and not consider safe during pregnancy. It is also excreted in breast milk.
Birth Control Pills Counseling: Birth Control Pill Counseling: I discussed with the patient the potential side effects of OCPs including but not limited to increased risk of stroke, heart attack, thrombophlebitis, deep venous thrombosis, hepatic adenomas, breast changes, GI upset, headaches, and depression.  The patient verbalized understanding of the proper use and possible adverse effects of OCPs. All of the patient's questions and concerns were addressed.
Drysol Pregnancy And Lactation Text: This medication is considered safe during pregnancy and breast feeding.
Acitretin Pregnancy And Lactation Text: This medication is Pregnancy Category X and should not be given to women who are pregnant or may become pregnant in the future. This medication is excreted in breast milk.
Birth Control Pills Pregnancy And Lactation Text: This medication should be avoided if pregnant and for the first 30 days post-partum.
Ilumya Counseling: I discussed with the patient the risks of tildrakizumab including but not limited to immunosuppression, malignancy, posterior leukoencephalopathy syndrome, and serious infections.  The patient understands that monitoring is required including a PPD at baseline and must alert us or the primary physician if symptoms of infection or other concerning signs are noted.
Picato Pregnancy And Lactation Text: This medication is Pregnancy Category C. It is unknown if this medication is excreted in breast milk.
Albendazole Counseling:  I discussed with the patient the risks of albendazole including but not limited to cytopenia, kidney damage, nausea/vomiting and severe allergy.  The patient understands that this medication is being used in an off-label manner.
Ilumya Pregnancy And Lactation Text: The risk during pregnancy and breastfeeding is uncertain with this medication.
Xelyadiraz Pregnancy And Lactation Text: This medication is Pregnancy Category D and is not considered safe during pregnancy.  The risk during breast feeding is also uncertain.
Gabapentin Pregnancy And Lactation Text: This medication is Pregnancy Category C and isn't considered safe during pregnancy. It is excreted in breast milk.
Clindamycin Counseling: I counseled the patient regarding use of clindamycin as an antibiotic for prophylactic and/or therapeutic purposes. Clindamycin is active against numerous classes of bacteria, including skin bacteria. Side effects may include nausea, diarrhea, gastrointestinal upset, rash, hives, yeast infections, and in rare cases, colitis.
Glycopyrrolate Counseling:  I discussed with the patient the risks of glycopyrrolate including but not limited to skin rash, drowsiness, dry mouth, difficulty urinating, and blurred vision.
Xolair Counseling:  Patient informed of potential adverse effects including but not limited to fever, muscle aches, rash and allergic reactions.  The patient verbalized understanding of the proper use and possible adverse effects of Xolair.  All of the patient's questions and concerns were addressed.
Clindamycin Pregnancy And Lactation Text: This medication can be used in pregnancy if certain situations. Clindamycin is also present in breast milk.
Elidel Counseling: Patient may experience a mild burning sensation during topical application. Elidel is not approved in children less than 2 years of age. There have been case reports of hematologic and skin malignancies in patients using topical calcineurin inhibitors although causality is questionable.
Spironolactone Counseling: Patient advised regarding risks of diarrhea, abdominal pain, hyperkalemia, birth defects (for female patients), liver toxicity and renal toxicity. The patient may need blood work to monitor liver and kidney function and potassium levels while on therapy. The patient verbalized understanding of the proper use and possible adverse effects of spironolactone.  All of the patient's questions and concerns were addressed.
Fluconazole Counseling:  Patient counseled regarding adverse effects of fluconazole including but not limited to headache, diarrhea, nausea, upset stomach, liver function test abnormalities, taste disturbance, and stomach pain.  There is a rare possibility of liver failure that can occur when taking fluconazole.  The patient understands that monitoring of LFTs and kidney function test may be required, especially at baseline. The patient verbalized understanding of the proper use and possible adverse effects of fluconazole.  All of the patient's questions and concerns were addressed.
Bexarotene Counseling:  I discussed with the patient the risks of bexarotene including but not limited to hair loss, dry lips/skin/eyes, liver abnormalities, hyperlipidemia, pancreatitis, depression/suicidal ideation, photosensitivity, drug rash/allergic reactions, hypothyroidism, anemia, leukopenia, infection, cataracts, and teratogenicity.  Patient understands that they will need regular blood tests to check lipid profile, liver function tests, white blood cell count, thyroid function tests and pregnancy test if applicable.
Infliximab Counseling:  I discussed with the patient the risks of infliximab including but not limited to myelosuppression, immunosuppression, autoimmune hepatitis, demyelinating diseases, lymphoma, and serious infections.  The patient understands that monitoring is required including a PPD at baseline and must alert us or the primary physician if symptoms of infection or other concerning signs are noted.
Bexarotene Pregnancy And Lactation Text: This medication is Pregnancy Category X and should not be given to women who are pregnant or may become pregnant. This medication should not be used if you are breast feeding.
Fluconazole Pregnancy And Lactation Text: This medication is Pregnancy Category C and it isn't know if it is safe during pregnancy. It is also excreted in breast milk.
Protopic Counseling: Patient may experience a mild burning sensation during topical application. Protopic is not approved in children less than 2 years of age. There have been case reports of hematologic and skin malignancies in patients using topical calcineurin inhibitors although causality is questionable.
Albendazole Pregnancy And Lactation Text: This medication is Pregnancy Category C and it isn't known if it is safe during pregnancy. It is also excreted in breast milk.
Doxycycline Counseling:  Patient counseled regarding possible photosensitivity and increased risk for sunburn.  Patient instructed to avoid sunlight, if possible.  When exposed to sunlight, patients should wear protective clothing, sunglasses, and sunscreen.  The patient was instructed to call the office immediately if the following severe adverse effects occur:  hearing changes, easy bruising/bleeding, severe headache, or vision changes.  The patient verbalized understanding of the proper use and possible adverse effects of doxycycline.  All of the patient's questions and concerns were addressed.
Xolair Pregnancy And Lactation Text: This medication is Pregnancy Category B and is considered safe during pregnancy. This medication is excreted in breast milk.
Glycopyrrolate Pregnancy And Lactation Text: This medication is Pregnancy Category B and is considered safe during pregnancy. It is unknown if it is excreted breast milk.
Opioid Counseling: I discussed with the patient the potential side effects of opioids including but not limited to addiction, altered mental status, and depression. I stressed avoiding alcohol, benzodiazepines, muscle relaxants and sleep aids unless specifically okayed by a physician. The patient verbalized understanding of the proper use and possible adverse effects of opioids. All of the patient's questions and concerns were addressed. They were instructed to flush the remaining pills down the toilet if they did not need them for pain.
Spironolactone Pregnancy And Lactation Text: This medication can cause feminization of the male fetus and should be avoided during pregnancy. The active metabolite is also found in breast milk.
Griseofulvin Counseling:  I discussed with the patient the risks of griseofulvin including but not limited to photosensitivity, cytopenia, liver damage, nausea/vomiting and severe allergy.  The patient understands that this medication is best absorbed when taken with a fatty meal (e.g., ice cream or french fries).
Isotretinoin Counseling: Patient should get monthly blood tests, not donate blood, not drive at night if vision affected, not share medication, and not undergo elective surgery for 6 months after tx completed. Side effects reviewed, pt to contact office should one occur.
SSKI Counseling:  I discussed with the patient the risks of SSKI including but not limited to thyroid abnormalities, metallic taste, GI upset, fever, headache, acne, arthralgias, paraesthesias, lymphadenopathy, easy bleeding, arrhythmias, and allergic reaction.
Ivermectin Counseling:  Patient instructed to take medication on an empty stomach with a full glass of water.  Patient informed of potential adverse effects including but not limited to nausea, diarrhea, dizziness, itching, and swelling of the extremities or lymph nodes.  The patient verbalized understanding of the proper use and possible adverse effects of ivermectin.  All of the patient's questions and concerns were addressed.
Eucrisa Counseling: Patient may experience a mild burning sensation during topical application. Eucrisa is not approved in children less than 2 years of age.
Infliximab Pregnancy And Lactation Text: This medication is Pregnancy Category B and is considered safe during pregnancy. It is unknown if this medication is excreted in breast milk.
Protopic Pregnancy And Lactation Text: This medication is Pregnancy Category C. It is unknown if this medication is excreted in breast milk when applied topically.
Rhofade Counseling: Rhofade is a topical medication which can decrease superficial blood flow where applied. Side effects are uncommon and include stinging, redness and allergic reactions.
Rituxan Counseling:  I discussed with the patient the risks of Rituxan infusions. Side effects can include infusion reactions, severe drug rashes including mucocutaneous reactions, reactivation of latent hepatitis and other infections and rarely progressive multifocal leukoencephalopathy.  All of the patient's questions and concerns were addressed.
Hydroxychloroquine Counseling:  I discussed with the patient that a baseline ophthalmologic exam is needed at the start of therapy and every year thereafter while on therapy. A CBC may also be warranted for monitoring.  The side effects of this medication were discussed with the patient, including but not limited to agranulocytosis, aplastic anemia, seizures, rashes, retinopathy, and liver toxicity. Patient instructed to call the office should any adverse effect occur.  The patient verbalized understanding of the proper use and possible adverse effects of Plaquenil.  All the patient's questions and concerns were addressed.
Doxycycline Pregnancy And Lactation Text: This medication is Pregnancy Category D and not consider safe during pregnancy. It is also excreted in breast milk but is considered safe for shorter treatment courses.
Opioid Pregnancy And Lactation Text: These medications can lead to premature delivery and should be avoided during pregnancy. These medications are also present in breast milk in small amounts.
Hydroxychloroquine Pregnancy And Lactation Text: This medication has been shown to cause fetal harm but it isn't assigned a Pregnancy Risk Category. There are small amounts excreted in breast milk.
Azathioprine Counseling:  I discussed with the patient the risks of azathioprine including but not limited to myelosuppression, immunosuppression, hepatotoxicity, lymphoma, and infections.  The patient understands that monitoring is required including baseline LFTs, Creatinine, possible TPMP genotyping and weekly CBCs for the first month and then every 2 weeks thereafter.  The patient verbalized understanding of the proper use and possible adverse effects of azathioprine.  All of the patient's questions and concerns were addressed.
Erythromycin Counseling:  I discussed with the patient the risks of erythromycin including but not limited to GI upset, allergic reaction, drug rash, diarrhea, increase in liver enzymes, and yeast infections.
Eucrisa Pregnancy And Lactation Text: This medication has not been assigned a Pregnancy Risk Category but animal studies failed to show danger with the topical medication. It is unknown if the medication is excreted in breast milk.
Sski Pregnancy And Lactation Text: This medication is Pregnancy Category D and isn't considered safe during pregnancy. It is excreted in breast milk.
Griseofulvin Pregnancy And Lactation Text: This medication is Pregnancy Category X and is known to cause serious birth defects. It is unknown if this medication is excreted in breast milk but breast feeding should be avoided.
Rhofade Pregnancy And Lactation Text: This medication has not been assigned a Pregnancy Risk Category. It is unknown if the medication is excreted in breast milk.
Isotretinoin Pregnancy And Lactation Text: This medication is Pregnancy Category X and is considered extremely dangerous during pregnancy. It is unknown if it is excreted in breast milk.
Rituxan Pregnancy And Lactation Text: This medication is Pregnancy Category C and it isn't know if it is safe during pregnancy. It is unknown if this medication is excreted in breast milk but similar antibodies are known to be excreted.
Erythromycin Pregnancy And Lactation Text: This medication is Pregnancy Category B and is considered safe during pregnancy. It is also excreted in breast milk.
Itraconazole Counseling:  I discussed with the patient the risks of itraconazole including but not limited to liver damage, nausea/vomiting, neuropathy, and severe allergy.  The patient understands that this medication is best absorbed when taken with acidic beverages such as non-diet cola or ginger ale.  The patient understands that monitoring is required including baseline LFTs and repeat LFTs at intervals.  The patient understands that they are to contact us or the primary physician if concerning signs are noted.
Thalidomide Counseling: I discussed with the patient the risks of thalidomide including but not limited to birth defects, anxiety, weakness, chest pain, dizziness, cough and severe allergy.
Azathioprine Pregnancy And Lactation Text: This medication is Pregnancy Category D and isn't considered safe during pregnancy. It is unknown if this medication is excreted in breast milk.
Niacinamide Counseling: I recommended taking niacin or niacinamide, also know as vitamin B3, twice daily. Recent evidence suggests that taking vitamin B3 (500 mg twice daily) can reduce the risk of actinic keratoses and non-melanoma skin cancers. Side effects of vitamin B3 include flushing and headache.
High Dose Vitamin A Counseling: Side effects reviewed, pt to contact office should one occur.
Hydroquinone Counseling:  Patient advised that medication may result in skin irritation, lightening (hypopigmentation), dryness, and burning.  In the event of skin irritation, the patient was advised to reduce the amount of the drug applied or use it less frequently.  Rarely, spots that are treated with hydroquinone can become darker (pseudoochronosis).  Should this occur, patient instructed to stop medication and call the office. The patient verbalized understanding of the proper use and possible adverse effects of hydroquinone.  All of the patient's questions and concerns were addressed.
Siliq Counseling:  I discussed with the patient the risks of Siliq including but not limited to new or worsening depression, suicidal thoughts and behavior, immunosuppression, malignancy, posterior leukoencephalopathy syndrome, and serious infections.  The patient understands that monitoring is required including a PPD at baseline and must alert us or the primary physician if symptoms of infection or other concerning signs are noted. There is also a special program designed to monitor depression which is required with Siliq.
Solaraze Counseling:  I discussed with the patient the risks of Solaraze including but not limited to erythema, scaling, itching, weeping, crusting, and pain.
Arava Counseling:  Patient counseled regarding adverse effects of Arava including but not limited to nausea, vomiting, abnormalities in liver function tests. Patients may develop mouth sores, rash, diarrhea, and abnormalities in blood counts. The patient understands that monitoring is required including LFTs and blood counts.  There is a rare possibility of scarring of the liver and lung problems that can occur when taking methotrexate. Persistent nausea, loss of appetite, pale stools, dark urine, cough, and shortness of breath should be reported immediately. Patient advised to discontinue Arava treatment and consult with a physician prior to attempting conception. The patient will have to undergo a treatment to eliminate Arava from the body prior to conception.
Arava Pregnancy And Lactation Text: This medication is Pregnancy Category X and is absolutely contraindicated during pregnancy. It is unknown if it is excreted in breast milk.
Cellcept Counseling:  I discussed with the patient the risks of mycophenolate mofetil including but not limited to infection/immunosuppression, GI upset, hypokalemia, hypercholesterolemia, bone marrow suppression, lymphoproliferative disorders, malignancy, GI ulceration/bleed/perforation, colitis, interstitial lung disease, kidney failure, progressive multifocal leukoencephalopathy, and birth defects.  The patient understands that monitoring is required including a baseline creatinine and regular CBC testing. In addition, patient must alert us immediately if symptoms of infection or other concerning signs are noted.
High Dose Vitamin A Pregnancy And Lactation Text: High dose vitamin A therapy is contraindicated during pregnancy and breast feeding.
Niacinamide Pregnancy And Lactation Text: These medications are considered safe during pregnancy.
Metronidazole Counseling:  I discussed with the patient the risks of metronidazole including but not limited to seizures, nausea/vomiting, a metallic taste in the mouth, nausea/vomiting and severe allergy.
Nsaids Counseling: NSAID Counseling: I discussed with the patient that NSAIDs should be taken with food. Prolonged use of NSAIDs can result in the development of stomach ulcers.  Patient advised to stop taking NSAIDs if abdominal pain occurs.  The patient verbalized understanding of the proper use and possible adverse effects of NSAIDs.  All of the patient's questions and concerns were addressed.
Solaraze Pregnancy And Lactation Text: This medication is Pregnancy Category B and is considered safe. There is some data to suggest avoiding during the third trimester. It is unknown if this medication is excreted in breast milk.
Metronidazole Pregnancy And Lactation Text: This medication is Pregnancy Category B and considered safe during pregnancy.  It is also excreted in breast milk.
Clofazimine Counseling:  I discussed with the patient the risks of clofazimine including but not limited to skin and eye pigmentation, liver damage, nausea/vomiting, gastrointestinal bleeding and allergy.
Ketoconazole Counseling:   Patient counseled regarding improving absorption with orange juice.  Adverse effects include but are not limited to breast enlargement, headache, diarrhea, nausea, upset stomach, liver function test abnormalities, taste disturbance, and stomach pain.  There is a rare possibility of liver failure that can occur when taking ketoconazole. The patient understands that monitoring of LFTs may be required, especially at baseline. The patient verbalized understanding of the proper use and possible adverse effects of ketoconazole.  All of the patient's questions and concerns were addressed.
Simponi Counseling:  I discussed with the patient the risks of golimumab including but not limited to myelosuppression, immunosuppression, autoimmune hepatitis, demyelinating diseases, lymphoma, and serious infections.  The patient understands that monitoring is required including a PPD at baseline and must alert us or the primary physician if symptoms of infection or other concerning signs are noted.
Imiquimod Counseling:  I discussed with the patient the risks of imiquimod including but not limited to erythema, scaling, itching, weeping, crusting, and pain.  Patient understands that the inflammatory response to imiquimod is variable from person to person and was educated regarded proper titration schedule.  If flu-like symptoms develop, patient knows to discontinue the medication and contact us.
Tranexamic Acid Counseling:  Patient advised of the small risk of bleeding problems with tranexamic acid. They were also instructed to call if they developed any nausea, vomiting or diarrhea. All of the patient's questions and concerns were addressed.
Ketoconazole Pregnancy And Lactation Text: This medication is Pregnancy Category C and it isn't know if it is safe during pregnancy. It is also excreted in breast milk and breast feeding isn't recommended.
Tranexamic Acid Pregnancy And Lactation Text: It is unknown if this medication is safe during pregnancy or breast feeding.
Cimzia Counseling:  I discussed with the patient the risks of Cimzia including but not limited to immunosuppression, allergic reactions and infections.  The patient understands that monitoring is required including a PPD at baseline and must alert us or the primary physician if symptoms of infection or other concerning signs are noted.
Topical Retinoid counseling:  Patient advised to apply a pea-sized amount only at bedtime and wait 30 minutes after washing their face before applying.  If too drying, patient may add a non-comedogenic moisturizer. The patient verbalized understanding of the proper use and possible adverse effects of retinoids.  All of the patient's questions and concerns were addressed.
Cimzia Pregnancy And Lactation Text: This medication crosses the placenta but can be considered safe in certain situations. Cimzia may be excreted in breast milk.
Nsaids Pregnancy And Lactation Text: These medications are considered safe up to 30 weeks gestation. It is excreted in breast milk.
Minocycline Counseling: Patient advised regarding possible photosensitivity and discoloration of the teeth, skin, lips, tongue and gums.  Patient instructed to avoid sunlight, if possible.  When exposed to sunlight, patients should wear protective clothing, sunglasses, and sunscreen.  The patient was instructed to call the office immediately if the following severe adverse effects occur:  hearing changes, easy bruising/bleeding, severe headache, or vision changes.  The patient verbalized understanding of the proper use and possible adverse effects of minocycline.  All of the patient's questions and concerns were addressed.
Cyclophosphamide Counseling:  I discussed with the patient the risks of cyclophosphamide including but not limited to hair loss, hormonal abnormalities, decreased fertility, abdominal pain, diarrhea, nausea and vomiting, bone marrow suppression and infection. The patient understands that monitoring is required while taking this medication.
Colchicine Counseling:  Patient counseled regarding adverse effects including but not limited to stomach upset (nausea, vomiting, stomach pain, or diarrhea).  Patient instructed to limit alcohol consumption while taking this medication.  Colchicine may reduce blood counts especially with prolonged use.  The patient understands that monitoring of kidney function and blood counts may be required, especially at baseline. The patient verbalized understanding of the proper use and possible adverse effects of colchicine.  All of the patient's questions and concerns were addressed.
Cyclophosphamide Pregnancy And Lactation Text: This medication is Pregnancy Category D and it isn't considered safe during pregnancy. This medication is excreted in breast milk.
Valtrex Counseling: I discussed with the patient the risks of valacyclovir including but not limited to kidney damage, nausea, vomiting and severe allergy.  The patient understands that if the infection seems to be worsening or is not improving, they are to call.
Benzoyl Peroxide Counseling: Patient counseled that medicine may cause skin irritation and bleach clothing.  In the event of skin irritation, the patient was advised to reduce the amount of the drug applied or use it less frequently.   The patient verbalized understanding of the proper use and possible adverse effects of benzoyl peroxide.  All of the patient's questions and concerns were addressed.
Minoxidil Counseling: Minoxidil is a topical medication which can increase blood flow where it is applied. It is uncertain how this medication increases hair growth. Side effects are uncommon and include stinging and allergic reactions.
Terbinafine Counseling: Patient counseling regarding adverse effects of terbinafine including but not limited to headache, diarrhea, rash, upset stomach, liver function test abnormalities, itching, taste/smell disturbance, nausea, abdominal pain, and flatulence.  There is a rare possibility of liver failure that can occur when taking terbinafine.  The patient understands that a baseline LFT and kidney function test may be required. The patient verbalized understanding of the proper use and possible adverse effects of terbinafine.  All of the patient's questions and concerns were addressed.
Benzoyl Peroxide Pregnancy And Lactation Text: This medication is Pregnancy Category C. It is unknown if benzoyl peroxide is excreted in breast milk.
Skyrizi Counseling: I discussed with the patient the risks of risankizumab-rzaa including but not limited to immunosuppression, and serious infections.  The patient understands that monitoring is required including a PPD at baseline and must alert us or the primary physician if symptoms of infection or other concerning signs are noted.
Tazorac Counseling:  Patient advised that medication is irritating and drying.  Patient may need to apply sparingly and wash off after an hour before eventually leaving it on overnight.  The patient verbalized understanding of the proper use and possible adverse effects of tazorac.  All of the patient's questions and concerns were addressed.
Cosentyx Counseling:  I discussed with the patient the risks of Cosentyx including but not limited to worsening of Crohn's disease, immunosuppression, allergic reactions and infections.  The patient understands that monitoring is required including a PPD at baseline and must alert us or the primary physician if symptoms of infection or other concerning signs are noted.
Odomzo Counseling- I discussed with the patient the risks of Odomzo including but not limited to nausea, vomiting, diarrhea, constipation, weight loss, changes in the sense of taste, decreased appetite, muscle spasms, and hair loss.  The patient verbalized understanding of the proper use and possible adverse effects of Odomzo.  All of the patient's questions and concerns were addressed.
Cyclosporine Counseling:  I discussed with the patient the risks of cyclosporine including but not limited to hypertension, gingival hyperplasia,myelosuppression, immunosuppression, liver damage, kidney damage, neurotoxicity, lymphoma, and serious infections. The patient understands that monitoring is required including baseline blood pressure, CBC, CMP, lipid panel and uric acid, and then 1-2 times monthly CMP and blood pressure.
Quinolones Counseling:  I discussed with the patient the risks of fluoroquinolones including but not limited to GI upset, allergic reaction, drug rash, diarrhea, dizziness, photosensitivity, yeast infections, liver function test abnormalities, tendonitis/tendon rupture.
Terbinafine Pregnancy And Lactation Text: This medication is Pregnancy Category B and is considered safe during pregnancy. It is also excreted in breast milk and breast feeding isn't recommended.
Valtrex Pregnancy And Lactation Text: this medication is Pregnancy Category B and is considered safe during pregnancy. This medication is not directly found in breast milk but it's metabolite acyclovir is present.
Mirvaso Counseling: Mirvaso is a topical medication which can decrease superficial blood flow where applied. Side effects are uncommon and include stinging, redness and allergic reactions.
Detail Level: Generalized
Use Enhanced Medication Counseling?: No
Tazorac Pregnancy And Lactation Text: This medication is not safe during pregnancy. It is unknown if this medication is excreted in breast milk.
Topical Clindamycin Counseling: Patient counseled that this medication may cause skin irritation or allergic reactions.  In the event of skin irritation, the patient was advised to reduce the amount of the drug applied or use it less frequently.   The patient verbalized understanding of the proper use and possible adverse effects of clindamycin.  All of the patient's questions and concerns were addressed.
Dapsone Counseling: I discussed with the patient the risks of dapsone including but not limited to hemolytic anemia, agranulocytosis, rashes, methemoglobinemia, kidney failure, peripheral neuropathy, headaches, GI upset, and liver toxicity.  Patients who start dapsone require monitoring including baseline LFTs and weekly CBCs for the first month, then every month thereafter.  The patient verbalized understanding of the proper use and possible adverse effects of dapsone.  All of the patient's questions and concerns were addressed.
Stelara Counseling:  I discussed with the patient the risks of ustekinumab including but not limited to immunosuppression, malignancy, posterior leukoencephalopathy syndrome, and serious infections.  The patient understands that monitoring is required including a PPD at baseline and must alert us or the primary physician if symptoms of infection or other concerning signs are noted.
Cyclosporine Pregnancy And Lactation Text: This medication is Pregnancy Category C and it isn't know if it is safe during pregnancy. This medication is excreted in breast milk.
Otezla Counseling: The side effects of Otezla were discussed with the patient, including but not limited to worsening or new depression, weight loss, diarrhea, nausea, upper respiratory tract infection, and headache. Patient instructed to call the office should any adverse effect occur.  The patient verbalized understanding of the proper use and possible adverse effects of Otezla.  All the patient's questions and concerns were addressed.
Carac Counseling:  I discussed with the patient the risks of Carac including but not limited to erythema, scaling, itching, weeping, crusting, and pain.
Otezla Pregnancy And Lactation Text: This medication is Pregnancy Category C and it isn't known if it is safe during pregnancy. It is unknown if it is excreted in breast milk.
Rifampin Counseling: I discussed with the patient the risks of rifampin including but not limited to liver damage, kidney damage, red-orange body fluids, nausea/vomiting and severe allergy.
Methotrexate Counseling:  Patient counseled regarding adverse effects of methotrexate including but not limited to nausea, vomiting, abnormalities in liver function tests. Patients may develop mouth sores, rash, diarrhea, and abnormalities in blood counts. The patient understands that monitoring is required including LFT's and blood counts.  There is a rare possibility of scarring of the liver and lung problems that can occur when taking methotrexate. Persistent nausea, loss of appetite, pale stools, dark urine, cough, and shortness of breath should be reported immediately. Patient advised to discontinue methotrexate treatment at least three months before attempting to become pregnant.  I discussed the need for folate supplements while taking methotrexate.  These supplements can decrease side effects during methotrexate treatment. The patient verbalized understanding of the proper use and possible adverse effects of methotrexate.  All of the patient's questions and concerns were addressed.
Dupixent Counseling: I discussed with the patient the risks of dupilumab including but not limited to eye infection and irritation, cold sores, injection site reactions, worsening of asthma, allergic reactions and increased risk of parasitic infection.  Live vaccines should be avoided while taking dupilumab. Dupilumab will also interact with certain medications such as warfarin and cyclosporine. The patient understands that monitoring is required and they must alert us or the primary physician if symptoms of infection or other concerning signs are noted.
Cimetidine Counseling:  I discussed with the patient the risks of Cimetidine including but not limited to gynecomastia, headache, diarrhea, nausea, drowsiness, arrhythmias, pancreatitis, skin rashes, psychosis, bone marrow suppression and kidney toxicity.
Dapsone Pregnancy And Lactation Text: This medication is Pregnancy Category C and is not considered safe during pregnancy or breast feeding.
Topical Clindamycin Pregnancy And Lactation Text: This medication is Pregnancy Category B and is considered safe during pregnancy. It is unknown if it is excreted in breast milk.
Carac Pregnancy And Lactation Text: This medication is Pregnancy Category X and contraindicated in pregnancy and in women who may become pregnant. It is unknown if this medication is excreted in breast milk.
Methotrexate Pregnancy And Lactation Text: This medication is Pregnancy Category X and is known to cause fetal harm. This medication is excreted in breast milk.
Rifampin Pregnancy And Lactation Text: This medication is Pregnancy Category C and it isn't know if it is safe during pregnancy. It is also excreted in breast milk and should not be used if you are breast feeding.
Calcipotriene Counseling:  I discussed with the patient the risks of calcipotriene including but not limited to erythema, scaling, itching, and irritation.
Oxybutynin Counseling:  I discussed with the patient the risks of oxybutynin including but not limited to skin rash, drowsiness, dry mouth, difficulty urinating, and blurred vision.
Dupixent Pregnancy And Lactation Text: This medication likely crosses the placenta but the risk for the fetus is uncertain. This medication is excreted in breast milk.
Picato Counseling:  I discussed with the patient the risks of Picato including but not limited to erythema, scaling, itching, weeping, crusting, and pain.
Enbrel Counseling:  I discussed with the patient the risks of etanercept including but not limited to myelosuppression, immunosuppression, autoimmune hepatitis, demyelinating diseases, lymphoma, and infections.  The patient understands that monitoring is required including a PPD at baseline and must alert us or the primary physician if symptoms of infection or other concerning signs are noted.
Taltz Counseling: I discussed with the patient the risks of ixekizumab including but not limited to immunosuppression, serious infections, worsening of inflammatory bowel disease and drug reactions.  The patient understands that monitoring is required including a PPD at baseline and must alert us or the primary physician if symptoms of infection or other concerning signs are noted.
Erivedge Counseling- I discussed with the patient the risks of Erivedge including but not limited to nausea, vomiting, diarrhea, constipation, weight loss, changes in the sense of taste, decreased appetite, muscle spasms, and hair loss.  The patient verbalized understanding of the proper use and possible adverse effects of Erivedge.  All of the patient's questions and concerns were addressed.
Azithromycin Pregnancy And Lactation Text: This medication is considered safe during pregnancy and is also secreted in breast milk.
Topical Sulfur Applications Counseling: Topical Sulfur Counseling: Patient counseled that this medication may cause skin irritation or allergic reactions.  In the event of skin irritation, the patient was advised to reduce the amount of the drug applied or use it less frequently.   The patient verbalized understanding of the proper use and possible adverse effects of topical sulfur application.  All of the patient's questions and concerns were addressed.
Prednisone Counseling:  I discussed with the patient the risks of prolonged use of prednisone including but not limited to weight gain, insomnia, osteoporosis, mood changes, diabetes, susceptibility to infection, glaucoma and high blood pressure.  In cases where prednisone use is prolonged, patients should be monitored with blood pressure checks, serum glucose levels and an eye exam.  Additionally, the patient may need to be placed on GI prophylaxis, PCP prophylaxis, and calcium and vitamin D supplementation and/or a bisphosphonate.  The patient verbalized understanding of the proper use and the possible adverse effects of prednisone.  All of the patient's questions and concerns were addressed.
Bactrim Counseling:  I discussed with the patient the risks of sulfa antibiotics including but not limited to GI upset, allergic reaction, drug rash, diarrhea, dizziness, photosensitivity, and yeast infections.  Rarely, more serious reactions can occur including but not limited to aplastic anemia, agranulocytosis, methemoglobinemia, blood dyscrasias, liver or kidney failure, lung infiltrates or desquamative/blistering drug rashes.
Calcipotriene Pregnancy And Lactation Text: This medication has not been proven safe during pregnancy. It is unknown if this medication is excreted in breast milk.
Doxepin Counseling:  Patient advised that the medication is sedating and not to drive a car after taking this medication. Patient informed of potential adverse effects including but not limited to dry mouth, urinary retention, and blurry vision.  The patient verbalized understanding of the proper use and possible adverse effects of doxepin.  All of the patient's questions and concerns were addressed.
Sarecycline Counseling: Patient advised regarding possible photosensitivity and discoloration of the teeth, skin, lips, tongue and gums.  Patient instructed to avoid sunlight, if possible.  When exposed to sunlight, patients should wear protective clothing, sunglasses, and sunscreen.  The patient was instructed to call the office immediately if the following severe adverse effects occur:  hearing changes, easy bruising/bleeding, severe headache, or vision changes.  The patient verbalized understanding of the proper use and possible adverse effects of sarecycline.  All of the patient's questions and concerns were addressed.
Propranolol Counseling:  I discussed with the patient the risks of propranolol including but not limited to low heart rate, low blood pressure, low blood sugar, restlessness and increased cold sensitivity. They should call the office if they experience any of these side effects.
Topical Sulfur Applications Pregnancy And Lactation Text: This medication is Pregnancy Category C and has an unknown safety profile during pregnancy. It is unknown if this topical medication is excreted in breast milk.
Bactrim Pregnancy And Lactation Text: This medication is Pregnancy Category D and is known to cause fetal risk.  It is also excreted in breast milk.
Finasteride Male Counseling: Finasteride Counseling:  I discussed with the patient the risks of use of finasteride including but not limited to decreased libido, decreased ejaculate volume, gynecomastia, and depression. Women should not handle medication.  All of the patient's questions and concerns were addressed.
Doxepin Pregnancy And Lactation Text: This medication is Pregnancy Category C and it isn't known if it is safe during pregnancy. It is also excreted in breast milk and breast feeding isn't recommended.
5-Fu Counseling: 5-Fluorouracil Counseling:  I discussed with the patient the risks of 5-fluorouracil including but not limited to erythema, scaling, itching, weeping, crusting, and pain.
Tremfya Counseling: I discussed with the patient the risks of guselkumab including but not limited to immunosuppression, serious infections, worsening of inflammatory bowel disease and drug reactions.  The patient understands that monitoring is required including a PPD at baseline and must alert us or the primary physician if symptoms of infection or other concerning signs are noted.
Hydroxyzine Counseling: Patient advised that the medication is sedating and not to drive a car after taking this medication.  Patient informed of potential adverse effects including but not limited to dry mouth, urinary retention, and blurry vision.  The patient verbalized understanding of the proper use and possible adverse effects of hydroxyzine.  All of the patient's questions and concerns were addressed.
Humira Counseling:  I discussed with the patient the risks of adalimumab including but not limited to myelosuppression, immunosuppression, autoimmune hepatitis, demyelinating diseases, lymphoma, and serious infections.  The patient understands that monitoring is required including a PPD at baseline and must alert us or the primary physician if symptoms of infection or other concerning signs are noted.
Wartpeel Counseling:  I discussed with the patient the risks of Wartpeel including but not limited to erythema, scaling, itching, weeping, crusting, and pain.
Propranolol Pregnancy And Lactation Text: This medication is Pregnancy Category C and it isn't known if it is safe during pregnancy. It is excreted in breast milk.
Cephalexin Counseling: I counseled the patient regarding use of cephalexin as an antibiotic for prophylactic and/or therapeutic purposes. Cephalexin (commonly prescribed under brand name Keflex) is a cephalosporin antibiotic which is active against numerous classes of bacteria, including most skin bacteria. Side effects may include nausea, diarrhea, gastrointestinal upset, rash, hives, yeast infections, and in rare cases, hepatitis, kidney disease, seizures, fever, confusion, neurologic symptoms, and others. Patients with severe allergies to penicillin medications are cautioned that there is about a 10% incidence of cross-reactivity with cephalosporins. When possible, patients with penicillin allergies should use alternatives to cephalosporins for antibiotic therapy.
Finasteride Pregnancy And Lactation Text: This medication is absolutely contraindicated during pregnancy. It is unknown if it is excreted in breast milk.
Tetracycline Counseling: Patient counseled regarding possible photosensitivity and increased risk for sunburn.  Patient instructed to avoid sunlight, if possible.  When exposed to sunlight, patients should wear protective clothing, sunglasses, and sunscreen.  The patient was instructed to call the office immediately if the following severe adverse effects occur:  hearing changes, easy bruising/bleeding, severe headache, or vision changes.  The patient verbalized understanding of the proper use and possible adverse effects of tetracycline.  All of the patient's questions and concerns were addressed. Patient understands to avoid pregnancy while on therapy due to potential birth defects.

## 2021-04-21 ENCOUNTER — OFFICE VISIT (OUTPATIENT)
Dept: MEDICAL GROUP | Facility: PHYSICIAN GROUP | Age: 81
End: 2021-04-21
Payer: MEDICARE

## 2021-04-21 VITALS
RESPIRATION RATE: 16 BRPM | DIASTOLIC BLOOD PRESSURE: 50 MMHG | WEIGHT: 185 LBS | SYSTOLIC BLOOD PRESSURE: 122 MMHG | OXYGEN SATURATION: 97 % | HEIGHT: 70 IN | BODY MASS INDEX: 26.48 KG/M2 | TEMPERATURE: 97.4 F | HEART RATE: 67 BPM

## 2021-04-21 DIAGNOSIS — Z23 NEED FOR VACCINATION: ICD-10-CM

## 2021-04-21 DIAGNOSIS — R80.9 TYPE 2 DIABETES MELLITUS WITH MICROALBUMINURIA, WITHOUT LONG-TERM CURRENT USE OF INSULIN (HCC): ICD-10-CM

## 2021-04-21 DIAGNOSIS — E11.29 TYPE 2 DIABETES MELLITUS WITH MICROALBUMINURIA, WITHOUT LONG-TERM CURRENT USE OF INSULIN (HCC): ICD-10-CM

## 2021-04-21 DIAGNOSIS — E78.2 MIXED HYPERLIPIDEMIA: ICD-10-CM

## 2021-04-21 DIAGNOSIS — I10 ESSENTIAL HYPERTENSION: ICD-10-CM

## 2021-04-21 LAB
HBA1C MFR BLD: 6.7 % (ref 0–5.6)
INT CON NEG: NEGATIVE
INT CON POS: POSITIVE

## 2021-04-21 PROCEDURE — 83036 HEMOGLOBIN GLYCOSYLATED A1C: CPT | Performed by: NURSE PRACTITIONER

## 2021-04-21 PROCEDURE — 90732 PPSV23 VACC 2 YRS+ SUBQ/IM: CPT | Performed by: NURSE PRACTITIONER

## 2021-04-21 PROCEDURE — 99214 OFFICE O/P EST MOD 30 MIN: CPT | Mod: 25 | Performed by: NURSE PRACTITIONER

## 2021-04-21 PROCEDURE — G0009 ADMIN PNEUMOCOCCAL VACCINE: HCPCS | Performed by: NURSE PRACTITIONER

## 2021-04-21 ASSESSMENT — FIBROSIS 4 INDEX: FIB4 SCORE: 1.34

## 2021-04-21 ASSESSMENT — PATIENT HEALTH QUESTIONNAIRE - PHQ9: CLINICAL INTERPRETATION OF PHQ2 SCORE: 0

## 2021-04-21 NOTE — ASSESSMENT & PLAN NOTE
Chronic medical problem.  He is taking Metformin 1000 mg with breakfast and 500 mg with dinner.  He is due for A1c today. He is checking his blood sugars once a week. He denies low blood sugars, states his last blood sugar was 135. No diarrhea.

## 2021-04-21 NOTE — ASSESSMENT & PLAN NOTE
Chronic medical problem.  He is taking atorvastatin 80 mg daily. He is tolerating his medication. Denies myalgias.  He is due for updated labs.

## 2021-04-21 NOTE — PROGRESS NOTES
Subjective:     CC: Diabetes    HPI:   Raf presents today with the following:    Type 2 diabetes mellitus without complication, without long-term current use of insulin (CMS-[facility])  Chronic medical problem.  He is taking Metformin 1000 mg with breakfast and 500 mg with dinner.  He is due for A1c today. He is checking his blood sugars once a week. He denies low blood sugars, states his last blood sugar was 135. No diarrhea.     Mixed hyperlipidemia  Chronic medical problem.  He is taking atorvastatin 80 mg daily. He is tolerating his medication. Denies myalgias.  He is due for updated labs.    Essential hypertension  Chronic medical problem.  He is taking losartan 50 mg daily and metoprolol 25 BID.  His initial blood pressure today is 102/48.  He is checking his blood pressure at home. He takes his blood pressure before his medications.  He denies any chest pain, shortness of breath, dizziness, or headaches.      Past Medical History:   Diagnosis Date   • Anxiety    • Arthritis    • CAD (coronary artery disease) 07/06/2017    CABG x 4 (LIMA to distal diagonal, rSVG to distal OM1, rSVG to distal OM2, rSVG to distal LAD) by Dr. Gaffney.   • Diabetes (HCC)     oral medication    • Hypertension    • Inguinal hernia    • Mixed hyperlipidemia    • Nodular prostate without urinary obstruction    • Prostate cancer (HCC)        Social History     Tobacco Use   • Smoking status: Never Smoker   • Smokeless tobacco: Never Used   Substance Use Topics   • Alcohol use: Yes     Alcohol/week: 1.2 oz     Types: 2 Shots of liquor per week   • Drug use: No       Current Outpatient Medications Ordered in Epic   Medication Sig Dispense Refill   • metoprolol (LOPRESSOR) 25 MG Tab TAKE 1 TABLET BY MOUTH TWICE DAILY 180 Tab 3   • donepezil (ARICEPT) 10 MG tablet Take 1 Tab by mouth every day. 90 Tab 2   • atorvastatin (LIPITOR) 80 MG tablet TAKE 1 TABLET BY MOUTH EVERY DAY 90 Tab 3   • potassium Chloride ER (K-TAB) 20 MEQ Tab CR tablet Take  "1 Tab by mouth every day. 90 Tab 3   • losartan (COZAAR) 50 MG Tab TAKE 1 TABLET BY MOUTH EVERY DAY 90 Tab 3   • metFORMIN (GLUCOPHAGE) 500 MG Tab TAKE 2 TABS WITH BREAKFAST AND 1 TAB WITH DINNER 270 Tab 3   • ONE TOUCH ULTRA TEST strip TEST BLOOD SUGAR LEVELS EVERY  Strip 3   • hydrocortisone 2.5 % Cream topical cream Apply thin layer to affected area twice daily 1 Tube 0   • ONETOUCH DELICA LANCETS FINE Misc USE TO TEST BLOOD SUGARS ONCE DAILY 100 Each 3   • Ascorbic Acid (VITAMIN C PO) Take  by mouth.     • aspirin (ASA) 81 MG Chew Tab chewable tablet Take 81 mg by mouth every day. Indications: post cardiac surgery to prevent blood clots     • therapeutic multivitamin-minerals (THERAGRAN-M) Tab Take 1 Tab by mouth every day. Indications: supplement     • Glucosamine-Chondroit-Vit C-Mn (GLUCOSAMINE CHONDR 1500 COMPLX) Cap Take 1 Cap by mouth every day. 100 Cap 3   • Cholecalciferol (VITAMIN D3) 1000 UNIT TABS Take 1 Tab by mouth every day. 100 Tab 3   • furosemide (LASIX) 40 MG Tab Take 40 mg by mouth every day.       No current [facility]-ordered facility-administered medications on file.       Allergies:  Other environmental    Health Maintenance: Due for Pneumovax, diabetes health maintenance, monofilament exam.    ROS:  Gen: no fevers/chills, no changes in weight  Eyes: no changes in vision  ENT: no sore throat  Pulm: no shortness of breath  CV: no chest pain, no palpitations  GI: no nausea/vomiting, no diarrhea  : no dysuria  MSk: no myalgias  Skin: no rash, + dry skin to bilateral hands and bilateral heels   Neuro: no headaches, no dizziness      Objective:     Vital signs reviewed   Exam:  /50   Pulse 67   Temp 36.3 °C (97.4 °F) (Temporal)   Resp 16   Ht 1.778 m (5' 10\")   Wt 83.9 kg (185 lb)   SpO2 97%   BMI 26.54 kg/m²  Body mass index is 26.54 kg/m².    Gen: Alert and oriented, No apparent distress.  Eyes:   Lids normal. Glasses in place.   Neck: Neck is supple without " lymphadenopathy.  Lungs: Normal effort, CTA bilaterally, no wheezes, rhonchi, or rales  CV: Regular rate and rhythm with systolic murmur. No rubs or gallops.  Ext: No clubbing, cyanosis, edema.    Monofilament testing with a 10 gram force: sensation intact: intact bilaterally  Visual Inspection: Feet without maceration, ulcers, fissures. Dry skin bilateral heels.  Pedal pulses: intact bilaterally      Labs: A1C 6.7%    Assessment & Plan:     80 y.o. male with the following -     1. Type 2 diabetes mellitus with microalbuminuria, without long-term current use of insulin ([facility])  Chronic stable problem.  Discussed and reviewed A1c today.  He will continue with his Metformin dosing.  He will continue with home blood glucose monitoring.  Monofilament exam completed today.  He did have dry heels.  He states that he has been using vitamin D and using sandpaper at home to send out his dry skin.  I did discuss with him that he should not use sandpaper to his feet as his risk for diabetes and infection is high.  I discussed with him that I will refer him to podiatry to have his feet evaluated.  He is due for health maintenance labs, orders placed today.  He will return in 6 months for follow-up.  - Diabetic Monofilament Lower Extremity Exam  - Comp Metabolic Panel; Future  - Lipid Profile; Future  - MICROALBUMIN CREAT RATIO URINE (LAB COLLECT); Future  - CBC WITH DIFFERENTIAL; Future  - REFERRAL TO PODIATRY    2. Essential hypertension  Chronic stable problem.  On repeat by me his blood pressure is 122/50.  He will continue with his losartan and metoprolol.  His heart rate today is 67.  He will continue home blood pressure monitoring.  He will continue follow-up with cardiology.  Red flags discussed.    3. Mixed hyperlipidemia  Chronic stable problem.  Continue atorvastatin.  He is due for updated labs.  Orders placed.  See #1.    4.  Need for vaccination  Acute uncomplicated problem.  Vaccine indicated.  Patient is in  agreement. I have placed the below orders and discussed them with an approved delegating provider.  The MA is performing the below orders under the direction of Dr. Deluna.  - Pneumococal Polysaccharide Vaccine 23-Valent =>3YO SQ/IM      Return in about 6 months (around 10/21/2021) for Diabetes, A1C, HTN.    Please note that this dictation was created using voice recognition software. I have made every reasonable attempt to correct obvious errors, but I expect that there are errors of grammar and possibly content that I did not discover before finalizing the note.

## 2021-04-21 NOTE — ASSESSMENT & PLAN NOTE
Chronic medical problem.  He is taking losartan 50 mg daily and metoprolol 25 BID.  His initial blood pressure today is 102/48.  He is checking his blood pressure at home. He takes his blood pressure before his medications.  He denies any chest pain, shortness of breath, dizziness, or headaches.

## 2021-04-26 DIAGNOSIS — E11.9 TYPE 2 DIABETES MELLITUS WITHOUT COMPLICATION, WITHOUT LONG-TERM CURRENT USE OF INSULIN (HCC): ICD-10-CM

## 2021-04-26 NOTE — TELEPHONE ENCOUNTER
Received request via: Pharmacy    Was the patient seen in the last year in this department? Yes on 4/21/2021    Does the patient have an active prescription (recently filled or refills available) for medication(s) requested? No

## 2021-04-26 NOTE — TELEPHONE ENCOUNTER
Requested Prescriptions     Signed Prescriptions Disp Refills   • metFORMIN (GLUCOPHAGE) 500 MG Tab 270 tablet 2     Sig: TAKE 2 TABS WITH BREAKFAST AND 1 TAB WITH DINNER     Authorizing Provider: PETER PAZ A.P.R.N.

## 2021-06-21 DIAGNOSIS — I10 ESSENTIAL HYPERTENSION: ICD-10-CM

## 2021-06-21 RX ORDER — LOSARTAN POTASSIUM 50 MG/1
50 TABLET ORAL
Qty: 90 TABLET | Refills: 3 | Status: SHIPPED | OUTPATIENT
Start: 2021-06-21 | End: 2022-07-21 | Stop reason: SDUPTHER

## 2021-06-21 NOTE — TELEPHONE ENCOUNTER
Requested Prescriptions     Signed Prescriptions Disp Refills   • losartan (COZAAR) 50 MG Tab 90 tablet 3     Sig: Take 1 tablet by mouth every day.     Authorizing Provider: PETER PAZ A.P.R.N.

## 2021-07-13 ENCOUNTER — APPOINTMENT (RX ONLY)
Dept: URBAN - METROPOLITAN AREA CLINIC 22 | Facility: CLINIC | Age: 81
Setting detail: DERMATOLOGY
End: 2021-07-13

## 2021-07-13 DIAGNOSIS — L57.0 ACTINIC KERATOSIS: ICD-10-CM

## 2021-07-13 DIAGNOSIS — Z71.89 OTHER SPECIFIED COUNSELING: ICD-10-CM

## 2021-07-13 DIAGNOSIS — L82.0 INFLAMED SEBORRHEIC KERATOSIS: ICD-10-CM

## 2021-07-13 DIAGNOSIS — L91.8 OTHER HYPERTROPHIC DISORDERS OF THE SKIN: ICD-10-CM

## 2021-07-13 DIAGNOSIS — D22 MELANOCYTIC NEVI: ICD-10-CM

## 2021-07-13 DIAGNOSIS — L82.1 OTHER SEBORRHEIC KERATOSIS: ICD-10-CM

## 2021-07-13 DIAGNOSIS — L81.4 OTHER MELANIN HYPERPIGMENTATION: ICD-10-CM

## 2021-07-13 DIAGNOSIS — D18.0 HEMANGIOMA: ICD-10-CM

## 2021-07-13 PROBLEM — D22.5 MELANOCYTIC NEVI OF TRUNK: Status: ACTIVE | Noted: 2021-07-13

## 2021-07-13 PROBLEM — D22.61 MELANOCYTIC NEVI OF RIGHT UPPER LIMB, INCLUDING SHOULDER: Status: ACTIVE | Noted: 2021-07-13

## 2021-07-13 PROBLEM — D22.62 MELANOCYTIC NEVI OF LEFT UPPER LIMB, INCLUDING SHOULDER: Status: ACTIVE | Noted: 2021-07-13

## 2021-07-13 PROBLEM — D18.01 HEMANGIOMA OF SKIN AND SUBCUTANEOUS TISSUE: Status: ACTIVE | Noted: 2021-07-13

## 2021-07-13 PROCEDURE — 17000 DESTRUCT PREMALG LESION: CPT | Mod: 59

## 2021-07-13 PROCEDURE — 17003 DESTRUCT PREMALG LES 2-14: CPT | Mod: 59

## 2021-07-13 PROCEDURE — 17110 DESTRUCTION B9 LES UP TO 14: CPT

## 2021-07-13 PROCEDURE — ? LIQUID NITROGEN

## 2021-07-13 PROCEDURE — ? SUNSCREEN RECOMMENDATIONS

## 2021-07-13 PROCEDURE — 99213 OFFICE O/P EST LOW 20 MIN: CPT | Mod: 25

## 2021-07-13 PROCEDURE — ? ADDITIONAL NOTES

## 2021-07-13 PROCEDURE — ? COUNSELING

## 2021-07-13 ASSESSMENT — LOCATION SIMPLE DESCRIPTION DERM
LOCATION SIMPLE: ABDOMEN
LOCATION SIMPLE: RIGHT UPPER BACK
LOCATION SIMPLE: INFERIOR FOREHEAD
LOCATION SIMPLE: RIGHT HAND
LOCATION SIMPLE: LEFT FOREHEAD
LOCATION SIMPLE: LEFT FOREARM
LOCATION SIMPLE: RIGHT FOREARM
LOCATION SIMPLE: LEFT HAND
LOCATION SIMPLE: LEFT EYELID
LOCATION SIMPLE: RIGHT LOWER BACK

## 2021-07-13 ASSESSMENT — LOCATION DETAILED DESCRIPTION DERM
LOCATION DETAILED: LEFT LATERAL CANTHUS
LOCATION DETAILED: INFERIOR MID FOREHEAD
LOCATION DETAILED: LEFT PROXIMAL DORSAL FOREARM
LOCATION DETAILED: LEFT ULNAR DORSAL HAND
LOCATION DETAILED: RIGHT RADIAL DORSAL HAND
LOCATION DETAILED: LEFT MEDIAL FOREHEAD
LOCATION DETAILED: RIGHT SUPERIOR UPPER BACK
LOCATION DETAILED: RIGHT DISTAL DORSAL FOREARM
LOCATION DETAILED: RIGHT SUPERIOR MEDIAL MIDBACK
LOCATION DETAILED: RIGHT MEDIAL UPPER BACK
LOCATION DETAILED: RIGHT PROXIMAL DORSAL FOREARM
LOCATION DETAILED: EPIGASTRIC SKIN
LOCATION DETAILED: LEFT DISTAL DORSAL FOREARM

## 2021-07-13 ASSESSMENT — LOCATION ZONE DERM
LOCATION ZONE: HAND
LOCATION ZONE: FACE
LOCATION ZONE: TRUNK
LOCATION ZONE: EYELID
LOCATION ZONE: ARM

## 2021-07-13 NOTE — PROCEDURE: LIQUID NITROGEN
Number Of Freeze-Thaw Cycles: 2 freeze-thaw cycles
Consent: The patient's consent was obtained including but not limited to risks of crusting, scabbing, blistering, scarring, darker or lighter pigmentary change, recurrence, incomplete removal and infection.
Duration Of Freeze Thaw-Cycle (Seconds): 0
Total Number Of Aks Treated: 14
Post-Care Instructions: I reviewed with the patient in detail post-care instructions. Patient is to wear sunprotection, and avoid picking at any of the treated lesions. Pt may apply Vaseline to crusted or scabbing areas.
Detail Level: Zone
Render In Bullet Format When Appropriate: No
Detail Level: Detailed
Show Topical Anesthesia Variable?: Yes
Medical Necessity Clause: This procedure was medically necessary because the lesions that were treated were:
Number Of Freeze-Thaw Cycles: 3 freeze-thaw cycles
Duration Of Freeze Thaw-Cycle (Seconds): 3
Medical Necessity Information: It is in your best interest to select a reason for this procedure from the list below. All of these items fulfill various CMS LCD requirements except the new and changing color options.

## 2021-08-25 DIAGNOSIS — R41.3 MEMORY DIFFICULTIES: ICD-10-CM

## 2021-08-25 RX ORDER — DONEPEZIL HYDROCHLORIDE 10 MG/1
10 TABLET, FILM COATED ORAL DAILY
Qty: 90 TABLET | Refills: 2 | Status: SHIPPED | OUTPATIENT
Start: 2021-08-25 | End: 2022-10-13 | Stop reason: SDUPTHER

## 2021-08-25 NOTE — TELEPHONE ENCOUNTER
Requested Prescriptions     Signed Prescriptions Disp Refills   • donepezil (ARICEPT) 10 MG tablet 90 Tablet 2     Sig: Take 1 Tablet by mouth every day.     Authorizing Provider: PETER PAZ A.P.R.N.

## 2021-09-25 DIAGNOSIS — E78.2 MIXED HYPERLIPIDEMIA: ICD-10-CM

## 2021-09-27 RX ORDER — ATORVASTATIN CALCIUM 80 MG/1
TABLET, FILM COATED ORAL
Qty: 90 TABLET | Refills: 0 | Status: SHIPPED | OUTPATIENT
Start: 2021-09-27 | End: 2022-04-18

## 2021-10-09 DIAGNOSIS — I10 ESSENTIAL HYPERTENSION: ICD-10-CM

## 2021-10-11 RX ORDER — POTASSIUM CHLORIDE 1500 MG/1
TABLET, EXTENDED RELEASE ORAL
Qty: 90 TABLET | Refills: 2 | Status: SHIPPED | OUTPATIENT
Start: 2021-10-11 | End: 2023-01-30

## 2021-10-11 NOTE — TELEPHONE ENCOUNTER
Requested Prescriptions     Signed Prescriptions Disp Refills   • potassium Chloride ER (K-TAB) 20 MEQ Tab CR tablet 90 Tablet 2     Sig: TAKE 1 TABLET BY MOUTH EVERY DAY     Authorizing Provider: PETER PAZ A.P.R.N.

## 2021-10-21 ENCOUNTER — OFFICE VISIT (OUTPATIENT)
Dept: MEDICAL GROUP | Facility: PHYSICIAN GROUP | Age: 81
End: 2021-10-21
Payer: MEDICARE

## 2021-10-21 VITALS
TEMPERATURE: 97.3 F | OXYGEN SATURATION: 96 % | SYSTOLIC BLOOD PRESSURE: 136 MMHG | BODY MASS INDEX: 26.34 KG/M2 | HEART RATE: 65 BPM | HEIGHT: 70 IN | DIASTOLIC BLOOD PRESSURE: 64 MMHG | WEIGHT: 184 LBS

## 2021-10-21 DIAGNOSIS — E11.9 TYPE 2 DIABETES MELLITUS WITHOUT COMPLICATION, WITHOUT LONG-TERM CURRENT USE OF INSULIN (HCC): ICD-10-CM

## 2021-10-21 DIAGNOSIS — M19.90 ARTHRITIS: ICD-10-CM

## 2021-10-21 DIAGNOSIS — I10 ESSENTIAL HYPERTENSION: ICD-10-CM

## 2021-10-21 DIAGNOSIS — M79.674 PAIN OF TOE OF RIGHT FOOT: ICD-10-CM

## 2021-10-21 LAB
HBA1C MFR BLD: 6.6 % (ref 0–5.6)
INT CON NEG: NEGATIVE
INT CON POS: POSITIVE

## 2021-10-21 PROCEDURE — 83036 HEMOGLOBIN GLYCOSYLATED A1C: CPT | Performed by: NURSE PRACTITIONER

## 2021-10-21 PROCEDURE — 99214 OFFICE O/P EST MOD 30 MIN: CPT | Performed by: NURSE PRACTITIONER

## 2021-10-21 NOTE — ASSESSMENT & PLAN NOTE
Chronic medical problem.  He is taking Metformin 1000 mg of breakfast and 500 mg at dinner.  He is due for A1c today.  He is checking his blood sugars at home and brings his blood sugar log.  His blood sugars have been ranging 121-196.  His weight has remained stable. At home is active throughout the house.

## 2021-10-21 NOTE — PATIENT INSTRUCTIONS
Tips for taking an accurate blood pressure:   If you are currently taking blood pressure medication, make sure you have taken it at least 1 hour prior to your blood pressure reading.    1. Ensure the blood pressure cuff is placed on your bare arm with no clothing underneath.   2. Sit in an upright position with both of your feet flat on the floor (do not cross legs/ankles).   3. Arm is supported at the heart level (resting on a table/arm chair) .  4. Make sure the bottom of blood pressure cuff is just above the bend of the arm.   5. Rest for 5 minutes before starting the blood pressure reading.  6. Try not to talk/move around while the blood pressure machine is measuring.

## 2021-10-21 NOTE — PROGRESS NOTES
Subjective:     CC: Diabetes and arthritis    HPI:   Raf presents today with the following:    Type 2 diabetes mellitus without complication, without long-term current use of insulin (CMS-HCC)  Chronic medical problem.  He is taking Metformin 1000 mg of breakfast and 500 mg at dinner.  He is due for A1c today.  He is checking his blood sugars at home and brings his blood sugar log.  His blood sugars have been ranging 121-196.  His weight has remained stable. At home is active throughout the house.     Arthritis  Chronic medical problem.  Patient reports that for the last 5 days his arthritis in his lower back has flared.  The pain is exacerbated with movement. He has been painting and fixing up his house. He states that he bends over frequently. He has tried lidocaine patches and Tylenol with improvement. The last 2 days he has been able to do his ADL's. He denies any recent falls, trauma, bruising, dysuria, hematuria.    Essential hypertension  Chronic medical problem.  His blood pressure today is 136/64.  He has been monitoring his blood pressure at home and brings his blood pressure log.  Systolic blood pressure has been ranging 115-181 and diastolic blood pressure has been ranging 60-86.  He states that he takes his blood pressure first thing in the morning on awakening and does not rest for at least 5 minutes before taking his blood pressure.  He is not having any chest pain, shortness of breath, dizziness, blurry vision, or headache.    Pain of toe of right foot  Acute medical problem.  2 months ago he kicked the bed post with his right 4th toe.  He had pain and was unable to touch his toe initially.  Today he states that the pain has improved.       Past Medical History:   Diagnosis Date   • Anxiety    • Arthritis    • CAD (coronary artery disease) 07/06/2017    CABG x 4 (LIMA to distal diagonal, rSVG to distal OM1, rSVG to distal OM2, rSVG to distal LAD) by Dr. Gaffney.   • Diabetes ([facility])     oral  medication    • Hypertension    • Inguinal hernia    • Mixed hyperlipidemia    • Nodular prostate without urinary obstruction    • Prostate cancer (HCC)        Social History     Tobacco Use   • Smoking status: Never Smoker   • Smokeless tobacco: Never Used   Vaping Use   • Vaping Use: Never used   Substance Use Topics   • Alcohol use: Yes     Alcohol/week: 1.2 oz     Types: 2 Shots of liquor per week   • Drug use: No       Current Outpatient Medications Ordered in Epic   Medication Sig Dispense Refill   • potassium Chloride ER (K-TAB) 20 MEQ Tab CR tablet TAKE 1 TABLET BY MOUTH EVERY DAY 90 Tablet 2   • atorvastatin (LIPITOR) 80 MG tablet TAKE 1 TABLET BY MOUTH EVERY DAY 90 Tablet 0   • donepezil (ARICEPT) 10 MG tablet Take 1 Tablet by mouth every day. 90 Tablet 2   • losartan (COZAAR) 50 MG Tab Take 1 tablet by mouth every day. 90 tablet 3   • metFORMIN (GLUCOPHAGE) 500 MG Tab TAKE 2 TABS WITH BREAKFAST AND 1 TAB WITH DINNER 270 tablet 2   • metoprolol (LOPRESSOR) 25 MG Tab TAKE 1 TABLET BY MOUTH TWICE DAILY 180 Tab 3   • ONE TOUCH ULTRA TEST strip TEST BLOOD SUGAR LEVELS EVERY  Strip 3   • ONETOUCH DELICA LANCETS FINE Misc USE TO TEST BLOOD SUGARS ONCE DAILY 100 Each 3   • furosemide (LASIX) 40 MG Tab Take 40 mg by mouth every day.     • Ascorbic Acid (VITAMIN C PO) Take  by mouth.     • aspirin (ASA) 81 MG Chew Tab chewable tablet Take 81 mg by mouth every day. Indications: post cardiac surgery to prevent blood clots     • therapeutic multivitamin-minerals (THERAGRAN-M) Tab Take 1 Tab by mouth every day. Indications: supplement     • Glucosamine-Chondroit-Vit C-Mn (GLUCOSAMINE CHONDR 1500 COMPLX) Cap Take 1 Cap by mouth every day. 100 Cap 3   • Cholecalciferol (VITAMIN D3) 1000 UNIT TABS Take 1 Tab by mouth every day. 100 Tab 3     No current Epic-ordered facility-administered medications on file.       Allergies:  Other environmental    Health Maintenance: Due for annual wellness visit, influenza  "vaccine, and retinal screening.  He states that he recently had his influenza vaccine and Pfizer booster at [facility].  We have not received records from [facility] at this time.    ROS:  Per HPI    Objective:     Vital signs reviewed  Exam:  /64 (BP Location: Right arm, Patient Position: Sitting, BP Cuff Size: Adult)   Pulse 65   Temp 36.3 °C (97.3 °F) (Temporal)   Ht 1.778 m (5' 10\")   Wt 83.5 kg (184 lb)   SpO2 96%   BMI 26.40 kg/m²  Body mass index is 26.4 kg/m².    Gen: Alert and oriented, No apparent distress.  Lungs: Normal effort, CTA bilaterally, no wheezes, rhonchi, or rales.  No CVA tenderness.  CV: Regular rate and rhythm. No murmurs, rubs, or gallops.  Ext: No clubbing, cyanosis, edema.  Right foot pedal pulse 2+.  Right fourth toe no pain on palpation without bruising or redness.  Toes 1 through 5 no pain on palpation.  No pain on palpation to spinous processes today.  No pain on palpation to bilateral lumbar or bilateral thoracic areas today.      Assessment & Plan:     81 y.o. male with the following -     1. Type 2 diabetes mellitus without complication, without long-term current use of insulin ([facility])  Chronic stable problem.  I reviewed his A1c today.  His A1c is stable.  He will continue with his Metformin 1000 mg in the morning and 500 mg in the evening.  He will continue with home blood sugar monitoring.  I reviewed his home blood pressure log today.  His health and that labs have been ordered.  - POCT Hemoglobin A1C    2. Essential hypertension  Chronic stable problem.  I reviewed his home blood pressure log today.  After discussion with patient we discussed correct home blood pressure monitoring technique.  I will have him return in 1 month with his blood pressure log to follow-up.  He will continue with his losartan 50 mg daily and metoprolol 25 mg twice daily.  Red flags discussed.    3. Arthritis  Chronic exacerbated problem.  No pain on exam today.  He notes that his pain has been " improving.  He will continue with over-the-counter Tylenol and lidocaine patches.  We discussed back stretching exercises with demonstration.  Informed patient that if his back does not improve to return to clinic.  He verbalized understanding.    4. Pain of toe of right foot  Acute uncomplicated problem.  No pain on exam today.  No suspicions for fracture today.  Recommended to keep monitoring and return to clinic if worsening symptoms.        Return in about 4 weeks (around 11/18/2021) for Hypertension.    Please note that this dictation was created using voice recognition software. I have made every reasonable attempt to correct obvious errors, but I expect that there are errors of grammar and possibly content that I did not discover before finalizing the note.

## 2021-10-21 NOTE — ASSESSMENT & PLAN NOTE
Acute medical problem.  2 months ago he kicked the bed post with his right 4th toe.  He had pain and was unable to touch his toe initially.  Today he states that the pain has improved.

## 2021-10-21 NOTE — ASSESSMENT & PLAN NOTE
Chronic medical problem.  His blood pressure today is 136/64.  He has been monitoring his blood pressure at home and brings his blood pressure log.  Systolic blood pressure has been ranging 115-181 and diastolic blood pressure has been ranging 60-86.  He states that he takes his blood pressure first thing in the morning on awakening and does not rest for at least 5 minutes before taking his blood pressure.  He is not having any chest pain, shortness of breath, dizziness, blurry vision, or headache.

## 2021-10-21 NOTE — ASSESSMENT & PLAN NOTE
Chronic medical problem.  Patient reports that for the last 5 days his arthritis in his lower back has flared.  The pain is exacerbated with movement. He has been painting and fixing up his house. He states that he bends over frequently. He has tried lidocaine patches and Tylenol with improvement. The last 2 days he has been able to do his ADL's. He denies any recent falls, trauma, bruising, dysuria, hematuria.

## 2021-12-03 ENCOUNTER — OFFICE VISIT (OUTPATIENT)
Dept: MEDICAL GROUP | Facility: PHYSICIAN GROUP | Age: 81
End: 2021-12-03
Payer: MEDICARE

## 2021-12-03 VITALS
SYSTOLIC BLOOD PRESSURE: 128 MMHG | WEIGHT: 187 LBS | DIASTOLIC BLOOD PRESSURE: 50 MMHG | OXYGEN SATURATION: 96 % | TEMPERATURE: 96.7 F | HEART RATE: 63 BPM | HEIGHT: 70 IN | BODY MASS INDEX: 26.77 KG/M2

## 2021-12-03 DIAGNOSIS — I10 ESSENTIAL HYPERTENSION: ICD-10-CM

## 2021-12-03 DIAGNOSIS — R21 RASH IN ADULT: ICD-10-CM

## 2021-12-03 PROCEDURE — 99213 OFFICE O/P EST LOW 20 MIN: CPT | Performed by: NURSE PRACTITIONER

## 2021-12-03 RX ORDER — TRIAMCINOLONE ACETONIDE 1 MG/G
1 CREAM TOPICAL 2 TIMES DAILY
Qty: 80 G | Refills: 0 | Status: SHIPPED | OUTPATIENT
Start: 2021-12-03 | End: 2021-12-17

## 2021-12-03 NOTE — ASSESSMENT & PLAN NOTE
The rash started 2 months ago. The rash is to his right lower leg and left lower ankle. The rash does not itch and has been decreasing in size. He has been using antibacterial ointment and cortisone cream on it. No fever, chills, discharge, new skin products, new lotions or new soaps.

## 2021-12-03 NOTE — PROGRESS NOTES
Subjective:     CC: Blood pressure    HPI:   Raf presents today with the following:     Essential hypertension  His blood pressure today is 136/60.  He is checking his blood pressure at home.  He is taking losartan 50 mg daily and metoprolol 25 mg twice daily.  His morning blood pressures have been ranging 134-174/55-83 and his afternoon blood pressures have been ranging 123-168/45-71. He does not rest before taking his blood pressure. He states that he sits down for 1 minute and checks his blood pressure. No chest pain, shortness of breath, dizziness, blurry vision, or headaches. Reinforce home blood pressure monitoring, diet and lifestyle modifications.     Rash in adult  The rash started 2 months ago. The rash is to his right lower leg and left lower ankle. The rash does not itch and has been decreasing in size. He has been using antibacterial ointment and cortisone cream on it. No fever, chills, discharge, new skin products, new lotions or new soaps.       Past Medical History:   Diagnosis Date   • Anxiety    • Arthritis    • CAD (coronary artery disease) 07/06/2017    CABG x 4 (LIMA to distal diagonal, rSVG to distal OM1, rSVG to distal OM2, rSVG to distal LAD) by Dr. Gaffney.   • Diabetes (HCC)     oral medication    • Hypertension    • Inguinal hernia    • Mixed hyperlipidemia    • Nodular prostate without urinary obstruction    • Prostate cancer (HCC)        Social History     Tobacco Use   • Smoking status: Never Smoker   • Smokeless tobacco: Never Used   Vaping Use   • Vaping Use: Never used   Substance Use Topics   • Alcohol use: Yes     Alcohol/week: 1.2 oz     Types: 2 Shots of liquor per week   • Drug use: No       Current Outpatient Medications Ordered in Epic   Medication Sig Dispense Refill   • triamcinolone acetonide (KENALOG) 0.1 % Cream Apply 1 Application topically 2 times a day for 14 days. 80 g 0   • potassium Chloride ER (K-TAB) 20 MEQ Tab CR tablet TAKE 1 TABLET BY MOUTH EVERY DAY 90  "Tablet 2   • atorvastatin (LIPITOR) 80 MG tablet TAKE 1 TABLET BY MOUTH EVERY DAY 90 Tablet 0   • losartan (COZAAR) 50 MG Tab Take 1 tablet by mouth every day. 90 tablet 3   • metFORMIN (GLUCOPHAGE) 500 MG Tab TAKE 2 TABS WITH BREAKFAST AND 1 TAB WITH DINNER 270 tablet 2   • metoprolol (LOPRESSOR) 25 MG Tab TAKE 1 TABLET BY MOUTH TWICE DAILY 180 Tab 3   • ONE TOUCH ULTRA TEST strip TEST BLOOD SUGAR LEVELS EVERY  Strip 3   • ONETOUCH DELICA LANCETS FINE Misc USE TO TEST BLOOD SUGARS ONCE DAILY 100 Each 3   • furosemide (LASIX) 40 MG Tab Take 40 mg by mouth every day.     • Ascorbic Acid (VITAMIN C PO) Take  by mouth.     • aspirin (ASA) 81 MG Chew Tab chewable tablet Take 81 mg by mouth every day. Indications: post cardiac surgery to prevent blood clots     • therapeutic multivitamin-minerals (THERAGRAN-M) Tab Take 1 Tab by mouth every day. Indications: supplement     • Glucosamine-Chondroit-Vit C-Mn (GLUCOSAMINE CHONDR 1500 COMPLX) Cap Take 1 Cap by mouth every day. 100 Cap 3   • Cholecalciferol (VITAMIN D3) 1000 UNIT TABS Take 1 Tab by mouth every day. 100 Tab 3   • donepezil (ARICEPT) 10 MG tablet Take 1 Tablet by mouth every day. (Patient not taking: Reported on 12/3/2021) 90 Tablet 2     No current Epic-ordered facility-administered medications on file.       Allergies:  Other environmental    Health Maintenance: Reviewed       Objective:     Vital signs reviewed   Exam:  /50 (BP Location: Right arm, Patient Position: Sitting)   Pulse 63   Temp 35.9 °C (96.7 °F) (Temporal)   Ht 1.778 m (5' 10\")   Wt 84.8 kg (187 lb)   SpO2 96%   BMI 26.83 kg/m²  Body mass index is 26.83 kg/m².    Gen: Alert and oriented, No apparent distress.  Lungs: Normal effort, CTA bilaterally, no wheezes, rhonchi, or rales  CV: Regular rate and rhythm. No murmurs, rubs, or gallops.  Ext: Right lower leg with dry, red raised papules, no discharge. Left medial malleolus area with dry, red raised papules, no discharge.  "     Assessment & Plan:     81 y.o. male with the following -     1. Essential hypertension  Chronic stable problem.  On repeat by me his blood pressure is 128/50.  Reinforced home blood pressure monitoring, diet, and lifestyle modifications.  He will continue with his losartan 50 mg daily and metoprolol 25 mg twice daily.    2. Rash in adult  Acute uncomplicated problem.  Recommended he moisturize the skin and use triamcinolone.  We discussed that his triamcinolone should only be used for up to 14 days before stopping.  He verbalized understanding.  Return if symptoms worsen.  - triamcinolone acetonide (KENALOG) 0.1 % Cream; Apply 1 Application topically 2 times a day for 14 days.  Dispense: 80 g; Refill: 0        Return in about 4 months (around 4/3/2022) for Diabetes, Hypertension.    Please note that this dictation was created using voice recognition software. I have made every reasonable attempt to correct obvious errors, but I expect that there are errors of grammar and possibly content that I did not discover before finalizing the note.

## 2021-12-03 NOTE — ASSESSMENT & PLAN NOTE
His blood pressure today is 136/60.  He is checking his blood pressure at home.  He is taking losartan 50 mg daily and metoprolol 25 mg twice daily.  His morning blood pressures have been ranging 134-174/55-83 and his afternoon blood pressures have been ranging 123-168/45-71. He does not rest before taking his blood pressure. He states that he sits down for 1 minute and checks his blood pressure. No chest pain, shortness of breath, dizziness, blurry vision, or headaches. Reinforce home blood pressure monitoring, diet and lifestyle modifications.

## 2022-01-03 ENCOUNTER — OFFICE VISIT (OUTPATIENT)
Dept: CARDIOLOGY | Facility: MEDICAL CENTER | Age: 82
End: 2022-01-03
Payer: MEDICARE

## 2022-01-03 VITALS
HEIGHT: 70 IN | WEIGHT: 191.4 LBS | OXYGEN SATURATION: 97 % | SYSTOLIC BLOOD PRESSURE: 128 MMHG | DIASTOLIC BLOOD PRESSURE: 70 MMHG | RESPIRATION RATE: 14 BRPM | BODY MASS INDEX: 27.4 KG/M2 | HEART RATE: 60 BPM

## 2022-01-03 DIAGNOSIS — Z95.1 S/P CABG X 4: ICD-10-CM

## 2022-01-03 DIAGNOSIS — E11.9 TYPE 2 DIABETES MELLITUS WITHOUT COMPLICATION, WITHOUT LONG-TERM CURRENT USE OF INSULIN (HCC): ICD-10-CM

## 2022-01-03 DIAGNOSIS — E78.2 MIXED HYPERLIPIDEMIA: ICD-10-CM

## 2022-01-03 DIAGNOSIS — I25.119 CORONARY ARTERY DISEASE INVOLVING NATIVE CORONARY ARTERY OF NATIVE HEART WITH ANGINA PECTORIS (HCC): ICD-10-CM

## 2022-01-03 DIAGNOSIS — I35.0 NONRHEUMATIC AORTIC VALVE STENOSIS: ICD-10-CM

## 2022-01-03 PROCEDURE — 99214 OFFICE O/P EST MOD 30 MIN: CPT | Performed by: INTERNAL MEDICINE

## 2022-01-03 NOTE — PROGRESS NOTES
Chief Complaint   Patient presents with   • Coronary Artery Disease   • Hyperlipidemia     F/V Dx: Mixed hyperlipidemia       Subjective:   Raf Pelayo is a 81 y.o. male who presents today for follow-up of CAD status post CABG times 4 July 2017, hypertension and diabetes.      Since last visit has been doing well asymptomatic takes his medication as directed with controlled blood pressures.  A1c is reasonable.  Exercise is moderate.  Notable for aortic stenosis on echocardiogram from last visit.  This is mild.      Past Medical History:   Diagnosis Date   • Anxiety    • Arthritis    • CAD (coronary artery disease) 07/06/2017    CABG x 4 (LIMA to distal diagonal, rSVG to distal OM1, rSVG to distal OM2, rSVG to distal LAD) by Dr. Gaffney.   • Diabetes (HCC)     oral medication    • Hypertension    • Inguinal hernia    • Mixed hyperlipidemia    • Nodular prostate without urinary obstruction    • Prostate cancer (HCC)      Past Surgical History:   Procedure Laterality Date   • ROHINI  7/6/2017    Procedure: ROHINI - INTRAOPERATIVE ;  Surgeon: Anthony Gaffney M.D.;  Location: SURGERY [facility];  Service:    • MULTIPLE CORONARY ARTERY BYPASS ENDO VEIN HARVEST  7/6/2017    Procedure: MULTIPLE CORONARY ARTERY BYPASS ENDO VEIN HARVEST X2-4, PREVENA DRESSING ;  Surgeon: Anthony Gaffney M.D.;  Location: SURGERY [facility];  Service:    • APPENDECTOMY  1955   • TONSILLECTOMY       Family History   Problem Relation Age of Onset   • Stroke Mother    • Heart Disease Father    • Heart Disease Brother      Social History     Socioeconomic History   • Marital status:      Spouse name: Not on file   • Number of children: Not on file   • Years of education: Not on file   • Highest education level: Not on file   Occupational History   • Not on file   Tobacco Use   • Smoking status: Never Smoker   • Smokeless tobacco: Never Used   Vaping Use   • Vaping Use: Never used   Substance and Sexual Activity   • Alcohol  use: Yes     Alcohol/week: 1.2 oz     Types: 2 Shots of liquor per week   • Drug use: No   • Sexual activity: Never     Partners: Female     Birth control/protection: Post-Menopausal   Other Topics Concern   • Not on file   Social History Narrative   • Not on file     Social Determinants of Health     Financial Resource Strain:    • Difficulty of Paying Living Expenses: Not on file   Food Insecurity:    • Worried About Running Out of Food in the Last Year: Not on file   • Ran Out of Food in the Last Year: Not on file   Transportation Needs:    • Lack of Transportation (Medical): Not on file   • Lack of Transportation (Non-Medical): Not on file   Physical Activity:    • Days of Exercise per Week: Not on file   • Minutes of Exercise per Session: Not on file   Stress:    • Feeling of Stress : Not on file   Social Connections:    • Frequency of Communication with Friends and Family: Not on file   • Frequency of Social Gatherings with Friends and Family: Not on file   • Attends Taoism Services: Not on file   • Active Member of Clubs or Organizations: Not on file   • Attends Club or Organization Meetings: Not on file   • Marital Status: Not on file   Intimate Partner Violence:    • Fear of Current or Ex-Partner: Not on file   • Emotionally Abused: Not on file   • Physically Abused: Not on file   • Sexually Abused: Not on file   Housing Stability:    • Unable to Pay for Housing in the Last Year: Not on file   • Number of Places Lived in the Last Year: Not on file   • Unstable Housing in the Last Year: Not on file     Allergies   Allergen Reactions   • Other Environmental Runny Nose     SAW DUST     Outpatient Encounter Medications as of 1/3/2022   Medication Sig Dispense Refill   • metoprolol tartrate (LOPRESSOR) 25 MG Tab Take 1 Tablet by mouth 2 times a day. 180 Tablet 0   • potassium Chloride ER (K-TAB) 20 MEQ Tab CR tablet TAKE 1 TABLET BY MOUTH EVERY DAY 90 Tablet 2   • atorvastatin (LIPITOR) 80 MG tablet TAKE 1  "TABLET BY MOUTH EVERY DAY 90 Tablet 0   • donepezil (ARICEPT) 10 MG tablet Take 1 Tablet by mouth every day. 90 Tablet 2   • losartan (COZAAR) 50 MG Tab Take 1 tablet by mouth every day. 90 tablet 3   • metFORMIN (GLUCOPHAGE) 500 MG Tab TAKE 2 TABS WITH BREAKFAST AND 1 TAB WITH DINNER 270 tablet 2   • ONE TOUCH ULTRA TEST strip TEST BLOOD SUGAR LEVELS EVERY  Strip 3   • ONETOUCH DELICA LANCETS FINE Misc USE TO TEST BLOOD SUGARS ONCE DAILY 100 Each 3   • furosemide (LASIX) 40 MG Tab Take 40 mg by mouth every day.     • Ascorbic Acid (VITAMIN C PO) Take  by mouth.     • aspirin (ASA) 81 MG Chew Tab chewable tablet Take 81 mg by mouth every day. Indications: post cardiac surgery to prevent blood clots     • therapeutic multivitamin-minerals (THERAGRAN-M) Tab Take 1 Tab by mouth every day. Indications: supplement     • Glucosamine-Chondroit-Vit C-Mn (GLUCOSAMINE CHONDR 1500 COMPLX) Cap Take 1 Cap by mouth every day. 100 Cap 3   • Cholecalciferol (VITAMIN D3) 1000 UNIT TABS Take 1 Tab by mouth every day. 100 Tab 3     No facility-administered encounter medications on file as of 1/3/2022.     Review of Systems   All other systems reviewed and are negative.       Objective:   /70 (BP Location: Left arm, Patient Position: Sitting, BP Cuff Size: Adult)   Pulse 60   Resp 14   Ht 1.778 m (5' 10\")   Wt 86.8 kg (191 lb 6.4 oz)   SpO2 97%   BMI 27.46 kg/m²     Physical Exam  Vitals reviewed.   Constitutional:       General: He is not in acute distress.     Appearance: He is well-developed. He is not diaphoretic.   HENT:      Head: Normocephalic and atraumatic.      Right Ear: External ear normal.      Left Ear: External ear normal.   Eyes:      General: No scleral icterus.        Right eye: No discharge.         Left eye: No discharge.      Conjunctiva/sclera: Conjunctivae normal.      Pupils: Pupils are equal, round, and reactive to light.   Neck:      Thyroid: No thyromegaly.      Vascular: No JVD.      " Trachea: No tracheal deviation.   Cardiovascular:      Rate and Rhythm: Normal rate and regular rhythm.      Chest Wall: PMI is not displaced.      Pulses:           Carotid pulses are 2+ on the right side and 2+ on the left side.       Radial pulses are 2+ on the left side.        Popliteal pulses are 2+ on the right side and 2+ on the left side.        Dorsalis pedis pulses are 2+ on the right side and 2+ on the left side.        Posterior tibial pulses are 2+ on the right side and 2+ on the left side.      Heart sounds: Murmur ( 2/6 systolic ejection murmur left upper sternal border) heard.   No friction rub. No gallop.    Pulmonary:      Effort: Pulmonary effort is normal. No respiratory distress.      Breath sounds: Normal breath sounds. No wheezing or rales.   Chest:      Chest wall: No tenderness.   Abdominal:      General: Bowel sounds are normal. There is no distension.      Palpations: Abdomen is soft.      Tenderness: There is no abdominal tenderness.   Musculoskeletal:         General: No tenderness or deformity. Normal range of motion.      Cervical back: Normal range of motion and neck supple.   Skin:     General: Skin is warm and dry.      Coloration: Skin is not pale.      Findings: No erythema or rash.   Neurological:      Mental Status: He is alert and oriented to person, place, and time.      Cranial Nerves: No cranial nerve deficit (cranial nerves II through XII grossly intact).      Coordination: Coordination normal.   Psychiatric:         Behavior: Behavior normal.         Thought Content: Thought content normal.       LABS:  Lab Results   Component Value Date/Time    CHOLSTRLTOT 125 04/13/2020 07:32 AM    LDL 56 04/13/2020 07:32 AM    HDL 35 (A) 04/13/2020 07:32 AM    TRIGLYCERIDE 172 (H) 04/13/2020 07:32 AM       Lab Results   Component Value Date/Time    WBC 7.7 07/02/2019 07:32 AM    RBC 4.52 (L) 07/02/2019 07:32 AM    HEMOGLOBIN 13.7 (L) 07/02/2019 07:32 AM    HEMATOCRIT 43.9 07/02/2019  07:32 AM    MCV 97.1 07/02/2019 07:32 AM    NEUTSPOLYS 71.10 07/02/2019 07:32 AM    LYMPHOCYTES 15.40 (L) 07/02/2019 07:32 AM    MONOCYTES 9.70 07/02/2019 07:32 AM    EOSINOPHILS 2.60 07/02/2019 07:32 AM    BASOPHILS 0.80 07/02/2019 07:32 AM    HYPOCHROMIA 1+ 02/19/2013 10:35 AM     Lab Results   Component Value Date/Time    SODIUM 139 10/12/2020 08:01 AM    POTASSIUM 4.6 10/12/2020 08:01 AM    CHLORIDE 104 10/12/2020 08:01 AM    CO2 23 10/12/2020 08:01 AM    GLUCOSE 134 (H) 10/12/2020 08:01 AM    BUN 16 10/12/2020 08:01 AM    CREATININE 1.03 10/12/2020 08:01 AM    CREATININE 1.0 10/27/2008 08:20 AM     Lab Results   Component Value Date    HBA1C 6.6 (A) 10/21/2021      Lab Results   Component Value Date/Time    ALKPHOSPHAT 78 04/13/2020 07:32 AM    ASTSGOT 20 04/13/2020 07:32 AM    ALTSGPT 27 04/13/2020 07:32 AM    TBILIRUBIN 0.5 04/13/2020 07:32 AM      Lab Results   Component Value Date/Time    BNPBTYPENAT 52 08/16/2017 10:13 AM      No results found for: TSH  Lab Results   Component Value Date/Time    PROTHROMBTM 16.7 (H) 07/06/2017 12:27 PM    INR 1.31 (H) 07/06/2017 12:27 PM      ECHO CONCLUSIONS (10/23/2020):  Normal left ventricular chamber size.  Left ventricular ejection fraction is visually estimated to be 65%.  Mild aortic stenosis.  Right heart pressures are normal.          Assessment:     1. Coronary artery disease involving native coronary artery of native heart with angina pectoris (HCC)     2. S/P CABG x 4  Lipid Profile    Comp Metabolic Panel   3. Type 2 diabetes mellitus without complication, without long-term current use of insulin (HCC)     4. Nonrheumatic aortic valve stenosis         Medical Decision Making:  Today's Assessment / Status / Plan:     No cholesterol profile since checked by cardiology in 2020.  Recommend follow-up with lipid profile for goal LDL less than 70 and metabolic panel.  He also has diabetes and may have opportunities for improvement of his cardioembolic medication  profile.  Stenosis is mild unlikely to progress significantly over the next several years and asymptomatic.  Recommend follow-up echocardiogram at next visit.    1.  Lipid profile and CMP goal LDL less than 70  2.  Echocardiogram next year to follow-up mild AS  3.  Recommend addition of an SGLT2 inhibitor (such ad empagliflozin) for optimization of therapy in the context of DM2 and atherosclerotic CADif eGFR >45.  Suggest he discuss further with his primary provider who manages his diabetes.  4.  Continue other medical therapy    Follow-up in 1 year

## 2022-01-03 NOTE — LETTER
[facility]   [address], [address]  [place] [address]  Phone: [phone]  Fax: [phone]              Raf Pelayo  1940    Encounter Date: 1/3/2022    Moris Mitchell M.D.          PROGRESS NOTE:  Chief Complaint   Patient presents with   • Coronary Artery Disease   • Hyperlipidemia     F/V Dx: Mixed hyperlipidemia       Subjective:   Raf Pelayo is a 81 y.o. male who presents today for follow-up of CAD status post CABG times 4 July 2017, hypertension and diabetes.      Since last visit has been doing well asymptomatic takes his medication as directed with controlled blood pressures.  A1c is reasonable.  Exercise is moderate.  Notable for aortic stenosis on echocardiogram from last visit.  This is mild.      Past Medical History:   Diagnosis Date   • Anxiety    • Arthritis    • CAD (coronary artery disease) 07/06/2017    CABG x 4 (LIMA to distal diagonal, rSVG to distal OM1, rSVG to distal OM2, rSVG to distal LAD) by Dr. Gaffney.   • Diabetes (HCC)     oral medication    • Hypertension    • Inguinal hernia    • Mixed hyperlipidemia    • Nodular prostate without urinary obstruction    • Prostate cancer (HCC)      Past Surgical History:   Procedure Laterality Date   • ROHINI  7/6/2017    Procedure: ROHINI - INTRAOPERATIVE ;  Surgeon: Anthony Gaffney M.D.;  Location: SURGERY [facility];  Service:    • MULTIPLE CORONARY ARTERY BYPASS ENDO VEIN HARVEST  7/6/2017    Procedure: MULTIPLE CORONARY ARTERY BYPASS ENDO VEIN HARVEST X2-4, PREVENA DRESSING ;  Surgeon: Anthony Gaffney M.D.;  Location: SURGERY [facility];  Service:    • APPENDECTOMY  1955   • TONSILLECTOMY       Family History   Problem Relation Age of Onset   • Stroke Mother    • Heart Disease Father    • Heart Disease Brother      Social History     Socioeconomic History   • Marital status:      Spouse name: Not on file   • Number of children: Not on file   •  Years of education: Not on file   • Highest education level: Not on file   Occupational History   • Not on file   Tobacco Use   • Smoking status: Never Smoker   • Smokeless tobacco: Never Used   Vaping Use   • Vaping Use: Never used   Substance and Sexual Activity   • Alcohol use: Yes     Alcohol/week: 1.2 oz     Types: 2 Shots of liquor per week   • Drug use: No   • Sexual activity: Never     Partners: Female     Birth control/protection: Post-Menopausal   Other Topics Concern   • Not on file   Social History Narrative   • Not on file     Social Determinants of Health     Financial Resource Strain:    • Difficulty of Paying Living Expenses: Not on file   Food Insecurity:    • Worried About Running Out of Food in the Last Year: Not on file   • Ran Out of Food in the Last Year: Not on file   Transportation Needs:    • Lack of Transportation (Medical): Not on file   • Lack of Transportation (Non-Medical): Not on file   Physical Activity:    • Days of Exercise per Week: Not on file   • Minutes of Exercise per Session: Not on file   Stress:    • Feeling of Stress : Not on file   Social Connections:    • Frequency of Communication with Friends and Family: Not on file   • Frequency of Social Gatherings with Friends and Family: Not on file   • Attends Muslim Services: Not on file   • Active Member of Clubs or Organizations: Not on file   • Attends Club or Organization Meetings: Not on file   • Marital Status: Not on file   Intimate Partner Violence:    • Fear of Current or Ex-Partner: Not on file   • Emotionally Abused: Not on file   • Physically Abused: Not on file   • Sexually Abused: Not on file   Housing Stability:    • Unable to Pay for Housing in the Last Year: Not on file   • Number of Places Lived in the Last Year: Not on file   • Unstable Housing in the Last Year: Not on file     Allergies   Allergen Reactions   • Other Environmental Runny Nose     SAW DUST     Outpatient Encounter Medications as of 1/3/2022  "  Medication Sig Dispense Refill   • metoprolol tartrate (LOPRESSOR) 25 MG Tab Take 1 Tablet by mouth 2 times a day. 180 Tablet 0   • potassium Chloride ER (K-TAB) 20 MEQ Tab CR tablet TAKE 1 TABLET BY MOUTH EVERY DAY 90 Tablet 2   • atorvastatin (LIPITOR) 80 MG tablet TAKE 1 TABLET BY MOUTH EVERY DAY 90 Tablet 0   • donepezil (ARICEPT) 10 MG tablet Take 1 Tablet by mouth every day. 90 Tablet 2   • losartan (COZAAR) 50 MG Tab Take 1 tablet by mouth every day. 90 tablet 3   • metFORMIN (GLUCOPHAGE) 500 MG Tab TAKE 2 TABS WITH BREAKFAST AND 1 TAB WITH DINNER 270 tablet 2   • ONE TOUCH ULTRA TEST strip TEST BLOOD SUGAR LEVELS EVERY  Strip 3   • ONETOUCH DELICA LANCETS FINE Misc USE TO TEST BLOOD SUGARS ONCE DAILY 100 Each 3   • furosemide (LASIX) 40 MG Tab Take 40 mg by mouth every day.     • Ascorbic Acid (VITAMIN C PO) Take  by mouth.     • aspirin (ASA) 81 MG Chew Tab chewable tablet Take 81 mg by mouth every day. Indications: post cardiac surgery to prevent blood clots     • therapeutic multivitamin-minerals (THERAGRAN-M) Tab Take 1 Tab by mouth every day. Indications: supplement     • Glucosamine-Chondroit-Vit C-Mn (GLUCOSAMINE CHONDR 1500 COMPLX) Cap Take 1 Cap by mouth every day. 100 Cap 3   • Cholecalciferol (VITAMIN D3) 1000 UNIT TABS Take 1 Tab by mouth every day. 100 Tab 3     No facility-administered encounter medications on file as of 1/3/2022.     Review of Systems   All other systems reviewed and are negative.       Objective:   /70 (BP Location: Left arm, Patient Position: Sitting, BP Cuff Size: Adult)   Pulse 60   Resp 14   Ht 1.778 m (5' 10\")   Wt 86.8 kg (191 lb 6.4 oz)   SpO2 97%   BMI 27.46 kg/m²     Physical Exam  Vitals reviewed.   Constitutional:       General: He is not in acute distress.     Appearance: He is well-developed. He is not diaphoretic.   HENT:      Head: Normocephalic and atraumatic.      Right Ear: External ear normal.      Left Ear: External ear normal.   Eyes: "      General: No scleral icterus.        Right eye: No discharge.         Left eye: No discharge.      Conjunctiva/sclera: Conjunctivae normal.      Pupils: Pupils are equal, round, and reactive to light.   Neck:      Thyroid: No thyromegaly.      Vascular: No JVD.      Trachea: No tracheal deviation.   Cardiovascular:      Rate and Rhythm: Normal rate and regular rhythm.      Chest Wall: PMI is not displaced.      Pulses:           Carotid pulses are 2+ on the right side and 2+ on the left side.       Radial pulses are 2+ on the left side.        Popliteal pulses are 2+ on the right side and 2+ on the left side.        Dorsalis pedis pulses are 2+ on the right side and 2+ on the left side.        Posterior tibial pulses are 2+ on the right side and 2+ on the left side.      Heart sounds: Murmur ( 2/6 systolic ejection murmur left upper sternal border) heard.   No friction rub. No gallop.    Pulmonary:      Effort: Pulmonary effort is normal. No respiratory distress.      Breath sounds: Normal breath sounds. No wheezing or rales.   Chest:      Chest wall: No tenderness.   Abdominal:      General: Bowel sounds are normal. There is no distension.      Palpations: Abdomen is soft.      Tenderness: There is no abdominal tenderness.   Musculoskeletal:         General: No tenderness or deformity. Normal range of motion.      Cervical back: Normal range of motion and neck supple.   Skin:     General: Skin is warm and dry.      Coloration: Skin is not pale.      Findings: No erythema or rash.   Neurological:      Mental Status: He is alert and oriented to person, place, and time.      Cranial Nerves: No cranial nerve deficit (cranial nerves II through XII grossly intact).      Coordination: Coordination normal.   Psychiatric:         Behavior: Behavior normal.         Thought Content: Thought content normal.       LABS:  Lab Results   Component Value Date/Time    CHOLSTRLTOT 125 04/13/2020 07:32 AM    LDL 56 04/13/2020  07:32 AM    HDL 35 (A) 04/13/2020 07:32 AM    TRIGLYCERIDE 172 (H) 04/13/2020 07:32 AM       Lab Results   Component Value Date/Time    WBC 7.7 07/02/2019 07:32 AM    RBC 4.52 (L) 07/02/2019 07:32 AM    HEMOGLOBIN 13.7 (L) 07/02/2019 07:32 AM    HEMATOCRIT 43.9 07/02/2019 07:32 AM    MCV 97.1 07/02/2019 07:32 AM    NEUTSPOLYS 71.10 07/02/2019 07:32 AM    LYMPHOCYTES 15.40 (L) 07/02/2019 07:32 AM    MONOCYTES 9.70 07/02/2019 07:32 AM    EOSINOPHILS 2.60 07/02/2019 07:32 AM    BASOPHILS 0.80 07/02/2019 07:32 AM    HYPOCHROMIA 1+ 02/19/2013 10:35 AM     Lab Results   Component Value Date/Time    SODIUM 139 10/12/2020 08:01 AM    POTASSIUM 4.6 10/12/2020 08:01 AM    CHLORIDE 104 10/12/2020 08:01 AM    CO2 23 10/12/2020 08:01 AM    GLUCOSE 134 (H) 10/12/2020 08:01 AM    BUN 16 10/12/2020 08:01 AM    CREATININE 1.03 10/12/2020 08:01 AM    CREATININE 1.0 10/27/2008 08:20 AM     Lab Results   Component Value Date    HBA1C 6.6 (A) 10/21/2021      Lab Results   Component Value Date/Time    ALKPHOSPHAT 78 04/13/2020 07:32 AM    ASTSGOT 20 04/13/2020 07:32 AM    ALTSGPT 27 04/13/2020 07:32 AM    TBILIRUBIN 0.5 04/13/2020 07:32 AM      Lab Results   Component Value Date/Time    BNPBTYPENAT 52 08/16/2017 10:13 AM      No results found for: TSH  Lab Results   Component Value Date/Time    PROTHROMBTM 16.7 (H) 07/06/2017 12:27 PM    INR 1.31 (H) 07/06/2017 12:27 PM      ECHO CONCLUSIONS (10/23/2020):  Normal left ventricular chamber size.  Left ventricular ejection fraction is visually estimated to be 65%.  Mild aortic stenosis.  Right heart pressures are normal.          Assessment:     1. Coronary artery disease involving native coronary artery of native heart with angina pectoris (HCC)     2. S/P CABG x 4  Lipid Profile    Comp Metabolic Panel   3. Type 2 diabetes mellitus without complication, without long-term current use of insulin (HCC)     4. Nonrheumatic aortic valve stenosis         Medical Decision Making:  Today's  Assessment / Status / Plan:     No cholesterol profile since checked by cardiology in 2020.  Recommend follow-up with lipid profile for goal LDL less than 70 and metabolic panel.  He also has diabetes and may have opportunities for improvement of his cardioembolic medication profile.  Stenosis is mild unlikely to progress significantly over the next several years and asymptomatic.  Recommend follow-up echocardiogram at next visit.    1.  Lipid profile and CMP goal LDL less than 70  2.  Echocardiogram next year to follow-up mild AS  3.  Recommend addition of an SGLT2 inhibitor (such ad empagliflozin) for optimization of therapy in the context of DM2 and atherosclerotic CADif eGFR >45.  Suggest he discuss further with his primary provider who manages his diabetes.  4.  Continue other medical therapy    Follow-up in 1 year              No Recipients

## 2022-01-12 ENCOUNTER — TELEPHONE (OUTPATIENT)
Dept: MEDICAL GROUP | Facility: PHYSICIAN GROUP | Age: 82
End: 2022-01-12

## 2022-01-12 NOTE — TELEPHONE ENCOUNTER
A refill request was received for potassium chloride.  [facility]  Our system shows receipt by the pharmacy at 9:52 in the morning.  The pharmacist stated that they did not receive the prescription.  I gave a verbal prescription for the potassium to the pharmacist.

## 2022-01-20 ENCOUNTER — HOSPITAL ENCOUNTER (OUTPATIENT)
Dept: LAB | Facility: MEDICAL CENTER | Age: 82
End: 2022-01-20
Attending: INTERNAL MEDICINE
Payer: MEDICARE

## 2022-01-20 DIAGNOSIS — I25.119 CORONARY ARTERY DISEASE INVOLVING NATIVE CORONARY ARTERY OF NATIVE HEART WITH ANGINA PECTORIS (HCC): ICD-10-CM

## 2022-01-20 DIAGNOSIS — Z95.1 S/P CABG X 4: ICD-10-CM

## 2022-01-20 DIAGNOSIS — E78.2 MIXED HYPERLIPIDEMIA: ICD-10-CM

## 2022-01-20 LAB
ALBUMIN SERPL BCP-MCNC: 4.4 G/DL (ref 3.2–4.9)
ALBUMIN/GLOB SERPL: 1.8 G/DL
ALP SERPL-CCNC: 88 U/L (ref 30–99)
ALT SERPL-CCNC: 23 U/L (ref 2–50)
ANION GAP SERPL CALC-SCNC: 13 MMOL/L (ref 7–16)
AST SERPL-CCNC: 19 U/L (ref 12–45)
BILIRUB SERPL-MCNC: 0.6 MG/DL (ref 0.1–1.5)
BUN SERPL-MCNC: 17 MG/DL (ref 8–22)
CALCIUM SERPL-MCNC: 9.9 MG/DL (ref 8.5–10.5)
CHLORIDE SERPL-SCNC: 105 MMOL/L (ref 96–112)
CHOLEST SERPL-MCNC: 133 MG/DL (ref 100–199)
CO2 SERPL-SCNC: 23 MMOL/L (ref 20–33)
CREAT SERPL-MCNC: 1.03 MG/DL (ref 0.5–1.4)
FASTING STATUS PATIENT QL REPORTED: NORMAL
GLOBULIN SER CALC-MCNC: 2.5 G/DL (ref 1.9–3.5)
GLUCOSE SERPL-MCNC: 121 MG/DL (ref 65–99)
HDLC SERPL-MCNC: 36 MG/DL
LDLC SERPL CALC-MCNC: 74 MG/DL
POTASSIUM SERPL-SCNC: 4.2 MMOL/L (ref 3.6–5.5)
PROT SERPL-MCNC: 6.9 G/DL (ref 6–8.2)
SODIUM SERPL-SCNC: 141 MMOL/L (ref 135–145)
TRIGL SERPL-MCNC: 116 MG/DL (ref 0–149)

## 2022-01-20 PROCEDURE — 36415 COLL VENOUS BLD VENIPUNCTURE: CPT

## 2022-01-20 PROCEDURE — 80061 LIPID PANEL: CPT

## 2022-01-20 PROCEDURE — 80053 COMPREHEN METABOLIC PANEL: CPT

## 2022-02-14 ENCOUNTER — OFFICE VISIT (OUTPATIENT)
Dept: MEDICAL GROUP | Facility: PHYSICIAN GROUP | Age: 82
End: 2022-02-14
Payer: MEDICARE

## 2022-02-14 VITALS
TEMPERATURE: 98.8 F | WEIGHT: 195 LBS | HEIGHT: 70 IN | BODY MASS INDEX: 27.92 KG/M2 | HEART RATE: 78 BPM | DIASTOLIC BLOOD PRESSURE: 60 MMHG | OXYGEN SATURATION: 95 % | SYSTOLIC BLOOD PRESSURE: 136 MMHG

## 2022-02-14 DIAGNOSIS — S81.801A WOUND OF RIGHT LOWER EXTREMITY, INITIAL ENCOUNTER: ICD-10-CM

## 2022-02-14 DIAGNOSIS — I10 ESSENTIAL HYPERTENSION: ICD-10-CM

## 2022-02-14 PROCEDURE — 99213 OFFICE O/P EST LOW 20 MIN: CPT | Performed by: NURSE PRACTITIONER

## 2022-02-14 ASSESSMENT — PATIENT HEALTH QUESTIONNAIRE - PHQ9: CLINICAL INTERPRETATION OF PHQ2 SCORE: 0

## 2022-02-14 NOTE — ASSESSMENT & PLAN NOTE
The right posterior leg wound started 1 week ago.  His wife noticed the area today and told him to schedule an appointment.  He believes it might be a splinter.  He believes it might be splinter because he has been doing yard work, cutting down trees and bushes.  No interventions tried at home.  He does not have any fever, chills, pain, itching, or noticeable drainage.  He states that this morning he did notice yellow coloring in the middle that he removed.

## 2022-02-14 NOTE — PROGRESS NOTES
Subjective:     CC: right leg wound check     HPI:   Raf presents today with the following:     Wound of right leg  The right posterior leg wound started 1 week ago.  His wife noticed the area today and told him to schedule an appointment.  He believes it might be a splinter.  He believes it might be splinter because he has been doing yard work, cutting down trees and bushes.  No interventions tried at home.  He does not have any fever, chills, pain, itching, or noticeable drainage.  He states that this morning he did notice yellow coloring in the middle that he removed.    Essential hypertension  His blood pressure is at goal today.  He continues with his losartan 50 mg daily and metoprolol 25 mg twice daily.  He is checking his blood pressure in the morning on awakening and again in the afternoon.  He does bring his blood pressure log today.  His last 2 afternoon blood pressure logs have been above 140/90.      Past Medical History:   Diagnosis Date   • Anxiety    • Arthritis    • CAD (coronary artery disease) 07/06/2017    CABG x 4 (LIMA to distal diagonal, rSVG to distal OM1, rSVG to distal OM2, rSVG to distal LAD) by Dr. Gaffney.   • Diabetes (HCC)     oral medication    • Hypertension    • Inguinal hernia    • Mixed hyperlipidemia    • Nodular prostate without urinary obstruction    • Prostate cancer (HCC)        Social History     Tobacco Use   • Smoking status: Never Smoker   • Smokeless tobacco: Never Used   Vaping Use   • Vaping Use: Never used   Substance Use Topics   • Alcohol use: Yes     Alcohol/week: 1.2 oz     Types: 2 Shots of liquor per week   • Drug use: No       Current Outpatient Medications Ordered in Epic   Medication Sig Dispense Refill   • mupirocin (BACTROBAN) 2 % Ointment Apply 1 Application topically 2 times a day. 22 g 0   • metoprolol tartrate (LOPRESSOR) 25 MG Tab Take 1 Tablet by mouth 2 times a day. 180 Tablet 0   • potassium Chloride ER (K-TAB) 20 MEQ Tab CR tablet TAKE 1 TABLET  "BY MOUTH EVERY DAY 90 Tablet 2   • atorvastatin (LIPITOR) 80 MG tablet TAKE 1 TABLET BY MOUTH EVERY DAY 90 Tablet 0   • donepezil (ARICEPT) 10 MG tablet Take 1 Tablet by mouth every day. 90 Tablet 2   • losartan (COZAAR) 50 MG Tab Take 1 tablet by mouth every day. 90 tablet 3   • metFORMIN (GLUCOPHAGE) 500 MG Tab TAKE 2 TABS WITH BREAKFAST AND 1 TAB WITH DINNER 270 tablet 2   • ONE TOUCH ULTRA TEST strip TEST BLOOD SUGAR LEVELS EVERY  Strip 3   • ONETOUCH DELICA LANCETS FINE Misc USE TO TEST BLOOD SUGARS ONCE DAILY 100 Each 3   • furosemide (LASIX) 40 MG Tab Take 40 mg by mouth every day.     • Ascorbic Acid (VITAMIN C PO) Take  by mouth.     • aspirin (ASA) 81 MG Chew Tab chewable tablet Take 81 mg by mouth every day. Indications: post cardiac surgery to prevent blood clots     • therapeutic multivitamin-minerals (THERAGRAN-M) Tab Take 1 Tab by mouth every day. Indications: supplement     • Glucosamine-Chondroit-Vit C-Mn (GLUCOSAMINE CHONDR 1500 COMPLX) Cap Take 1 Cap by mouth every day. 100 Cap 3   • Cholecalciferol (VITAMIN D3) 1000 UNIT TABS Take 1 Tab by mouth every day. 100 Tab 3     No current Epic-ordered facility-administered medications on file.       Allergies:  Other environmental    Health Maintenance: Reviewed      Objective:     Vital signs reviewed  Exam:  /60 (BP Location: Right arm, Patient Position: Sitting, BP Cuff Size: Adult)   Pulse 78   Temp 37.1 °C (98.8 °F) (Temporal)   Ht 1.778 m (5' 10\")   Wt 88.5 kg (195 lb)   SpO2 95%   BMI 27.98 kg/m²  Body mass index is 27.98 kg/m².    Gen: Alert and oriented, No apparent distress.  Lungs: Normal effort, no audible wheezes  CV: Skin pink, warm and dry.  Ext: No clubbing, cyanosis, edema.  Skin:    Right lateral knee with superficial wound approximately 0.4 cm width and 1 cm in length. No fluctuance , redness, streaking or obvious deformity.      Assessment & Plan:     81 y.o. male with the following -     1. Wound of right lower " extremity, initial encounter  Acute complicated problem. Wound is superficial.  Start mupirocin BID and cover with band aid. Keep area clean. Red flags discussed.   - mupirocin (BACTROBAN) 2 % Ointment; Apply 1 Application topically 2 times a day.  Dispense: 22 g; Refill: 0    2. Essential hypertension  Chronic stable problem. He will continue with his Losartan 50 mg daily and metoprolol 25 mg BID. He will continue with home blood pressure monitoring.     Return if symptoms worsen or fail to improve.    Please note that this dictation was created using voice recognition software. I have made every reasonable attempt to correct obvious errors, but I expect that there are errors of grammar and possibly content that I did not discover before finalizing the note.

## 2022-02-14 NOTE — ASSESSMENT & PLAN NOTE
His blood pressure is at goal today.  He continues with his losartan 50 mg daily and metoprolol 25 mg twice daily.  He is checking his blood pressure in the morning on awakening and again in the afternoon.  He does bring his blood pressure log today.  His last 2 afternoon blood pressure logs have been above 140/90.

## 2022-04-06 ENCOUNTER — OFFICE VISIT (OUTPATIENT)
Dept: MEDICAL GROUP | Facility: PHYSICIAN GROUP | Age: 82
End: 2022-04-06
Payer: MEDICARE

## 2022-04-06 VITALS
HEART RATE: 84 BPM | SYSTOLIC BLOOD PRESSURE: 128 MMHG | DIASTOLIC BLOOD PRESSURE: 68 MMHG | TEMPERATURE: 97.5 F | BODY MASS INDEX: 27.63 KG/M2 | HEIGHT: 70 IN | OXYGEN SATURATION: 95 % | WEIGHT: 193 LBS

## 2022-04-06 DIAGNOSIS — R80.9 TYPE 2 DIABETES MELLITUS WITH MICROALBUMINURIA, WITHOUT LONG-TERM CURRENT USE OF INSULIN (HCC): ICD-10-CM

## 2022-04-06 DIAGNOSIS — E11.29 TYPE 2 DIABETES MELLITUS WITH MICROALBUMINURIA, WITHOUT LONG-TERM CURRENT USE OF INSULIN (HCC): ICD-10-CM

## 2022-04-06 DIAGNOSIS — I10 ESSENTIAL HYPERTENSION: ICD-10-CM

## 2022-04-06 PROBLEM — M79.674 PAIN OF TOE OF RIGHT FOOT: Status: RESOLVED | Noted: 2021-10-21 | Resolved: 2022-04-06

## 2022-04-06 PROBLEM — S81.801A WOUND OF RIGHT LEG: Status: RESOLVED | Noted: 2022-02-14 | Resolved: 2022-04-06

## 2022-04-06 LAB
HBA1C MFR BLD: 7.4 % (ref 0–5.6)
INT CON NEG: NEGATIVE
INT CON POS: POSITIVE

## 2022-04-06 PROCEDURE — 92250 FUNDUS PHOTOGRAPHY W/I&R: CPT | Mod: TC | Performed by: NURSE PRACTITIONER

## 2022-04-06 PROCEDURE — 99214 OFFICE O/P EST MOD 30 MIN: CPT | Performed by: NURSE PRACTITIONER

## 2022-04-06 PROCEDURE — 83036 HEMOGLOBIN GLYCOSYLATED A1C: CPT | Performed by: NURSE PRACTITIONER

## 2022-04-06 RX ORDER — POTASSIUM CHLORIDE 20 MEQ/1
20 TABLET, EXTENDED RELEASE ORAL
COMMUNITY
Start: 2022-03-12 | End: 2022-04-06

## 2022-04-06 NOTE — ASSESSMENT & PLAN NOTE
His blood pressure is at goal today.  He monitors his blood pressure at home.  His home blood pressures have been ranging 120-152/58-65.  He continues with losartan 50 mg daily and metoprolol 25 mg twice daily.  His blood pressure is at goal today.

## 2022-04-06 NOTE — ASSESSMENT & PLAN NOTE
He continues with Metformin 1000 mg with breakfast and 500 mg with dinner.  He is checking his blood sugar at home.  His blood sugars have been ranging 145-207.  He is due for updated A1c today and monofilament exam.  He is on atorvastatin and losartan. He notes he has been eating more peaches. He reports he has not increased his sugar intake. He continues to be active. He is trimming and filing down his toenails with a home drummel.  He states that he is at this morning and it caused several abrasions to his toes.

## 2022-04-06 NOTE — PROGRESS NOTES
Subjective:     CC: Diabetes and hypertension    HPI:   Raf presents today with the following:     Type 2 diabetes mellitus with microalbuminuria, without long-term current use of insulin (HCC)  He continues with Metformin 1000 mg with breakfast and 500 mg with dinner.  He is checking his blood sugar at home.  His blood sugars have been ranging 145-207.  He is due for updated A1c today and monofilament exam.  He is on atorvastatin and losartan. He notes he has been eating more peaches. He reports he has not increased his sugar intake. He continues to be active. He is trimming and filing down his toenails with a home drummel.  He states that he is at this morning and it caused several abrasions to his toes.    Essential hypertension  His blood pressure is at goal today.  He monitors his blood pressure at home.  His home blood pressures have been ranging 120-152/58-65.  He continues with losartan 50 mg daily and metoprolol 25 mg twice daily.  His blood pressure is at goal today.          Past Medical History:   Diagnosis Date   • Anxiety    • Arthritis    • CAD (coronary artery disease) 07/06/2017    CABG x 4 (LIMA to distal diagonal, rSVG to distal OM1, rSVG to distal OM2, rSVG to distal LAD) by Dr. Gaffney.   • Diabetes (HCC)     oral medication    • Hypertension    • Inguinal hernia    • Mixed hyperlipidemia    • Nodular prostate without urinary obstruction    • Prostate cancer (HCC)        Social History     Tobacco Use   • Smoking status: Never Smoker   • Smokeless tobacco: Never Used   Vaping Use   • Vaping Use: Never used   Substance Use Topics   • Alcohol use: Yes     Alcohol/week: 1.2 oz     Types: 2 Shots of liquor per week   • Drug use: No       Current Outpatient Medications Ordered in Epic   Medication Sig Dispense Refill   • metformin (GLUCOPHAGE) 1000 MG tablet Take 1 Tablet by mouth 2 times a day with meals. 180 Tablet 1   • mupirocin (BACTROBAN) 2 % Ointment Apply 1 Application topically 2 times a  "day. 22 g 0   • metoprolol tartrate (LOPRESSOR) 25 MG Tab Take 1 Tablet by mouth 2 times a day. 180 Tablet 0   • potassium Chloride ER (K-TAB) 20 MEQ Tab CR tablet TAKE 1 TABLET BY MOUTH EVERY DAY 90 Tablet 2   • atorvastatin (LIPITOR) 80 MG tablet TAKE 1 TABLET BY MOUTH EVERY DAY 90 Tablet 0   • donepezil (ARICEPT) 10 MG tablet Take 1 Tablet by mouth every day. 90 Tablet 2   • losartan (COZAAR) 50 MG Tab Take 1 tablet by mouth every day. 90 tablet 3   • ONE TOUCH ULTRA TEST strip TEST BLOOD SUGAR LEVELS EVERY  Strip 3   • ONETOUCH DELICA LANCETS FINE Misc USE TO TEST BLOOD SUGARS ONCE DAILY 100 Each 3   • Ascorbic Acid (VITAMIN C PO) Take  by mouth.     • aspirin (ASA) 81 MG Chew Tab chewable tablet Take 81 mg by mouth every day. Indications: post cardiac surgery to prevent blood clots     • therapeutic multivitamin-minerals (THERAGRAN-M) Tab Take 1 Tab by mouth every day. Indications: supplement     • Glucosamine-Chondroit-Vit C-Mn (GLUCOSAMINE CHONDR 1500 COMPLX) Cap Take 1 Cap by mouth every day. 100 Cap 3   • Cholecalciferol (VITAMIN D3) 1000 UNIT TABS Take 1 Tab by mouth every day. 100 Tab 3     No current Epic-ordered facility-administered medications on file.       Allergies:  Other environmental    Health Maintenance: Due for annual wellness exam, urine microalbumin, retinal screening, A1c, monofilament and Tdap.    Objective:     Vital signs reviewed   Exam:  /68 (BP Location: Right arm, Patient Position: Sitting, BP Cuff Size: Adult)   Pulse 84   Temp 36.4 °C (97.5 °F) (Temporal)   Ht 1.778 m (5' 10\")   Wt 87.5 kg (193 lb)   SpO2 95%   BMI 27.69 kg/m²  Body mass index is 27.69 kg/m².    Gen: Alert and oriented, No apparent distress.  Lungs: Normal effort, CTA bilaterally, no wheezes, rhonchi, or rales  CV: Regular rate and rhythm. No murmurs, rubs, or gallops.  Ext: No clubbing, cyanosis, edema.    Monofilament testing with a 10 gram force: sensation intact: decreased " bilaterally  Visual Inspection: Feet without maceration, ulcers, fissures. Dry skin to entire plantar surface.  Small abrasions under nail cuticle to great toe, and toes 2 through 4.  Right foot toenails thick and yellow.  Pedal pulses: intact bilaterally      Labs:     Results for IRA SALAZAR (MRN [id number]) as of 4/6/2022 08:45   Ref. Range 4/6/2022 08:41   Glycohemoglobin Latest Ref Range: 0.0 - 5.6 % 7.4 (A)     Assessment & Plan:     81 y.o. male with the following -     1. Type 2 diabetes mellitus with microalbuminuria, without long-term current use of insulin (HCC)  Chronic exacerbated problem.  A1c today has increased from previous 6.6%.  Encouraged low sugar and low carb diet.  Discussed eating lean meats/protein, vegetables and fruits.  We will increase his Metformin to 1000 mg twice daily.  Monofilament exam completed today.  Urine micro albumin ordered.  We discussed referral to podiatry for diabetic foot care as he does need his nails trimmed and could benefit from podiatry care.  We discussed for him to not use his home bhargav on his toenails at this time.  He remains understanding. POCT Retinal eye exam completed today. He will return in 3 months for A1c.  - POCT Hemoglobin A1C  - Diabetic Monofilament Lower Extremity Exam  - Referral to Podiatry  - metformin (GLUCOPHAGE) 1000 MG tablet; Take 1 Tablet by mouth 2 times a day with meals.  Dispense: 180 Tablet; Refill: 1  - Microalbumin Creat Ratio Urine - Lab Collect; Future  - POCT Retinal Eye Exam    2. Essential hypertension  Chronic stable problem.  His blood pressure is at goal today.  He will continue with losartan 25 mg daily and metoprolol 25 mg twice daily.  He will continue with home blood pressure monitoring.      Return in about 3 months (around 7/6/2022) for Diabetes, A1C.    Please note that this dictation was created using voice recognition software. I have made every reasonable attempt to correct obvious errors, but I expect  that there are errors of grammar and possibly content that I did not discover before finalizing the note.

## 2022-05-02 ENCOUNTER — HOSPITAL ENCOUNTER (OUTPATIENT)
Dept: LAB | Facility: MEDICAL CENTER | Age: 82
End: 2022-05-02
Attending: NURSE PRACTITIONER
Payer: MEDICARE

## 2022-05-02 DIAGNOSIS — R80.9 TYPE 2 DIABETES MELLITUS WITH MICROALBUMINURIA, WITHOUT LONG-TERM CURRENT USE OF INSULIN (HCC): ICD-10-CM

## 2022-05-02 DIAGNOSIS — E11.29 TYPE 2 DIABETES MELLITUS WITH MICROALBUMINURIA, WITHOUT LONG-TERM CURRENT USE OF INSULIN (HCC): ICD-10-CM

## 2022-05-02 LAB
CREAT UR-MCNC: 136.68 MG/DL
MICROALBUMIN UR-MCNC: 4.3 MG/DL
MICROALBUMIN/CREAT UR: 31 MG/G (ref 0–30)

## 2022-05-02 PROCEDURE — 82043 UR ALBUMIN QUANTITATIVE: CPT

## 2022-05-02 PROCEDURE — 82570 ASSAY OF URINE CREATININE: CPT

## 2022-06-07 ENCOUNTER — PATIENT MESSAGE (OUTPATIENT)
Dept: CARDIOLOGY | Facility: MEDICAL CENTER | Age: 82
End: 2022-06-07
Payer: MEDICARE

## 2022-06-07 DIAGNOSIS — I25.119 CORONARY ARTERY DISEASE INVOLVING NATIVE CORONARY ARTERY OF NATIVE HEART WITH ANGINA PECTORIS (HCC): ICD-10-CM

## 2022-06-07 DIAGNOSIS — Z95.1 S/P CABG X 4: ICD-10-CM

## 2022-06-14 RX ORDER — ASPIRIN 81 MG/1
81 TABLET, CHEWABLE ORAL DAILY
Qty: 100 TABLET
Start: 2022-06-14

## 2022-06-14 NOTE — PATIENT COMMUNICATION
Federico Barba,     Medication refill has been prepped, awaiting for approval or denial.     Thank you!

## 2022-07-21 ENCOUNTER — TELEPHONE (OUTPATIENT)
Dept: CARDIOLOGY | Facility: MEDICAL CENTER | Age: 82
End: 2022-07-21
Payer: MEDICARE

## 2022-07-21 DIAGNOSIS — I10 ESSENTIAL HYPERTENSION: ICD-10-CM

## 2022-07-21 RX ORDER — LOSARTAN POTASSIUM 50 MG/1
50 TABLET ORAL
Qty: 90 TABLET | Refills: 1 | Status: SHIPPED | OUTPATIENT
Start: 2022-07-21 | End: 2022-12-20 | Stop reason: SDUPTHER

## 2022-07-21 NOTE — TELEPHONE ENCOUNTER
TW    Caller: Estefanía (spouse)    Medication Name and Dosage:    losartan (COZAAR) 50 MG Tab [[id number]]    Did patient contact pharmacy (If no, please call pharmacy first): yes    Medication amount left: 2    Preferred Pharmacy: [facility] - [place]  Other questions (Topic): N/A    Callback Number (Will only call for issues): [phone]    Thank you    Paulette FERNANDEZ

## 2022-07-27 ENCOUNTER — OFFICE VISIT (OUTPATIENT)
Dept: MEDICAL GROUP | Facility: PHYSICIAN GROUP | Age: 82
End: 2022-07-27
Payer: MEDICARE

## 2022-07-27 VITALS
SYSTOLIC BLOOD PRESSURE: 136 MMHG | BODY MASS INDEX: 26.48 KG/M2 | WEIGHT: 185 LBS | HEIGHT: 70 IN | HEART RATE: 57 BPM | DIASTOLIC BLOOD PRESSURE: 62 MMHG | OXYGEN SATURATION: 96 % | TEMPERATURE: 97.3 F

## 2022-07-27 DIAGNOSIS — R80.9 TYPE 2 DIABETES MELLITUS WITH MICROALBUMINURIA, WITHOUT LONG-TERM CURRENT USE OF INSULIN (HCC): ICD-10-CM

## 2022-07-27 DIAGNOSIS — I10 ESSENTIAL HYPERTENSION: ICD-10-CM

## 2022-07-27 DIAGNOSIS — Z91.09 ENVIRONMENTAL ALLERGIES: ICD-10-CM

## 2022-07-27 DIAGNOSIS — E11.29 TYPE 2 DIABETES MELLITUS WITH MICROALBUMINURIA, WITHOUT LONG-TERM CURRENT USE OF INSULIN (HCC): ICD-10-CM

## 2022-07-27 LAB
HBA1C MFR BLD: 6.6 % (ref 0–5.6)
INT CON NEG: ABNORMAL
INT CON POS: ABNORMAL

## 2022-07-27 PROCEDURE — 83036 HEMOGLOBIN GLYCOSYLATED A1C: CPT | Performed by: NURSE PRACTITIONER

## 2022-07-27 PROCEDURE — 99214 OFFICE O/P EST MOD 30 MIN: CPT | Performed by: NURSE PRACTITIONER

## 2022-07-27 RX ORDER — POTASSIUM CHLORIDE 20 MEQ/1
20 TABLET, EXTENDED RELEASE ORAL
COMMUNITY
Start: 2022-05-12 | End: 2022-07-27

## 2022-07-27 NOTE — ASSESSMENT & PLAN NOTE
His initial blood pressure is 140/60. He continues with Losartan 50 mg daily and metoprolol 25 mg twice daily. He has been checking his blood pressure at home. He brings in readings before medication they do show elevations greater than 140/90.  He states that he has not had his blood pressure medication today.  He states that he normally takes his medications around 9 AM with breakfast.  No new chest pain, shortness of breath, dizziness, blurry vision or headache.

## 2022-07-27 NOTE — ASSESSMENT & PLAN NOTE
He continues with metformin 1000 mg twice daily.  His dose was increased at his last appointment.  He does watch his diet.  He has been checking his blood sugar at home and brings in his blood pressure log.  He reports that his fasting blood sugars have been ranging 122-166 with majority less than 150. He keeps busy during the day for activity.

## 2022-07-27 NOTE — ASSESSMENT & PLAN NOTE
He reports that he has recently had a runny nose.  No itchy eyes or watery eyes.  He has started an over-the-counter allergy pill that has been helping.  He has not noticed any fever, chills, sore throat or ear pain.

## 2022-07-27 NOTE — PROGRESS NOTES
Subjective:     CC: Diabetes follow-up    HPI:   Raf presents today with the following:    Essential hypertension  His initial blood pressure is 140/60. He continues with Losartan 50 mg daily and metoprolol 25 mg twice daily. He has been checking his blood pressure at home. He brings in readings before medication they do show elevations greater than 140/90.  He states that he has not had his blood pressure medication today.  He states that he normally takes his medications around 9 AM with breakfast.  No new chest pain, shortness of breath, dizziness, blurry vision or headache.    Type 2 diabetes mellitus with microalbuminuria, without long-term current use of insulin (HCC)  He continues with metformin 1000 mg twice daily.  His dose was increased at his last appointment.  He does watch his diet.  He has been checking his blood sugar at home and brings in his blood pressure log.  He reports that his fasting blood sugars have been ranging 122-166 with majority less than 150. He keeps busy during the day for activity.     Environmental allergies  He reports that he has recently had a runny nose.  No itchy eyes or watery eyes.  He has started an over-the-counter allergy pill that has been helping.  He has not noticed any fever, chills, sore throat or ear pain.      Past Medical History:   Diagnosis Date   • Anxiety    • Arthritis    • CAD (coronary artery disease) 07/06/2017    CABG x 4 (LIMA to distal diagonal, rSVG to distal OM1, rSVG to distal OM2, rSVG to distal LAD) by Dr. Gaffney.   • Diabetes (HCC)     oral medication    • Hypertension    • Inguinal hernia    • Mixed hyperlipidemia    • Nodular prostate without urinary obstruction    • Prostate cancer (HCC)        Social History     Tobacco Use   • Smoking status: Never Smoker   • Smokeless tobacco: Never Used   Vaping Use   • Vaping Use: Never used   Substance Use Topics   • Alcohol use: Yes     Alcohol/week: 1.2 oz     Types: 2 Shots of liquor per week   •  "Drug use: No       Current Outpatient Medications Ordered in Epic   Medication Sig Dispense Refill   • metformin (GLUCOPHAGE) 1000 MG tablet Take 1 Tablet by mouth 2 times a day with meals. 180 Tablet 2   • losartan (COZAAR) 50 MG Tab Take 1 Tablet by mouth every day. 90 Tablet 1   • metoprolol tartrate (LOPRESSOR) 25 MG Tab Take 1 Tablet by mouth 2 times a day. 180 Tablet 2   • atorvastatin (LIPITOR) 80 MG tablet TAKE 1 TABLET BY MOUTH EVERY DAY 90 Tablet 2   • mupirocin (BACTROBAN) 2 % Ointment Apply 1 Application topically 2 times a day. 22 g 0   • potassium Chloride ER (K-TAB) 20 MEQ Tab CR tablet TAKE 1 TABLET BY MOUTH EVERY DAY 90 Tablet 2   • donepezil (ARICEPT) 10 MG tablet Take 1 Tablet by mouth every day. 90 Tablet 2   • ONE TOUCH ULTRA TEST strip TEST BLOOD SUGAR LEVELS EVERY  Strip 3   • ONETOUCH DELICA LANCETS FINE Misc USE TO TEST BLOOD SUGARS ONCE DAILY 100 Each 3   • Ascorbic Acid (VITAMIN C PO) Take  by mouth.     • aspirin (ASA) 81 MG Chew Tab chewable tablet Take 81 mg by mouth every day. Indications: post cardiac surgery to prevent blood clots     • therapeutic multivitamin-minerals (THERAGRAN-M) Tab Take 1 Tab by mouth every day. Indications: supplement     • Glucosamine-Chondroit-Vit C-Mn (GLUCOSAMINE CHONDR 1500 COMPLX) Cap Take 1 Cap by mouth every day. 100 Cap 3   • Cholecalciferol (VITAMIN D3) 1000 UNIT TABS Take 1 Tab by mouth every day. 100 Tab 3   • potassium chloride SA (KDUR) 20 MEQ Tab CR Take 20 mEq by mouth every day.       No current [facility]-ordered facility-administered medications on file.       Allergies:  Other environmental    Health Maintenance: Due for A1c.      Objective:     Vital signs reviewed  Exam:  /62 (BP Location: Right arm, Patient Position: Sitting)   Pulse (!) 57   Temp 36.3 °C (97.3 °F) (Temporal)   Ht 1.778 m (5' 10\")   Wt 83.9 kg (185 lb)   SpO2 96%   BMI 26.54 kg/m²  Body mass index is 26.54 kg/m².    General: Normal appearing. No " distress.  HENT: Normocephalic. Ears normal shape and contour, canals are clear bilaterally, tympanic membranes are pearly gray, nasal mucosa gray and boggy, oropharynx is with cobblestoning, no erythema, edema or exudates.  No pain on palpation to frontal sinuses or bilateral maxillary sinuses.  Eyes: Eyes conjunctiva clear lids without ptosis, lids normal.  Pulmonary: Clear to ausculation.  Normal effort. No rales, ronchi, or wheezing.  Cardiovascular: Regular rate and rhythm with systolic murmur.   Lymph: No cervical or supraclavicular lymph nodes are palpable.  Skin: Warm and dry.  No obvious lesions.  Psych: Normal mood and affect. Alert and oriented x3. Judgment and insight is normal.        Labs:      Latest Reference Range & Units 07/27/22 09:07   Glycohemoglobin 0.0 - 5.6 % 6.6 !   !: Data is abnormal    Assessment & Plan:     81 y.o. male with the following -     1. Type 2 diabetes mellitus with microalbuminuria, without long-term current use of insulin ([facility])  Chronic stable problem.  A1c is at goal.  He will continue with metformin 1000 mg twice daily.  Medication refilled today.  Continue home glucose monitoring.  Encouraged healthy diet and exercise as tolerated.  He will be due for A1c in 6 months.  - POCT Hemoglobin A1C  - metformin (GLUCOPHAGE) 1000 MG tablet; Take 1 Tablet by mouth 2 times a day with meals.  Dispense: 180 Tablet; Refill: 2    2. Essential hypertension  Chronic stable problem.  On repeat by me today's blood pressure is 136/62.  I encouraged him to take his blood pressure medication when he gets home today.  We also discussed taking blood pressure at least 2 hours after he has had his medication.  He verbalized understanding.  He will continue with losartan 50 mg daily and metoprolol 25 mg twice daily.    3. Environmental allergies  Chronic stable problem.  Exam consistent with allergies.  Continue with over-the-counter allergy medication.      Return in about 6 months (around  1/27/2023) for Diabetes, A1C.    Please note that this dictation was created using voice recognition software. I have made every reasonable attempt to correct obvious errors, but I expect that there are errors of grammar and possibly content that I did not discover before finalizing the note.

## 2022-07-29 LAB — RETINAL SCREEN: NEGATIVE

## 2022-10-13 DIAGNOSIS — R41.3 MEMORY DIFFICULTIES: ICD-10-CM

## 2022-10-13 RX ORDER — DONEPEZIL HYDROCHLORIDE 10 MG/1
10 TABLET, FILM COATED ORAL DAILY
Qty: 90 TABLET | Refills: 3 | Status: SHIPPED | OUTPATIENT
Start: 2022-10-13 | End: 2023-09-21 | Stop reason: SDUPTHER

## 2022-10-13 NOTE — TELEPHONE ENCOUNTER
Requested Prescriptions     Signed Prescriptions Disp Refills    donepezil (ARICEPT) 10 MG tablet 90 Tablet 3     Sig: Take 1 Tablet by mouth every day.     Authorizing Provider: PETER PAZ A.P.R.N.

## 2022-11-02 ENCOUNTER — PATIENT MESSAGE (OUTPATIENT)
Dept: HEALTH INFORMATION MANAGEMENT | Facility: OTHER | Age: 82
End: 2022-11-02

## 2022-12-20 DIAGNOSIS — I10 ESSENTIAL HYPERTENSION: ICD-10-CM

## 2022-12-20 NOTE — PATIENT INSTRUCTIONS
Patient given written instructions regarding labs, imaging, medications, referrals, dietary and lifestyle management, and return visit.    Brandon Velázquez MD       Speaking Coherently

## 2022-12-20 NOTE — TELEPHONE ENCOUNTER
Is the patient due for a refill? No    Was the patient seen the past year? Yes    Date of last office visit: 01/03/22    Does the patient have an upcoming appointment?  Yes   If yes, When? 01/19/23    Provider to refill:TW    Does the patients insurance require a 100 day supply?  No

## 2022-12-23 RX ORDER — LOSARTAN POTASSIUM 50 MG/1
50 TABLET ORAL
Qty: 90 TABLET | Refills: 0 | Status: SHIPPED | OUTPATIENT
Start: 2022-12-23 | End: 2023-04-28 | Stop reason: SDUPTHER

## 2023-01-20 ENCOUNTER — OFFICE VISIT (OUTPATIENT)
Dept: CARDIOLOGY | Facility: MEDICAL CENTER | Age: 83
End: 2023-01-20
Payer: MEDICARE

## 2023-01-20 VITALS
RESPIRATION RATE: 16 BRPM | SYSTOLIC BLOOD PRESSURE: 130 MMHG | DIASTOLIC BLOOD PRESSURE: 80 MMHG | WEIGHT: 189 LBS | OXYGEN SATURATION: 96 % | HEIGHT: 70 IN | BODY MASS INDEX: 27.06 KG/M2 | HEART RATE: 61 BPM

## 2023-01-20 DIAGNOSIS — R80.9 TYPE 2 DIABETES MELLITUS WITH MICROALBUMINURIA, WITHOUT LONG-TERM CURRENT USE OF INSULIN (HCC): ICD-10-CM

## 2023-01-20 DIAGNOSIS — Z95.1 S/P CABG X 4: ICD-10-CM

## 2023-01-20 DIAGNOSIS — I35.0 NONRHEUMATIC AORTIC VALVE STENOSIS: ICD-10-CM

## 2023-01-20 DIAGNOSIS — I10 ESSENTIAL HYPERTENSION: ICD-10-CM

## 2023-01-20 DIAGNOSIS — E78.2 MIXED HYPERLIPIDEMIA: ICD-10-CM

## 2023-01-20 DIAGNOSIS — E11.29 TYPE 2 DIABETES MELLITUS WITH MICROALBUMINURIA, WITHOUT LONG-TERM CURRENT USE OF INSULIN (HCC): ICD-10-CM

## 2023-01-20 PROCEDURE — 99214 OFFICE O/P EST MOD 30 MIN: CPT | Performed by: INTERNAL MEDICINE

## 2023-01-20 RX ORDER — POTASSIUM CHLORIDE 20 MEQ/1
20 TABLET, EXTENDED RELEASE ORAL
COMMUNITY
Start: 2022-11-18 | End: 2023-01-30

## 2023-01-20 NOTE — PROGRESS NOTES
Chief Complaint   Patient presents with    Coronary Artery Disease     F/v dx: Coronary artery disease involving native coronary artery of native heart with angina pectoris (HCC)    Hyperlipidemia    Hypertension       Subjective:   Raf Pelayo is an 82y.o. male who presents today for follow-up of CAD status post CABG times 4 July 2017, hypertension and diabetes.      Doing well.  No symptoms.  No cardiac issues taking his medications well.    Past Medical History:   Diagnosis Date    Anxiety     Arthritis     CAD (coronary artery disease) 07/06/2017    CABG x 4 (LIMA to distal diagonal, rSVG to distal OM1, rSVG to distal OM2, rSVG to distal LAD) by Dr. Gaffney.    Diabetes (HCC)     oral medication     Hypertension     Inguinal hernia     Mixed hyperlipidemia     Nodular prostate without urinary obstruction     Prostate cancer (HCC)      Past Surgical History:   Procedure Laterality Date    ROHINI  7/6/2017    Procedure: ROHINI - INTRAOPERATIVE ;  Surgeon: Anthony Gaffney M.D.;  Location: SURGERY [facility];  Service:     MULTIPLE CORONARY ARTERY BYPASS ENDO VEIN HARVEST  7/6/2017    Procedure: MULTIPLE CORONARY ARTERY BYPASS ENDO VEIN HARVEST X2-4, PREVENA DRESSING ;  Surgeon: Anthony Gaffney M.D.;  Location: SURGERY [facility];  Service:     APPENDECTOMY  1955    TONSILLECTOMY       Family History   Problem Relation Age of Onset    Stroke Mother     Heart Disease Father     Heart Disease Brother      Social History     Socioeconomic History    Marital status:      Spouse name: Not on file    Number of children: Not on file    Years of education: Not on file    Highest education level: Not on file   Occupational History    Not on file   Tobacco Use    Smoking status: Never    Smokeless tobacco: Never   Vaping Use    Vaping Use: Never used   Substance and Sexual Activity    Alcohol use: Yes     Alcohol/week: 1.2 oz     Types: 2 Shots of liquor per week    Drug use: No    Sexual activity:  Never     Partners: Female     Birth control/protection: Post-Menopausal   Other Topics Concern    Not on file   Social History Narrative    Not on file     Social Determinants of Health     Financial Resource Strain: Not on file   Food Insecurity: Not on file   Transportation Needs: Not on file   Physical Activity: Not on file   Stress: Not on file   Social Connections: Not on file   Intimate Partner Violence: Not on file   Housing Stability: Not on file     Allergies   Allergen Reactions    Other Environmental Runny Nose     SAW DUST     Outpatient Encounter Medications as of 1/20/2023   Medication Sig Dispense Refill    losartan (COZAAR) 50 MG Tab Take 1 Tablet by mouth every day. 90 Tablet 0    metformin (GLUCOPHAGE) 1000 MG tablet Take 1 Tablet by mouth 2 times a day with meals. 180 Tablet 2    metoprolol tartrate (LOPRESSOR) 25 MG Tab Take 1 Tablet by mouth 2 times a day. 180 Tablet 2    atorvastatin (LIPITOR) 80 MG tablet TAKE 1 TABLET BY MOUTH EVERY DAY 90 Tablet 2    mupirocin (BACTROBAN) 2 % Ointment Apply 1 Application topically 2 times a day. 22 g 0    potassium Chloride ER (K-TAB) 20 MEQ Tab CR tablet TAKE 1 TABLET BY MOUTH EVERY DAY 90 Tablet 2    ONE TOUCH ULTRA TEST strip TEST BLOOD SUGAR LEVELS EVERY  Strip 3    ONETOUCH DELICA LANCETS FINE Misc USE TO TEST BLOOD SUGARS ONCE DAILY 100 Each 3    Ascorbic Acid (VITAMIN C PO) Take  by mouth.      aspirin (ASA) 81 MG Chew Tab chewable tablet Take 81 mg by mouth every day. Indications: post cardiac surgery to prevent blood clots      therapeutic multivitamin-minerals (THERAGRAN-M) Tab Take 1 Tab by mouth every day. Indications: supplement      Glucosamine-Chondroit-Vit C-Mn (GLUCOSAMINE CHONDR 1500 COMPLX) Cap Take 1 Cap by mouth every day. 100 Cap 3    Cholecalciferol (VITAMIN D3) 1000 UNIT TABS Take 1 Tab by mouth every day. 100 Tab 3    potassium chloride SA (KDUR) 20 MEQ Tab CR Take 20 mEq by mouth every day. (Patient not taking: Reported on  "1/20/2023)      donepezil (ARICEPT) 10 MG tablet Take 1 Tablet by mouth every day. 90 Tablet 3     No facility-administered encounter medications on file as of 1/20/2023.     Review of Systems   All other systems reviewed and are negative.     Objective:   /80 (BP Location: Left arm, Patient Position: Sitting, BP Cuff Size: Adult)   Pulse 61   Resp 16   Ht 1.778 m (5' 10\")   Wt 85.7 kg (189 lb)   SpO2 96%   BMI 27.12 kg/m²     Physical Exam  Vitals reviewed.   Constitutional:       General: He is not in acute distress.     Appearance: He is well-developed. He is not diaphoretic.   HENT:      Head: Normocephalic and atraumatic.      Right Ear: External ear normal.      Left Ear: External ear normal.   Eyes:      General: No scleral icterus.        Right eye: No discharge.         Left eye: No discharge.      Conjunctiva/sclera: Conjunctivae normal.      Pupils: Pupils are equal, round, and reactive to light.   Neck:      Thyroid: No thyromegaly.      Vascular: No JVD.      Trachea: No tracheal deviation.   Cardiovascular:      Rate and Rhythm: Normal rate and regular rhythm.      Chest Wall: PMI is not displaced.      Pulses:           Carotid pulses are 2+ on the right side and 2+ on the left side.       Radial pulses are 2+ on the left side.        Popliteal pulses are 2+ on the right side and 2+ on the left side.        Dorsalis pedis pulses are 2+ on the right side and 2+ on the left side.        Posterior tibial pulses are 2+ on the right side and 2+ on the left side.      Heart sounds: Murmur ( 2/6 systolic ejection murmur left upper sternal border) heard.     No friction rub. No gallop.   Pulmonary:      Effort: Pulmonary effort is normal. No respiratory distress.      Breath sounds: Normal breath sounds. No wheezing or rales.   Chest:      Chest wall: No tenderness.   Abdominal:      General: Bowel sounds are normal. There is no distension.      Palpations: Abdomen is soft.      Tenderness: There " is no abdominal tenderness.   Musculoskeletal:         General: No tenderness or deformity. Normal range of motion.      Cervical back: Normal range of motion and neck supple.   Skin:     General: Skin is warm and dry.      Coloration: Skin is not pale.      Findings: No erythema or rash.   Neurological:      Mental Status: He is alert and oriented to person, place, and time.      Cranial Nerves: No cranial nerve deficit (cranial nerves II through XII grossly intact).      Coordination: Coordination normal.   Psychiatric:         Behavior: Behavior normal.         Thought Content: Thought content normal.     LABS:  Lab Results   Component Value Date/Time    CHOLSTRLTOT 133 01/20/2022 08:01 AM    LDL 74 01/20/2022 08:01 AM    HDL 36 (A) 01/20/2022 08:01 AM    TRIGLYCERIDE 116 01/20/2022 08:01 AM       Lab Results   Component Value Date/Time    WBC 7.7 07/02/2019 07:32 AM    RBC 4.52 (L) 07/02/2019 07:32 AM    HEMOGLOBIN 13.7 (L) 07/02/2019 07:32 AM    HEMATOCRIT 43.9 07/02/2019 07:32 AM    MCV 97.1 07/02/2019 07:32 AM    NEUTSPOLYS 71.10 07/02/2019 07:32 AM    LYMPHOCYTES 15.40 (L) 07/02/2019 07:32 AM    MONOCYTES 9.70 07/02/2019 07:32 AM    EOSINOPHILS 2.60 07/02/2019 07:32 AM    BASOPHILS 0.80 07/02/2019 07:32 AM    HYPOCHROMIA 1+ 02/19/2013 10:35 AM     Lab Results   Component Value Date/Time    SODIUM 141 01/20/2022 08:01 AM    POTASSIUM 4.2 01/20/2022 08:01 AM    CHLORIDE 105 01/20/2022 08:01 AM    CO2 23 01/20/2022 08:01 AM    GLUCOSE 121 (H) 01/20/2022 08:01 AM    BUN 17 01/20/2022 08:01 AM    CREATININE 1.03 01/20/2022 08:01 AM    CREATININE 1.0 10/27/2008 08:20 AM     Lab Results   Component Value Date    HBA1C 6.6 (A) 07/27/2022      Lab Results   Component Value Date/Time    ALKPHOSPHAT 88 01/20/2022 08:01 AM    ASTSGOT 19 01/20/2022 08:01 AM    ALTSGPT 23 01/20/2022 08:01 AM    TBILIRUBIN 0.6 01/20/2022 08:01 AM      Lab Results   Component Value Date/Time    BNPBTYPENAT 52 08/16/2017 10:13 AM      No  results found for: TSH  Lab Results   Component Value Date/Time    PROTHROMBTM 16.7 (H) 07/06/2017 12:27 PM    INR 1.31 (H) 07/06/2017 12:27 PM      ECHO CONCLUSIONS (10/23/2020):  Normal left ventricular chamber size.  Left ventricular ejection fraction is visually estimated to be 65%.  Mild aortic stenosis.  Right heart pressures are normal.          Assessment:     1. S/P CABG x 4  Comp Metabolic Panel    Lipid Profile    EC-ECHOCARDIOGRAM COMPLETE W/O CONT      2. Nonrheumatic aortic valve stenosis  Comp Metabolic Panel    Lipid Profile    EC-ECHOCARDIOGRAM COMPLETE W/O CONT      3. Mixed hyperlipidemia  Comp Metabolic Panel    Lipid Profile      4. Essential hypertension        5. Type 2 diabetes mellitus with microalbuminuria, without long-term current use of insulin (HCC)            Medical Decision Making:  Today's Assessment / Status / Plan:     CMP and lipid profile goal LDL less than 70.  Echocardiogram to follow aortic stenosis.  Continue other medical therapy.  Follow-up yearly.

## 2023-01-27 ENCOUNTER — HOSPITAL ENCOUNTER (OUTPATIENT)
Dept: LAB | Facility: MEDICAL CENTER | Age: 83
End: 2023-01-27
Attending: INTERNAL MEDICINE
Payer: MEDICARE

## 2023-01-27 DIAGNOSIS — I35.0 NONRHEUMATIC AORTIC VALVE STENOSIS: ICD-10-CM

## 2023-01-27 DIAGNOSIS — E78.2 MIXED HYPERLIPIDEMIA: ICD-10-CM

## 2023-01-27 DIAGNOSIS — Z95.1 S/P CABG X 4: ICD-10-CM

## 2023-01-27 LAB
ALBUMIN SERPL BCP-MCNC: 4.2 G/DL (ref 3.2–4.9)
ALBUMIN/GLOB SERPL: 1.8 G/DL
ALP SERPL-CCNC: 73 U/L (ref 30–99)
ALT SERPL-CCNC: 19 U/L (ref 2–50)
ANION GAP SERPL CALC-SCNC: 11 MMOL/L (ref 7–16)
AST SERPL-CCNC: 16 U/L (ref 12–45)
BILIRUB SERPL-MCNC: 0.5 MG/DL (ref 0.1–1.5)
BUN SERPL-MCNC: 22 MG/DL (ref 8–22)
CALCIUM ALBUM COR SERPL-MCNC: 9.4 MG/DL (ref 8.5–10.5)
CALCIUM SERPL-MCNC: 9.6 MG/DL (ref 8.5–10.5)
CHLORIDE SERPL-SCNC: 104 MMOL/L (ref 96–112)
CHOLEST SERPL-MCNC: 123 MG/DL (ref 100–199)
CO2 SERPL-SCNC: 25 MMOL/L (ref 20–33)
CREAT SERPL-MCNC: 1 MG/DL (ref 0.5–1.4)
FASTING STATUS PATIENT QL REPORTED: NORMAL
GFR SERPLBLD CREATININE-BSD FMLA CKD-EPI: 75 ML/MIN/1.73 M 2
GLOBULIN SER CALC-MCNC: 2.4 G/DL (ref 1.9–3.5)
GLUCOSE SERPL-MCNC: 109 MG/DL (ref 65–99)
HDLC SERPL-MCNC: 29 MG/DL
LDLC SERPL CALC-MCNC: 70 MG/DL
POTASSIUM SERPL-SCNC: 4.6 MMOL/L (ref 3.6–5.5)
PROT SERPL-MCNC: 6.6 G/DL (ref 6–8.2)
SODIUM SERPL-SCNC: 140 MMOL/L (ref 135–145)
TRIGL SERPL-MCNC: 118 MG/DL (ref 0–149)

## 2023-01-27 PROCEDURE — 80061 LIPID PANEL: CPT

## 2023-01-27 PROCEDURE — 36415 COLL VENOUS BLD VENIPUNCTURE: CPT

## 2023-01-27 PROCEDURE — 80053 COMPREHEN METABOLIC PANEL: CPT

## 2023-01-30 ENCOUNTER — OFFICE VISIT (OUTPATIENT)
Dept: MEDICAL GROUP | Facility: PHYSICIAN GROUP | Age: 83
End: 2023-01-30
Payer: MEDICARE

## 2023-01-30 VITALS
SYSTOLIC BLOOD PRESSURE: 128 MMHG | HEART RATE: 57 BPM | TEMPERATURE: 97.5 F | HEIGHT: 70 IN | BODY MASS INDEX: 26.92 KG/M2 | DIASTOLIC BLOOD PRESSURE: 58 MMHG | OXYGEN SATURATION: 94 % | WEIGHT: 188 LBS

## 2023-01-30 DIAGNOSIS — R80.9 TYPE 2 DIABETES MELLITUS WITH MICROALBUMINURIA, WITHOUT LONG-TERM CURRENT USE OF INSULIN (HCC): ICD-10-CM

## 2023-01-30 DIAGNOSIS — E11.29 TYPE 2 DIABETES MELLITUS WITH MICROALBUMINURIA, WITHOUT LONG-TERM CURRENT USE OF INSULIN (HCC): ICD-10-CM

## 2023-01-30 DIAGNOSIS — Z23 NEED FOR VACCINATION: ICD-10-CM

## 2023-01-30 DIAGNOSIS — I10 ESSENTIAL HYPERTENSION: ICD-10-CM

## 2023-01-30 DIAGNOSIS — E78.2 MIXED HYPERLIPIDEMIA: ICD-10-CM

## 2023-01-30 PROBLEM — R21 RASH IN ADULT: Status: RESOLVED | Noted: 2021-12-03 | Resolved: 2023-01-30

## 2023-01-30 LAB
HBA1C MFR BLD: 6.8 % (ref 0–5.6)
INT CON NEG: NEGATIVE
INT CON POS: POSITIVE

## 2023-01-30 PROCEDURE — 83036 HEMOGLOBIN GLYCOSYLATED A1C: CPT | Performed by: NURSE PRACTITIONER

## 2023-01-30 PROCEDURE — 99214 OFFICE O/P EST MOD 30 MIN: CPT | Performed by: NURSE PRACTITIONER

## 2023-01-30 RX ORDER — ATORVASTATIN CALCIUM 80 MG/1
80 TABLET, FILM COATED ORAL
Qty: 90 TABLET | Refills: 3 | Status: SHIPPED | OUTPATIENT
Start: 2023-01-30 | End: 2024-02-22 | Stop reason: SDUPTHER

## 2023-01-30 NOTE — PROGRESS NOTES
Subjective:     CC: Diabetes follow-up    HPI:   Raf presents today with the following:    Type 2 diabetes mellitus with microalbuminuria, without long-term current use of insulin ([facility])  Due for A1c today, last A1c 6.6%.  He continues with metformin 1000 mg twice daily.  On losartan for blood pressure control.  On atorvastatin 80 mg daily with recent labs on 1/27/2023 that showed controlled cholesterol.  His home blood sugars have been ranging 130-170.  He watches his sugar intake.  Recent labs show stable CMP.    Essential hypertension  Blood pressure today is at goal.  He continues with losartan 50 mg daily and metoprolol 25 mg twice daily.  He monitors his blood pressures at home and his home blood pressures have been ranging 120-140/50-70, occasional 150/60-70's, HR 60-90's.    Mixed hyperlipidemia  He continues with atorvastatin 80 mg daily and recent labs on 1/27/2023 show controlled cholesterol.  Recently saw cardiology who ordered labs and repeat echocardiogram.    Past Medical History:   Diagnosis Date    Anxiety     Arthritis     CAD (coronary artery disease) 07/06/2017    CABG x 4 (LIMA to distal diagonal, rSVG to distal OM1, rSVG to distal OM2, rSVG to distal LAD) by Dr. Gaffney.    Diabetes (HCC)     oral medication     Hypertension     Inguinal hernia     Mixed hyperlipidemia     Nodular prostate without urinary obstruction     Prostate cancer (HCC)        Social History     Tobacco Use    Smoking status: Never    Smokeless tobacco: Never   Vaping Use    Vaping Use: Never used   Substance Use Topics    Alcohol use: Yes     Alcohol/week: 1.2 oz     Types: 2 Shots of liquor per week    Drug use: No       Current Outpatient Medications Ordered in Epic   Medication Sig Dispense Refill    metformin (GLUCOPHAGE) 1000 MG tablet Take 1 Tablet by mouth 2 times a day with meals. 180 Tablet 3    atorvastatin (LIPITOR) 80 MG tablet Take 1 Tablet by mouth every day. 90 Tablet 3    tetanus-dipth-acell pertussis  "(ADACEL) 5-2-15.5 LF-MCG/0.5 Suspension Inject 0.5 mL into the shoulder, thigh, or buttocks one time for 1 dose. 0.5 mL 0    losartan (COZAAR) 50 MG Tab Take 1 Tablet by mouth every day. 90 Tablet 0    metoprolol tartrate (LOPRESSOR) 25 MG Tab Take 1 Tablet by mouth 2 times a day. 180 Tablet 2    ONE TOUCH ULTRA TEST strip TEST BLOOD SUGAR LEVELS EVERY  Strip 3    ONETOUCH DELICA LANCETS FINE Misc USE TO TEST BLOOD SUGARS ONCE DAILY 100 Each 3    Ascorbic Acid (VITAMIN C PO) Take  by mouth.      aspirin (ASA) 81 MG Chew Tab chewable tablet Take 81 mg by mouth every day. Indications: post cardiac surgery to prevent blood clots      therapeutic multivitamin-minerals (THERAGRAN-M) Tab Take 1 Tab by mouth every day. Indications: supplement      Glucosamine-Chondroit-Vit C-Mn (GLUCOSAMINE CHONDR 1500 COMPLX) Cap Take 1 Cap by mouth every day. 100 Cap 3    Cholecalciferol (VITAMIN D3) 1000 UNIT TABS Take 1 Tab by mouth every day. 100 Tab 3    donepezil (ARICEPT) 10 MG tablet Take 1 Tablet by mouth every day. (Patient not taking: Reported on 1/30/2023) 90 Tablet 3    mupirocin (BACTROBAN) 2 % Ointment Apply 1 Application topically 2 times a day. (Patient not taking: Reported on 1/30/2023) 22 g 0     No current Epic-ordered facility-administered medications on file.       Allergies:  Other environmental    Health Maintenance: Due A1C and Tdap.       Objective:     Vital signs reviewed  Exam:  /58 (BP Location: Right arm, Patient Position: Sitting, BP Cuff Size: Adult)   Pulse (!) 57   Temp 36.4 °C (97.5 °F) (Temporal)   Ht 1.778 m (5' 10\")   Wt 85.3 kg (188 lb)   SpO2 94%   BMI 26.98 kg/m²  Body mass index is 26.98 kg/m².    Gen: Alert and oriented, No apparent distress.  Neck: Neck is supple without lymphadenopathy.  Lungs: Normal effort, CTA bilaterally, no wheezes, rhonchi, or rales  CV: Regular rate and rhythm with systolic murmur, No rubs or gallops.      Labs:    Latest Reference Range & Units " 01/30/23 08:10   Glycohemoglobin 0.0 - 5.6 % 6.8 !   !: Data is abnormal    Assessment & Plan:     82 y.o. male with the following -     1. Type 2 diabetes mellitus with microalbuminuria, without long-term current use of insulin (HCC)  Chronic stable problem.  A1c is stable today.  He will continue with his metformin 1000 mg twice daily.  Plan to check updated labs in 6 months around July 2023.  Continue healthy diet and exercise as tolerated.  Continue home glucose monitoring.  Reviewed his recent lab results from 1/27/2023.  - POCT  A1C  - Basic Metabolic Panel; Future  - HEMOGLOBIN A1C; Future  - MICROALBUMIN CREAT RATIO URINE; Future  - metformin (GLUCOPHAGE) 1000 MG tablet; Take 1 Tablet by mouth 2 times a day with meals.  Dispense: 180 Tablet; Refill: 3    2. Essential hypertension  Chronic stable problem.  Blood pressure is at goal today.  Continue with losartan 50 mg daily and metoprolol 25 mg twice daily.  Continue home blood pressure monitoring.  Reviewed his recent lab results from 1/27/2023.  We did discuss that his potassium chloride he can stop after his current bottle is completed as he is not currently on any medication that would decrease his potassium and his recent labs did show stable potassium level.  We will recheck BMP in 6 months.    3. Mixed hyperlipidemia  Chronic stable problem.  Continue with atorvastatin 80 mg daily.  Reviewed his recent lab results from 1/27/2023.  - atorvastatin (LIPITOR) 80 MG tablet; Take 1 Tablet by mouth every day.  Dispense: 90 Tablet; Refill: 3    4. Need for vaccination  Acute uncomplicated problem.  Due for Tdap and we discussed that he can obtain this at the pharmacy.  Prescription provided for patient so that he can obtain at the pharmacy.  - tetanus-dipth-acell pertussis (ADACEL) 5-2-15.5 LF-MCG/0.5 Suspension; Inject 0.5 mL into the shoulder, thigh, or buttocks one time for 1 dose.  Dispense: 0.5 mL; Refill: 0        Return in about 6 months (around  7/30/2023) for Diabetes, Labs, monofilament.    Please note that this dictation was created using voice recognition software. I have made every reasonable attempt to correct obvious errors, but I expect that there are errors of grammar and possibly content that I did not discover before finalizing the note.

## 2023-01-30 NOTE — ASSESSMENT & PLAN NOTE
Blood pressure today is at goal.  He continues with losartan 50 mg daily and metoprolol 25 mg twice daily.  He monitors his blood pressures at home and his home blood pressures have been ranging 120-140/50-70, occasional 150/60-70's, HR 60-90's.

## 2023-01-30 NOTE — ASSESSMENT & PLAN NOTE
Due for A1c today, last A1c 6.6%.  He continues with metformin 1000 mg twice daily.  On losartan for blood pressure control.  On atorvastatin 80 mg daily with recent labs on 1/27/2023 that showed controlled cholesterol.  His home blood sugars have been ranging 130-170.  He watches his sugar intake.  Recent labs show stable CMP.

## 2023-01-30 NOTE — ASSESSMENT & PLAN NOTE
He continues with atorvastatin 80 mg daily and recent labs on 1/27/2023 show controlled cholesterol.  Recently saw cardiology who ordered labs and repeat echocardiogram.

## 2023-02-03 ENCOUNTER — HOSPITAL ENCOUNTER (OUTPATIENT)
Dept: CARDIOLOGY | Facility: MEDICAL CENTER | Age: 83
End: 2023-02-03
Attending: INTERNAL MEDICINE
Payer: MEDICARE

## 2023-02-03 DIAGNOSIS — I35.0 NONRHEUMATIC AORTIC VALVE STENOSIS: ICD-10-CM

## 2023-02-03 DIAGNOSIS — Z95.1 S/P CABG X 4: ICD-10-CM

## 2023-02-03 PROCEDURE — 93306 TTE W/DOPPLER COMPLETE: CPT

## 2023-02-06 LAB
LV EJECT FRACT  99904: 65
LV EJECT FRACT MOD 2C 99903: 62.44
LV EJECT FRACT MOD 4C 99902: 63.35
LV EJECT FRACT MOD BP 99901: 63.28

## 2023-02-06 PROCEDURE — 93306 TTE W/DOPPLER COMPLETE: CPT | Mod: 26 | Performed by: INTERNAL MEDICINE

## 2023-03-01 ENCOUNTER — OFFICE VISIT (OUTPATIENT)
Dept: MEDICAL GROUP | Facility: PHYSICIAN GROUP | Age: 83
End: 2023-03-01
Payer: MEDICARE

## 2023-03-01 ENCOUNTER — HOSPITAL ENCOUNTER (OUTPATIENT)
Dept: RADIOLOGY | Facility: MEDICAL CENTER | Age: 83
End: 2023-03-01
Attending: NURSE PRACTITIONER
Payer: MEDICARE

## 2023-03-01 VITALS
OXYGEN SATURATION: 92 % | BODY MASS INDEX: 27.2 KG/M2 | SYSTOLIC BLOOD PRESSURE: 138 MMHG | TEMPERATURE: 98.7 F | HEIGHT: 70 IN | DIASTOLIC BLOOD PRESSURE: 60 MMHG | WEIGHT: 190 LBS | HEART RATE: 89 BPM

## 2023-03-01 DIAGNOSIS — R05.9 COUGH IN ADULT: ICD-10-CM

## 2023-03-01 DIAGNOSIS — R52 BODY ACHES: ICD-10-CM

## 2023-03-01 DIAGNOSIS — R50.9 FEVER, UNSPECIFIED FEVER CAUSE: ICD-10-CM

## 2023-03-01 DIAGNOSIS — J02.9 SORE THROAT: ICD-10-CM

## 2023-03-01 DIAGNOSIS — J22 LOWER RESP. TRACT INFECTION: ICD-10-CM

## 2023-03-01 LAB
FLUAV RNA SPEC QL NAA+PROBE: NEGATIVE
FLUBV RNA SPEC QL NAA+PROBE: NEGATIVE
RSV RNA SPEC QL NAA+PROBE: NEGATIVE
S PYO DNA SPEC NAA+PROBE: NOT DETECTED
SARS-COV-2 RNA RESP QL NAA+PROBE: NEGATIVE

## 2023-03-01 PROCEDURE — 99213 OFFICE O/P EST LOW 20 MIN: CPT | Mod: CS | Performed by: NURSE PRACTITIONER

## 2023-03-01 PROCEDURE — 71046 X-RAY EXAM CHEST 2 VIEWS: CPT

## 2023-03-01 PROCEDURE — 0241U POCT CEPHEID COV-2, FLU A/B, RSV - PCR: CPT | Performed by: NURSE PRACTITIONER

## 2023-03-01 PROCEDURE — 87651 STREP A DNA AMP PROBE: CPT | Performed by: NURSE PRACTITIONER

## 2023-03-01 RX ORDER — AZITHROMYCIN 250 MG/1
TABLET, FILM COATED ORAL
Qty: 6 TABLET | Refills: 0 | Status: SHIPPED | OUTPATIENT
Start: 2023-03-01 | End: 2023-07-31

## 2023-03-01 RX ORDER — AMOXICILLIN AND CLAVULANATE POTASSIUM 875; 125 MG/1; MG/1
1 TABLET, FILM COATED ORAL 2 TIMES DAILY
Qty: 10 TABLET | Refills: 0 | Status: SHIPPED | OUTPATIENT
Start: 2023-03-01 | End: 2023-03-06

## 2023-03-01 ASSESSMENT — PATIENT HEALTH QUESTIONNAIRE - PHQ9: CLINICAL INTERPRETATION OF PHQ2 SCORE: 0

## 2023-03-01 NOTE — PROGRESS NOTES
Subjective:     CC: cough     HPI:   Raf presents today with the following:    Cough   The cough started 1 week ago. The cough is productive. Associated symptoms include subjective fever, body aches, sore throat, watery eyes and chest congestion. Aggravating factors include none. Alleviating factors include rest. Interventions tried salt/warm water gargle, nasal steroid. +sick contacts in wife. Denies chills, ear pain, sinus pain, loss of taste, loss of smell, itchy eyes, chest pain or shortness of breath.        Past Medical History:   Diagnosis Date    Anxiety     Arthritis     CAD (coronary artery disease) 07/06/2017    CABG x 4 (LIMA to distal diagonal, rSVG to distal OM1, rSVG to distal OM2, rSVG to distal LAD) by Dr. Gaffney.    Diabetes (HCC)     oral medication     Hypertension     Inguinal hernia     Mixed hyperlipidemia     Nodular prostate without urinary obstruction     Prostate cancer (HCC)        Social History     Tobacco Use    Smoking status: Never    Smokeless tobacco: Never   Vaping Use    Vaping Use: Never used   Substance Use Topics    Alcohol use: Yes     Alcohol/week: 1.2 oz     Types: 2 Shots of liquor per week     Comment: oz every other week    Drug use: No       Current Outpatient Medications Ordered in Epic   Medication Sig Dispense Refill    metformin (GLUCOPHAGE) 1000 MG tablet Take 1 Tablet by mouth 2 times a day with meals. 180 Tablet 3    atorvastatin (LIPITOR) 80 MG tablet Take 1 Tablet by mouth every day. 90 Tablet 3    losartan (COZAAR) 50 MG Tab Take 1 Tablet by mouth every day. 90 Tablet 0    donepezil (ARICEPT) 10 MG tablet Take 1 Tablet by mouth every day. 90 Tablet 3    metoprolol tartrate (LOPRESSOR) 25 MG Tab Take 1 Tablet by mouth 2 times a day. 180 Tablet 2    mupirocin (BACTROBAN) 2 % Ointment Apply 1 Application topically 2 times a day. 22 g 0    ONE TOUCH ULTRA TEST strip TEST BLOOD SUGAR LEVELS EVERY  Strip 3    ONETOUCH DELICA LANCETS FINE Misc USE TO TEST  "BLOOD SUGARS ONCE DAILY 100 Each 3    Ascorbic Acid (VITAMIN C PO) Take  by mouth.      aspirin (ASA) 81 MG Chew Tab chewable tablet Take 81 mg by mouth every day. Indications: post cardiac surgery to prevent blood clots      therapeutic multivitamin-minerals (THERAGRAN-M) Tab Take 1 Tab by mouth every day. Indications: supplement      Glucosamine-Chondroit-Vit C-Mn (GLUCOSAMINE CHONDR 1500 COMPLX) Cap Take 1 Cap by mouth every day. 100 Cap 3    Cholecalciferol (VITAMIN D3) 1000 UNIT TABS Take 1 Tab by mouth every day. 100 Tab 3     No current Epic-ordered facility-administered medications on file.       Allergies:  Other environmental    Health Maintenance: Reviewed      Objective:     Vital signs reviewed  Exam:  /60 (BP Location: Right arm, Patient Position: Sitting, BP Cuff Size: Adult)   Pulse 89   Temp 37.1 °C (98.7 °F) (Temporal)   Ht 1.778 m (5' 10\")   Wt 86.2 kg (190 lb)   SpO2 92%   BMI 27.26 kg/m²  Body mass index is 27.26 kg/m².    General: Normal appearing. No distress.  HENT: Normocephalic. Ears normal shape and contour, canals are clear bilaterally, tympanic membranes are benign, nasal mucosa benign, oropharynx is with erythema, no edema or exudates. No pain on palpation to frontal or maxillary sinuses.  Eyes: Eyes conjunctiva clear lids without ptosis, lids normal.  Pulmonary: Diminished in bases.  Normal effort. No rales, ronchi, or wheezing. Dry nonproductive cough.  Cardiovascular: Regular rate and rhythm without murmur.   Lymph: No cervical or supraclavicular lymph nodes are palpable.  Skin: Warm and dry.  No obvious lesions.  Psych: Normal mood and affect. Alert and oriented x3. Judgment and insight is normal.    Labs:      Latest Reference Range & Units 03/01/23 08:25   Influenza virus A RNA Negative, Invalid  Negative   Influenza virus B, PCR Negative, Invalid  Negative   RSV, PCR Negative, Invalid  Negative   SARS-CoV-2 by PCR Negative, Invalid  Negative   POC Group A Strep, PCR " Not Detected, Invalid  Not Detected       Assessment & Plan:     82 y.o. male with the following -     1. Lower resp. tract infection  Acute uncomplicated problem.  With his diminished breath sounds we will treat him as lower respiratory tract infection.  Start Augmentin twice daily for 5-day course and azithromycin for 5-day course.  Discussed to take the medications with food and to take a daily probiotic or eat a daily yogurt.  Follow-up if symptoms worsen.  - amoxicillin-clavulanate (AUGMENTIN) 875-125 MG Tab; Take 1 Tablet by mouth 2 times a day for 5 days.  Dispense: 10 Tablet; Refill: 0  - azithromycin (ZITHROMAX) 250 MG Tab; Take 2 tablets (500 mg) PO on day 1 and then take 1 tablet (250 mg) daily on 2-5  Dispense: 6 Tablet; Refill: 0    2. Cough in adult  Acute uncomplicated problem.  Oxygen level today is 92% which is slightly lower than his normal.  He is diminished in the lower bases and will check chest x-ray.  See #1 above.  - DX-CHEST-2 VIEWS; Future    3. Sore throat  Acute uncomplicated problem.  POCT negative for strep, COVID, influenza and RSV.  Discussed taking continue symptomatic care, stay hydrated may use Tylenol if having pain or fever.  - POCT CEPHEID COV-2, FLU A/B, RSV - PCR  - POCT CEPHEID GROUP A STREP - PCR    4. Body aches  Acute uncomplicated problem.  POCT negative for influenza, COVID and RSV.  Recommend symptomatic care.  - POCT CEPHEID COV-2, FLU A/B, RSV - PCR    5. Fever, unspecified fever cause  Acute uncomplicated problem.  POCT negative today for influenza, COVID and RSV.  Commend symptomatic care, starting antibiotics, see #1 above and stay hydrated.  - POCT CEPHEID COV-2, FLU A/B, RSV - PCR        Return if symptoms worsen or fail to improve.    Please note that this dictation was created using voice recognition software. I have made every reasonable attempt to correct obvious errors, but I expect that there are errors of grammar and possibly content that I did not  discover before finalizing the note.

## 2023-04-17 ENCOUNTER — APPOINTMENT (RX ONLY)
Dept: URBAN - METROPOLITAN AREA CLINIC 22 | Facility: CLINIC | Age: 83
Setting detail: DERMATOLOGY
End: 2023-04-17

## 2023-04-17 DIAGNOSIS — L82.1 OTHER SEBORRHEIC KERATOSIS: ICD-10-CM

## 2023-04-17 DIAGNOSIS — D18.0 HEMANGIOMA: ICD-10-CM

## 2023-04-17 DIAGNOSIS — D22 MELANOCYTIC NEVI: ICD-10-CM

## 2023-04-17 DIAGNOSIS — L57.0 ACTINIC KERATOSIS: ICD-10-CM

## 2023-04-17 DIAGNOSIS — L81.4 OTHER MELANIN HYPERPIGMENTATION: ICD-10-CM

## 2023-04-17 DIAGNOSIS — Z71.89 OTHER SPECIFIED COUNSELING: ICD-10-CM

## 2023-04-17 PROBLEM — D22.5 MELANOCYTIC NEVI OF TRUNK: Status: ACTIVE | Noted: 2023-04-17

## 2023-04-17 PROBLEM — D48.5 NEOPLASM OF UNCERTAIN BEHAVIOR OF SKIN: Status: ACTIVE | Noted: 2023-04-17

## 2023-04-17 PROBLEM — D18.01 HEMANGIOMA OF SKIN AND SUBCUTANEOUS TISSUE: Status: ACTIVE | Noted: 2023-04-17

## 2023-04-17 PROCEDURE — 11102 TANGNTL BX SKIN SINGLE LES: CPT

## 2023-04-17 PROCEDURE — ? BIOPSY BY SHAVE METHOD

## 2023-04-17 PROCEDURE — 17000 DESTRUCT PREMALG LESION: CPT | Mod: 59

## 2023-04-17 PROCEDURE — 99213 OFFICE O/P EST LOW 20 MIN: CPT | Mod: 25

## 2023-04-17 PROCEDURE — ? COUNSELING

## 2023-04-17 PROCEDURE — 17003 DESTRUCT PREMALG LES 2-14: CPT

## 2023-04-17 PROCEDURE — ? LIQUID NITROGEN

## 2023-04-17 PROCEDURE — 11103 TANGNTL BX SKIN EA SEP/ADDL: CPT

## 2023-04-17 PROCEDURE — ? SUNSCREEN RECOMMENDATIONS

## 2023-04-17 ASSESSMENT — LOCATION DETAILED DESCRIPTION DERM
LOCATION DETAILED: LEFT NASAL DORSUM
LOCATION DETAILED: LEFT SUPERIOR CENTRAL MALAR CHEEK
LOCATION DETAILED: LEFT SUPERIOR MEDIAL UPPER BACK
LOCATION DETAILED: EPIGASTRIC SKIN
LOCATION DETAILED: LEFT SUPERIOR LATERAL MALAR CHEEK
LOCATION DETAILED: LEFT SUPERIOR HELIX
LOCATION DETAILED: RIGHT SUPERIOR MEDIAL MIDBACK
LOCATION DETAILED: RIGHT LATERAL TEMPLE
LOCATION DETAILED: LEFT LATERAL SHOULDER

## 2023-04-17 ASSESSMENT — LOCATION SIMPLE DESCRIPTION DERM
LOCATION SIMPLE: RIGHT LOWER BACK
LOCATION SIMPLE: LEFT SHOULDER
LOCATION SIMPLE: LEFT EAR
LOCATION SIMPLE: LEFT CHEEK
LOCATION SIMPLE: ABDOMEN
LOCATION SIMPLE: NOSE
LOCATION SIMPLE: LEFT UPPER BACK
LOCATION SIMPLE: RIGHT TEMPLE

## 2023-04-17 ASSESSMENT — LOCATION ZONE DERM
LOCATION ZONE: EAR
LOCATION ZONE: TRUNK
LOCATION ZONE: ARM
LOCATION ZONE: NOSE
LOCATION ZONE: FACE

## 2023-04-17 NOTE — PROCEDURE: LIQUID NITROGEN
Show Aperture Variable?: Yes
Consent: The patient's consent was obtained including but not limited to risks of crusting, scabbing, blistering, scarring, darker or lighter pigmentary change, recurrence, incomplete removal and infection.
Detail Level: Detailed
Number Of Freeze-Thaw Cycles: 2 freeze-thaw cycles
Duration Of Freeze Thaw-Cycle (Seconds): 3
Post-Care Instructions: I reviewed with the patient in detail post-care instructions. Patient is to wear sunprotection, and avoid picking at any of the treated lesions. Pt may apply Vaseline to crusted or scabbing areas.
Render Post-Care Instructions In Note?: no

## 2023-04-25 ENCOUNTER — TELEPHONE (OUTPATIENT)
Dept: HEALTH INFORMATION MANAGEMENT | Facility: OTHER | Age: 83
End: 2023-04-25
Payer: MEDICARE

## 2023-04-28 DIAGNOSIS — I10 ESSENTIAL HYPERTENSION: ICD-10-CM

## 2023-04-29 NOTE — TELEPHONE ENCOUNTER
Is the patient due for a refill? Yes    Was the patient seen the past year? Yes    Date of last office visit: 1/20/2023    Does the patient have an upcoming appointment?  No   If yes, When?     Provider to refill:TW    Does the patients insurance require a 100 day supply?  No

## 2023-05-02 RX ORDER — LOSARTAN POTASSIUM 50 MG/1
50 TABLET ORAL
Qty: 90 TABLET | Refills: 2 | Status: SHIPPED | OUTPATIENT
Start: 2023-05-02 | End: 2024-02-05 | Stop reason: SDUPTHER

## 2023-05-09 ENCOUNTER — APPOINTMENT (RX ONLY)
Dept: URBAN - METROPOLITAN AREA CLINIC 22 | Facility: CLINIC | Age: 83
Setting detail: DERMATOLOGY
End: 2023-05-09

## 2023-05-09 PROBLEM — D04.5 CARCINOMA IN SITU OF SKIN OF TRUNK: Status: ACTIVE | Noted: 2023-05-09

## 2023-05-09 PROCEDURE — ? CURETTAGE AND DESTRUCTION

## 2023-05-09 PROCEDURE — 17263 DSTRJ MAL LES T/A/L 2.1-3.0: CPT

## 2023-05-09 NOTE — PROCEDURE: CURETTAGE AND DESTRUCTION
Detail Level: Detailed
Biopsy Photograph Reviewed: Yes
Accession # (Optional): [id number]
Number Of Curettages: 2
Size Of Lesion In Cm: 1.1
Size Of Lesion After Curettage: 2.6
Add Intralesional Injection: No
Total Volume (Ccs): 1
Anesthesia Type: 1% lidocaine with epinephrine and a 1:10 solution of 8.4% sodium bicarbonate
Anesthesia Volume In Cc: 6
Cautery Type: hot cautery
What Was Performed First?: Curettage
Final Size Statement: The size of the lesion after curettage was
Additional Information: (Optional): The wound was cleaned, and a pressure dressing was applied.  The patient received detailed post-op instructions.
Consent was obtained from the patient. The risks, benefits and alternatives to therapy were discussed in detail. Specifically, the risks of infection, scarring, bleeding, prolonged wound healing, nerve injury, incomplete removal, allergy to anesthesia and recurrence were addressed. Alternatives to ED&C, such as:  Prior to the procedure, the treatment site was clearly identified and confirmed by the patient. All components of Universal Protocol/PAUSE Rule completed.
Post-Care Instructions: I reviewed with the patient in detail post-care instructions. Patient is to keep the area dry for 48 hours, and not to engage in any swimming until the area is healed. Should the patient develop any fevers, chills, bleeding, severe pain patient will contact the office immediately.
Bill As A Line Item Or As Units: Line Item

## 2023-07-07 DIAGNOSIS — R80.9 TYPE 2 DIABETES MELLITUS WITH MICROALBUMINURIA, WITHOUT LONG-TERM CURRENT USE OF INSULIN (HCC): ICD-10-CM

## 2023-07-07 DIAGNOSIS — E11.29 TYPE 2 DIABETES MELLITUS WITH MICROALBUMINURIA, WITHOUT LONG-TERM CURRENT USE OF INSULIN (HCC): ICD-10-CM

## 2023-07-09 NOTE — TELEPHONE ENCOUNTER
Requested Prescriptions     Pending Prescriptions Disp Refills   • metformin (GLUCOPHAGE) 1000 MG tablet 180 Tablet 0     Sig: Take 1 Tablet by mouth 2 times a day with meals.       ADELITA Gabriel.

## 2023-07-24 ENCOUNTER — HOSPITAL ENCOUNTER (OUTPATIENT)
Dept: LAB | Facility: MEDICAL CENTER | Age: 83
End: 2023-07-24
Attending: NURSE PRACTITIONER
Payer: MEDICARE

## 2023-07-24 ENCOUNTER — TELEPHONE (OUTPATIENT)
Dept: MEDICAL GROUP | Facility: PHYSICIAN GROUP | Age: 83
End: 2023-07-24
Payer: MEDICARE

## 2023-07-24 DIAGNOSIS — E11.29 TYPE 2 DIABETES MELLITUS WITH MICROALBUMINURIA, WITHOUT LONG-TERM CURRENT USE OF INSULIN (HCC): ICD-10-CM

## 2023-07-24 DIAGNOSIS — R80.9 TYPE 2 DIABETES MELLITUS WITH MICROALBUMINURIA, WITHOUT LONG-TERM CURRENT USE OF INSULIN (HCC): ICD-10-CM

## 2023-07-24 LAB
ANION GAP SERPL CALC-SCNC: 12 MMOL/L (ref 7–16)
BUN SERPL-MCNC: 20 MG/DL (ref 8–22)
CALCIUM SERPL-MCNC: 10.2 MG/DL (ref 8.5–10.5)
CHLORIDE SERPL-SCNC: 105 MMOL/L (ref 96–112)
CO2 SERPL-SCNC: 24 MMOL/L (ref 20–33)
CREAT SERPL-MCNC: 1.46 MG/DL (ref 0.5–1.4)
EST. AVERAGE GLUCOSE BLD GHB EST-MCNC: 163 MG/DL
GFR SERPLBLD CREATININE-BSD FMLA CKD-EPI: 47 ML/MIN/1.73 M 2
GLUCOSE SERPL-MCNC: 148 MG/DL (ref 65–99)
HBA1C MFR BLD: 7.3 % (ref 4–5.6)
POTASSIUM SERPL-SCNC: 4.7 MMOL/L (ref 3.6–5.5)
SODIUM SERPL-SCNC: 141 MMOL/L (ref 135–145)

## 2023-07-24 PROCEDURE — 80048 BASIC METABOLIC PNL TOTAL CA: CPT

## 2023-07-24 PROCEDURE — 36415 COLL VENOUS BLD VENIPUNCTURE: CPT

## 2023-07-24 PROCEDURE — 82570 ASSAY OF URINE CREATININE: CPT

## 2023-07-24 PROCEDURE — 82043 UR ALBUMIN QUANTITATIVE: CPT

## 2023-07-24 PROCEDURE — 83036 HEMOGLOBIN GLYCOSYLATED A1C: CPT | Mod: GA

## 2023-07-24 NOTE — TELEPHONE ENCOUNTER
Future Appointments         Provider Department Center    7/31/2023 8:00 AM (Arrive by 7:45 AM) LORENA Fermin [facility] [place]         ESTABLISHED PATIENT PRE-VISIT PLANNING     Patient was contacted to complete PVP.     Note: Patient will not be contacted if there is no indication to call.     1.  Reviewed notes from the last few office visits within the medical group: Yes, LOV 01/30/2023    2.  If any orders were placed at last visit or intended to be done for this visit (i.e. 6 mos follow-up), do we have Results/Consult Notes?           Labs - Labs ordered, NOT completed. Patient advised to complete prior to next appointment.  Note: If patient appointment is for lab review and patient did not complete labs, check with provider if OK to reschedule patient until labs completed.         Imaging - Imaging was not ordered at last office visit.         Referrals - No referrals were ordered at last office visit.    3. Is this appointment scheduled as a Hospital Follow-Up? No    4.  Immunizations were updated in Epic using Reconcile Outside Information activity? Yes    5.  Patient is due for the following Health Maintenance Topics:   Health Maintenance Due   Topic Date Due    Annual Wellness Visit  08/12/2018    IMM DTaP/Tdap/Td Vaccine (2 - Td or Tdap) 12/09/2021    COVID-19 Vaccine (6 - Pfizer series) 01/13/2023    RETINAL SCREENING  04/06/2023    DIABETES MONOFILAMENT / LE EXAM  04/06/2023    URINE ACR / MICROALBUMIN  05/02/2023    A1C SCREENING  07/30/2023     6.  AHA (Pulse8) form printed for Provider? N/A  Pt was reminded of his check in time and to complete his labs.

## 2023-07-25 LAB
CREAT UR-MCNC: 119.63 MG/DL
MICROALBUMIN UR-MCNC: 3.8 MG/DL
MICROALBUMIN/CREAT UR: 32 MG/G (ref 0–30)

## 2023-07-27 PROBLEM — I25.119 ATHEROSCLEROTIC HEART DISEASE OF NATIVE CORONARY ARTERY WITH ANGINA PECTORIS (HCC): Status: ACTIVE | Noted: 2017-07-06

## 2023-07-31 ENCOUNTER — OFFICE VISIT (OUTPATIENT)
Dept: MEDICAL GROUP | Facility: PHYSICIAN GROUP | Age: 83
End: 2023-07-31
Payer: MEDICARE

## 2023-07-31 VITALS
TEMPERATURE: 96.8 F | SYSTOLIC BLOOD PRESSURE: 124 MMHG | BODY MASS INDEX: 26.48 KG/M2 | HEIGHT: 70 IN | OXYGEN SATURATION: 95 % | DIASTOLIC BLOOD PRESSURE: 60 MMHG | HEART RATE: 60 BPM | WEIGHT: 185 LBS

## 2023-07-31 DIAGNOSIS — I10 ESSENTIAL HYPERTENSION: ICD-10-CM

## 2023-07-31 DIAGNOSIS — N18.31 STAGE 3A CHRONIC KIDNEY DISEASE: ICD-10-CM

## 2023-07-31 DIAGNOSIS — E11.29 TYPE 2 DIABETES MELLITUS WITH MICROALBUMINURIA, WITHOUT LONG-TERM CURRENT USE OF INSULIN (HCC): ICD-10-CM

## 2023-07-31 DIAGNOSIS — R80.9 TYPE 2 DIABETES MELLITUS WITH MICROALBUMINURIA, WITHOUT LONG-TERM CURRENT USE OF INSULIN (HCC): ICD-10-CM

## 2023-07-31 PROCEDURE — 92250 FUNDUS PHOTOGRAPHY W/I&R: CPT | Mod: TC | Performed by: NURSE PRACTITIONER

## 2023-07-31 PROCEDURE — 3078F DIAST BP <80 MM HG: CPT | Performed by: NURSE PRACTITIONER

## 2023-07-31 PROCEDURE — 99214 OFFICE O/P EST MOD 30 MIN: CPT | Performed by: NURSE PRACTITIONER

## 2023-07-31 PROCEDURE — 3074F SYST BP LT 130 MM HG: CPT | Performed by: NURSE PRACTITIONER

## 2023-07-31 NOTE — ASSESSMENT & PLAN NOTE
Blood pressure today 124/60 and he is taking metoprolol 25 mg twice daily and losartan 50 mg daily. Home blood pressure 120-150/60-70, highest reading 163/52.

## 2023-07-31 NOTE — ASSESSMENT & PLAN NOTE
Recent labs showed GFR 47 which is decreased from previous GFR of 75 6 months ago. He is not taking any NSAID's. He as not taken new medications or supplements since last appointment. He is drinking diet soda, 1-1.5 twelve ounce bottles. He is drinking 1 to 2 glasses of water daily.

## 2023-07-31 NOTE — ASSESSMENT & PLAN NOTE
Recent A1c 7.3% up from previous 6.8%.  He continues with metformin at 1000 mg twice daily.  He is on losartan for blood pressure control. He has a podiatrist that trims his toenails and he see's the podiatrist every 2 months. Home blood sugars are mostly 140-180 with lowest 129 and highest 199. For exercise he keeps himself busy doing house repairs. Last retinal exam 1 year ago. Due for retinal and monofilament. He eats fruits and eats cookie every other day, watches his blood sugar. He watches his rice, bread and potato intake.

## 2023-07-31 NOTE — PROGRESS NOTES
Subjective:     CC: Diabetes follow-up    HPI:   Raf presents today with the following:    Type 2 diabetes mellitus with microalbuminuria, without long-term current use of insulin ([facility])  Recent A1c 7.3% up from previous 6.8%.  He continues with metformin at 1000 mg twice daily.  He is on losartan for blood pressure control. He has a podiatrist that trims his toenails and he see's the podiatrist every 2 months. Home blood sugars are mostly 140-180 with lowest 129 and highest 199. For exercise he keeps himself busy doing house repairs. Last retinal exam 1 year ago. Due for retinal and monofilament. He eats fruits and eats cookie every other day, watches his blood sugar. He watches his rice, bread and potato intake.     Stage 3a chronic kidney disease (HCC)  Recent labs showed GFR 47 which is decreased from previous GFR of 75 6 months ago. He is not taking any NSAID's. He as not taken new medications or supplements since last appointment. He is drinking diet soda, 1-1.5 twelve ounce bottles. He is drinking 1 to 2 glasses of water daily.    Essential hypertension  Blood pressure today 124/60 and he is taking metoprolol 25 mg twice daily and losartan 50 mg daily. Home blood pressure 120-150/60-70, highest reading 163/52.     Past Medical History:   Diagnosis Date    Anxiety     Arthritis     CAD (coronary artery disease) 07/06/2017    CABG x 4 (LIMA to distal diagonal, rSVG to distal OM1, rSVG to distal OM2, rSVG to distal LAD) by Dr. Gaffney.    Diabetes ([facility])     oral medication     Hypertension     Inguinal hernia     Mixed hyperlipidemia     Nodular prostate without urinary obstruction     Prostate cancer ([facility])        Social History     Tobacco Use    Smoking status: Never    Smokeless tobacco: Never   Vaping Use    Vaping Use: Never used   Substance Use Topics    Alcohol use: Yes     Alcohol/week: 1.2 oz     Types: 2 Shots of liquor per week     Comment: oz every other week    Drug use: No       Current Outpatient  "Medications Ordered in Epic   Medication Sig Dispense Refill    metformin (GLUCOPHAGE) 1000 MG tablet Take 1 Tablet by mouth 2 times a day with meals. 180 Tablet 0    losartan (COZAAR) 50 MG Tab Take 1 Tablet by mouth every day. 90 Tablet 2    metoprolol tartrate (LOPRESSOR) 25 MG Tab Take 1 Tablet by mouth 2 times a day. 180 Tablet 2    atorvastatin (LIPITOR) 80 MG tablet Take 1 Tablet by mouth every day. 90 Tablet 3    donepezil (ARICEPT) 10 MG tablet Take 1 Tablet by mouth every day. 90 Tablet 3    ONE TOUCH ULTRA TEST strip TEST BLOOD SUGAR LEVELS EVERY  Strip 3    ONETOUCH DELICA LANCETS FINE Misc USE TO TEST BLOOD SUGARS ONCE DAILY 100 Each 3    Ascorbic Acid (VITAMIN C PO) Take  by mouth.      aspirin (ASA) 81 MG Chew Tab chewable tablet Take 81 mg by mouth every day. Indications: post cardiac surgery to prevent blood clots      therapeutic multivitamin-minerals (THERAGRAN-M) Tab Take 1 Tab by mouth every day. Indications: supplement      Glucosamine-Chondroit-Vit C-Mn (GLUCOSAMINE CHONDR 1500 COMPLX) Cap Take 1 Cap by mouth every day. 100 Cap 3    Cholecalciferol (VITAMIN D3) 1000 UNIT TABS Take 1 Tab by mouth every day. 100 Tab 3     No current Epic-ordered facility-administered medications on file.       Allergies:  Other environmental    Health Maintenance: Reviewed      Objective:     Vital signs reviewed  Exam:  /60 (BP Location: Left arm, Patient Position: Sitting, BP Cuff Size: Adult)   Pulse 60   Temp 36 °C (96.8 °F) (Temporal)   Ht 1.778 m (5' 10\")   Wt 83.9 kg (185 lb)   SpO2 95%   BMI 26.54 kg/m²  Body mass index is 26.54 kg/m².    Gen: Alert and oriented, No apparent distress.  Lungs: Normal effort, CTA bilaterally, no wheezes, rhonchi, or rales  CV: Regular rate and rhythm. No murmurs, rubs, or gallops.  Ext: No clubbing, cyanosis, edema.    Monofilament testing with a 10 gram force: sensation intact: intact bilaterally  Visual Inspection: Feet without maceration, ulcers, " fissures. Dry plantar surface.  Pedal pulses: decreased bilaterally, 1+      Assessment & Plan:     82 y.o. male with the following -     Discussed and reviewed lab results from 7/24/2023.    1. Type 2 diabetes mellitus with microalbuminuria, without long-term current use of insulin (HCC)  Chronic stable problem.  Monofilament exam and retinal eye exam completed in office today.  Recent A1c has increased slightly to 7.3%.  Encouraged him to review his dinner meal to make sure he is not eating any extra sugars or carbohydrates.  Recommend that he start adding exercise to his daily routine and even a 10-minute brisk walk daily as he is not currently exercising.  Continue metformin 1000 mg twice daily.  Continue losartan and atorvastatin.  - POCT Retinal Eye Exam  - Diabetic Monofilament Lower Extremity Exam  - Basic Metabolic Panel; Future    2. Stage 3a chronic kidney disease (HCC)  Acute uncomplicated problem.  New problem to examiner.  He is not taking any NSAIDs, new medications or new supplements.  He states that he is drinking low amount of water daily.  Encouraged him to increase his water intake as this may affect his kidneys as well.  Continue with losartan.  Plan to recheck kidney function in 2 weeks around mid August 2023.  - Basic Metabolic Panel; Future    3. Essential hypertension  Chronic stable problem.  Blood pressure is at goal today.  Continue metoprolol 25 mg twice daily and losartan 50 mg daily.        Return in about 6 months (around 1/31/2024) for Diabetes.    Please note that this dictation was created using voice recognition software. I have made every reasonable attempt to correct obvious errors, but I expect that there are errors of grammar and possibly content that I did not discover before finalizing the note.

## 2023-08-01 LAB — RETINAL SCREEN: NEGATIVE

## 2023-08-16 ENCOUNTER — HOSPITAL ENCOUNTER (OUTPATIENT)
Dept: LAB | Facility: MEDICAL CENTER | Age: 83
End: 2023-08-16
Attending: NURSE PRACTITIONER
Payer: MEDICARE

## 2023-08-16 DIAGNOSIS — E11.29 TYPE 2 DIABETES MELLITUS WITH MICROALBUMINURIA, WITHOUT LONG-TERM CURRENT USE OF INSULIN (HCC): ICD-10-CM

## 2023-08-16 DIAGNOSIS — R80.9 TYPE 2 DIABETES MELLITUS WITH MICROALBUMINURIA, WITHOUT LONG-TERM CURRENT USE OF INSULIN (HCC): ICD-10-CM

## 2023-08-16 DIAGNOSIS — N18.31 STAGE 3A CHRONIC KIDNEY DISEASE: ICD-10-CM

## 2023-08-16 LAB
ANION GAP SERPL CALC-SCNC: 13 MMOL/L (ref 7–16)
BUN SERPL-MCNC: 20 MG/DL (ref 8–22)
CALCIUM SERPL-MCNC: 9.2 MG/DL (ref 8.5–10.5)
CHLORIDE SERPL-SCNC: 107 MMOL/L (ref 96–112)
CO2 SERPL-SCNC: 22 MMOL/L (ref 20–33)
CREAT SERPL-MCNC: 1.23 MG/DL (ref 0.5–1.4)
GFR SERPLBLD CREATININE-BSD FMLA CKD-EPI: 58 ML/MIN/1.73 M 2
GLUCOSE SERPL-MCNC: 131 MG/DL (ref 65–99)
POTASSIUM SERPL-SCNC: 5 MMOL/L (ref 3.6–5.5)
SODIUM SERPL-SCNC: 142 MMOL/L (ref 135–145)

## 2023-08-16 PROCEDURE — 36415 COLL VENOUS BLD VENIPUNCTURE: CPT

## 2023-08-16 PROCEDURE — 80048 BASIC METABOLIC PNL TOTAL CA: CPT

## 2023-09-12 ENCOUNTER — OFFICE VISIT (OUTPATIENT)
Dept: MEDICAL GROUP | Facility: PHYSICIAN GROUP | Age: 83
End: 2023-09-12
Payer: MEDICARE

## 2023-09-12 VITALS
BODY MASS INDEX: 26.63 KG/M2 | TEMPERATURE: 97.3 F | HEIGHT: 70 IN | SYSTOLIC BLOOD PRESSURE: 106 MMHG | DIASTOLIC BLOOD PRESSURE: 60 MMHG | WEIGHT: 186 LBS | HEART RATE: 69 BPM | OXYGEN SATURATION: 96 %

## 2023-09-12 DIAGNOSIS — Z09 HOSPITAL DISCHARGE FOLLOW-UP: ICD-10-CM

## 2023-09-12 DIAGNOSIS — I10 ESSENTIAL HYPERTENSION: ICD-10-CM

## 2023-09-12 DIAGNOSIS — N18.31 STAGE 3A CHRONIC KIDNEY DISEASE: ICD-10-CM

## 2023-09-12 DIAGNOSIS — U07.1 LAB TEST POSITIVE FOR DETECTION OF COVID-19 VIRUS: ICD-10-CM

## 2023-09-12 PROBLEM — Z85.46 HISTORY OF PROSTATE CANCER: Status: ACTIVE | Noted: 2017-10-25

## 2023-09-12 PROCEDURE — 99214 OFFICE O/P EST MOD 30 MIN: CPT | Performed by: NURSE PRACTITIONER

## 2023-09-12 PROCEDURE — 3074F SYST BP LT 130 MM HG: CPT | Performed by: NURSE PRACTITIONER

## 2023-09-12 PROCEDURE — 3078F DIAST BP <80 MM HG: CPT | Performed by: NURSE PRACTITIONER

## 2023-09-12 NOTE — ASSESSMENT & PLAN NOTE
Blood pressure today 106/60. Continues metoprolol 25 mg twice daily and losartan 50 mg daily. He is checking his blood pressure at home and home readings are 120/60's. Denies chest pain, shortness of breath or dizziness.

## 2023-09-12 NOTE — ASSESSMENT & PLAN NOTE
We do have ER records from his recent [facility] state.  He was brought in by EMS for increased weakness with fever on 9/10/2023.  He was also having decreased appetite more tired which required him to take more naps during the day.  Wife noted that he felt hot to the touch.  When he went to use the bathroom he was unable to get up from the toilet due to his weakness.  In the ER his blood pressure was 143/67 with a heart rate of 78.  His temperature was 101.9 °F via oral route.  Oxygen was 93% on oxygen and 90% on room air.  EKG in ER shows sinus rhythm 75 without any acute ischemic changes.  Labs did show neutrophilia, creatinine 1.35, normal lactate, urinalysis negative for hematuria or infection.  Chest x-ray no acute disease.  His COVID test was positive.  He was treated with 1 L of IV fluids and 1000 mg of Tylenol. He was given dexamethasone. When he was getting ready for discharge the nurse ambulated patient and he was too weak to get to edge of bed.  He was admitted to the hospital for further management.  We do not have complete records for his hospitalization but under media we did receive notification from [facility] that he was a patient discharged on 9/11/2023. By the morning of discharge his weakness and strength was improving. He was seen by physical therapy before discharge. He is able to eat and drink at home. He is having BM and urinating. No chest pain or shortness of breath.

## 2023-09-12 NOTE — ASSESSMENT & PLAN NOTE
Repeat GFR 8/16/2023 58. He stopped soda. He is only drinking water now. He is working on increasing water intake.  GFR at ER 9/10/2023 52.

## 2023-09-21 DIAGNOSIS — R41.3 MEMORY DIFFICULTIES: ICD-10-CM

## 2023-09-21 RX ORDER — DONEPEZIL HYDROCHLORIDE 10 MG/1
10 TABLET, FILM COATED ORAL DAILY
Qty: 90 TABLET | Refills: 3 | Status: SHIPPED | OUTPATIENT
Start: 2023-09-21

## 2023-11-29 ENCOUNTER — PATIENT MESSAGE (OUTPATIENT)
Dept: HEALTH INFORMATION MANAGEMENT | Facility: OTHER | Age: 83
End: 2023-11-29

## 2023-11-30 NOTE — PROGRESS NOTES
Pulmonary Critical Care Progress Note        Chief Complaint: CABG    History of Present Illness: 76 y.o. Male, h/o IDDM, HTN, DLD admitted for CABG x 4     ROS:  Respiratory: unable to perform due to the patient's inability to effectively communicate, Cardiac: unable to perform due to the patient's inability to effectively communicate, GI: unable to perform due to the patient's inability to effectively communicate.  All other systems negative.    Interval Events:  24 hour interval history reviewed    - seen post-op in ICU, full vent support; d/w'd Anesthesia at bedside      PFSH:  No change.    Respiratory:  Branch Vent Mode: ASV, PEEP/CPAP: 8, FiO2: 45, Static Compliance (ml / cm H2O): 72.8, Control VTE (exp VT): 556  Pulse Oximetry: 99 %  Chest Tube Drains:          Exam: unlabored respirations, no intercostal retractions or accessory muscle use and diminished breath sounds mild  ImagingAvailable data reviewed   Recent Labs      07/06/17   1230  07/06/17   1320  07/06/17   1412   ISTATAPH  7.360*  7.355*  7.321*   ISTATAPCO2  38.7*  37.3*  36.6   ISTATAPO2  233*  155*  104*   ISTATATCO2  23  22  20   [id number]  100*  99  98   ISTATARTHCO3  21.8  20.8  18.9   ISTATARTBE  -3  -4  -7*   ISTATTEMP  35.5 C  35.6 C  36.1 C   ISTATFIO2  100  60  45   ISTATSPEC  Arterial  Arterial  Arterial   ISTATAPHTC  7.381*  7.375*  7.333*   [id number]  226*  147*  99*       HemoDynamics:  Pulse: (!) 59, Heart Rate (Monitored): (!) 59  Blood Pressure : 133/72 mmHg, Arterial BP: (!) 99/48 mmHg, NIBP: (!) 90/47 mmHg  CVP (mm Hg): 5 MM HG    Exam: regular rate and rhythm  Imaging: Available data reviewed        Neuro:  GCS Total [place] Coma Score: 11       Exam: sedated post-op, not following  Imaging: Available data reviewed    Fluids:  Intake/Output       07/04/17 0700 - 07/05/17 0659 (Not Admitted) 07/05/17 0700 - 07/06/17 0659 (Not Admitted) 07/06/17 0700 - 07/07/17 0659      9089-0296 1932-0019 Total 2915-1446 6860-1428 Total  [id number] 9645-9855 Total       Intake    I.V.  --  -- --  --  -- --  1000  -- 1000    Crystalloid Intake -- -- -- -- -- -- 1000 -- 1000    Blood  --  -- --  --  -- --  430  -- 430    Cell Saver Volume (mL) -- -- -- -- -- -- 430 -- 430    Total Intake -- -- -- -- -- -- 1430 -- 1430       Output    Urine  --  -- --  --  -- --  900  -- 900    Void (ml) -- -- -- -- -- -- 900 -- 900    Stool  --  -- --  --  -- --  --  -- --    Number of Times Stooled -- -- -- -- -- -- 0 x -- 0 x    Total Output -- -- -- -- -- -- 900 -- 900       Net I/O     -- -- -- -- -- -- 530 -- 530        Weight: 87.5 kg (192 lb 14.4 oz)  Recent Labs      17   1505  17   1227   SODIUM  140   --    POTASSIUM  3.9   --    CHLORIDE  107   --    CO2  24   --    BUN  12   --    CREATININE  0.88   --    MAGNESIUM   --   2.3   CALCIUM  9.1   --        GI/Nutrition:  Exam: abdomen is soft and non-tender  Imaging: Available data reviewed  NPO  Liver Function  Recent Labs      17   1505   ALTSGPT  26   ASTSGOT  22   ALKPHOSPHAT  56   TBILIRUBIN  0.4   GLUCOSE  104*       Heme:  Recent Labs      17   1505  17   1020  17   1227   RBC  4.69*   --    --    HEMOGLOBIN  14.2   --    --    HEMATOCRIT  43.5   --    --    PLATELETCT  232  151*  161*   PROTHROMBTM  14.4   --   16.7*   APTT  31.1   --   37.2*   INR  1.09   --   1.31*       Infectious Disease:  Temp  Av °C (96.8 °F)  Min: 35.6 °C (96.1 °F)  Max: 36.4 °C (97.5 °F)  Micro: reviewed  Recent Labs      17   1505   WBC  8.9   NEUTSPOLYS  66.00   LYMPHOCYTES  23.00   MONOCYTES  7.60   EOSINOPHILS  2.20   BASOPHILS  0.60   ASTSGOT  22   ALTSGPT  26   ALKPHOSPHAT  56   TBILIRUBIN  0.4     Current Facility-Administered Medications   Medication Dose Frequency Provider Last Rate Last Dose   • [START ON 2017] aspirin (ASA) chewable tab 81 mg  81 mg DAILY Adriana Marquis, A.P.N.       • Respiratory Care per Protocol   Continuous RT Adriana Marquis, A.P.N.       • NS  infusion   Continuous Adriana Wadet, A.P.N. 10 mL/hr at 07/06/17 1351     • K+ Scale: Goal of 4.5  1 Each Q6HRS Adriana Wadet, A.P.N.   1 Each at 07/06/17 1300   • [START ON 7/7/2017] docusate sodium (COLACE) capsule 100 mg  100 mg QAM Adriana Wadet, A.P.N.        And   • senna-docusate (PERICOLACE or SENOKOT S) 8.6-50 MG per tablet 1 Tab  1 Tab Nightly Adriana L Kandis, A.P.N.        And   • senna-docusate (PERICOLACE or SENOKOT S) 8.6-50 MG per tablet 1 Tab  1 Tab Q24HRS PRN Adriana Wadet, A.P.N.        And   • lactulose 20 GM/30ML solution 30 mL  30 mL Q24HRS PRN Adriana Wadet, A.P.N.        And   • bisacodyl (DULCOLAX) suppository 10 mg  10 mg Q24HRS PRN Adriana L Kandis, A.P.N.        And   • fleet enema 133 mL  1 Each Once PRN Adriana Wadet, A.P.N.       • [START ON 7/7/2017] enoxaparin (LOVENOX) inj 40 mg  40 mg DAILY Adriana Wadet, A.P.N.       • vancomycin 1,500 mg in  mL IVPB  1.5 g Once Adriana Wadet, A.P.N.       • mupirocin (BACTROBAN) 2 % ointment 1 Application  1 Application BID Adriana Wadet, A.P.N.       • magnesium sulfate ivpb premix 1 g  1 g DAILY Adriana Wadet, A.P.N. 100 mL/hr at 07/06/17 1335 1 g at 07/06/17 1335   • [START ON 7/7/2017] metoprolol (LOPRESSOR) tablet 12.5 mg  12.5 mg BID Adriana Wadet, A.P.N.        Followed by   • [START ON 7/8/2017] metoprolol (LOPRESSOR) tablet 25 mg  25 mg BID Adriana Wadet, A.P.N.       • [START ON 7/7/2017] clopidogrel (PLAVIX) tablet 75 mg  75 mg DAILY Adriana Marquis, A.P.N.       • rosuvastatin (CRESTOR) tablet 20 mg  20 mg QHS Adriana Marquis, A.P.N.       • electrolyte-A (PLASMALYTE-A) infusion   PRN Adriana Marquis, A.P.N.   1,000 mL at 07/06/17 1300   • clevidipine (CLEVIPREX) IV emulsion  0-10 mg/hr Continuous Adriana Marquis, A.P.N.   Stopped at 07/06/17 1245   • nitroglycerin 50 mg in D5W 250 ml infusion  0-100 mcg/min Continuous Adriana Wadet, A.P.N.   Stopped at 07/06/17 1300   • oxycodone immediate-release  (ROXICODONE) tablet 5 mg  5 mg Q3HRS PRN Adriana L Kandis, A.P.N.       • oxycodone immediate release (ROXICODONE) tablet 10 mg  10 mg Q3HRS PRN Adriana L Kandis, A.P.N.       • tramadol (ULTRAM) 50 MG tablet 50 mg  50 mg Q4HRS PRN Adriana L Kandis, A.P.N.       • midazolam (VERSED) 2 MG/2ML injection 0.5-2 mg  0.5-2 mg Q HOUR PRN Adriana L Kandis, A.P.N.       • dexmedetomidine (PRECEDEX) 200 mcg in NS 50 mL infusion  0-1.5 mcg/kg/hr Continuous Adriana L Kandis, A.P.N. 10.9 mL/hr at 07/06/17 1215 0.5 mcg/kg/hr at 07/06/17 1215   • sodium bicarbonate 8.4 % injection 50 mEq  50 mEq Q HOUR PRN Adriana L Kandis, A.P.N.       • morphine (pf) 4 mg/ml injection 4 mg  4 mg Q HOUR PRN Adriana L Kandis, A.P.N.       • ondansetron (ZOFRAN) syringe/vial injection 4 mg  4 mg Q6HRS PRN Adriana L Kandis, A.P.N.        Or   • prochlorperazine (COMPAZINE) injection 10 mg  10 mg Q6HRS PRN Adriana L Kandis, A.P.N.        Or   • promethazine (PHENERGAN) suppository 25 mg  25 mg Q6HRS PRN Adriana L Kandis, A.P.N.       • acetaminophen (TYLENOL) tablet 650 mg  650 mg Q4HRS PRN Adriana L Kandis, A.P.N.        Or   • acetaminophen (TYLENOL) suppository 650 mg  650 mg Q4HRS PRN Adriana L Kandis, A.P.N.       • mag hydrox-al hydrox-simeth (MAALOX PLUS ES or MYLANTA DS) suspension 30 mL  30 mL Q4HRS PRN Adriana L Kandis, A.P.N.       • diphenhydrAMINE (BENADRYL) tablet/capsule 25 mg  25 mg HS PRN - MR X 1 Adriana L Kandis, A.P.N.       • SODIUM CHLORIDE 0.9 % IV SOLN           • insulin regular human (HUMULIN/NOVOLIN R) 62.5 Units in  mL infusion per protocol  1-6 Units/hr Continuous Anthony Gaffney M.D. 12 mL/hr at 07/06/17 1416 3 Units/hr at 07/06/17 1416    And   • insulin regular (HUMULIN R) injection 0-10 Units  0-10 Units PRN Anthony Gaffney M.D.   2 Units at 07/06/17 1417    And   • dextrose 50% (D50W) injection 25 mL  25 mL PRN Anthony Gaffney M.D.       • insulin lispro (HUMALOG) injection 0-20 Units  0-20 Units PRN Anthony Gaffney M.D.        • aminocaproic acid (AMICAR) 8.95 g in  mL IV Bolus  100 mg/kg Once Anthony Gaffney M.D.   8.95 g at 07/06/17 0600    Followed by   • aminocaproic acid (AMICAR) 5 g in  mL Infusion  2 g/hr Once Anthony Gaffney M.D.   2 g/hr at 07/06/17 0600   • epinephrine 1 mg/mL(1:1000) 4 mg in  mL Infusion  0-0.2 mcg/kg/min Continuous Anthony Gaffney M.D.   0 mcg/kg/min at 07/06/17 0600   • vancomycin 1500 mg/250mL NS IVPB premix  1,500 mg Once Anthony Gaffney M.D.   1,500 mg at 07/06/17 0600     Last reviewed on 7/6/2017 11:14 AM by Van Cooper R.N.    Quality  Measures:  Labs reviewed, Medications reviewed and Radiology images reviewed                      Problems/Plan:  Post-op vent mgt   - CXR, ABG reviewed with RN/RT   - discussed with Anesthesia   - wean vent and extubate on-track  CAD   - CABGx4 7/6   - follow CTs, hemodynamics, CVS protocols   - CVS following  IDDM   - glycemic control  HTN   - BP control  DLD  Will follow in ICU  Discussed patient condition and risk of morbidity and/or mortality with RN, RT and QA team.    The patient remains critically ill.  Critical care time = 33 minutes in directly providing and coordinating critical care and extensive data review.  No time overlap and excludes procedures.     Patient unable to complete

## 2024-01-03 ENCOUNTER — OFFICE VISIT (OUTPATIENT)
Dept: MEDICAL GROUP | Facility: PHYSICIAN GROUP | Age: 84
End: 2024-01-03
Payer: MEDICARE

## 2024-01-03 ENCOUNTER — HOSPITAL ENCOUNTER (OUTPATIENT)
Dept: RADIOLOGY | Facility: MEDICAL CENTER | Age: 84
End: 2024-01-03
Attending: NURSE PRACTITIONER
Payer: MEDICARE

## 2024-01-03 ENCOUNTER — TELEPHONE (OUTPATIENT)
Dept: MEDICAL GROUP | Facility: PHYSICIAN GROUP | Age: 84
End: 2024-01-03

## 2024-01-03 VITALS
HEIGHT: 70 IN | DIASTOLIC BLOOD PRESSURE: 60 MMHG | BODY MASS INDEX: 27.06 KG/M2 | OXYGEN SATURATION: 98 % | HEART RATE: 66 BPM | SYSTOLIC BLOOD PRESSURE: 134 MMHG | WEIGHT: 189 LBS | TEMPERATURE: 97.5 F

## 2024-01-03 DIAGNOSIS — R80.9 TYPE 2 DIABETES MELLITUS WITH MICROALBUMINURIA, WITHOUT LONG-TERM CURRENT USE OF INSULIN (HCC): ICD-10-CM

## 2024-01-03 DIAGNOSIS — I10 ESSENTIAL HYPERTENSION: ICD-10-CM

## 2024-01-03 DIAGNOSIS — I25.118 ATHEROSCLEROSIS OF NATIVE CORONARY ARTERY OF NATIVE HEART WITH STABLE ANGINA PECTORIS (HCC): ICD-10-CM

## 2024-01-03 DIAGNOSIS — N18.31 STAGE 3A CHRONIC KIDNEY DISEASE: ICD-10-CM

## 2024-01-03 DIAGNOSIS — E78.2 MIXED HYPERLIPIDEMIA: ICD-10-CM

## 2024-01-03 DIAGNOSIS — G89.29 CHRONIC RIGHT-SIDED LOW BACK PAIN WITHOUT SCIATICA: ICD-10-CM

## 2024-01-03 DIAGNOSIS — M54.50 CHRONIC RIGHT-SIDED LOW BACK PAIN WITHOUT SCIATICA: ICD-10-CM

## 2024-01-03 DIAGNOSIS — E11.29 TYPE 2 DIABETES MELLITUS WITH MICROALBUMINURIA, WITHOUT LONG-TERM CURRENT USE OF INSULIN (HCC): ICD-10-CM

## 2024-01-03 PROCEDURE — 3078F DIAST BP <80 MM HG: CPT | Performed by: NURSE PRACTITIONER

## 2024-01-03 PROCEDURE — 72100 X-RAY EXAM L-S SPINE 2/3 VWS: CPT

## 2024-01-03 PROCEDURE — 73502 X-RAY EXAM HIP UNI 2-3 VIEWS: CPT | Mod: RT

## 2024-01-03 PROCEDURE — 99214 OFFICE O/P EST MOD 30 MIN: CPT | Performed by: NURSE PRACTITIONER

## 2024-01-03 PROCEDURE — 3075F SYST BP GE 130 - 139MM HG: CPT | Performed by: NURSE PRACTITIONER

## 2024-01-03 ASSESSMENT — PATIENT HEALTH QUESTIONNAIRE - PHQ9: CLINICAL INTERPRETATION OF PHQ2 SCORE: 0

## 2024-01-03 NOTE — TELEPHONE ENCOUNTER
Pt wife came into office stating pts pain is worse since today's visit and is having troubles moving around and is in a lot of pain. Requesting referral to [facility] [phone]. Advised ER if symptoms worsen.

## 2024-01-03 NOTE — TELEPHONE ENCOUNTER
Imaging for lumbar spine and right hip completed, awaiting impressions.  Reviewed message from patient's wife.  During patient's appointment today he does not express any pain or have symptoms.  Will place urgent referral to [facility] per request.  Per MA informed wife that if patient's symptoms worsen patient to go to ER, wife verbalized understanding.

## 2024-01-03 NOTE — PROGRESS NOTES
"Subjective:     CC: Back pain and leg pain    HPI:   Raf presents today with the following:      Back pain and leg pain  The right side lower back pain has been present for years and worse in the last 2 days. The pain is lower near the right hip and is making his right leg \"give out\". He did have 2 falls with the leg \"giving out\", reports no injuries. The right back pain is sharp. No numbness, tingling, shooting pain, bowel or bladder incontinence, saddle anesthesia, hematuria. Aggravating factors include sitting down and standing up initially.       Past Medical History:   Diagnosis Date    Anxiety     Arthritis     CAD (coronary artery disease) 07/06/2017    CABG x 4 (LIMA to distal diagonal, rSVG to distal OM1, rSVG to distal OM2, rSVG to distal LAD) by Dr. Gaffney.    Diabetes (HCC)     oral medication     Hypertension     Inguinal hernia     Mixed hyperlipidemia     Nodular prostate without urinary obstruction     Prostate cancer (HCC)        Social History     Tobacco Use    Smoking status: Never    Smokeless tobacco: Never   Vaping Use    Vaping Use: Never used   Substance Use Topics    Alcohol use: Yes     Alcohol/week: 1.2 oz     Types: 2 Shots of liquor per week     Comment: oz every other week    Drug use: No       Current Outpatient Medications Ordered in Epic   Medication Sig Dispense Refill    donepezil (ARICEPT) 10 MG tablet Take 1 Tablet by mouth every day. 90 Tablet 3    metformin (GLUCOPHAGE) 1000 MG tablet Take 1 Tablet by mouth 2 times a day with meals. 180 Tablet 0    losartan (COZAAR) 50 MG Tab Take 1 Tablet by mouth every day. 90 Tablet 2    metoprolol tartrate (LOPRESSOR) 25 MG Tab Take 1 Tablet by mouth 2 times a day. 180 Tablet 2    atorvastatin (LIPITOR) 80 MG tablet Take 1 Tablet by mouth every day. 90 Tablet 3    ONE TOUCH ULTRA TEST strip TEST BLOOD SUGAR LEVELS EVERY  Strip 3    ONETOUCH DELICA LANCETS FINE Misc USE TO TEST BLOOD SUGARS ONCE DAILY 100 Each 3    Ascorbic Acid " "(VITAMIN C PO) Take  by mouth.      aspirin (ASA) 81 MG Chew Tab chewable tablet Take 81 mg by mouth every day. Indications: post cardiac surgery to prevent blood clots      therapeutic multivitamin-minerals (THERAGRAN-M) Tab Take 1 Tab by mouth every day. Indications: supplement      Glucosamine-Chondroit-Vit C-Mn (GLUCOSAMINE CHONDR 1500 COMPLX) Cap Take 1 Cap by mouth every day. 100 Cap 3    Cholecalciferol (VITAMIN D3) 1000 UNIT TABS Take 1 Tab by mouth every day. 100 Tab 3     No current Epic-ordered facility-administered medications on file.       Allergies:  Other environmental    Health Maintenance: Reviewed DM labs ordered.       Objective:     Vital signs reviewed  Exam:  /60 (BP Location: Right arm, Patient Position: Sitting, BP Cuff Size: Adult)   Pulse 66   Temp 36.4 °C (97.5 °F) (Temporal)   Ht 1.778 m (5' 10\")   Wt 85.7 kg (189 lb)   SpO2 98%   BMI 27.12 kg/m²  Body mass index is 27.12 kg/m².    Gen: Alert and oriented, No apparent distress.  Neck: Neck is supple, full ROM.  Lungs: Normal effort, no audible wheezes  CV: Skin pink, warm and dry.  Ext: No clubbing, cyanosis, edema. RLE and LLE strength equal 5/5.  No pain on palpation to spinous processes or paraspinal muscles.  Right hip:  No pain on palpation to hip and surrounding musculature. ROM intact. Negative JENNIFER and FADIR. Negative log roll test. Negative straight leg test.   Left hip:   No pain on palpation to hip and surrounding musculature. ROM intact. Negative JENNIFER and FADIR. Negative log roll test. Negative straight leg test.       Assessment & Plan:     83 y.o. male with the following -     1. Chronic right-sided low back pain without sciatica  Chronic exacerbated problem.  No pain able to be reproduced today on exam of his back or hip.  No antalgic gait.  Will check updated lumbar imaging and hip imaging.  Start conservative treatment with physical therapy.  - DX-LUMBAR SPINE-2 OR 3 VIEWS; Future  - DX-HIP-COMPLETE - " UNILATERAL 2+ RIGHT; Future  - Referral to Physical Therapy    2. Essential hypertension  Chronic stable problem.  Blood pressure is at goal today.  Continue metoprolol 25 mg twice daily and losartan 50 mg daily.   - Comp Metabolic Panel; Future    3. Stage 3a chronic kidney disease (HCC)  Chronic stable problem.  Due for updated GFR.  Continue losartan 50 mg daily.  - Comp Metabolic Panel; Future    4. Type 2 diabetes mellitus with microalbuminuria, without long-term current use of insulin (HCC)  Chronic stable problem.  Continue metformin at 1000 mg twice daily.  Continue diet control.  Continue with home glucose monitoring.  Due for updated health maintenance labs.  - Hemoglobin A1c; Future  - Lipid Profile; Future  - Microalbumin Creat Ratio Urine - Lab Collect; Future    5. Mixed hyperlipidemia  Chronic stable problem.  Continue atorvastatin 80 mg daily and aspirin 81 mg daily.  Due for updated lipid panel.  - Lipid Profile; Future    6. 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  Continue atorvastatin 80 mg daily and aspirin 81 mg daily.      HCC Gap Form    Diagnosis to address: N18.31 - Stage 3a chronic kidney disease (HCC)  Assessment and plan: Chronic, stable. Continue with current defined treatment plan: due for updated labs and continues losartan 50 mg daily. Follow-up at least annually.  Diagnosis: E11.29, R80.9 - Type 2 diabetes mellitus with microalbuminuria, without long-term current use of insulin ([facility])  Assessment and plan: Chronic, stable. Continue with current defined treatment plan: continue metformin 1,000 mg twice daily and checking blood sugars. Health maintenance labs ordered. Follow-up at least annually.  Diagnosis: I25.118 - Atherosclerosis of native coronary artery of native heart with stable angina pectoris (HCC)  Assessment and plan: Chronic, stable. Continue with current defined treatment plan: atorvastatin 80 mg  daily and aspirin 81 mg daily. Follow-up at least annually.  Last edited 01/03/24 12:44 PST by ADELITA Fermin.           Return in about 2 months (around 3/3/2024) for Diabetes, Labs, back pain .    Please note that this dictation was created using voice recognition software. I have made every reasonable attempt to correct obvious errors, but I expect that there are errors of grammar and possibly content that I did not discover before finalizing the note.

## 2024-01-12 ENCOUNTER — OFFICE VISIT (OUTPATIENT)
Dept: CARDIOLOGY | Facility: MEDICAL CENTER | Age: 84
End: 2024-01-12
Attending: INTERNAL MEDICINE
Payer: MEDICARE

## 2024-01-12 VITALS
WEIGHT: 188.2 LBS | HEART RATE: 84 BPM | SYSTOLIC BLOOD PRESSURE: 130 MMHG | OXYGEN SATURATION: 96 % | BODY MASS INDEX: 26.94 KG/M2 | HEIGHT: 70 IN | DIASTOLIC BLOOD PRESSURE: 72 MMHG

## 2024-01-12 DIAGNOSIS — I35.0 NONRHEUMATIC AORTIC VALVE STENOSIS: ICD-10-CM

## 2024-01-12 DIAGNOSIS — E78.2 MIXED HYPERLIPIDEMIA: ICD-10-CM

## 2024-01-12 DIAGNOSIS — I10 ESSENTIAL HYPERTENSION: ICD-10-CM

## 2024-01-12 DIAGNOSIS — Z95.1 S/P CABG X 4: ICD-10-CM

## 2024-01-12 DIAGNOSIS — I25.119 CORONARY ARTERY DISEASE INVOLVING NATIVE CORONARY ARTERY OF NATIVE HEART WITH ANGINA PECTORIS (HCC): ICD-10-CM

## 2024-01-12 PROCEDURE — 3078F DIAST BP <80 MM HG: CPT | Performed by: INTERNAL MEDICINE

## 2024-01-12 PROCEDURE — 99213 OFFICE O/P EST LOW 20 MIN: CPT | Performed by: INTERNAL MEDICINE

## 2024-01-12 PROCEDURE — 3075F SYST BP GE 130 - 139MM HG: CPT | Performed by: INTERNAL MEDICINE

## 2024-01-12 NOTE — PROGRESS NOTES
Chief Complaint   Patient presents with    Coronary Artery Disease     Dx: Coronary artery disease involving native coronary artery of native heart with angina pectoris (HCC)       Subjective:   Raf Pelayo is an 83 y.o. male who presents today for follow-up of CAD status post CABG times 4 July 2017, hypertension and diabetes.      Since last visit no issues doing well slight twinge of brief chest discomfort without recurrence many months ago.    Past Medical History:   Diagnosis Date    Anxiety     Arthritis     CAD (coronary artery disease) 07/06/2017    CABG x 4 (LIMA to distal diagonal, rSVG to distal OM1, rSVG to distal OM2, rSVG to distal LAD) by Dr. Gaffney.    Diabetes (HCC)     oral medication     Hypertension     Inguinal hernia     Mixed hyperlipidemia     Nodular prostate without urinary obstruction     Prostate cancer (HCC)      Past Surgical History:   Procedure Laterality Date    ROHINI  7/6/2017    Procedure: ROHINI - INTRAOPERATIVE ;  Surgeon: Anthony Gaffney M.D.;  Location: SURGERY [facility];  Service:     MULTIPLE CORONARY ARTERY BYPASS ENDO VEIN HARVEST  7/6/2017    Procedure: MULTIPLE CORONARY ARTERY BYPASS ENDO VEIN HARVEST X2-4, PREVENA DRESSING ;  Surgeon: Anthony Gaffney M.D.;  Location: SURGERY [facility];  Service:     APPENDECTOMY  1955    TONSILLECTOMY       Family History   Problem Relation Age of Onset    Stroke Mother     Heart Disease Father     Heart Disease Brother      Social History     Socioeconomic History    Marital status:      Spouse name: Not on file    Number of children: Not on file    Years of education: Not on file    Highest education level: Not on file   Occupational History    Not on file   Tobacco Use    Smoking status: Never    Smokeless tobacco: Never   Vaping Use    Vaping Use: Never used   Substance and Sexual Activity    Alcohol use: Yes     Alcohol/week: 1.2 oz     Types: 2 Shots of liquor per week     Comment: oz every other week     Drug use: No    Sexual activity: Never     Partners: Female     Birth control/protection: Post-Menopausal   Other Topics Concern    Not on file   Social History Narrative    Not on file     Social Determinants of Health     Financial Resource Strain: Not on file   Food Insecurity: Not on file   Transportation Needs: Not on file   Physical Activity: Not on file   Stress: Not on file   Social Connections: Not on file   Intimate Partner Violence: Not on file   Housing Stability: Not on file     Allergies   Allergen Reactions    Other Environmental Runny Nose     SAW DUST     Outpatient Encounter Medications as of 1/12/2024   Medication Sig Dispense Refill    donepezil (ARICEPT) 10 MG tablet Take 1 Tablet by mouth every day. 90 Tablet 3    metformin (GLUCOPHAGE) 1000 MG tablet Take 1 Tablet by mouth 2 times a day with meals. 180 Tablet 0    losartan (COZAAR) 50 MG Tab Take 1 Tablet by mouth every day. 90 Tablet 2    metoprolol tartrate (LOPRESSOR) 25 MG Tab Take 1 Tablet by mouth 2 times a day. 180 Tablet 2    atorvastatin (LIPITOR) 80 MG tablet Take 1 Tablet by mouth every day. 90 Tablet 3    ONE TOUCH ULTRA TEST strip TEST BLOOD SUGAR LEVELS EVERY  Strip 3    ONETOUCH DELICA LANCETS FINE Misc USE TO TEST BLOOD SUGARS ONCE DAILY 100 Each 3    Ascorbic Acid (VITAMIN C PO) Take  by mouth.      aspirin (ASA) 81 MG Chew Tab chewable tablet Take 81 mg by mouth every day. Indications: post cardiac surgery to prevent blood clots      therapeutic multivitamin-minerals (THERAGRAN-M) Tab Take 1 Tab by mouth every day. Indications: supplement      Glucosamine-Chondroit-Vit C-Mn (GLUCOSAMINE CHONDR 1500 COMPLX) Cap Take 1 Cap by mouth every day. 100 Cap 3    Cholecalciferol (VITAMIN D3) 1000 UNIT TABS Take 1 Tab by mouth every day. 100 Tab 3     No facility-administered encounter medications on file as of 1/12/2024.     Review of Systems   All other systems reviewed and are negative.       Objective:   /72 (BP  "Location: Left arm, Patient Position: Sitting, BP Cuff Size: Adult)   Pulse 84   Ht 1.778 m (5' 10\")   Wt 85.4 kg (188 lb 3.2 oz)   SpO2 96%   BMI 27.00 kg/m²     Physical Exam  Vitals reviewed.   Constitutional:       General: He is not in acute distress.     Appearance: He is well-developed. He is not diaphoretic.   HENT:      Head: Normocephalic and atraumatic.      Right Ear: External ear normal.      Left Ear: External ear normal.   Eyes:      General: No scleral icterus.        Right eye: No discharge.         Left eye: No discharge.      Conjunctiva/sclera: Conjunctivae normal.      Pupils: Pupils are equal, round, and reactive to light.   Neck:      Thyroid: No thyromegaly.      Vascular: No JVD.      Trachea: No tracheal deviation.   Cardiovascular:      Rate and Rhythm: Normal rate and regular rhythm.      Chest Wall: PMI is not displaced.      Pulses:           Carotid pulses are 2+ on the right side and 2+ on the left side.       Radial pulses are 2+ on the left side.        Popliteal pulses are 2+ on the right side and 2+ on the left side.        Dorsalis pedis pulses are 2+ on the right side and 2+ on the left side.        Posterior tibial pulses are 2+ on the right side and 2+ on the left side.      Heart sounds: Murmur ( 2/6 systolic ejection murmur left upper sternal border) heard.      No friction rub. No gallop.   Pulmonary:      Effort: Pulmonary effort is normal. No respiratory distress.      Breath sounds: Normal breath sounds. No wheezing or rales.   Chest:      Chest wall: No tenderness.   Abdominal:      General: Bowel sounds are normal. There is no distension.      Palpations: Abdomen is soft.      Tenderness: There is no abdominal tenderness.   Musculoskeletal:         General: No tenderness or deformity. Normal range of motion.      Cervical back: Normal range of motion and neck supple.   Skin:     General: Skin is warm and dry.      Coloration: Skin is not pale.      Findings: No " "erythema or rash.   Neurological:      Mental Status: He is alert and oriented to person, place, and time.      Cranial Nerves: No cranial nerve deficit (cranial nerves II through XII grossly intact).      Coordination: Coordination normal.   Psychiatric:         Behavior: Behavior normal.         Thought Content: Thought content normal.       LABS:  Lab Results   Component Value Date/Time    CHOLSTRLTOT 123 01/27/2023 07:13 AM    LDL 70 01/27/2023 07:13 AM    HDL 29 (A) 01/27/2023 07:13 AM    TRIGLYCERIDE 118 01/27/2023 07:13 AM       Lab Results   Component Value Date/Time    WBC 7.7 07/02/2019 07:32 AM    RBC 4.52 (L) 07/02/2019 07:32 AM    HEMOGLOBIN 13.7 (L) 07/02/2019 07:32 AM    HEMATOCRIT 43.9 07/02/2019 07:32 AM    MCV 97.1 07/02/2019 07:32 AM    NEUTSPOLYS 71.10 07/02/2019 07:32 AM    LYMPHOCYTES 15.40 (L) 07/02/2019 07:32 AM    MONOCYTES 9.70 07/02/2019 07:32 AM    EOSINOPHILS 2.60 07/02/2019 07:32 AM    BASOPHILS 0.80 07/02/2019 07:32 AM    HYPOCHROMIA 1+ 02/19/2013 10:35 AM     Lab Results   Component Value Date/Time    SODIUM 142 08/16/2023 02:43 PM    POTASSIUM 5.0 08/16/2023 02:43 PM    CHLORIDE 107 08/16/2023 02:43 PM    CO2 22 08/16/2023 02:43 PM    GLUCOSE 131 (H) 08/16/2023 02:43 PM    BUN 20 08/16/2023 02:43 PM    CREATININE 1.23 08/16/2023 02:43 PM    CREATININE 1.0 10/27/2008 08:20 AM     Lab Results   Component Value Date    HBA1C 7.3 (H) 07/24/2023      Lab Results   Component Value Date/Time    ALKPHOSPHAT 73 01/27/2023 07:13 AM    ASTSGOT 16 01/27/2023 07:13 AM    ALTSGPT 19 01/27/2023 07:13 AM    TBILIRUBIN 0.5 01/27/2023 07:13 AM      Lab Results   Component Value Date/Time    BNPBTYPENAT 52 08/16/2017 10:13 AM      No results found for: \"TSH\"  Lab Results   Component Value Date/Time    PROTHROMBTM 16.7 (H) 07/06/2017 12:27 PM    INR 1.31 (H) 07/06/2017 12:27 PM        Imaging reviewed    Assessment:     1. S/P CABG x 4        2. Coronary artery disease involving native coronary artery " of native heart with angina pectoris (HCC)  Comp Metabolic Panel    Lipid Profile      3. Nonrheumatic aortic valve stenosis  EC-ECHOCARDIOGRAM COMPLETE W/O CONT      4. Mixed hyperlipidemia  Comp Metabolic Panel    Lipid Profile      5. Essential hypertension            Medical Decision Making:  Today's Assessment / Status / Plan:     Due for lipid profile and CMP goal LDL less than 70 closer to 50.  Due for echocardiogram to follow aortic stenosis which has been previously mild.  We discussed diet lifestyle and as well as his other medications.  No medication changes at this time.  Follow-up routinely.

## 2024-01-19 ENCOUNTER — HOSPITAL ENCOUNTER (OUTPATIENT)
Dept: LAB | Facility: MEDICAL CENTER | Age: 84
End: 2024-01-19
Attending: NURSE PRACTITIONER
Payer: MEDICARE

## 2024-01-19 DIAGNOSIS — E78.2 MIXED HYPERLIPIDEMIA: ICD-10-CM

## 2024-01-19 DIAGNOSIS — R80.9 TYPE 2 DIABETES MELLITUS WITH MICROALBUMINURIA, WITHOUT LONG-TERM CURRENT USE OF INSULIN (HCC): ICD-10-CM

## 2024-01-19 DIAGNOSIS — N18.31 STAGE 3A CHRONIC KIDNEY DISEASE: ICD-10-CM

## 2024-01-19 DIAGNOSIS — I10 ESSENTIAL HYPERTENSION: ICD-10-CM

## 2024-01-19 DIAGNOSIS — E11.29 TYPE 2 DIABETES MELLITUS WITH MICROALBUMINURIA, WITHOUT LONG-TERM CURRENT USE OF INSULIN (HCC): ICD-10-CM

## 2024-01-19 LAB
ALBUMIN SERPL BCP-MCNC: 4.3 G/DL (ref 3.2–4.9)
ALBUMIN/GLOB SERPL: 1.7 G/DL
ALP SERPL-CCNC: 77 U/L (ref 30–99)
ALT SERPL-CCNC: 37 U/L (ref 2–50)
ANION GAP SERPL CALC-SCNC: 12 MMOL/L (ref 7–16)
AST SERPL-CCNC: 23 U/L (ref 12–45)
BILIRUB SERPL-MCNC: 0.4 MG/DL (ref 0.1–1.5)
BUN SERPL-MCNC: 22 MG/DL (ref 8–22)
CALCIUM ALBUM COR SERPL-MCNC: 9.1 MG/DL (ref 8.5–10.5)
CALCIUM SERPL-MCNC: 9.3 MG/DL (ref 8.5–10.5)
CHLORIDE SERPL-SCNC: 103 MMOL/L (ref 96–112)
CHOLEST SERPL-MCNC: 119 MG/DL (ref 100–199)
CO2 SERPL-SCNC: 22 MMOL/L (ref 20–33)
CREAT SERPL-MCNC: 1.09 MG/DL (ref 0.5–1.4)
CREAT UR-MCNC: 53.41 MG/DL
EST. AVERAGE GLUCOSE BLD GHB EST-MCNC: 180 MG/DL
FASTING STATUS PATIENT QL REPORTED: NORMAL
GFR SERPLBLD CREATININE-BSD FMLA CKD-EPI: 67 ML/MIN/1.73 M 2
GLOBULIN SER CALC-MCNC: 2.5 G/DL (ref 1.9–3.5)
GLUCOSE SERPL-MCNC: 117 MG/DL (ref 65–99)
HBA1C MFR BLD: 7.9 % (ref 4–5.6)
HDLC SERPL-MCNC: 35 MG/DL
LDLC SERPL CALC-MCNC: 59 MG/DL
MICROALBUMIN UR-MCNC: 2.2 MG/DL
MICROALBUMIN/CREAT UR: 41 MG/G (ref 0–30)
POTASSIUM SERPL-SCNC: 5.1 MMOL/L (ref 3.6–5.5)
PROT SERPL-MCNC: 6.8 G/DL (ref 6–8.2)
SODIUM SERPL-SCNC: 137 MMOL/L (ref 135–145)
TRIGL SERPL-MCNC: 123 MG/DL (ref 0–149)

## 2024-01-19 PROCEDURE — 80061 LIPID PANEL: CPT

## 2024-01-19 PROCEDURE — 82570 ASSAY OF URINE CREATININE: CPT

## 2024-01-19 PROCEDURE — 80053 COMPREHEN METABOLIC PANEL: CPT

## 2024-01-19 PROCEDURE — 82043 UR ALBUMIN QUANTITATIVE: CPT

## 2024-01-19 PROCEDURE — 83036 HEMOGLOBIN GLYCOSYLATED A1C: CPT | Mod: GA

## 2024-01-19 PROCEDURE — 36415 COLL VENOUS BLD VENIPUNCTURE: CPT

## 2024-01-23 ENCOUNTER — TELEPHONE (OUTPATIENT)
Dept: CARDIOLOGY | Facility: MEDICAL CENTER | Age: 84
End: 2024-01-23

## 2024-01-23 ENCOUNTER — HOSPITAL ENCOUNTER (OUTPATIENT)
Dept: CARDIOLOGY | Facility: MEDICAL CENTER | Age: 84
End: 2024-01-23
Attending: INTERNAL MEDICINE
Payer: MEDICARE

## 2024-01-23 DIAGNOSIS — I35.0 AORTIC STENOSIS, MODERATE: ICD-10-CM

## 2024-01-23 DIAGNOSIS — I35.0 NONRHEUMATIC AORTIC VALVE STENOSIS: ICD-10-CM

## 2024-01-23 LAB
LV EJECT FRACT  99904: 60
LV EJECT FRACT MOD 2C 99903: 47.86
LV EJECT FRACT MOD 4C 99902: 58.29
LV EJECT FRACT MOD BP 99901: 53.66

## 2024-01-23 PROCEDURE — 93306 TTE W/DOPPLER COMPLETE: CPT | Mod: 26 | Performed by: INTERNAL MEDICINE

## 2024-01-23 PROCEDURE — 93306 TTE W/DOPPLER COMPLETE: CPT

## 2024-01-23 NOTE — TELEPHONE ENCOUNTER
Please also see echo result message sent to pt dated 1/23.     Echo ordered with expected date in 6 months.     To valve team, robert for surveillance.

## 2024-01-25 ENCOUNTER — DOCUMENTATION (OUTPATIENT)
Dept: CARDIOLOGY | Facility: MEDICAL CENTER | Age: 84
End: 2024-01-25
Payer: MEDICARE

## 2024-01-25 DIAGNOSIS — Z95.1 S/P CABG X 4: ICD-10-CM

## 2024-01-25 DIAGNOSIS — I25.119 CORONARY ARTERY DISEASE INVOLVING NATIVE CORONARY ARTERY OF NATIVE HEART WITH ANGINA PECTORIS (HCC): ICD-10-CM

## 2024-01-26 NOTE — PROGRESS NOTES
This patient has been flagged through our echocardiogram surveillance with the following echocardiogram results:    Moderate Aortic Stenosis    Cardiologist: Dr. Mitchell    Current Plan of Care for Structural Heart Disease: Added to surveillance tracking list    Next Echo date needed by: July 2024 (per Dr. Mitchell)    Next Follow up appointment needed by: 1/12/2025

## 2024-02-05 DIAGNOSIS — I10 ESSENTIAL HYPERTENSION: ICD-10-CM

## 2024-02-05 NOTE — TELEPHONE ENCOUNTER
Is the patient due for a refill? Yes    Was the patient seen the past year? Yes    Date of last office visit: 01/12/2024    Does the patient have an upcoming appointment?  No   If yes, When?     Provider to refill:TW    Does the patients insurance require a 100 day supply?  No    
0 = independent

## 2024-02-06 RX ORDER — LOSARTAN POTASSIUM 50 MG/1
50 TABLET ORAL
Qty: 90 TABLET | Refills: 3 | Status: SHIPPED | OUTPATIENT
Start: 2024-02-06

## 2024-02-21 DIAGNOSIS — E11.29 TYPE 2 DIABETES MELLITUS WITH MICROALBUMINURIA, WITHOUT LONG-TERM CURRENT USE OF INSULIN (HCC): ICD-10-CM

## 2024-02-21 DIAGNOSIS — R80.9 TYPE 2 DIABETES MELLITUS WITH MICROALBUMINURIA, WITHOUT LONG-TERM CURRENT USE OF INSULIN (HCC): ICD-10-CM

## 2024-02-21 NOTE — TELEPHONE ENCOUNTER
Requested Prescriptions     Signed Prescriptions Disp Refills    metformin (GLUCOPHAGE) 1000 MG tablet 180 Tablet 3     Sig: Take 1 Tablet by mouth 2 times a day with meals.     Authorizing Provider: PETER PAZ A.P.R.N.

## 2024-02-21 NOTE — TELEPHONE ENCOUNTER
Received request via: Pharmacy    Was the patient seen in the last year in this department? Yes    Does the patient have an active prescription (recently filled or refills available) for medication(s) requested? No    [facility]    Does the patient have [facility] Plus and need 100 day supply (blood pressure, diabetes and cholesterol meds only)? Patient does not have SCP

## 2024-02-22 DIAGNOSIS — E78.2 MIXED HYPERLIPIDEMIA: ICD-10-CM

## 2024-02-22 RX ORDER — ATORVASTATIN CALCIUM 80 MG/1
80 TABLET, FILM COATED ORAL
Qty: 90 TABLET | Refills: 3 | Status: SHIPPED | OUTPATIENT
Start: 2024-02-22

## 2024-02-22 NOTE — TELEPHONE ENCOUNTER
Received request via: Patient    Was the patient seen in the last year in this department? Yes    Does the patient have an active prescription (recently filled or refills available) for medication(s) requested? No    [facility]    Does the patient have skilled nursing Plus and need 100 day supply (blood pressure, diabetes and cholesterol meds only)? Patient does not have SCP

## 2024-02-22 NOTE — TELEPHONE ENCOUNTER
Requested Prescriptions     Signed Prescriptions Disp Refills    atorvastatin (LIPITOR) 80 MG tablet 90 Tablet 3     Sig: Take 1 Tablet by mouth every day.     Authorizing Provider: PETER PAZ A.P.R.N.

## 2024-03-05 ENCOUNTER — OFFICE VISIT (OUTPATIENT)
Dept: MEDICAL GROUP | Facility: PHYSICIAN GROUP | Age: 84
End: 2024-03-05
Payer: MEDICARE

## 2024-03-05 VITALS
TEMPERATURE: 96.6 F | BODY MASS INDEX: 26.77 KG/M2 | HEART RATE: 80 BPM | DIASTOLIC BLOOD PRESSURE: 62 MMHG | HEIGHT: 70 IN | WEIGHT: 187 LBS | SYSTOLIC BLOOD PRESSURE: 116 MMHG | OXYGEN SATURATION: 96 %

## 2024-03-05 DIAGNOSIS — R80.9 TYPE 2 DIABETES MELLITUS WITH MICROALBUMINURIA, WITHOUT LONG-TERM CURRENT USE OF INSULIN (HCC): ICD-10-CM

## 2024-03-05 DIAGNOSIS — I10 ESSENTIAL HYPERTENSION: ICD-10-CM

## 2024-03-05 DIAGNOSIS — E11.29 TYPE 2 DIABETES MELLITUS WITH MICROALBUMINURIA, WITHOUT LONG-TERM CURRENT USE OF INSULIN (HCC): ICD-10-CM

## 2024-03-05 DIAGNOSIS — E78.2 MIXED HYPERLIPIDEMIA: ICD-10-CM

## 2024-03-05 PROCEDURE — 99214 OFFICE O/P EST MOD 30 MIN: CPT | Performed by: NURSE PRACTITIONER

## 2024-03-05 PROCEDURE — 3074F SYST BP LT 130 MM HG: CPT | Performed by: NURSE PRACTITIONER

## 2024-03-05 PROCEDURE — 3078F DIAST BP <80 MM HG: CPT | Performed by: NURSE PRACTITIONER

## 2024-03-05 RX ORDER — EMPAGLIFLOZIN 10 MG/1
10 TABLET, FILM COATED ORAL DAILY
Qty: 90 TABLET | Refills: 1 | Status: SHIPPED | OUTPATIENT
Start: 2024-03-05 | End: 2024-03-11

## 2024-03-05 NOTE — PROGRESS NOTES
Subjective:     CC: lab results     HPI:   Raf presents today with the following:    Type 2 diabetes mellitus with microalbuminuria, without long-term current use of insulin (HCC)  Previous A1c 7.3% and most recent A1c has increased to 7.9%.  He continues with metformin at 1000 mg twice daily. Home blood sugars are in the morning and 116-156.  Urine microalbumin elevated at 41.  He is on losartan.  Plan to add Jardiance.    Essential hypertension  Blood pressure today 116/62. Home blood pressures are 128-154/60-78. Continues losartan 50 mg daily and metoprolol 25 mg twice daily. Denies chest pain, shortness of breath, dizziness or headaches.  Recent CMP WNL.    Mixed hyperlipidemia  Recent labs show controlled cholesterol.  Patient continues atorvastatin 80 mg daily.  Cardiology would like LDL to be less than 70 and closer to 50.      Past Medical History:   Diagnosis Date    Anxiety     Arthritis     CAD (coronary artery disease) 07/06/2017    CABG x 4 (LIMA to distal diagonal, rSVG to distal OM1, rSVG to distal OM2, rSVG to distal LAD) by Dr. Gaffney.    Diabetes (HCC)     oral medication     Hypertension     Inguinal hernia     Mixed hyperlipidemia     Nodular prostate without urinary obstruction     Prostate cancer (HCC)        Social History     Tobacco Use    Smoking status: Never    Smokeless tobacco: Never   Vaping Use    Vaping Use: Never used   Substance Use Topics    Alcohol use: Yes     Alcohol/week: 1.2 oz     Types: 2 Shots of liquor per week     Comment: oz every other week    Drug use: No       Current Outpatient Medications Ordered in Epic   Medication Sig Dispense Refill    Empagliflozin (JARDIANCE) 10 MG Tab tablet Take 1 Tablet by mouth every day. 90 Tablet 1    atorvastatin (LIPITOR) 80 MG tablet Take 1 Tablet by mouth every day. 90 Tablet 3    metformin (GLUCOPHAGE) 1000 MG tablet Take 1 Tablet by mouth 2 times a day with meals. 180 Tablet 3    losartan (COZAAR) 50 MG Tab Take 1 Tablet by  "mouth every day. 90 Tablet 3    metoprolol tartrate (LOPRESSOR) 25 MG Tab Take 1 Tablet by mouth 2 times a day. 180 Tablet 2    donepezil (ARICEPT) 10 MG tablet Take 1 Tablet by mouth every day. 90 Tablet 3    ONE TOUCH ULTRA TEST strip TEST BLOOD SUGAR LEVELS EVERY  Strip 3    ONETOUCH DELICA LANCETS FINE Misc USE TO TEST BLOOD SUGARS ONCE DAILY 100 Each 3    Ascorbic Acid (VITAMIN C PO) Take  by mouth.      aspirin (ASA) 81 MG Chew Tab chewable tablet Take 81 mg by mouth every day. Indications: post cardiac surgery to prevent blood clots      therapeutic multivitamin-minerals (THERAGRAN-M) Tab Take 1 Tab by mouth every day. Indications: supplement      Glucosamine-Chondroit-Vit C-Mn (GLUCOSAMINE CHONDR 1500 COMPLX) Cap Take 1 Cap by mouth every day. 100 Cap 3    Cholecalciferol (VITAMIN D3) 1000 UNIT TABS Take 1 Tab by mouth every day. 100 Tab 3     No current Epic-ordered facility-administered medications on file.       Allergies:  Other environmental    Health Maintenance: Due for Tdap.      Objective:     Vital signs reviewed  Exam:  /62 (BP Location: Left arm, Patient Position: Sitting, BP Cuff Size: Adult)   Pulse 80   Temp 35.9 °C (96.6 °F) (Temporal)   Ht 1.778 m (5' 10\")   Wt 84.8 kg (187 lb)   SpO2 96%   BMI 26.83 kg/m²  Body mass index is 26.83 kg/m².    Gen: Alert and oriented, No apparent distress.  Eyes:   Lids normal. Glasses in place.   Lungs: Normal effort, no audible wheezes  CV: Skin pink, warm and dry.  Ext: No clubbing, cyanosis, edema.      Assessment & Plan:     83 y.o. male with the following -     Discussed and reviewed lab results from 1/19/2024.     1. Type 2 diabetes mellitus with microalbuminuria, without long-term current use of insulin (HCC)  Chronic exacerbated problem.  [place] discussion today to add second medication.  Will add Jardiance 10 mg daily.  He will continue with metformin 1000 mg twice daily.  Provided him with my [employer]As direct line to let us know if the " Jardiance is affordable we will need to do a PA.  Repeat A1c in 3 months.  - Empagliflozin (JARDIANCE) 10 MG Tab tablet; Take 1 Tablet by mouth every day.  Dispense: 90 Tablet; Refill: 1    2. Essential hypertension  Chronic stable problem.  Blood pressure is at goal today.  Continue losartan 50 mg daily and metoprolol 25 mg twice daily.  Continue home blood pressure monitoring.    3. Mixed hyperlipidemia  Chronic stable problem.  Continue atorvastatin 80 mg daily.  Continue to work on healthy diet and exercise.        Return in about 3 months (around 6/5/2024) for Diabetes, A1C.    Please note that this dictation was created using voice recognition software. I have made every reasonable attempt to correct obvious errors, but I expect that there are errors of grammar and possibly content that I did not discover before finalizing the note.

## 2024-03-05 NOTE — ASSESSMENT & PLAN NOTE
Blood pressure today 116/62. Home blood pressures are 128-154/60-78. Continues losartan 50 mg daily and metoprolol 25 mg twice daily. Denies chest pain, shortness of breath, dizziness or headaches.  Recent CMP WNL.

## 2024-03-05 NOTE — ASSESSMENT & PLAN NOTE
Previous A1c 7.3% and most recent A1c has increased to 7.9%.  He continues with metformin at 1000 mg twice daily. Home blood sugars are in the morning and 116-156.  Urine microalbumin elevated at 41.  He is on losartan.  Plan to add Jardiance.

## 2024-03-06 NOTE — ASSESSMENT & PLAN NOTE
Recent labs show controlled cholesterol.  Patient continues atorvastatin 80 mg daily.  Cardiology would like LDL to be less than 70 and closer to 50.

## 2024-03-07 ENCOUNTER — PATIENT MESSAGE (OUTPATIENT)
Dept: MEDICAL GROUP | Facility: PHYSICIAN GROUP | Age: 84
End: 2024-03-07
Payer: MEDICARE

## 2024-03-07 DIAGNOSIS — E11.29 TYPE 2 DIABETES MELLITUS WITH MICROALBUMINURIA, WITHOUT LONG-TERM CURRENT USE OF INSULIN (HCC): ICD-10-CM

## 2024-03-07 DIAGNOSIS — R80.9 TYPE 2 DIABETES MELLITUS WITH MICROALBUMINURIA, WITHOUT LONG-TERM CURRENT USE OF INSULIN (HCC): ICD-10-CM

## 2024-03-11 RX ORDER — PIOGLITAZONEHYDROCHLORIDE 15 MG/1
15 TABLET ORAL DAILY
Qty: 90 TABLET | Refills: 1 | Status: SHIPPED | OUTPATIENT
Start: 2024-03-11

## 2024-04-17 ENCOUNTER — APPOINTMENT (RX ONLY)
Dept: URBAN - METROPOLITAN AREA CLINIC 22 | Facility: CLINIC | Age: 84
Setting detail: DERMATOLOGY
End: 2024-04-17

## 2024-04-17 DIAGNOSIS — Z71.89 OTHER SPECIFIED COUNSELING: ICD-10-CM

## 2024-04-17 DIAGNOSIS — L57.0 ACTINIC KERATOSIS: ICD-10-CM

## 2024-04-17 DIAGNOSIS — L82.1 OTHER SEBORRHEIC KERATOSIS: ICD-10-CM

## 2024-04-17 DIAGNOSIS — D22 MELANOCYTIC NEVI: ICD-10-CM

## 2024-04-17 DIAGNOSIS — D18.0 HEMANGIOMA: ICD-10-CM

## 2024-04-17 DIAGNOSIS — L81.4 OTHER MELANIN HYPERPIGMENTATION: ICD-10-CM

## 2024-04-17 DIAGNOSIS — Z86.007 PERSONAL HISTORY OF IN-SITU NEOPLASM OF SKIN: ICD-10-CM

## 2024-04-17 PROBLEM — D22.5 MELANOCYTIC NEVI OF TRUNK: Status: ACTIVE | Noted: 2024-04-17

## 2024-04-17 PROBLEM — D18.01 HEMANGIOMA OF SKIN AND SUBCUTANEOUS TISSUE: Status: ACTIVE | Noted: 2024-04-17

## 2024-04-17 PROCEDURE — 99213 OFFICE O/P EST LOW 20 MIN: CPT | Mod: 25

## 2024-04-17 PROCEDURE — ? COUNSELING

## 2024-04-17 PROCEDURE — ? SUNSCREEN RECOMMENDATIONS

## 2024-04-17 PROCEDURE — ? LIQUID NITROGEN

## 2024-04-17 PROCEDURE — 17000 DESTRUCT PREMALG LESION: CPT

## 2024-04-17 ASSESSMENT — LOCATION ZONE DERM
LOCATION ZONE: TRUNK
LOCATION ZONE: FACE

## 2024-04-17 ASSESSMENT — LOCATION DETAILED DESCRIPTION DERM
LOCATION DETAILED: RIGHT LATERAL SUPERIOR CHEST
LOCATION DETAILED: LEFT CENTRAL MALAR CHEEK
LOCATION DETAILED: LEFT SUPERIOR MEDIAL UPPER BACK
LOCATION DETAILED: EPIGASTRIC SKIN
LOCATION DETAILED: RIGHT SUPERIOR MEDIAL MIDBACK

## 2024-04-17 ASSESSMENT — LOCATION SIMPLE DESCRIPTION DERM
LOCATION SIMPLE: LEFT UPPER BACK
LOCATION SIMPLE: CHEST
LOCATION SIMPLE: ABDOMEN
LOCATION SIMPLE: RIGHT LOWER BACK
LOCATION SIMPLE: LEFT CHEEK

## 2024-05-06 ENCOUNTER — TELEPHONE (OUTPATIENT)
Dept: CARDIOLOGY | Facility: MEDICAL CENTER | Age: 84
End: 2024-05-06
Payer: MEDICARE

## 2024-05-06 NOTE — TELEPHONE ENCOUNTER
Patient in Structural Heart Moderate Surveillance Program and has not had their 6 month follow up echo. Order previously placed. Please notify patient to schedule imaging.    Echo needed by: July 2024    Office visit needed by: January 2025    Primary Cardiologist: Dr. Mitchell    RN notified: Neena

## 2024-05-07 NOTE — TELEPHONE ENCOUNTER
Phone Number Called: [phone]     Call outcome:  Spoke to pt's wife Estefanía    Message: Reminded to make appt for surveillance echo. Forence verbalized understanding and states she will call imaging today to schedule. Imaging phone number provided.

## 2024-05-16 NOTE — TELEPHONE ENCOUNTER
Beat.no message sent to the pt.       Felicita King R.N.  Felicita King R.N.             No follow up scheduled  (is due to see EC)        
lower/back

## 2024-06-05 ENCOUNTER — OFFICE VISIT (OUTPATIENT)
Dept: MEDICAL GROUP | Facility: PHYSICIAN GROUP | Age: 84
End: 2024-06-05
Payer: MEDICARE

## 2024-06-05 VITALS
DIASTOLIC BLOOD PRESSURE: 62 MMHG | OXYGEN SATURATION: 94 % | HEART RATE: 63 BPM | HEIGHT: 70 IN | SYSTOLIC BLOOD PRESSURE: 130 MMHG | TEMPERATURE: 96.8 F | WEIGHT: 182 LBS | BODY MASS INDEX: 26.05 KG/M2

## 2024-06-05 DIAGNOSIS — I10 ESSENTIAL HYPERTENSION: ICD-10-CM

## 2024-06-05 DIAGNOSIS — E11.29 TYPE 2 DIABETES MELLITUS WITH MICROALBUMINURIA, WITHOUT LONG-TERM CURRENT USE OF INSULIN (HCC): ICD-10-CM

## 2024-06-05 DIAGNOSIS — R80.9 TYPE 2 DIABETES MELLITUS WITH MICROALBUMINURIA, WITHOUT LONG-TERM CURRENT USE OF INSULIN (HCC): ICD-10-CM

## 2024-06-05 LAB
HBA1C MFR BLD: 6.9 % (ref ?–5.8)
POCT INT CON NEG: NEGATIVE
POCT INT CON POS: POSITIVE

## 2024-06-05 PROCEDURE — 99214 OFFICE O/P EST MOD 30 MIN: CPT | Performed by: NURSE PRACTITIONER

## 2024-06-05 PROCEDURE — 3075F SYST BP GE 130 - 139MM HG: CPT | Performed by: NURSE PRACTITIONER

## 2024-06-05 PROCEDURE — 83036 HEMOGLOBIN GLYCOSYLATED A1C: CPT | Performed by: NURSE PRACTITIONER

## 2024-06-05 PROCEDURE — 3078F DIAST BP <80 MM HG: CPT | Performed by: NURSE PRACTITIONER

## 2024-06-05 NOTE — ASSESSMENT & PLAN NOTE
"January 2024 A1c 7.9%.  Due for A1c today and A1c is 6.9%. At his last appointment we started him on Jardiance however patient could not afford the medication.  He was started on pioglitazone 15 mg daily and reports that he did not take the medication as he is \"a grown up\". Continues metformin 1000 mg twice daily.  His weight is down 5 pounds since last office visit. He has cut back on sugars and made diet changes. Home blood sugars have been 115-142. Plan to continue metformin, discontinue pioglitazone, continue diet and lifestyle changes and repeat A1C in 3 months.   "

## 2024-06-05 NOTE — PROGRESS NOTES
"Subjective:     CC: Diabetes follow-up    HPI:   Raf presents today with the following:    Type 2 diabetes mellitus with microalbuminuria, without long-term current use of insulin ([facility])  January 2024 A1c 7.9%.  Due for A1c today and A1c is 6.9%. At his last appointment we started him on Jardiance however patient could not afford the medication.  He was started on pioglitazone 15 mg daily and reports that he did not take the medication as he is \"a grown up\". Continues metformin 1000 mg twice daily.  His weight is down 5 pounds since last office visit. He has cut back on sugars and made diet changes. Home blood sugars have been 115-142. Plan to continue metformin, discontinue pioglitazone, continue diet and lifestyle changes and repeat A1C in 3 months.     Essential hypertension  Blood pressure today 130/62. Home blood pressures are   120-150/60-70. No chest pain, shortness of breath, dizziness, blurry vision or headaches.  Continues metoprolol 25 mg twice daily and losartan 50 mg daily.        Past Medical History:   Diagnosis Date    Anxiety     Arthritis     CAD (coronary artery disease) 07/06/2017    CABG x 4 (LIMA to distal diagonal, rSVG to distal OM1, rSVG to distal OM2, rSVG to distal LAD) by Dr. Gaffney.    Diabetes ([facility])     oral medication     Hypertension     Inguinal hernia     Mixed hyperlipidemia     Nodular prostate without urinary obstruction     Prostate cancer ([facility])        Social History     Tobacco Use    Smoking status: Never    Smokeless tobacco: Never   Vaping Use    Vaping status: Never Used   Substance Use Topics    Alcohol use: Yes     Alcohol/week: 1.2 oz     Types: 2 Shots of liquor per week     Comment: oz every other week    Drug use: No       Current Outpatient Medications Ordered in Epic   Medication Sig Dispense Refill    atorvastatin (LIPITOR) 80 MG tablet Take 1 Tablet by mouth every day. 90 Tablet 3    metformin (GLUCOPHAGE) 1000 MG tablet Take 1 Tablet by mouth 2 times a day with " "meals. 180 Tablet 3    losartan (COZAAR) 50 MG Tab Take 1 Tablet by mouth every day. 90 Tablet 3    metoprolol tartrate (LOPRESSOR) 25 MG Tab Take 1 Tablet by mouth 2 times a day. 180 Tablet 2    donepezil (ARICEPT) 10 MG tablet Take 1 Tablet by mouth every day. 90 Tablet 3    ONE TOUCH ULTRA TEST strip TEST BLOOD SUGAR LEVELS EVERY  Strip 3    ONETOUCH DELICA LANCETS FINE Misc USE TO TEST BLOOD SUGARS ONCE DAILY 100 Each 3    Ascorbic Acid (VITAMIN C PO) Take  by mouth.      aspirin (ASA) 81 MG Chew Tab chewable tablet Take 81 mg by mouth every day. Indications: post cardiac surgery to prevent blood clots      therapeutic multivitamin-minerals (THERAGRAN-M) Tab Take 1 Tab by mouth every day. Indications: supplement      Glucosamine-Chondroit-Vit C-Mn (GLUCOSAMINE CHONDR 1500 COMPLX) Cap Take 1 Cap by mouth every day. 100 Cap 3    Cholecalciferol (VITAMIN D3) 1000 UNIT TABS Take 1 Tab by mouth every day. 100 Tab 3     No current Epic-ordered facility-administered medications on file.       Allergies:  Other environmental    Health Maintenance: Due for Tdap, encouraged to complete at the pharmacy.      Objective:     Vital signs reviewed  Exam:  /62 (BP Location: Left arm, Patient Position: Sitting, BP Cuff Size: Adult)   Pulse 63   Temp 36 °C (96.8 °F) (Temporal)   Ht 1.778 m (5' 10\")   Wt 82.6 kg (182 lb)   SpO2 94%   BMI 26.11 kg/m²  Body mass index is 26.11 kg/m².    Gen: Alert and oriented, No apparent distress.  Neck: Neck is supple, full ROM.  Lungs: Normal effort, no audible wheezes  CV: Skin pink, warm and dry.  Ext: No clubbing, cyanosis, edema.        Labs:      Latest Reference Range & Units 06/05/24 10:41   Glycohemoglobin 5.8 % 6.9 !   !: Data is abnormal    Assessment & Plan:     83 y.o. male with the following -     1. Type 2 diabetes mellitus with microalbuminuria, without long-term current use of insulin (HCC)  Chronic stable problem.  Patient has improved his A1c with decreasing " sugar intake and watching his portions.  We discussed we can discontinue the pioglitazone.  He will continue with metformin 1000 mg twice daily.  Continue home blood glucose monitoring.  Discussed returning in 3 months for repeat A1c and if A1c is stable at 3-month visit we will check A1c every 6 months.  He verbalized understanding and is in agreement.  - POCT  A1C    2. Essential hypertension  Chronic stable problem.  Blood pressure is at goal.  Continue metoprolol 25 mg twice daily and losartan 50 mg daily.  Continue home blood pressure monitoring.  Continue healthy diet and exercise.        Return in about 3 months (around 9/5/2024) for Diabetes, A1C.    Please note that this dictation was created using voice recognition software. I have made every reasonable attempt to correct obvious errors, but I expect that there are errors of grammar and possibly content that I did not discover before finalizing the note.

## 2024-06-05 NOTE — ASSESSMENT & PLAN NOTE
Blood pressure today 130/62. Home blood pressures are   120-150/60-70. No chest pain, shortness of breath, dizziness, blurry vision or headaches.  Continues metoprolol 25 mg twice daily and losartan 50 mg daily.

## 2024-07-05 ENCOUNTER — PATIENT MESSAGE (OUTPATIENT)
Dept: MEDICAL GROUP | Facility: PHYSICIAN GROUP | Age: 84
End: 2024-07-05
Payer: MEDICARE

## 2024-07-05 DIAGNOSIS — R80.9 TYPE 2 DIABETES MELLITUS WITH MICROALBUMINURIA, WITHOUT LONG-TERM CURRENT USE OF INSULIN (HCC): ICD-10-CM

## 2024-07-05 DIAGNOSIS — E11.29 TYPE 2 DIABETES MELLITUS WITH MICROALBUMINURIA, WITHOUT LONG-TERM CURRENT USE OF INSULIN (HCC): ICD-10-CM

## 2024-07-25 ENCOUNTER — HOSPITAL ENCOUNTER (OUTPATIENT)
Dept: CARDIOLOGY | Facility: MEDICAL CENTER | Age: 84
End: 2024-07-25
Attending: INTERNAL MEDICINE
Payer: MEDICARE

## 2024-07-25 DIAGNOSIS — I35.0 AORTIC STENOSIS, MODERATE: ICD-10-CM

## 2024-07-25 PROCEDURE — 93306 TTE W/DOPPLER COMPLETE: CPT

## 2024-07-30 LAB
LV EJECT FRACT  99904: 60
LV EJECT FRACT MOD 2C 99903: 67.24
LV EJECT FRACT MOD 4C 99902: 50.02
LV EJECT FRACT MOD BP 99901: 59.72

## 2024-07-30 PROCEDURE — 93306 TTE W/DOPPLER COMPLETE: CPT | Mod: 26 | Performed by: INTERNAL MEDICINE

## 2024-08-05 ENCOUNTER — DOCUMENTATION (OUTPATIENT)
Dept: CARDIOLOGY | Facility: MEDICAL CENTER | Age: 84
End: 2024-08-05
Payer: MEDICARE

## 2024-08-05 NOTE — PROGRESS NOTES
This patient has been flagged through our echocardiogram surveillance with the following echocardiogram results:    Moderate to Severe Aortic Stenosis    Cardiologist: Dr. Mitchell    Current Plan of Care for Structural Heart Disease: Added to surveillance tracking list    Next Echo date needed by: 7/25/2025    Next Follow up appointment needed by: 1/12/2025

## 2024-09-05 DIAGNOSIS — R41.3 MEMORY DIFFICULTIES: ICD-10-CM

## 2024-09-05 RX ORDER — DONEPEZIL HYDROCHLORIDE 10 MG/1
10 TABLET, FILM COATED ORAL DAILY
Qty: 90 TABLET | Refills: 3 | Status: SHIPPED | OUTPATIENT
Start: 2024-09-05

## 2024-09-06 NOTE — TELEPHONE ENCOUNTER
Received request via: Pharmacy    Was the patient seen in the last year in this department? Yes    Does the patient have an active prescription (recently filled or refills available) for medication(s) requested? No    [facility]    Does the patient have FPC Plus and need 100-day supply? (This applies to ALL medications) Patient does not have SCP

## 2024-09-12 ENCOUNTER — OFFICE VISIT (OUTPATIENT)
Dept: MEDICAL GROUP | Facility: PHYSICIAN GROUP | Age: 84
End: 2024-09-12
Payer: MEDICARE

## 2024-09-12 VITALS
HEART RATE: 58 BPM | DIASTOLIC BLOOD PRESSURE: 58 MMHG | TEMPERATURE: 97.4 F | BODY MASS INDEX: 26.2 KG/M2 | OXYGEN SATURATION: 96 % | HEIGHT: 70 IN | SYSTOLIC BLOOD PRESSURE: 118 MMHG | WEIGHT: 183 LBS

## 2024-09-12 DIAGNOSIS — E11.29 TYPE 2 DIABETES MELLITUS WITH MICROALBUMINURIA, WITHOUT LONG-TERM CURRENT USE OF INSULIN (HCC): ICD-10-CM

## 2024-09-12 DIAGNOSIS — I10 ESSENTIAL HYPERTENSION: ICD-10-CM

## 2024-09-12 DIAGNOSIS — R41.3 MEMORY DIFFICULTIES: ICD-10-CM

## 2024-09-12 DIAGNOSIS — R80.9 TYPE 2 DIABETES MELLITUS WITH MICROALBUMINURIA, WITHOUT LONG-TERM CURRENT USE OF INSULIN (HCC): ICD-10-CM

## 2024-09-12 LAB
HBA1C MFR BLD: 7.2 % (ref ?–5.8)
POCT INT CON NEG: NEGATIVE
POCT INT CON POS: POSITIVE

## 2024-09-12 PROCEDURE — 3074F SYST BP LT 130 MM HG: CPT | Performed by: NURSE PRACTITIONER

## 2024-09-12 PROCEDURE — 83036 HEMOGLOBIN GLYCOSYLATED A1C: CPT | Performed by: NURSE PRACTITIONER

## 2024-09-12 PROCEDURE — 3078F DIAST BP <80 MM HG: CPT | Performed by: NURSE PRACTITIONER

## 2024-09-12 PROCEDURE — 99214 OFFICE O/P EST MOD 30 MIN: CPT | Performed by: NURSE PRACTITIONER

## 2024-09-12 NOTE — ASSESSMENT & PLAN NOTE
Previous A1c 6.9%.  A1c today 7.2%.  Continues metformin at 1000 mg twice daily.  Home blood sugars have been 123-161. He does not have regular exercise routine. He goes shopping with his wife. He has decreased his sugar intake.  Due for health maintenance today.

## 2024-09-12 NOTE — LETTER
[facility]  ADELITA Fermin.  [address]  [place] [address]  Fax: [phone]   Authorization for Release/Disclosure of   Protected Health Information   Name: ANNMARIE SALAZAR : 1940 SSN: [id number]   Address: [address] [place] [address] [place] [address] Phone:    There are no phone numbers on file.   I authorize the entity listed below to release/disclose the PHI below to:   [facility]/LORENA Fermin and LORENA Fermin   Provider or Entity Name:  [facility] [place]   Dr. Zayra Daly   Address   [facility]  [address] [place] [address] Phone:  [phone]    Fax:   [phone]     Reason for request: continuity of care   Information to be released:    [  ] LAST COLONOSCOPY,  including any PATH REPORT and follow-up  [  ] LAST FIT/COLOGUARD RESULT [  ] LAST DEXA  [  ] LAST MAMMOGRAM  [  ] LAST PAP  [  ] LAST LABS [  ] RETINA EXAM REPORT  [  ] IMMUNIZATION RECORDS  [xxx ] Release last 2 office visit records      [  ] Check here and initial the line next to each item to release ALL health information INCLUDING  _____ Care and treatment for drug and / or alcohol abuse  _____ HIV testing, infection status, or AIDS  _____ Genetic Testing    DATES OF SERVICE OR TIME PERIOD TO BE DISCLOSED: _____________  I understand and acknowledge that:  * This Authorization may be revoked at any time by you in writing, except if your health information has already been used or disclosed.  * Your health information that will be used or disclosed as a result of you signing this authorization could be re-disclosed by the recipient. If this occurs, your re-disclosed health information may no longer be protected by State or Federal laws.  * You may refuse to sign this Authorization. Your refusal will not affect your ability to obtain treatment.  * This Authorization becomes effective upon signing and will  on (date) __________.       If no date is indicated, this Authorization will  one (1) year from the signature date.    Name: Raf Pelayo  Signature: Date:   2024     PLEASE FAX REQUESTED RECORDS BACK TO: [phone]

## 2024-09-12 NOTE — PROGRESS NOTES
Subjective:     CC: Diabetes follow-up    HPI:   Raf presents today with the following:    Type 2 diabetes mellitus with microalbuminuria, without long-term current use of insulin (HCC)  Previous A1c 6.9%.  A1c today 7.2%.  Continues metformin at 1000 mg twice daily.  Home blood sugars have been 123-161. He does not have regular exercise routine. He goes shopping with his wife. He has decreased his sugar intake.  Due for health maintenance today.    Essential hypertension  Blood pressure today 118/58.  Continues losartan 50 mg daily and metoprolol 25 mg daily.  He checks his home blood pressure in the morning before his medications.  Discussion today to record blood pressures after he has had his medications.        Past Medical History:   Diagnosis Date    Anxiety     Arthritis     CAD (coronary artery disease) 07/06/2017    CABG x 4 (LIMA to distal diagonal, rSVG to distal OM1, rSVG to distal OM2, rSVG to distal LAD) by Dr. Gaffney.    Diabetes (HCC)     oral medication     Hypertension     Inguinal hernia     Mixed hyperlipidemia     Nodular prostate without urinary obstruction     Prostate cancer (HCC)        Social History     Tobacco Use    Smoking status: Never    Smokeless tobacco: Never   Vaping Use    Vaping status: Never Used   Substance Use Topics    Alcohol use: Yes     Alcohol/week: 1.2 oz     Types: 2 Shots of liquor per week     Comment: oz every other week    Drug use: No       Current Outpatient Medications Ordered in Epic   Medication Sig Dispense Refill    Zinc Sulfate (ZINC 15 PO) Take  by mouth.      Blood Glucose Test Strips Use one One Touch strip to test blood sugar once daily . 100 Strip 3    atorvastatin (LIPITOR) 80 MG tablet Take 1 Tablet by mouth every day. 90 Tablet 3    metformin (GLUCOPHAGE) 1000 MG tablet Take 1 Tablet by mouth 2 times a day with meals. 180 Tablet 3    losartan (COZAAR) 50 MG Tab Take 1 Tablet by mouth every day. 90 Tablet 3    metoprolol tartrate (LOPRESSOR) 25  "MG Tab Take 1 Tablet by mouth 2 times a day. 180 Tablet 2    ONE TOUCH ULTRA TEST strip TEST BLOOD SUGAR LEVELS EVERY  Strip 3    ONETOUCH DELICA LANCETS FINE Misc USE TO TEST BLOOD SUGARS ONCE DAILY 100 Each 3    Ascorbic Acid (VITAMIN C PO) Take  by mouth.      aspirin (ASA) 81 MG Chew Tab chewable tablet Take 81 mg by mouth every day. Indications: post cardiac surgery to prevent blood clots      therapeutic multivitamin-minerals (THERAGRAN-M) Tab Take 1 Tab by mouth every day. Indications: supplement      Cholecalciferol (VITAMIN D3) 1000 UNIT TABS Take 1 Tab by mouth every day. 100 Tab 3    donepezil (ARICEPT) 10 MG tablet Take 1 Tablet by mouth every day. (Patient not taking: Reported on 9/12/2024) 90 Tablet 3     No current Epic-ordered facility-administered medications on file.       Allergies:  Other environmental    Health Maintenance: Reviewed      Objective:     Vital signs reviewed  Exam:  /58 (BP Location: Right arm, Patient Position: Sitting, BP Cuff Size: Adult)   Pulse (!) 58   Temp 36.3 °C (97.4 °F) (Temporal)   Ht 1.778 m (5' 10\")   Wt 83 kg (183 lb)   SpO2 96%   BMI 26.26 kg/m²  Body mass index is 26.26 kg/m².    Gen: Alert and oriented, No apparent distress.  Lungs: Normal effort, no audible wheezes  CV: Skin pink, warm and dry.  Ext: No clubbing, cyanosis, edema.      Monofilament testing with a 10 gram force: sensation intact: decreased bilaterally. Right pinky toe and left middle toe.   Visual Inspection: Feet without maceration, ulcers, fissures. Nails are long, thick and yellow with dirt between toes.   Pedal pulses: decreased bilaterally, 1+    Labs:      Latest Reference Range & Units 09/12/24 10:23   Glycohemoglobin 5.8 % 7.2 !   !: Data is abnormal    Assessment & Plan:     84 y.o. male with the following -     1. Type 2 diabetes mellitus with microalbuminuria, without long-term current use of insulin (HCC)  Chronic stable problem.  Continue metformin 1000 mg twice " daily.  Retinal screening, A1c and monofilament completed today.  A1c in 3 months around December 2024.  - Diabetic Monofilament LE Exam  - POCT Retinal Eye Exam  - POCT  A1C    2. Essential hypertension  Chronic stable problem.  He will start checking home blood pressures at least 2 hours after his medications.  Continue losartan 50 mg daily and metoprolol 25 mg twice daily.      3. Memory difficulties  Chronic exacerbated problem.  Continue donepezil 10 mg daily.  Memory is worsening.  He has not seen neurology.  Referral to neurology.  - Referral to Neurology        Return in about 3 months (around 12/12/2024) for Diabetes, A1C, Hypertension, memory .    Please note that this dictation was created using voice recognition software. I have made every reasonable attempt to correct obvious errors, but I expect that there are errors of grammar and possibly content that I did not discover before finalizing the note.

## 2024-09-12 NOTE — PATIENT INSTRUCTIONS
Due for updated Tdap (tetanus vaccine), influenza and COVID vaccine    Take blood pressure in the afternoon after you have taken your blood pressure medication.    Follow-up with podiatry for diabetes foot care.    Referral to memory disorder clinic with neurology.

## 2024-09-12 NOTE — ASSESSMENT & PLAN NOTE
Blood pressure today 118/58.  Continues losartan 50 mg daily and metoprolol 25 mg daily.  He checks his home blood pressure in the morning before his medications.  Discussion today to record blood pressures after he has had his medications.

## 2024-10-08 DIAGNOSIS — Z95.1 S/P CABG X 4: ICD-10-CM

## 2024-10-08 DIAGNOSIS — I25.119 CORONARY ARTERY DISEASE INVOLVING NATIVE CORONARY ARTERY OF NATIVE HEART WITH ANGINA PECTORIS (HCC): ICD-10-CM

## 2024-10-14 RX ORDER — METOPROLOL TARTRATE 25 MG/1
25 TABLET, FILM COATED ORAL 2 TIMES DAILY
Qty: 180 TABLET | Refills: 0 | Status: SHIPPED | OUTPATIENT
Start: 2024-10-14

## 2024-10-15 LAB — RETINAL SCREEN: NEGATIVE

## 2024-12-02 ENCOUNTER — TELEPHONE (OUTPATIENT)
Dept: CARDIOLOGY | Facility: MEDICAL CENTER | Age: 84
End: 2024-12-02

## 2024-12-02 ENCOUNTER — OFFICE VISIT (OUTPATIENT)
Dept: CARDIOLOGY | Facility: MEDICAL CENTER | Age: 84
End: 2024-12-02
Attending: INTERNAL MEDICINE
Payer: MEDICARE

## 2024-12-02 VITALS
DIASTOLIC BLOOD PRESSURE: 62 MMHG | HEART RATE: 76 BPM | OXYGEN SATURATION: 96 % | HEIGHT: 70 IN | WEIGHT: 188 LBS | SYSTOLIC BLOOD PRESSURE: 138 MMHG | BODY MASS INDEX: 26.92 KG/M2

## 2024-12-02 DIAGNOSIS — I65.23 CAROTID ARTERY PLAQUE, BILATERAL: ICD-10-CM

## 2024-12-02 DIAGNOSIS — E78.2 MIXED HYPERLIPIDEMIA: ICD-10-CM

## 2024-12-02 DIAGNOSIS — I25.119 CORONARY ARTERY DISEASE INVOLVING NATIVE CORONARY ARTERY OF NATIVE HEART WITH ANGINA PECTORIS (HCC): ICD-10-CM

## 2024-12-02 DIAGNOSIS — E55.9 VITAMIN D DEFICIENCY: ICD-10-CM

## 2024-12-02 DIAGNOSIS — Z95.1 S/P CABG X 4: ICD-10-CM

## 2024-12-02 DIAGNOSIS — N18.2 CKD (CHRONIC KIDNEY DISEASE) STAGE 2, GFR 60-89 ML/MIN: ICD-10-CM

## 2024-12-02 DIAGNOSIS — I10 ESSENTIAL HYPERTENSION: ICD-10-CM

## 2024-12-02 DIAGNOSIS — E11.29 TYPE 2 DIABETES MELLITUS WITH MICROALBUMINURIA, WITHOUT LONG-TERM CURRENT USE OF INSULIN (HCC): ICD-10-CM

## 2024-12-02 DIAGNOSIS — R41.89 COGNITIVE DECLINE: ICD-10-CM

## 2024-12-02 DIAGNOSIS — R80.9 TYPE 2 DIABETES MELLITUS WITH MICROALBUMINURIA, WITHOUT LONG-TERM CURRENT USE OF INSULIN (HCC): ICD-10-CM

## 2024-12-02 DIAGNOSIS — I35.0 AORTIC VALVE STENOSIS, NONRHEUMATIC: ICD-10-CM

## 2024-12-02 PROCEDURE — 3078F DIAST BP <80 MM HG: CPT | Performed by: INTERNAL MEDICINE

## 2024-12-02 PROCEDURE — 3075F SYST BP GE 130 - 139MM HG: CPT | Performed by: INTERNAL MEDICINE

## 2024-12-02 PROCEDURE — 99214 OFFICE O/P EST MOD 30 MIN: CPT | Performed by: INTERNAL MEDICINE

## 2024-12-02 PROCEDURE — 99213 OFFICE O/P EST LOW 20 MIN: CPT | Performed by: INTERNAL MEDICINE

## 2024-12-02 NOTE — TELEPHONE ENCOUNTER
Received [employer] [facility] request via [employer]  for RYBELSUS 7MG TAB. (Quantity:90, Day Supply:90)     Insurance: [employer]  Member ID:  [id number]  BIN: [id number]  PCN: N/A  Group: [id number]     Ran Test claim via [employer] & medication Rejects stating prior authorization is required.

## 2024-12-02 NOTE — PATIENT INSTRUCTIONS
Atorvastatin holiday:  -Stop your atorvastatin for 4 weeks, see if your memory improves, start the atorvastatin back after 4 weeks, see if you have any setbacks.  It is very rare for atorvastatin to cause memory problems but I do like to exclude it as a possibility.    -We have much better medications on the market now to control diabetes which also help with heart disease.  We need to get your A1c more consistently under 6.8 to protect your kidneys, eyes and bypass grafts.    -Semaglutide/Rybelsus is a very good heart and diabetes medication.  I have no idea if your insurance would cover this.  If it is too expensive, you do not need to  the prescription.    TAVR= Transcatheter Aortic Valve Replacement  This is a way to replace the aortic valve without open heart surgery.  It is still a major cardiac procedure with anesthesia but the recovery time is very fast.  You can learn more about this from YouTube.    There are some steps to go through including Left Heart Catheterization with contrast dye (Angiogram, evaluation of blood flow in the heart arteries, may include intervention with a coronary stent), CT scan of the chest and abdomen with contrast dye, carotid ultrasound.    We have a Valve Team which includes an interventional Cardiologists, Heart Surgeons, Nurse Practitioners and Nurses. I refer you to see them.  The Team:  Interventional cardiologist: Dr. Hunter Macias, Dr. Lexx Diamond  Cardiothoracic surgeons: Dr. Francisco Javier Pavon, Dr. Herbie Mcfarlane, Dr. Roz Mg  Nurse practitioners: Paty Ortiz, MADELYN Sosa  Valve coordinators: Graciela Cohn, Yohana Anderson, RN    *I am not endorsing these companies but I have had patients tell me they have had good experience with these companies:  [facility]  Pharmacy in [facility]  Address: [id number], [address]  [place]: [place]  Phone: [phone]    [facility], [place]  Very positive experience.  [phone]    [facility] [employer]  phone [phone]    *I also discovered a local pharmacy but I have no experience with this one.  [facility]  Pharmacy in [address] [address] · [place]  [phone]  [email or web address]

## 2024-12-02 NOTE — TELEPHONE ENCOUNTER
Prior Authorization for RYBELSUS 7MG TAB  has been approved for a quantity of 90 , day supply 90    Prior Authorization reference number: [id number]  Insurance: [employer]  Effective dates: 11/2/24-12/2/25  Copay: $UNKNOWN INSURANCE LOCKOUT     Is patient eligible to fill with [facility]? No, test claim rejected stating INSURANCE LOCKOUT.    RELEASE RX TO PHARMACY ON FILE

## 2024-12-02 NOTE — PROGRESS NOTES
Medical decision making:  -Today, his AS is severe. DI for VTI/Vmax are 0.22-0.23, furthermore the LVOT was measured at 2.5 cm whereas previously measured at 2 and 2.2 cm.  Referral to the TAVR team.  -In terms of CAD, remains on secondary prevention aspirin, statin, beta-blocker.  Reports no symptoms.  -LDL cholesterol looks great.  -Because of memory loss, I do want him to take a 4-week statin holiday to ensure this is not interfering in any way with his cognition, it is unlikely but I would like to be confident that the statin is not causing harm.  -Diabetes is not to goal, I am not certain his insurance would cover any of the newer semaglutide's, we will try the oral semaglutide, information given about possibly getting it from [facility].  If this is not practical or affordable, would defer to his PCP regarding other possible medications such as Farxiga or Jardiance.  -Blood pressure looks fine.  -Consider AAA screening, former smoker.  This can wait.  -We will see him back in short turnaround to ensure all of his cardiology needs are being met    Addendum: I spoke with his wife Estefanía to let her know about the statin holiday and the TAVR team.  She notes cognitive decline is progressing.  He has an appointment with neurology.  All questions answered.      Problem list:  1. Coronary artery disease involving native coronary artery of native heart with angina pectoris (HCC)  - EKG    2. S/P CABG x 4    3. Aortic valve stenosis, nonrheumatic  - REFERRAL TO CARDIOLOGY    4. Essential hypertension    5. Mixed hyperlipidemia    6. Type 2 diabetes mellitus with microalbuminuria, without long-term current use of insulin (HCC)  - Semaglutide 7 MG Tab; Take 7 mg by mouth every day.  Dispense: 90 Tablet; Refill: 3    7. Vitamin D deficiency disease    8. Carotid artery plaque, bilateral    9. CKD (chronic kidney disease) stage 2, GFR 60-89 ml/min    10. Cognitive decline     Chief Complaint:   Chief Complaint    Patient presents with    Coronary Artery Disease    Hyperlipidemia    Hypertension       History of Present Illness:  Raf Pelayo is a 84 y.o. male who returns for long-term management of complex care including CAD/CABG, aortic stenosis, hypertension, hyperlipidemia, DM 2    Last seen by Dr. Mitchell 1/12/2024.    CAD, CABG x4 July 2017 with Dr. Anthony Steward, included LIMA to distal diagonal, SVG to distal OM1, distal OM 2, SVG to distal LAD.  Remains on ASA, statin.     Aortic stenosis, remains moderate by echo July 2024, V-max 3.8, TRISTA 1.3 cm², DI 0.22.  However, the LVOT was measured at 2.5 which is overestimated compared to prior.  I recalculated the dimensionless index on the VTI and the V-max and both are consistent with severe AS.  Physical exam is consistent with severe AS.  Echocardiogram from 2024 measured the LVOT of 2.2, an echo before that measured at 2.    Hyperlipidemia,  LDL 59.  HDL low at times.    DM2, uncontrolled.  A1c up to 7.2. Was 6.9, but prior 7.9.     Hypertension, overall controlled.    Bilateral carotid artery plaque in 2017, on statin.    CKD 2.    Former smoker, no AAA screening.    No history of autoimmune disease such as lupus or rheumatoid arthritis.  No history of chest radiation or cardiotoxic chemotherapy.  No ETOH overuse.  No caffeine overuse.  No recreation substance use.  No hx asthma.    Works with tools in the garage.   Not limited by chest pain, pressure or tightness with activity.  No significant dyspnea on exertion, orthopnea or lower extremity swelling.  No significant palpitations, lightheadedness, or presyncope/syncope.  No symptoms of leg claudication.  No stroke/TIA like symptoms.    Lives in [place].   to Estefanía who is not here today, she is in decent health he tells me.     Wt Readings from Last 5 Encounters:   12/02/24 85.3 kg (188 lb)   09/12/24 83 kg (183 lb)   06/05/24 82.6 kg (182 lb)   03/05/24 84.8 kg (187 lb)   01/12/24 85.4 kg  (188 lb 3.2 oz)       DATA REVIEWED by me:  ECG (my personal interpretation)  7/7/2017 sinus, 57    Echo  7/30/2024  Compared to the prior study on 01/23/2024 - AS has progressed, prior   Vmax 3.3 m/s.  Normal left ventricular chamber size.  The ejection fraction is measured to be  60% by Costa's biplane.  Moderate to severe aortic valve stenosis. Vmax is  3.8m/s.  Normal inferior vena cava size and inspiratory collapse.  TRISTA 1.3 cm², DI 0.22.  1/23/2024  Compared to the prior study on 2/26/2023 - aortic stenosis is now best   characterized as moderate.   Normal left ventricular chamber size.  The left ventricular ejection fraction is visually estimated to be 60%.  Moderate aortic valve stenosis. Vmax is 3.3 m/s.  Mild aortic insufficiency.  Normal inferior vena cava size and inspiratory collapse.  2/6/2023  Compared to the prior study on 10/23/2020 - AS remains mild.   Normal left ventricular chamber size.  The left ventricular ejection fraction is visually estimated to be 65%.  Mild aortic valve stenosis. Vmax is 2.66 m/s.  Normal inferior vena cava size and inspiratory collapse.  10/24/2020  Normal left ventricular chamber size.  Left ventricular ejection fraction is visually estimated to be 65%.  Mild aortic stenosis.  Right heart pressures are normal.     [facility] 7/3/2017  1. 50% distal LM stenosis, highly eccentric  2. Diffuse prioximal 80% LAD stenosis  3. Tandem focal 80% bulky proximal and mid LCx disease with OM involvement (Gaines 0,1,0)  4. LVEF 65%    CABG 7/6/2017 Dr. Bowers Linkous   Coronary artery bypass grafting x4, left MIKHAIL to the distal diagonal,     reverse saphenous vein graft sequence to the distal OM1 and distal OM 2,   and reverse saphenous vein graft to distal LAD, left greater saphenous vein endoscopic vein    harvest.    Carotid ultrasound   7/3/2017   Mild plaque of the right carotid artery with no significant right internal    carotid artery stenosis (<50%)   Mild plaque of the left  "carotid artery with no significant left internal    carotid artery stenosis  (<50%)   No significant stenosis of the right or left subclavian artery.   No significant stenosis of the right or left vertebral artery.    Most recent labs:       Lab Results   Component Value Date/Time    WBC 7.7 07/02/2019 07:32 AM    HEMOGLOBIN 13.7 (L) 07/02/2019 07:32 AM    HEMATOCRIT 43.9 07/02/2019 07:32 AM    MCV 97.1 07/02/2019 07:32 AM    INR 1.31 (H) 07/06/2017 12:27 PM      Lab Results   Component Value Date/Time    SODIUM 137 01/19/2024 08:09 AM    POTASSIUM 5.1 01/19/2024 08:09 AM    CHLORIDE 103 01/19/2024 08:09 AM    CO2 22 01/19/2024 08:09 AM    GLUCOSE 117 (H) 01/19/2024 08:09 AM    BUN 22 01/19/2024 08:09 AM    CREATININE 1.09 01/19/2024 08:09 AM    CREATININE 1.0 10/27/2008 08:20 AM      Lab Results   Component Value Date/Time    ASTSGOT 23 01/19/2024 08:09 AM    ALTSGPT 37 01/19/2024 08:09 AM    ALBUMIN 4.3 01/19/2024 08:09 AM      Lab Results   Component Value Date/Time    CHOLSTRLTOT 119 01/19/2024 08:09 AM    LDL 59 01/19/2024 08:09 AM    HDL 35 (A) 01/19/2024 08:09 AM    TRIGLYCERIDE 123 01/19/2024 08:09 AM     No results for input(s): \"NTPROBNP\", \"TROPONINT\" in the last 72 hours.      Past Medical History:   Diagnosis Date    Anxiety     Arthritis     CAD (coronary artery disease) 07/06/2017    CABG x 4 (LIMA to distal diagonal, rSVG to distal OM1, rSVG to distal OM2, rSVG to distal LAD) by Dr. Gaffney.    Diabetes (HCC)     oral medication     Hypertension     Inguinal hernia     Mixed hyperlipidemia     Nodular prostate without urinary obstruction     Prostate cancer (HCC)      Past Surgical History:   Procedure Laterality Date    ROHINI  7/6/2017    Procedure: ROHINI - INTRAOPERATIVE ;  Surgeon: Anthony Gaffney M.D.;  Location: SURGERY [facility];  Service:     MULTIPLE CORONARY ARTERY BYPASS ENDO VEIN HARVEST  7/6/2017    Procedure: MULTIPLE CORONARY ARTERY BYPASS ENDO VEIN HARVEST X2-4, PREVENA DRESSING ;  " Surgeon: Anthony Gaffney M.D.;  Location: SURGERY [facility];  Service:     APPENDECTOMY  1955    TONSILLECTOMY       Family History   Problem Relation Age of Onset    Stroke Mother     Heart Disease Father     Heart Disease Brother      Social History     Socioeconomic History    Marital status:      Spouse name: Not on file    Number of children: Not on file    Years of education: Not on file    Highest education level: Not on file   Occupational History    Not on file   Tobacco Use    Smoking status: Never    Smokeless tobacco: Never   Vaping Use    Vaping status: Never Used   Substance and Sexual Activity    Alcohol use: Yes     Alcohol/week: 1.2 oz     Types: 2 Shots of liquor per week     Comment: oz every other week    Drug use: No    Sexual activity: Never     Partners: Female     Birth control/protection: Post-Menopausal   Other Topics Concern    Not on file   Social History Narrative    Not on file     Social Drivers of Health     Financial Resource Strain: Not on file   Food Insecurity: Not on file   Transportation Needs: Not on file   Physical Activity: Not on file   Stress: Not on file   Social Connections: Not on file   Intimate Partner Violence: Not on file   Housing Stability: Not on file     Allergies   Allergen Reactions    Other Environmental Runny Nose     SAW DUST       Current Outpatient Medications   Medication Sig Dispense Refill    Semaglutide 7 MG Tab Take 7 mg by mouth every day. 90 Tablet 3    metoprolol tartrate (LOPRESSOR) 25 MG Tab Take 1 Tablet by mouth 2 times a day. 180 Tablet 0    Zinc Sulfate (ZINC 15 PO) Take  by mouth.      donepezil (ARICEPT) 10 MG tablet Take 1 Tablet by mouth every day. 90 Tablet 3    Blood Glucose Test Strips Use one One Touch strip to test blood sugar once daily . 100 Strip 3    atorvastatin (LIPITOR) 80 MG tablet Take 1 Tablet by mouth every day. 90 Tablet 3    metformin (GLUCOPHAGE) 1000 MG tablet Take 1 Tablet by mouth 2 times a day with  "meals. 180 Tablet 3    losartan (COZAAR) 50 MG Tab Take 1 Tablet by mouth every day. 90 Tablet 3    ONE TOUCH ULTRA TEST strip TEST BLOOD SUGAR LEVELS EVERY  Strip 3    ONETOUCH DELICA LANCETS FINE Misc USE TO TEST BLOOD SUGARS ONCE DAILY 100 Each 3    Ascorbic Acid (VITAMIN C PO) Take  by mouth.      aspirin (ASA) 81 MG Chew Tab chewable tablet Take 81 mg by mouth every day. Indications: post cardiac surgery to prevent blood clots      therapeutic multivitamin-minerals (THERAGRAN-M) Tab Take 1 Tab by mouth every day. Indications: supplement      Cholecalciferol (VITAMIN D3) 1000 UNIT TABS Take 1 Tab by mouth every day. 100 Tab 3     No current facility-administered medications for this visit.       ROS  All others systems reviewed and negative.    /62 (BP Location: Left arm, Patient Position: Sitting, BP Cuff Size: Adult)   Pulse 76   Ht 1.778 m (5' 10\")   Wt 85.3 kg (188 lb)   SpO2 96%  Body mass index is 26.98 kg/m².    General: No acute distress. Well nourished.  HEENT: EOM grossly intact, no scleral icterus, no pharyngeal erythema.   Neck:  No JVD, no bruits, trachea midline  CVS: RRR.  3 out of 6 late peaking systolic murmur throughout the chest. No LE edema.  2+ radial pulses, 2+ PT pulses, well-healed midline incision, mild pectus excavatum  Resp: CTAB. No wheezing or crackles/rhonchi. Normal respiratory effort.  Abdomen: Soft, NT, no daniel hepatomegaly.  MSK/Ext: No clubbing or cyanosis.  Skin: Warm and dry, no rashes.  Neurological: CN III-XII grossly intact. No focal deficits.   Psych: A&O x 3, pleasant, fair judgement, reduced STM      Return in about 3 months (around 3/2/2025).    Written instructions given today:      Atorvastatin holiday:  -Stop your atorvastatin for 4 weeks, see if your memory improves, start the atorvastatin back after 4 weeks, see if you have any setbacks.  It is very rare for atorvastatin to cause memory problems but I do like to exclude it as a " possibility.    -We have much better medications on the market now to control diabetes which also help with heart disease.  We need to get your A1c more consistently under 6.8 to protect your kidneys, eyes and bypass grafts.    -Semaglutide/Rybelsus is a very good heart and diabetes medication.  I have no idea if your insurance would cover this.  If it is too expensive, you do not need to  the prescription.    TAVR= Transcatheter Aortic Valve Replacement  This is a way to replace the aortic valve without open heart surgery.  It is still a major cardiac procedure with anesthesia but the recovery time is very fast.  You can learn more about this from YouTube.    There are some steps to go through including Left Heart Catheterization with contrast dye (Angiogram, evaluation of blood flow in the heart arteries, may include intervention with a coronary stent), CT scan of the chest and abdomen with contrast dye, carotid ultrasound.    We have a Valve Team which includes an interventional Cardiologists, Heart Surgeons, Nurse Practitioners and Nurses. I refer you to see them.  The Team:  Interventional cardiologist: Dr. Huntre Macias, Dr. Lexx Diamond  Cardiothoracic surgeons: Dr. Francisco Javier Pavon, Dr. Herbie Mcfarlane, Dr. Roz Mg  Nurse practitioners: Paty Ortiz, MADELYN Sosa  Valve coordinators: Graciela Cohn, Yohana Anderson, RN    *I am not endorsing these companies but I have had patients tell me they have had good experience with these companies:  [facility]  Pharmacy in [facility]  Address: [address] [place] [address] #[facility], [address]  Province: [place]  Phone: [phone]    [place], [place].  Very positive experience.  [phone]    [email or web address]  phone [phone]    *I also discovered a local pharmacy but I have no experience with this one.  [facility]  Pharmacy in [place] [address]  [place] [address] · [place]  [phone]  [facility]      It is my pleasure to participate in the care of Mr. Pelayo.  Please do not hesitate to contact me with questions or concerns.    Denisse Silva MD, [facility]  Cardiologist, [facility]    Please note that this dictation was created using voice recognition software. I have made every reasonable attempt to correct obvious errors, but it is possible there are errors of grammar and possibly content that I did not discover before finalizing the note.

## 2024-12-02 NOTE — TELEPHONE ENCOUNTER
Prior Authorization for RYBELSUS 7MG TAB (Quantity: 90, Days: 90) has been submitted via Cover My Meds: Key ([id number])    Insurance: [employer]    Will follow up in 24-48 business hours.

## 2024-12-04 ENCOUNTER — TELEPHONE (OUTPATIENT)
Dept: CARDIOLOGY | Facility: MEDICAL CENTER | Age: 84
End: 2024-12-04
Payer: MEDICARE

## 2024-12-04 DIAGNOSIS — I35.0 AORTIC STENOSIS, MODERATE: ICD-10-CM

## 2024-12-04 DIAGNOSIS — Z95.1 S/P CABG X 4: ICD-10-CM

## 2024-12-04 DIAGNOSIS — I35.0 NONRHEUMATIC AORTIC VALVE STENOSIS: ICD-10-CM

## 2024-12-04 DIAGNOSIS — Z01.810 PRE-PROCEDURAL CARDIOVASCULAR EXAMINATION: ICD-10-CM

## 2024-12-04 NOTE — TELEPHONE ENCOUNTER
Referral from: Dr. Silva for Moderate-Severe AS    Patient called on 12/4/2024.    Left message for patient requesting call back to discuss.       First attempt

## 2024-12-05 ENCOUNTER — HOSPITAL ENCOUNTER (OUTPATIENT)
Dept: LAB | Facility: MEDICAL CENTER | Age: 84
End: 2024-12-05
Payer: MEDICARE

## 2024-12-05 ENCOUNTER — TELEPHONE (OUTPATIENT)
Dept: CARDIOLOGY | Facility: MEDICAL CENTER | Age: 84
End: 2024-12-05
Payer: MEDICARE

## 2024-12-05 DIAGNOSIS — I35.0 NONRHEUMATIC AORTIC VALVE STENOSIS: ICD-10-CM

## 2024-12-05 DIAGNOSIS — Z95.1 S/P CABG X 4: ICD-10-CM

## 2024-12-05 DIAGNOSIS — Z01.810 PRE-PROCEDURAL CARDIOVASCULAR EXAMINATION: ICD-10-CM

## 2024-12-05 DIAGNOSIS — I35.0 AORTIC STENOSIS, MODERATE: ICD-10-CM

## 2024-12-05 LAB
ALBUMIN SERPL BCP-MCNC: 4.2 G/DL (ref 3.2–4.9)
ALBUMIN/GLOB SERPL: 1.8 G/DL
ALP SERPL-CCNC: 74 U/L (ref 30–99)
ALT SERPL-CCNC: 29 U/L (ref 2–50)
ANION GAP SERPL CALC-SCNC: 10 MMOL/L (ref 7–16)
AST SERPL-CCNC: 26 U/L (ref 12–45)
BILIRUB SERPL-MCNC: 0.5 MG/DL (ref 0.1–1.5)
BUN SERPL-MCNC: 16 MG/DL (ref 8–22)
CALCIUM ALBUM COR SERPL-MCNC: 9.5 MG/DL (ref 8.5–10.5)
CALCIUM SERPL-MCNC: 9.7 MG/DL (ref 8.5–10.5)
CHLORIDE SERPL-SCNC: 107 MMOL/L (ref 96–112)
CO2 SERPL-SCNC: 24 MMOL/L (ref 20–33)
CREAT SERPL-MCNC: 1.2 MG/DL (ref 0.5–1.4)
GFR SERPLBLD CREATININE-BSD FMLA CKD-EPI: 60 ML/MIN/1.73 M 2
GLOBULIN SER CALC-MCNC: 2.3 G/DL (ref 1.9–3.5)
GLUCOSE SERPL-MCNC: 117 MG/DL (ref 65–99)
POTASSIUM SERPL-SCNC: 4.3 MMOL/L (ref 3.6–5.5)
PROT SERPL-MCNC: 6.5 G/DL (ref 6–8.2)
SODIUM SERPL-SCNC: 141 MMOL/L (ref 135–145)

## 2024-12-05 PROCEDURE — 80053 COMPREHEN METABOLIC PANEL: CPT

## 2024-12-05 PROCEDURE — 36415 COLL VENOUS BLD VENIPUNCTURE: CPT

## 2024-12-05 NOTE — TELEPHONE ENCOUNTER
Received call back from patient.     Discussed with patient consultation appointments, testing needed, and plan of care.    Patient given dates and times of testing and consultations.    All questions answered.    Phone number given to patient for [facility] for any further questions or concerns.

## 2024-12-05 NOTE — TELEPHONE ENCOUNTER
----- Message from Nurse Yohana SMITH R.N. sent at 12/4/2024  4:11 PM PST -----  Regarding: Pre TAVR Cath  Hey! Please hold pre TAVR cath 12/16-12/17. Thanks!   Left message on voicemail returning call. Notified that forms filled out and submitted

## 2024-12-09 NOTE — PROGRESS NOTES
REFERRING PHYSICIAN: Denisse Silva MD.     CONSULTING PHYSICIAN: Roz Mg MD     CHIEF COMPLAINT: Fatigue    HISTORY OF PRESENT ILLNESS: The patient is a 84 y.o. male with a past medical history significant for coronary artery disease s/p CABG x4 (Dr Gaffney 2017), type II diabetes mellitus, hyperlipidemia, hypertension, prostate cancer, chronic kidney disease stage 3a, memory difficulties, aortic stenosis, who presents today with progressive fatigue over the last several months. He reports associated dyspnea on exertion and occasional orthostatic dizziness. he denies chest pain, lower extremity edema, syncope, orthopnea, or PND. His wife, Estefanía, accompanies him for the entirety of this consultation.     PAST MEDICAL HISTORY:   Active Ambulatory Problems     Diagnosis Date Noted    Mixed hyperlipidemia 07/27/2009    Vitamin D deficiency disease 12/13/2011    History of colonic polyps 12/29/2014    Arthritis 04/14/2016    S/P CABG x 4 07/26/2017    Type 2 diabetes mellitus with microalbuminuria, without long-term current use of insulin (HCC) 07/26/2017    Memory difficulties 01/14/2020    Essential hypertension 06/12/2020    Hearing aid worn 06/12/2020    Environmental allergies 07/27/2022    Stage 3a chronic kidney disease 07/31/2023    Hospital discharge follow-up 09/12/2023    Cognitive decline 12/02/2024    Severe aortic stenosis 12/11/2024     Resolved Ambulatory Problems     Diagnosis Date Noted    Ischemic heart disease 07/27/2009    Anxiety 07/27/2009    Nodular prostate without urinary obstruction 07/27/2009    Prostate cancer screening 08/31/2009    Ischemic heart disease 08/17/2012    Claustrophobia 01/19/2015    Chronic ischemic heart disease 07/03/2017    Atherosclerotic heart disease of native coronary artery with angina pectoris (HCC) 07/06/2017    Diastolic dysfunction 07/26/2017    Chronic ischemic heart disease, unspecified     Family history of stroke or transient ischemic attack in  mother 08/21/2017    Family history of heart disease 08/21/2017    History of prostate cancer     Pain of toe of right foot 10/21/2021    Rash in adult 12/03/2021    Wound of right leg 02/14/2022     Past Medical History:   Diagnosis Date    CAD (coronary artery disease) 07/06/2017    Diabetes (HCC)     Hypertension     Inguinal hernia     Prostate cancer (HCC)        PAST SURGICAL HISTORY:   Past Surgical History:   Procedure Laterality Date    ROHINI  7/6/2017    Procedure: ROHINI - INTRAOPERATIVE ;  Surgeon: Anthony Gaffney M.D.;  Location: SURGERY [facility];  Service:     MULTIPLE CORONARY ARTERY BYPASS ENDO VEIN HARVEST  7/6/2017    Procedure: MULTIPLE CORONARY ARTERY BYPASS ENDO VEIN HARVEST X2-4, PREVENA DRESSING ;  Surgeon: Anthony Gaffney M.D.;  Location: SURGERY [facility];  Service:     APPENDECTOMY  1955    TONSILLECTOMY          ALLERGIES:   Allergies   Allergen Reactions    Other Environmental Runny Nose     SAW DUST        CURRENT MEDICATIONS:   Current Outpatient Medications:     metoprolol tartrate (LOPRESSOR) 25 MG Tab, Take 1 Tablet by mouth 2 times a day., Disp: 180 Tablet, Rfl: 3    Semaglutide 7 MG Tab, Take 7 mg by mouth every day., Disp: 90 Tablet, Rfl: 3    Zinc Sulfate (ZINC 15 PO), Take  by mouth., Disp: , Rfl:     donepezil (ARICEPT) 10 MG tablet, Take 1 Tablet by mouth every day., Disp: 90 Tablet, Rfl: 3    Blood Glucose Test Strips, Use one One Touch strip to test blood sugar once daily ., Disp: 100 Strip, Rfl: 3    atorvastatin (LIPITOR) 80 MG tablet, Take 1 Tablet by mouth every day. (Patient not taking: Reported on 12/11/2024), Disp: 90 Tablet, Rfl: 3    metformin (GLUCOPHAGE) 1000 MG tablet, Take 1 Tablet by mouth 2 times a day with meals., Disp: 180 Tablet, Rfl: 3    losartan (COZAAR) 50 MG Tab, Take 1 Tablet by mouth every day., Disp: 90 Tablet, Rfl: 3    ONE TOUCH ULTRA TEST strip, TEST BLOOD SUGAR LEVELS EVERY DAY, Disp: 100 Strip, Rfl: 3    ONETOUCH DELICA LANCETS FINE  Misc, USE TO TEST BLOOD SUGARS ONCE DAILY, Disp: 100 Each, Rfl: 3    Ascorbic Acid (VITAMIN C PO), Take  by mouth., Disp: , Rfl:     aspirin (ASA) 81 MG Chew Tab chewable tablet, Take 81 mg by mouth every day. Indications: post cardiac surgery to prevent blood clots, Disp: , Rfl:     therapeutic multivitamin-minerals (THERAGRAN-M) Tab, Take 1 Tab by mouth every day. Indications: supplement, Disp: , Rfl:     Cholecalciferol (VITAMIN D3) 1000 UNIT TABS, Take 1 Tab by mouth every day., Disp: 100 Tab, Rfl: 3    FAMILY HISTORY:   Family History   Problem Relation Age of Onset    Stroke Mother     Heart Disease Father     Heart Disease Brother         SOCIAL HISTORY:   Social History     Socioeconomic History    Marital status:      Spouse name: Not on file    Number of children: Not on file    Years of education: Not on file    Highest education level: Not on file   Occupational History    Not on file   Tobacco Use    Smoking status: Never    Smokeless tobacco: Never   Vaping Use    Vaping status: Never Used   Substance and Sexual Activity    Alcohol use: Yes     Alcohol/week: 1.2 oz     Types: 2 Shots of liquor per week     Comment: oz every other week    Drug use: No    Sexual activity: Never     Partners: Female     Birth control/protection: Post-Menopausal   Other Topics Concern    Not on file   Social History Narrative    Not on file     Social Drivers of Health     Financial Resource Strain: Not on file   Food Insecurity: Not on file   Transportation Needs: Not on file   Physical Activity: Not on file   Stress: Not on file   Social Connections: Not on file   Intimate Partner Violence: Not on file   Housing Stability: Not on file       REVIEW OF SYSTEMS:  Review of Systems   Constitutional:  Positive for malaise/fatigue. Negative for chills, fever and weight loss.   HENT:  Positive for hearing loss (didn't wear his hearing aid today but still able to communicate). Negative for ear pain, nosebleeds and  "tinnitus.    Eyes:  Negative for double vision, photophobia and pain.   Respiratory:  Positive for shortness of breath (exertional). Negative for cough and hemoptysis.    Cardiovascular:  Negative for chest pain, palpitations, orthopnea, leg swelling and PND.   Gastrointestinal:  Negative for abdominal pain, blood in stool, nausea and vomiting.   Genitourinary:  Negative for frequency, hematuria and urgency.   Musculoskeletal: Negative.    Skin:  Negative for rash.   Neurological:  Negative for dizziness, tremors, speech change, focal weakness, seizures and headaches.   Endo/Heme/Allergies:  Negative for polydipsia. Does not bruise/bleed easily.   Psychiatric/Behavioral:  Negative for hallucinations and memory loss.      PHYSICAL EXAMINATION:    BP (!) 142/72 (BP Location: Left arm, Patient Position: Sitting, BP Cuff Size: Adult)   Pulse 64   Temp 36.1 °C (97 °F) (Temporal)   Ht 1.778 m (5' 10\")   Wt 85.7 kg (189 lb)   SpO2 96%   BMI 27.12 kg/m²    Physical Exam  Vitals reviewed.   Constitutional:       General: He is not in acute distress.     Appearance: Normal appearance.   HENT:      Head: Normocephalic and atraumatic.      Right Ear: External ear normal.      Left Ear: External ear normal.      Nose: Nose normal. No congestion.   Eyes:      General: No scleral icterus.     Extraocular Movements: Extraocular movements intact.      Conjunctiva/sclera: Conjunctivae normal.      Comments: Corrective lenses   Cardiovascular:      Rate and Rhythm: Normal rate and regular rhythm.   Pulmonary:      Effort: Pulmonary effort is normal. No respiratory distress.   Abdominal:      General: There is no distension.      Palpations: Abdomen is soft.   Musculoskeletal:         General: Normal range of motion.      Cervical back: Normal range of motion.   Skin:     General: Skin is warm and dry.      Coloration: Skin is not jaundiced.      Findings: No rash.   Neurological:      General: No focal deficit present.      " Mental Status: He is alert and oriented to person, place, and time.      Cranial Nerves: No cranial nerve deficit.   Psychiatric:         Mood and Affect: Mood normal.         Behavior: Behavior normal.       LABS REVIEWED:  Lab Results   Component Value Date/Time    SODIUM 141 12/05/2024 09:09 AM    POTASSIUM 4.3 12/05/2024 09:09 AM    CHLORIDE 107 12/05/2024 09:09 AM    CO2 24 12/05/2024 09:09 AM    GLUCOSE 117 (H) 12/05/2024 09:09 AM    BUN 16 12/05/2024 09:09 AM    CREATININE 1.20 12/05/2024 09:09 AM    CREATININE 1.0 10/27/2008 08:20 AM      Lab Results   Component Value Date/Time    PROTHROMBTM 16.7 (H) 07/06/2017 12:27 PM    INR 1.31 (H) 07/06/2017 12:27 PM      Lab Results   Component Value Date/Time    WBC 7.7 07/02/2019 07:32 AM    RBC 4.52 (L) 07/02/2019 07:32 AM    HEMOGLOBIN 13.7 (L) 07/02/2019 07:32 AM    HEMATOCRIT 43.9 07/02/2019 07:32 AM    MCV 97.1 07/02/2019 07:32 AM    MCH 30.3 07/02/2019 07:32 AM    MCHC 31.2 (L) 07/02/2019 07:32 AM    MPV 10.2 07/02/2019 07:32 AM    NEUTSPOLYS 71.10 07/02/2019 07:32 AM    LYMPHOCYTES 15.40 (L) 07/02/2019 07:32 AM    MONOCYTES 9.70 07/02/2019 07:32 AM    EOSINOPHILS 2.60 07/02/2019 07:32 AM    BASOPHILS 0.80 07/02/2019 07:32 AM    HYPOCHROMIA 1+ 02/19/2013 10:35 AM        IMAGING REVIEWED AND INTERPRETED:    ECHOCARDIOGRAM 7/25/24 [facility]  Compared to the prior study on 01/23/2024 - AS has progressed, prior   Vmax 3.3 m/s.  Normal left ventricular chamber size.  The ejection fraction is measured to be  60% by Costa's biplane.  Moderate to severe aortic valve stenosis. Vmax is  3.8m/s.  Normal inferior vena cava size and inspiratory collapse.    CARDIAC CATHETERIZATION   Sched 12/17/24 at 0730-[facility]    CT SCAN CHEST-TAVR  Sched 12/12/24 at 1100-[facility]      IMPRESSION:   84 y.o. male with a past medical history significant for coronary artery disease s/p CABG x4 (Dr Gaffney 2017), type II diabetes mellitus, hyperlipidemia, hypertension, prostate cancer, chronic kidney  disease stage 3a, memory difficulties, now with symptomatic severe aortic stenosis      PLAN:  I recommend that the patient is a reasonable TAVR candidate given advanced age, previous sternotomy with likely open LIMA graft making re-do higher risk, patient preference and some documented cognitive decline. We will continue to follow workup and discuss in multidisciplinary conference.    The STS mortality risk score for [id number] is 2% and the morbidity and mortality risk score is 12%. The scores were discussed with patient.    Thank you for this very challenging consultation and participation in the patient’s care.  I will keep you apprised of all future developments.      Sincerely,     Roz Mg MD

## 2024-12-10 DIAGNOSIS — Z95.1 S/P CABG X 4: ICD-10-CM

## 2024-12-10 DIAGNOSIS — I25.119 CORONARY ARTERY DISEASE INVOLVING NATIVE CORONARY ARTERY OF NATIVE HEART WITH ANGINA PECTORIS (HCC): ICD-10-CM

## 2024-12-11 ENCOUNTER — OFFICE VISIT (OUTPATIENT)
Dept: CARDIOLOGY | Facility: MEDICAL CENTER | Age: 84
End: 2024-12-11
Attending: INTERNAL MEDICINE
Payer: MEDICARE

## 2024-12-11 ENCOUNTER — APPOINTMENT (OUTPATIENT)
Dept: ADMISSIONS | Facility: MEDICAL CENTER | Age: 84
End: 2024-12-11
Attending: INTERNAL MEDICINE
Payer: MEDICARE

## 2024-12-11 ENCOUNTER — TELEPHONE (OUTPATIENT)
Dept: CARDIOLOGY | Facility: MEDICAL CENTER | Age: 84
End: 2024-12-11
Payer: MEDICARE

## 2024-12-11 ENCOUNTER — OFFICE VISIT (OUTPATIENT)
Dept: CARDIOTHORACIC SURGERY | Facility: MEDICAL CENTER | Age: 84
End: 2024-12-11
Payer: MEDICARE

## 2024-12-11 ENCOUNTER — TELEPHONE (OUTPATIENT)
Dept: CARDIOLOGY | Facility: MEDICAL CENTER | Age: 84
End: 2024-12-11

## 2024-12-11 VITALS
SYSTOLIC BLOOD PRESSURE: 128 MMHG | WEIGHT: 189 LBS | HEART RATE: 75 BPM | DIASTOLIC BLOOD PRESSURE: 70 MMHG | BODY MASS INDEX: 27.06 KG/M2 | HEIGHT: 70 IN | OXYGEN SATURATION: 98 % | RESPIRATION RATE: 18 BRPM

## 2024-12-11 VITALS
HEIGHT: 70 IN | HEART RATE: 64 BPM | TEMPERATURE: 97 F | SYSTOLIC BLOOD PRESSURE: 142 MMHG | BODY MASS INDEX: 27.06 KG/M2 | OXYGEN SATURATION: 96 % | DIASTOLIC BLOOD PRESSURE: 72 MMHG | WEIGHT: 189 LBS

## 2024-12-11 DIAGNOSIS — I35.0 SEVERE AORTIC STENOSIS: ICD-10-CM

## 2024-12-11 DIAGNOSIS — Z95.1 S/P CABG X 4: ICD-10-CM

## 2024-12-11 DIAGNOSIS — E11.29 TYPE 2 DIABETES MELLITUS WITH MICROALBUMINURIA, WITHOUT LONG-TERM CURRENT USE OF INSULIN (HCC): ICD-10-CM

## 2024-12-11 DIAGNOSIS — E78.2 MIXED HYPERLIPIDEMIA: ICD-10-CM

## 2024-12-11 DIAGNOSIS — I35.0 AORTIC STENOSIS, MODERATE: ICD-10-CM

## 2024-12-11 DIAGNOSIS — N18.31 STAGE 3A CHRONIC KIDNEY DISEASE: ICD-10-CM

## 2024-12-11 DIAGNOSIS — I10 ESSENTIAL HYPERTENSION: ICD-10-CM

## 2024-12-11 DIAGNOSIS — R80.9 TYPE 2 DIABETES MELLITUS WITH MICROALBUMINURIA, WITHOUT LONG-TERM CURRENT USE OF INSULIN (HCC): ICD-10-CM

## 2024-12-11 PROBLEM — I25.119 ATHEROSCLEROTIC HEART DISEASE OF NATIVE CORONARY ARTERY WITH ANGINA PECTORIS (HCC): Status: RESOLVED | Noted: 2017-07-06 | Resolved: 2024-12-11

## 2024-12-11 LAB — EKG IMPRESSION: NORMAL

## 2024-12-11 PROCEDURE — 3078F DIAST BP <80 MM HG: CPT | Performed by: INTERNAL MEDICINE

## 2024-12-11 PROCEDURE — 93005 ELECTROCARDIOGRAM TRACING: CPT | Mod: TC | Performed by: INTERNAL MEDICINE

## 2024-12-11 PROCEDURE — 99215 OFFICE O/P EST HI 40 MIN: CPT | Performed by: THORACIC SURGERY (CARDIOTHORACIC VASCULAR SURGERY)

## 2024-12-11 PROCEDURE — 99205 OFFICE O/P NEW HI 60 MIN: CPT | Performed by: INTERNAL MEDICINE

## 2024-12-11 PROCEDURE — 3078F DIAST BP <80 MM HG: CPT | Performed by: THORACIC SURGERY (CARDIOTHORACIC VASCULAR SURGERY)

## 2024-12-11 PROCEDURE — 93010 ELECTROCARDIOGRAM REPORT: CPT | Performed by: INTERNAL MEDICINE

## 2024-12-11 PROCEDURE — 3077F SYST BP >= 140 MM HG: CPT | Performed by: THORACIC SURGERY (CARDIOTHORACIC VASCULAR SURGERY)

## 2024-12-11 PROCEDURE — 99213 OFFICE O/P EST LOW 20 MIN: CPT | Performed by: INTERNAL MEDICINE

## 2024-12-11 PROCEDURE — 3075F SYST BP GE 130 - 139MM HG: CPT | Performed by: INTERNAL MEDICINE

## 2024-12-11 RX ORDER — METOPROLOL TARTRATE 25 MG/1
25 TABLET, FILM COATED ORAL 2 TIMES DAILY
Qty: 180 TABLET | Refills: 3 | Status: SHIPPED | OUTPATIENT
Start: 2024-12-11 | End: 2024-12-31

## 2024-12-11 ASSESSMENT — ENCOUNTER SYMPTOMS
ABDOMINAL PAIN: 0
HEMOPTYSIS: 0
TREMORS: 0
DOUBLE VISION: 0
WEIGHT LOSS: 0
MUSCULOSKELETAL NEGATIVE: 1
SHORTNESS OF BREATH: 1
BRUISES/BLEEDS EASILY: 0
PND: 0
MEMORY LOSS: 0
EYE PAIN: 0
HALLUCINATIONS: 0
VOMITING: 0
SPEECH CHANGE: 0
SEIZURES: 0
FOCAL WEAKNESS: 0
FEVER: 0
NAUSEA: 0
HEADACHES: 0
COUGH: 0
PHOTOPHOBIA: 0
CHILLS: 0
BLOOD IN STOOL: 0
POLYDIPSIA: 0
ORTHOPNEA: 0
DIZZINESS: 0
PALPITATIONS: 0

## 2024-12-11 ASSESSMENT — PATIENT HEALTH QUESTIONNAIRE - PHQ9: CLINICAL INTERPRETATION OF PHQ2 SCORE: 0

## 2024-12-11 NOTE — TELEPHONE ENCOUNTER
Patient is scheduled on 12-17-24 for a Pre TAVR angio with Dr.Jad Pan. Patient was told to hold Semaglutide 7 days prior, hold Metformin day of and 48hrs after. Patient to check in at 5:30 for a 7:30 procedure. H&P was done on 12-11-24 by . Pre admit to call patient.

## 2024-12-11 NOTE — TELEPHONE ENCOUNTER
Is the patient due for a refill? Yes    Was the patient seen the past year? Yes    Date of last office visit: 12.02.2024    Does the patient have an upcoming appointment?  Yes   If yes, When? 12.11.2024    Provider to refill: LS    Does the patient have [facility] and need 100-day supply? (This applies to ALL medications) Patient does not have SCP

## 2024-12-11 NOTE — PROGRESS NOTES
Interventional cardiology Initial Consultation Note      Chief Complaint: Aortic stenosis    Raf Pelayo is a 84 y.o. male  patient presented today referred by Dr. Silva for severe aortic stenosis, he has history of CAD, coronary artery bypass surgery x 4 with LIMA to diagonal, SVG to OM1, OM2, SVG to distal LAD, hypertension, hyperlipidemia, diabetes mellitus.  He feels fatigue, needing to take naps.  Otherwise feels okay denies chest pain, shortness of breath.        Past Medical History:   Diagnosis Date    Anxiety     Arthritis     CAD (coronary artery disease) 07/06/2017    CABG x 4 (LIMA to distal diagonal, rSVG to distal OM1, rSVG to distal OM2, rSVG to distal LAD) by Dr. Gaffney.    Diabetes (HCC)     oral medication     Hypertension     Inguinal hernia     Mixed hyperlipidemia     Nodular prostate without urinary obstruction     Prostate cancer (HCC)              Current Outpatient Medications   Medication Sig Dispense Refill    metoprolol tartrate (LOPRESSOR) 25 MG Tab Take 1 Tablet by mouth 2 times a day. 180 Tablet 3    Semaglutide 7 MG Tab Take 7 mg by mouth every day. 90 Tablet 3    Zinc Sulfate (ZINC 15 PO) Take  by mouth.      donepezil (ARICEPT) 10 MG tablet Take 1 Tablet by mouth every day. 90 Tablet 3    Blood Glucose Test Strips Use one One Touch strip to test blood sugar once daily . 100 Strip 3    metformin (GLUCOPHAGE) 1000 MG tablet Take 1 Tablet by mouth 2 times a day with meals. 180 Tablet 3    losartan (COZAAR) 50 MG Tab Take 1 Tablet by mouth every day. 90 Tablet 3    ONE TOUCH ULTRA TEST strip TEST BLOOD SUGAR LEVELS EVERY  Strip 3    ONETOUCH DELICA LANCETS FINE Misc USE TO TEST BLOOD SUGARS ONCE DAILY 100 Each 3    Ascorbic Acid (VITAMIN C PO) Take  by mouth.      aspirin (ASA) 81 MG Chew Tab chewable tablet Take 81 mg by mouth every day. Indications: post cardiac surgery to prevent blood clots      therapeutic multivitamin-minerals (THERAGRAN-M) Tab Take 1  "Tab by mouth every day. Indications: supplement      Cholecalciferol (VITAMIN D3) 1000 UNIT TABS Take 1 Tab by mouth every day. 100 Tab 3    atorvastatin (LIPITOR) 80 MG tablet Take 1 Tablet by mouth every day. (Patient not taking: Reported on 12/11/2024) 90 Tablet 3     No current facility-administered medications for this visit.             Physical Exam:  Ambulatory Vitals  /70 (BP Location: Left arm, Patient Position: Sitting, BP Cuff Size: Adult)   Pulse 75   Resp 18   Ht 1.778 m (5' 10\")   Wt 85.7 kg (189 lb)   SpO2 98%    Oxygen Therapy:  Pulse Oximetry: 98 %  BP Readings from Last 4 Encounters:   12/11/24 (!) 142/72   12/11/24 128/70   12/02/24 138/62   09/12/24 118/58       Weight/BMI: Body mass index is 27.12 kg/m².  Wt Readings from Last 4 Encounters:   12/11/24 85.7 kg (189 lb)   12/11/24 85.7 kg (189 lb)   12/02/24 85.3 kg (188 lb)   09/12/24 83 kg (183 lb)           General: Well appearing and in no apparent distress  Neck: carotid bruits absent  Lungs: respiratory sounds  normal  Heart: Regular rhythm,  No palpable thrills on palpation, murmurs present, no rubs,   Lower extremity edema absent.       Echocardiogram 7/25/2024 reviewed, independently interpreted  CONCLUSIONS  Compared to the prior study on 01/23/2024 - AS has progressed, prior   Vmax 3.3 m/s.  Normal left ventricular chamber size.  The ejection fraction is measured to be  60% by Costa's biplane.  Moderate to severe aortic valve stenosis. Vmax is  3.8m/s.  Normal inferior vena cava size and inspiratory collapse.     EKG 2/11/2024 sinus bradycardia.      Medical Decision Making:  Problem List Items Addressed This Visit       Mixed hyperlipidemia    S/P CABG x 4    Type 2 diabetes mellitus with microalbuminuria, without long-term current use of insulin (HCC)    Essential hypertension    Stage 3a chronic kidney disease    Severe aortic stenosis     Other Visit Diagnoses       Aortic stenosis, moderate        Relevant Orders    " EKG (Completed)          Today's encounter addressed an illness with threat to life/bodily function symptomatic severe aortic valve stenosis, NYHA II, stage C.  Symptomatology, progression, associated high morbidity, mortality of symptomatic severe aortic stenosis discussed with the patient.  Treatment options including conservative management, surgical, transcatheter aortic valve replacement discussed.  Patient would like to proceed with transcatheter aortic valve replacement.  Risk benefits of the procedure including but not limited to death, stroke, permanent pacemaker, vascular complications discussed.  All the questions answered.    Preoperative coronary angiogram prior to transcatheter aortic valve replacement discussed with the patient.  Risk benefits of this procedure discussed in detail.        This note was dictated using Dragon speech recognition software.    Jerrell JANE  Interventional cardiologist  [facility], [address]  [address] [place] [id number]  Phone: [phone]  Fax: [phone]

## 2024-12-11 NOTE — TELEPHONE ENCOUNTER
----- Message from Nurse Yohana SMITH R.N. sent at 12/4/2024  4:11 PM PST -----  Regarding: Pre TAVR Cath  Hey! Please hold pre TAVR cath 12/16-12/17. Thanks!

## 2024-12-12 ENCOUNTER — DOCUMENTATION (OUTPATIENT)
Dept: CARDIOLOGY | Facility: MEDICAL CENTER | Age: 84
End: 2024-12-12
Payer: MEDICARE

## 2024-12-12 ENCOUNTER — HOSPITAL ENCOUNTER (OUTPATIENT)
Dept: RADIOLOGY | Facility: MEDICAL CENTER | Age: 84
End: 2024-12-12
Payer: MEDICARE

## 2024-12-12 DIAGNOSIS — I35.0 NONRHEUMATIC AORTIC VALVE STENOSIS: ICD-10-CM

## 2024-12-12 DIAGNOSIS — Z00.6 EXAMINATION OF PARTICIPANT IN CLINICAL TRIAL: ICD-10-CM

## 2024-12-12 DIAGNOSIS — I35.0 SEVERE AORTIC STENOSIS: ICD-10-CM

## 2024-12-12 DIAGNOSIS — Z01.810 PRE-PROCEDURAL CARDIOVASCULAR EXAMINATION: ICD-10-CM

## 2024-12-12 DIAGNOSIS — Z95.1 S/P CABG X 4: ICD-10-CM

## 2024-12-12 DIAGNOSIS — I35.0 AORTIC STENOSIS, MODERATE: ICD-10-CM

## 2024-12-12 PROCEDURE — 700117 HCHG RX CONTRAST REV CODE 255

## 2024-12-12 PROCEDURE — 76497 UNLISTED CT PROCEDURE: CPT

## 2024-12-12 RX ADMIN — IOHEXOL 100 ML: 350 INJECTION, SOLUTION INTRAVENOUS at 11:00

## 2024-12-12 NOTE — PROGRESS NOTES
Valve Program Functional Assessment:     KCCQ12   1a) Showering/bathin  1b) Walking 1 block on ground: 2  1c) Hurrying or jogging:3  2) Swellin  3) Fatigue: 4  4) Shortness of breath: 4  5) Sleep sitting up: 2  6) Limited enjoyment of life: 3  7) Spend the rest of your life with HF: 3  8a) Hobbies, recreational activities:5  8b) Working or doing household chores:5  8c) Visiting family or friends: 5    5 meter walk test  1) __4.01____ s/5m  2) __4.07____ s/5m  3) __4.00____ s/5m  AVG:_4.02______     Strength   1) _32_____ kg  2) _36_____ kg  3) _36_____ kg  AVG:__34.6____    HARRELL ADLs  Patient independently preforms...   - Bathing: Yes   - Dressing: Yes   - Toileting: Yes   - Transferring: Yes   - Continence: Yes   - Feeding: Yes   Total Score: _6_/6    Living Situation  Patient lives:  with spouse    Mobility Aids   Patient uses:  none      FRAILTY SCORE: _0_/ 4

## 2024-12-12 NOTE — TELEPHONE ENCOUNTER
Caller: Estefanía - Wife    Topic/issue: Both patient and wife are confused on what is taking place on which date and are requesting call back to explain. Unable to reach Bobby or Yohana.     Callback Number: [phone]    Thank you  Aster LU

## 2024-12-13 NOTE — PROGRESS NOTES
"Valve Program Consultation: 12/11/2024 for tentative TAVR 12/23/2024    Mental Status Assessment:  Appearance: normal  Behavior: normal  Mood/Affect: normal, jolly  Thought process/content: normal   Cognition: normal  Functional ability: normal  Dental Concerns: natural teeth with implants; patient denies s/s of active oral infection/irritation  Further mental assessment needed: no    Post-op Plan of Care:  Support systems: spouse Estefanía present at consult today and participating in discussion  Patient understands discharge plan: yes  DME: none; wears glasses  Home health warranted prior to procedure: no  Social concerns for discharge: patient denies drug/tobacco/ETOH use/abuse/hx  PCP alerted of social concerns: no  POLST: none  Advance directive: none; provided AD packet  Post-op goal: \"decrease fatigue\"  Medication instructions: hold all vitamins and supplements 7 days prior, hold Losartan 24 hours prior, hold Metformin the morning of the procedure (patient states he has not yet started Semaglutide and will hold off until after TAVR)    Concerns prior to procedure: none    Met with patient during New TAVR consult.     All patient's questions and concerns were addressed during this visit. They understood pre-operative and post-operative plan of care.    Reviewed patient TAVR education packet explaining that information is provided regarding preparation for TAVR the night prior, what to expect during the hospital stay, average LOS, and what to look out for post TAVR discharge. Explained that patient will require SBE prophylactic antibiotic prior to any dental treatment post TAVR. Advised patient not to receive any new vaccinations within 1 week pre- and 1 week post-procedure.     Explained that patient should not eat or drink anything past midnight the day of the procedure. Encouraged patient to wear something clean and comfortable, easy to get on/off to check in Monday morning, time TBD. Patient may have friends " and family in the pre-operational area until patient is taken to the operating room, at which time family and friends will be asked to wait on floor 1 [facility] surgical waiting area. On completion of TAVR, heart team will update family. Once update is given, there is some time before family/friends may visit patient in their assigned room. Length of stay on average is one night, however patient may stay longer depending on specific needs at that time. Patient given printed instructions sheet with all above stated instructions. Patient states understanding of all material and education presented today and has no further questions at this time. Encouraged patient again, to contact me with any questions or concerns during this work-up process. Patient states understanding.

## 2024-12-13 NOTE — TELEPHONE ENCOUNTER
Called and spoke with Estefanía, clarified what procedure where schedule on which dates and arrival time for the heart cath on 12-17-24.

## 2024-12-16 ENCOUNTER — DOCUMENTATION (OUTPATIENT)
Dept: CARDIOLOGY | Facility: MEDICAL CENTER | Age: 84
End: 2024-12-16

## 2024-12-16 ENCOUNTER — PRE-ADMISSION TESTING (OUTPATIENT)
Dept: ADMISSIONS | Facility: MEDICAL CENTER | Age: 84
End: 2024-12-16
Attending: INTERNAL MEDICINE
Payer: MEDICARE

## 2024-12-16 DIAGNOSIS — Z01.810 PRE-OPERATIVE CARDIOVASCULAR EXAMINATION: ICD-10-CM

## 2024-12-16 DIAGNOSIS — Z01.812 PRE-OPERATIVE LABORATORY EXAMINATION: ICD-10-CM

## 2024-12-16 LAB
ABO GROUP BLD: NORMAL
ALBUMIN SERPL BCP-MCNC: 4.3 G/DL (ref 3.2–4.9)
ALBUMIN/GLOB SERPL: 1.7 G/DL
ALP SERPL-CCNC: 73 U/L (ref 30–99)
ALT SERPL-CCNC: 18 U/L (ref 2–50)
ANION GAP SERPL CALC-SCNC: 12 MMOL/L (ref 7–16)
APTT PPP: 31 SEC (ref 24.7–36)
AST SERPL-CCNC: 17 U/L (ref 12–45)
BASOPHILS # BLD AUTO: 0.7 % (ref 0–1.8)
BASOPHILS # BLD: 0.06 K/UL (ref 0–0.12)
BILIRUB SERPL-MCNC: 0.4 MG/DL (ref 0.1–1.5)
BLD GP AB SCN SERPL QL: NORMAL
BUN SERPL-MCNC: 25 MG/DL (ref 8–22)
CALCIUM ALBUM COR SERPL-MCNC: 9.7 MG/DL (ref 8.5–10.5)
CALCIUM SERPL-MCNC: 9.9 MG/DL (ref 8.5–10.5)
CHLORIDE SERPL-SCNC: 104 MMOL/L (ref 96–112)
CO2 SERPL-SCNC: 26 MMOL/L (ref 20–33)
CREAT SERPL-MCNC: 1.08 MG/DL (ref 0.5–1.4)
EKG IMPRESSION: NORMAL
EOSINOPHIL # BLD AUTO: 0.22 K/UL (ref 0–0.51)
EOSINOPHIL NFR BLD: 2.7 % (ref 0–6.9)
ERYTHROCYTE [DISTWIDTH] IN BLOOD BY AUTOMATED COUNT: 46.4 FL (ref 35.9–50)
GFR SERPLBLD CREATININE-BSD FMLA CKD-EPI: 68 ML/MIN/1.73 M 2
GLOBULIN SER CALC-MCNC: 2.5 G/DL (ref 1.9–3.5)
GLUCOSE SERPL-MCNC: 106 MG/DL (ref 65–99)
HCT VFR BLD AUTO: 42.5 % (ref 42–52)
HGB BLD-MCNC: 14 G/DL (ref 14–18)
IMM GRANULOCYTES # BLD AUTO: 0.03 K/UL (ref 0–0.11)
IMM GRANULOCYTES NFR BLD AUTO: 0.4 % (ref 0–0.9)
INR PPP: 1.04 (ref 0.87–1.13)
LYMPHOCYTES # BLD AUTO: 1.57 K/UL (ref 1–4.8)
LYMPHOCYTES NFR BLD: 19.1 % (ref 22–41)
MCH RBC QN AUTO: 31.7 PG (ref 27–33)
MCHC RBC AUTO-ENTMCNC: 32.9 G/DL (ref 32.3–36.5)
MCV RBC AUTO: 96.4 FL (ref 81.4–97.8)
MONOCYTES # BLD AUTO: 0.72 K/UL (ref 0–0.85)
MONOCYTES NFR BLD AUTO: 8.7 % (ref 0–13.4)
NEUTROPHILS # BLD AUTO: 5.63 K/UL (ref 1.82–7.42)
NEUTROPHILS NFR BLD: 68.4 % (ref 44–72)
NRBC # BLD AUTO: 0 K/UL
NRBC BLD-RTO: 0 /100 WBC (ref 0–0.2)
NT-PROBNP SERPL IA-MCNC: 574 PG/ML (ref 0–125)
PLATELET # BLD AUTO: 241 K/UL (ref 164–446)
PMV BLD AUTO: 10.3 FL (ref 9–12.9)
POTASSIUM SERPL-SCNC: 4.5 MMOL/L (ref 3.6–5.5)
PROT SERPL-MCNC: 6.8 G/DL (ref 6–8.2)
PROTHROMBIN TIME: 13.6 SEC (ref 12–14.6)
RBC # BLD AUTO: 4.41 M/UL (ref 4.7–6.1)
RH BLD: NORMAL
SODIUM SERPL-SCNC: 142 MMOL/L (ref 135–145)
WBC # BLD AUTO: 8.2 K/UL (ref 4.8–10.8)

## 2024-12-16 PROCEDURE — 86901 BLOOD TYPING SEROLOGIC RH(D): CPT

## 2024-12-16 PROCEDURE — 36415 COLL VENOUS BLD VENIPUNCTURE: CPT

## 2024-12-16 PROCEDURE — 85025 COMPLETE CBC W/AUTO DIFF WBC: CPT

## 2024-12-16 PROCEDURE — 83880 ASSAY OF NATRIURETIC PEPTIDE: CPT

## 2024-12-16 PROCEDURE — 93010 ELECTROCARDIOGRAM REPORT: CPT | Performed by: INTERNAL MEDICINE

## 2024-12-16 PROCEDURE — 93005 ELECTROCARDIOGRAM TRACING: CPT | Mod: TC

## 2024-12-16 PROCEDURE — 85610 PROTHROMBIN TIME: CPT

## 2024-12-16 PROCEDURE — 80053 COMPREHEN METABOLIC PANEL: CPT

## 2024-12-16 PROCEDURE — 86850 RBC ANTIBODY SCREEN: CPT

## 2024-12-16 PROCEDURE — 86900 BLOOD TYPING SEROLOGIC ABO: CPT

## 2024-12-16 PROCEDURE — 85730 THROMBOPLASTIN TIME PARTIAL: CPT

## 2024-12-16 RX ORDER — AMMONIUM LACTATE 12 G/100G
1 LOTION TOPICAL 2 TIMES DAILY
COMMUNITY
Start: 2024-12-02

## 2024-12-16 NOTE — PROGRESS NOTES
Nadeem TAVR Review:    Consider a 26mm S3UR from a right femoral approach   - Suboptimal CT/Motion artifact  - Moderate calcium in the distal aorta extending into the iliacs bilaterally  - L15, Ca6

## 2024-12-17 ENCOUNTER — APPOINTMENT (OUTPATIENT)
Dept: CARDIOLOGY | Facility: MEDICAL CENTER | Age: 84
End: 2024-12-17
Attending: INTERNAL MEDICINE
Payer: MEDICARE

## 2024-12-17 ENCOUNTER — HOSPITAL ENCOUNTER (OUTPATIENT)
Facility: MEDICAL CENTER | Age: 84
End: 2024-12-17
Attending: INTERNAL MEDICINE | Admitting: INTERNAL MEDICINE
Payer: MEDICARE

## 2024-12-17 VITALS
HEIGHT: 70 IN | DIASTOLIC BLOOD PRESSURE: 83 MMHG | TEMPERATURE: 96.9 F | HEART RATE: 50 BPM | BODY MASS INDEX: 26.57 KG/M2 | SYSTOLIC BLOOD PRESSURE: 176 MMHG | WEIGHT: 185.63 LBS | OXYGEN SATURATION: 97 % | RESPIRATION RATE: 16 BRPM

## 2024-12-17 DIAGNOSIS — Z95.1 S/P CABG X 4: ICD-10-CM

## 2024-12-17 DIAGNOSIS — Z01.810 PRE-PROCEDURAL CARDIOVASCULAR EXAMINATION: ICD-10-CM

## 2024-12-17 DIAGNOSIS — I35.0 AORTIC STENOSIS, MODERATE: ICD-10-CM

## 2024-12-17 DIAGNOSIS — I35.0 NONRHEUMATIC AORTIC VALVE STENOSIS: ICD-10-CM

## 2024-12-17 PROCEDURE — 700111 HCHG RX REV CODE 636 W/ 250 OVERRIDE (IP)

## 2024-12-17 PROCEDURE — 93455 CORONARY ART/GRFT ANGIO S&I: CPT | Mod: 26 | Performed by: INTERNAL MEDICINE

## 2024-12-17 PROCEDURE — 99153 MOD SED SAME PHYS/QHP EA: CPT

## 2024-12-17 PROCEDURE — 700101 HCHG RX REV CODE 250

## 2024-12-17 PROCEDURE — 700102 HCHG RX REV CODE 250 W/ 637 OVERRIDE(OP): Performed by: INTERNAL MEDICINE

## 2024-12-17 PROCEDURE — 99152 MOD SED SAME PHYS/QHP 5/>YRS: CPT | Performed by: INTERNAL MEDICINE

## 2024-12-17 PROCEDURE — 160002 HCHG RECOVERY MINUTES (STAT)

## 2024-12-17 PROCEDURE — G0278 ILIAC ART ANGIO,CARDIAC CATH: HCPCS | Performed by: INTERNAL MEDICINE

## 2024-12-17 PROCEDURE — 160035 HCHG PACU - 1ST 60 MINS PHASE I

## 2024-12-17 PROCEDURE — A9270 NON-COVERED ITEM OR SERVICE: HCPCS | Performed by: INTERNAL MEDICINE

## 2024-12-17 RX ORDER — MIDAZOLAM HYDROCHLORIDE 1 MG/ML
INJECTION INTRAMUSCULAR; INTRAVENOUS
Status: DISCONTINUED
Start: 2024-12-17 | End: 2024-12-17 | Stop reason: HOSPADM

## 2024-12-17 RX ORDER — LIDOCAINE HYDROCHLORIDE 20 MG/ML
INJECTION, SOLUTION INFILTRATION; PERINEURAL
Status: COMPLETED
Start: 2024-12-17 | End: 2024-12-17

## 2024-12-17 RX ORDER — MIDAZOLAM HYDROCHLORIDE 1 MG/ML
INJECTION INTRAMUSCULAR; INTRAVENOUS
Status: COMPLETED
Start: 2024-12-17 | End: 2024-12-17

## 2024-12-17 RX ORDER — ASPIRIN 81 MG/1
81 TABLET ORAL ONCE
Status: COMPLETED | OUTPATIENT
Start: 2024-12-17 | End: 2024-12-17

## 2024-12-17 RX ORDER — HEPARIN SODIUM 1000 [USP'U]/ML
INJECTION, SOLUTION INTRAVENOUS; SUBCUTANEOUS
Status: COMPLETED
Start: 2024-12-17 | End: 2024-12-17

## 2024-12-17 RX ORDER — HEPARIN SODIUM 200 [USP'U]/100ML
INJECTION, SOLUTION INTRAVENOUS
Status: COMPLETED
Start: 2024-12-17 | End: 2024-12-17

## 2024-12-17 RX ORDER — VERAPAMIL HYDROCHLORIDE 2.5 MG/ML
INJECTION, SOLUTION INTRAVENOUS
Status: COMPLETED
Start: 2024-12-17 | End: 2024-12-17

## 2024-12-17 RX ADMIN — LIDOCAINE HYDROCHLORIDE: 20 INJECTION, SOLUTION INFILTRATION; PERINEURAL at 08:05

## 2024-12-17 RX ADMIN — ASPIRIN 81 MG: 81 TABLET, COATED ORAL at 07:22

## 2024-12-17 RX ADMIN — FENTANYL CITRATE 100 MCG: 50 INJECTION, SOLUTION INTRAMUSCULAR; INTRAVENOUS at 07:59

## 2024-12-17 RX ADMIN — NITROGLYCERIN 10 ML: 20 INJECTION INTRAVENOUS at 08:05

## 2024-12-17 RX ADMIN — MIDAZOLAM HYDROCHLORIDE 2 MG: 1 INJECTION, SOLUTION INTRAMUSCULAR; INTRAVENOUS at 07:58

## 2024-12-17 RX ADMIN — FENTANYL CITRATE 50 MCG: 50 INJECTION, SOLUTION INTRAMUSCULAR; INTRAVENOUS at 08:29

## 2024-12-17 RX ADMIN — HEPARIN SODIUM 2000 UNITS: 200 INJECTION, SOLUTION INTRAVENOUS at 08:06

## 2024-12-17 ASSESSMENT — FIBROSIS 4 INDEX: FIB4 SCORE: 1.4

## 2024-12-17 NOTE — OR NURSING
0843 - Patient arrival to PPU after left heart cath. Assessed patient with Avi RN. Pt awake and alert. Dressing to right groin is C/D/soft. VSS. Denies pain at this time. Educated patient on bedrest instructions.   0845 - belongings returned to patient at bedside  0850 - Tolerating oral intake.  0855 - family at bedside  1230 - Discharge instructions given; discussed diet, activity, medications, dressing care, follow up care, and worsening symptoms. Pt and wife Estefanía verbalized understanding and questions answered.  1236 - steady ambulation to bathroom and around unit  1251 - Pt's VSS; denies N/V; patient up to edge of bed, denies discomfort. Dressing CDI to right groin.  IV dc'd. Patient states ready to d/c home.  Pt taken down in stable condition with wife and with all belongings present.

## 2024-12-17 NOTE — DISCHARGE INSTRUCTIONS
What to Expect Post Sedation    Rest and take it easy for the first 24 hours.  A responsible adult is recommended to remain with you during that time.  It is normal to feel sleepy.  We encourage you to not do anything that requires balance, judgment or coordination.    FOR 24 HOURS DO NOT:  Drive, operate machinery or run household appliances.  Drink beer or alcoholic beverages.  Make important decisions or sign legal documents.    MILD FLU-LIKE SYMPTOMS ARE NORMAL. You may experience generalized muscle aches, throat irritation, headache, and/or some nausea.  If any questions arise, call your provider.  If your provider is not available, please feel free to call the [facility] at [phone].    Medications: Resume taking daily medication.  Take prescribed pain medication with food.  If no medication is prescribed, you may take non-aspirin pain medication if needed.  PAIN  medication can be very constipating. Take a stool softener or laxative such as senokot, pericolace, or milk of magnesia if needed.    Diet: Resume your normal diet as tolerated.  A diet low in cholesterol, fat, and sodium is recommended for good health. To avoid nausea, slowly advance diet as tolerated, avoiding spicy or greasy foods for the first day.  Add more substantial food to your diet according to your provider's instructions.     Dressing: Keep dressing and incision dry and intact for 24 hours, may remove dressing and shower after 9 am  on 12/18/24, do not need to replace.  Do not submerge site in water for 7 days.     BOWEL FUNCTION:  Prescription pain medication may cause constipation. If you are having problems, use what you normally would or call your provider for suggestions. It also helps to stay regular by including fiber in your diet (for example: bran or fruits and vegetables) and drink plenty of liquids (water, juice, etc.).    Activity: No strenuous activity for 1 week.  You may tire easily; rest as needed during the  day.    10 Pound Weight Restriction Post Surgery. Do not lift anything heavier than a gallon of milk. Routine activities such as walking and using the stairs are safe. You may sleep in any position that is comfortable. Avoid strenuous activities and exercise that involves twisting, bending, and running.    Discharge Instructions:  POST ANGIOGRAM  General Care Instructions  Maintain a bandage over the incision site for 24 hours.  It's normal to find a small bruise or dime-sized lump at the insertion site. This should disappear within a few weeks.  Do not apply lotions or powders to the site.  Do not immerse the catheter insertion site in water (bathtub/swimming) for five days. It is ok to shower 24 hours after the procedure.  You may resume your normal diet immediately; on the day of your procedure, drink 6-10 glasses of water to help flush the contrast liquid out of your system.  If the doctor inserted the catheter in through your groin:  Walking short distances on a flat surface is OK. Limit going up/down stairs for the first 2 days.  DO NOT do yard work, drive, squat, lift heavy objects, or play sports for 2 days; or until your health care provider tells you it is OK.    Medications  If your current medications need to be changed, you will be provided with an updated list of your medications prior to discharge.  If you take warfarin (Coumadin), resume taking your usual dose the evening after the procedure.  DO NOT STOP taking prescribed blood thinning (anti-platelet) medications unless instructed by your cardiologist.  These medications include:  Aspirin, Clopidogrel (Plavix), Ticagrelor (Brilinta), or Prasugrel (Effient)   If you take one of the following anticoagulants, RESUME 24 HOURS after your procedure:  Apixiban (Eliquis), Rivaroxaban (Xarelto), Dabigatran (Pradaxa), Edoxaban (Savaysa)  If you take metformin (Glucophage), RESUME 48 HOURS after your procedure.    When to call your healthcare provider  Call  your cardiologist right away at [phone] if you have any of the following:   Problems/Concerns taking any of your prescribed heart medicines.   The insertion site has increasing pain, swelling, redness, bleeding, or drainage.   Your arm or leg below where the insertion site changes color, is cool, or is numb.   You have chest pain or shortness of breath that does not go away with rest.   Your pulse feels irregular -- very slow (less than 60 beats/minute) or very fast (over 100 beats/minute).   You have dizziness, fainting, or you are very tired.   You are coughing up blood or yellow or green mucus.   You have chills or a fever over 101°F (38.3°C).    If there is bleeding at the catheter insertion site, apply pressure for 10 minutes.  If bleeding persists, call 911, and continue to hold pressure until advanced medical support arrives.

## 2024-12-17 NOTE — PROCEDURES
Cardiac Catheterization Laboratory Procedure Note    DATE: 12/17/2024    : Kedar Pan MD, MPH    PROCEDURES PERFORMED:  Bypass Graft Angiography  Coronary angiography  Iliofemoral angiography  Left subclavian artery angiography  Ultrasound of the left ulnar and left radial arteries  Moderate conscious sedation  Perclose of the CFA    INDICATIONS:  Pre-TAVR    CONSENT:  The complete alternatives, risks, and benefits of the procedure were explained to the patient. Signed informed consent was obtained and placed in the chart prior to the procedure.    MEDICATIONS:  Lidocaine  Fentanyl  Midazolam    MODERATE CONSCIOUS SEDATION:  I personally supervised the administration of moderate conscious sedation by the nursing staff for 37 minutes.  Start time: 0749AM  End time: 0826AM    CONTRAST: Omnipaque 95 cc    ACCESS:  6-Azeri Milan sheath in the right CFA    ESTIMATED BLOOD LOSS: < 20 cc    COMPLICATIONS: None apparent    PROCEDURE IN DETAIL:  The patient was brought to the cardiac catheterization laboratory in the fasting state. The skin over the left wrist was prepped and draped in the usual sterile fashion.  Ultrasound was performed for the left ulnar and left radial arteries showing small in caliber vessels hence decision was made to proceed with right CFA for access.      Lidocaine infiltration was used to anesthetize the tissue over the right CFA. Using the micropuncture technique, ultrasound guidance and cineangiographic confirmation, a 6-Azeri Milan sheath was inserted in the right common femoral artery.     A 6-Fr JR4 diagnostic catheter was then advanced over a standard J-wire into the aortic root and used to engage the RCA and cineangiograms performed in multiple projections.  Then it was used to engage both  rSVG grafts and cineangiograms performed in multiple projections for complete evaluation .  Graft markers present.  Then this catheter was used to engage the left subclavian artery and  cineangiogram was performed of the left subclavian artery locating the ostium of the LIMA graft.  MIKHAIL or BC 6 Hebrew diagnostic catheters were unable to engage the LIMA graft however I was able to engage in using the 6 Hebrew JR4 catheter and cineangiograms performed in multiple projections for complete evaluation.      Then this catheter was exchanged over J-wire to 6 Hebrew JR4 diagnostic catheter which was used to engage the left coronary artery and cineangiograms performed in multiple projections.    Then this catheter was exchanged to a 6 Hebrew pigtail catheter which was used for the iliofemoral angiography.    Following completion of coronary angiography, all wires, catheters, and sheaths were removed.  Perclose was successfully deployed to achieve hemostasis of the right common femoral arteriotomy site.    HEMODYNAMICS:  Aortic pressure: 158 /66/72 mmHg    CORONARY ANGIOGRAPHY:  The left main coronary artery: Diffuse disease with severe distal stenosis.  The left anterior descending coronary artery: Occluded proximally.  Fills via patent SVG graft, and apical LAD provides left to right and left to left collaterals  The left circumflex coronary artery: Occluded at its ostium, fills via patent SVG graft.  Dominant left circumflex  The right coronary artery: Nondominant vessel with severe ostial and proximal disease.    BYPASS GRAFT ANGIOGRAPHY:  LIMA-diagonal: Patent, small diagonal branch  SVG-LAD: Patent.  Apical LAD provides left-to-right and left to left collaterals  SVG-OM1 and L-PDA : Patent    IMPRESSION:  1.  Severe three-vessel native CAD.  Dominant left circumflex  2.  Patent LIMA-diagonal, SVG-LAD, skip SVG-OM1/left PDA grafts.  3.  Mild PAD in the iliofemoral arteries      NOTIFICATION:  The patient's family was attempted to be called with update, left voicemail message.    Referring Cardiologist - Dr. Macias - was notified.    Kedar Pan MD, MPH FACC [facility]  Interventional  Cardiologist  [facility]   of Clinical Internal Medicine - [facility] Héctor MARTINEZ

## 2024-12-18 ENCOUNTER — DOCUMENTATION (OUTPATIENT)
Dept: CARDIOLOGY | Facility: MEDICAL CENTER | Age: 84
End: 2024-12-18
Payer: MEDICARE

## 2024-12-18 ENCOUNTER — TELEPHONE (OUTPATIENT)
Dept: CARDIOLOGY | Facility: MEDICAL CENTER | Age: 84
End: 2024-12-18
Payer: MEDICARE

## 2024-12-18 NOTE — TELEPHONE ENCOUNTER
Patient's spouse Estefanía notified of procedure date/time and check in process.     All questions were answered. Estefanía has direct extension for any further questions/concerns prior to the procedure.

## 2024-12-18 NOTE — TELEPHONE ENCOUNTER
Valve Conference Plan of Care: 12/18/2024 for TAVR 12/23/2024           TAVR Candidate: Yes  Access: right femoral  Valve Size: 26mm  General Anesthesia or MAC: MAC  Unit: Telemetry  Incidental findings to be discussed with PCP Luiza TOPETE: Mild pneumobilia. Prominent upper abdominal lymph nodes. Colonic diverticulosis. Prostate calcifications cc/clips.   Clearance needed prior to procedure: No    Check in time of 0700 12/23/2024.     Pre-op appointment completed: No, scheduled for completion 12/19/2024    NPO after midnight. Hold all vitamins and supplements 7 days prior, hold Losartan 24 hours prior, hold Metformin the morning of the procedure (patient states he has not yet started Semaglutide and will hold off until after TAVR).

## 2024-12-19 ENCOUNTER — PRE-ADMISSION TESTING (OUTPATIENT)
Dept: ADMISSIONS | Facility: MEDICAL CENTER | Age: 84
DRG: 267 | End: 2024-12-19
Attending: INTERNAL MEDICINE
Payer: MEDICARE

## 2024-12-19 DIAGNOSIS — I35.0 NONRHEUMATIC AORTIC VALVE STENOSIS: ICD-10-CM

## 2024-12-19 NOTE — PROGRESS NOTES
Thank you for the opportunity to participate in your patient's care.     Your patient is scheduled for transcatheter aortic valve replacement (TAVR) on Monday, December 23, 2024.    Sincerely,    [facility]    ESHA Reno, RN, Structural Heart Program Nurse Coordinator ([phone])  ESHA Pantoja, RN, Structural Heart Program Nurse Coordinator ([phone])

## 2024-12-23 ENCOUNTER — APPOINTMENT (OUTPATIENT)
Dept: CARDIOLOGY | Facility: MEDICAL CENTER | Age: 84
DRG: 267 | End: 2024-12-23
Attending: ANESTHESIOLOGY
Payer: MEDICARE

## 2024-12-23 ENCOUNTER — ANESTHESIA (OUTPATIENT)
Dept: SURGERY | Facility: MEDICAL CENTER | Age: 84
DRG: 267 | End: 2024-12-23
Payer: MEDICARE

## 2024-12-23 ENCOUNTER — ANESTHESIA EVENT (OUTPATIENT)
Dept: SURGERY | Facility: MEDICAL CENTER | Age: 84
DRG: 267 | End: 2024-12-23
Payer: MEDICARE

## 2024-12-23 ENCOUNTER — APPOINTMENT (OUTPATIENT)
Dept: RADIOLOGY | Facility: MEDICAL CENTER | Age: 84
DRG: 267 | End: 2024-12-23
Payer: MEDICARE

## 2024-12-23 ENCOUNTER — HOSPITAL ENCOUNTER (INPATIENT)
Facility: MEDICAL CENTER | Age: 84
LOS: 1 days | DRG: 267 | End: 2024-12-24
Attending: INTERNAL MEDICINE | Admitting: INTERNAL MEDICINE
Payer: MEDICARE

## 2024-12-23 DIAGNOSIS — Z95.2 S/P TAVR (TRANSCATHETER AORTIC VALVE REPLACEMENT): ICD-10-CM

## 2024-12-23 LAB
ACT BLD: 256 SEC (ref 74–137)
ALBUMIN SERPL BCP-MCNC: 3.8 G/DL (ref 3.2–4.9)
ALBUMIN/GLOB SERPL: 1.7 G/DL
ALP SERPL-CCNC: 65 U/L (ref 30–99)
ALT SERPL-CCNC: 17 U/L (ref 2–50)
ANION GAP SERPL CALC-SCNC: 11 MMOL/L (ref 7–16)
AST SERPL-CCNC: 20 U/L (ref 12–45)
BILIRUB SERPL-MCNC: 0.3 MG/DL (ref 0.1–1.5)
BUN SERPL-MCNC: 23 MG/DL (ref 8–22)
CALCIUM ALBUM COR SERPL-MCNC: 9.3 MG/DL (ref 8.5–10.5)
CALCIUM SERPL-MCNC: 9.1 MG/DL (ref 8.5–10.5)
CHLORIDE SERPL-SCNC: 107 MMOL/L (ref 96–112)
CO2 SERPL-SCNC: 20 MMOL/L (ref 20–33)
CREAT SERPL-MCNC: 1.16 MG/DL (ref 0.5–1.4)
EKG IMPRESSION: NORMAL
ERYTHROCYTE [DISTWIDTH] IN BLOOD BY AUTOMATED COUNT: 43.5 FL (ref 35.9–50)
GFR SERPLBLD CREATININE-BSD FMLA CKD-EPI: 62 ML/MIN/1.73 M 2
GLOBULIN SER CALC-MCNC: 2.2 G/DL (ref 1.9–3.5)
GLUCOSE BLD STRIP.AUTO-MCNC: 122 MG/DL (ref 65–99)
GLUCOSE BLD STRIP.AUTO-MCNC: 127 MG/DL (ref 65–99)
GLUCOSE BLD STRIP.AUTO-MCNC: 132 MG/DL (ref 65–99)
GLUCOSE BLD STRIP.AUTO-MCNC: 183 MG/DL (ref 65–99)
GLUCOSE SERPL-MCNC: 129 MG/DL (ref 65–99)
HCT VFR BLD AUTO: 38.7 % (ref 42–52)
HGB BLD-MCNC: 12.7 G/DL (ref 14–18)
MCH RBC QN AUTO: 30.8 PG (ref 27–33)
MCHC RBC AUTO-ENTMCNC: 32.8 G/DL (ref 32.3–36.5)
MCV RBC AUTO: 93.7 FL (ref 81.4–97.8)
NT-PROBNP SERPL IA-MCNC: 389 PG/ML (ref 0–125)
PLATELET # BLD AUTO: 219 K/UL (ref 164–446)
PMV BLD AUTO: 10.2 FL (ref 9–12.9)
POTASSIUM SERPL-SCNC: 4.5 MMOL/L (ref 3.6–5.5)
PROT SERPL-MCNC: 6 G/DL (ref 6–8.2)
RBC # BLD AUTO: 4.13 M/UL (ref 4.7–6.1)
SODIUM SERPL-SCNC: 138 MMOL/L (ref 135–145)
WBC # BLD AUTO: 6.9 K/UL (ref 4.8–10.8)

## 2024-12-23 PROCEDURE — 700102 HCHG RX REV CODE 250 W/ 637 OVERRIDE(OP)

## 2024-12-23 PROCEDURE — 700105 HCHG RX REV CODE 258: Performed by: INTERNAL MEDICINE

## 2024-12-23 PROCEDURE — C1751 CATH, INF, PER/CENT/MIDLINE: HCPCS | Performed by: INTERNAL MEDICINE

## 2024-12-23 PROCEDURE — 93325 DOPPLER ECHO COLOR FLOW MAPG: CPT

## 2024-12-23 PROCEDURE — C1883 ADAPT/EXT, PACING/NEURO LEAD: HCPCS | Performed by: INTERNAL MEDICINE

## 2024-12-23 PROCEDURE — 160009 HCHG ANES TIME/MIN: Performed by: INTERNAL MEDICINE

## 2024-12-23 PROCEDURE — 160035 HCHG PACU - 1ST 60 MINS PHASE I: Performed by: INTERNAL MEDICINE

## 2024-12-23 PROCEDURE — A9270 NON-COVERED ITEM OR SERVICE: HCPCS

## 2024-12-23 PROCEDURE — C1894 INTRO/SHEATH, NON-LASER: HCPCS | Performed by: INTERNAL MEDICINE

## 2024-12-23 PROCEDURE — 770020 HCHG ROOM/CARE - TELE (206)

## 2024-12-23 PROCEDURE — C1760 CLOSURE DEV, VASC: HCPCS | Performed by: INTERNAL MEDICINE

## 2024-12-23 PROCEDURE — 85347 COAGULATION TIME ACTIVATED: CPT

## 2024-12-23 PROCEDURE — 83880 ASSAY OF NATRIURETIC PEPTIDE: CPT

## 2024-12-23 PROCEDURE — 02RF38Z REPLACEMENT OF AORTIC VALVE WITH ZOOPLASTIC TISSUE, PERCUTANEOUS APPROACH: ICD-10-PCS | Performed by: INTERNAL MEDICINE

## 2024-12-23 PROCEDURE — 85027 COMPLETE CBC AUTOMATED: CPT

## 2024-12-23 PROCEDURE — C1887 CATHETER, GUIDING: HCPCS | Performed by: INTERNAL MEDICINE

## 2024-12-23 PROCEDURE — 700111 HCHG RX REV CODE 636 W/ 250 OVERRIDE (IP): Performed by: ANESTHESIOLOGY

## 2024-12-23 PROCEDURE — 160042 HCHG SURGERY MINUTES - EA ADDL 1 MIN LEVEL 5: Performed by: INTERNAL MEDICINE

## 2024-12-23 PROCEDURE — 93005 ELECTROCARDIOGRAM TRACING: CPT | Mod: TC

## 2024-12-23 PROCEDURE — 700105 HCHG RX REV CODE 258

## 2024-12-23 PROCEDURE — C1769 GUIDE WIRE: HCPCS | Performed by: INTERNAL MEDICINE

## 2024-12-23 PROCEDURE — 71045 X-RAY EXAM CHEST 1 VIEW: CPT

## 2024-12-23 PROCEDURE — 82962 GLUCOSE BLOOD TEST: CPT

## 2024-12-23 PROCEDURE — 700111 HCHG RX REV CODE 636 W/ 250 OVERRIDE (IP): Performed by: INTERNAL MEDICINE

## 2024-12-23 PROCEDURE — 93010 ELECTROCARDIOGRAM REPORT: CPT | Mod: 59,Q0 | Performed by: STUDENT IN AN ORGANIZED HEALTH CARE EDUCATION/TRAINING PROGRAM

## 2024-12-23 PROCEDURE — 80053 COMPREHEN METABOLIC PANEL: CPT

## 2024-12-23 PROCEDURE — 160048 HCHG OR STATISTICAL LEVEL 1-5: Performed by: INTERNAL MEDICINE

## 2024-12-23 PROCEDURE — 160031 HCHG SURGERY MINUTES - 1ST 30 MINS LEVEL 5: Performed by: INTERNAL MEDICINE

## 2024-12-23 PROCEDURE — 503001 HCHG PERFUSION: Performed by: INTERNAL MEDICINE

## 2024-12-23 PROCEDURE — 700101 HCHG RX REV CODE 250: Performed by: INTERNAL MEDICINE

## 2024-12-23 PROCEDURE — 160002 HCHG RECOVERY MINUTES (STAT): Performed by: INTERNAL MEDICINE

## 2024-12-23 PROCEDURE — 33361 REPLACE AORTIC VALVE PERQ: CPT | Mod: 62,Q0 | Performed by: THORACIC SURGERY (CARDIOTHORACIC VASCULAR SURGERY)

## 2024-12-23 PROCEDURE — 36415 COLL VENOUS BLD VENIPUNCTURE: CPT

## 2024-12-23 PROCEDURE — 110372 HCHG SHELL REV 278: Performed by: INTERNAL MEDICINE

## 2024-12-23 PROCEDURE — 33361 REPLACE AORTIC VALVE PERQ: CPT | Mod: 62,Q0 | Performed by: INTERNAL MEDICINE

## 2024-12-23 DEVICE — IMPLANTABLE DEVICE: Type: IMPLANTABLE DEVICE | Site: HEART | Status: FUNCTIONAL

## 2024-12-23 DEVICE — DEVICE CLSR 6FR HMST IMPL SLF STS PLUS ANGIOSEAL (10EA/CA): Type: IMPLANTABLE DEVICE | Site: HEART | Status: FUNCTIONAL

## 2024-12-23 RX ORDER — ALBUTEROL SULFATE 5 MG/ML
2.5 SOLUTION RESPIRATORY (INHALATION)
Status: DISCONTINUED | OUTPATIENT
Start: 2024-12-23 | End: 2024-12-23 | Stop reason: HOSPADM

## 2024-12-23 RX ORDER — ONDANSETRON 2 MG/ML
INJECTION INTRAMUSCULAR; INTRAVENOUS PRN
Status: DISCONTINUED | OUTPATIENT
Start: 2024-12-23 | End: 2024-12-23 | Stop reason: SURG

## 2024-12-23 RX ORDER — INSULIN LISPRO 100 [IU]/ML
2-9 INJECTION, SOLUTION INTRAVENOUS; SUBCUTANEOUS
Status: DISCONTINUED | OUTPATIENT
Start: 2024-12-23 | End: 2024-12-24 | Stop reason: HOSPADM

## 2024-12-23 RX ORDER — DONEPEZIL HYDROCHLORIDE 5 MG/1
10 TABLET, FILM COATED ORAL DAILY
Status: DISCONTINUED | OUTPATIENT
Start: 2024-12-23 | End: 2024-12-24 | Stop reason: HOSPADM

## 2024-12-23 RX ORDER — DIPHENHYDRAMINE HCL 25 MG
25 TABLET ORAL NIGHTLY PRN
Status: DISCONTINUED | OUTPATIENT
Start: 2024-12-23 | End: 2024-12-24 | Stop reason: HOSPADM

## 2024-12-23 RX ORDER — PROTAMINE SULFATE 10 MG/ML
INJECTION, SOLUTION INTRAVENOUS PRN
Status: DISCONTINUED | OUTPATIENT
Start: 2024-12-23 | End: 2024-12-23 | Stop reason: SURG

## 2024-12-23 RX ORDER — ASPIRIN 81 MG/1
81 TABLET, CHEWABLE ORAL DAILY
Status: DISCONTINUED | OUTPATIENT
Start: 2024-12-23 | End: 2024-12-24 | Stop reason: HOSPADM

## 2024-12-23 RX ORDER — AMOXICILLIN 250 MG
1 CAPSULE ORAL NIGHTLY
Status: DISCONTINUED | OUTPATIENT
Start: 2024-12-23 | End: 2024-12-24 | Stop reason: HOSPADM

## 2024-12-23 RX ORDER — SODIUM CHLORIDE, SODIUM LACTATE, POTASSIUM CHLORIDE, CALCIUM CHLORIDE 600; 310; 30; 20 MG/100ML; MG/100ML; MG/100ML; MG/100ML
INJECTION, SOLUTION INTRAVENOUS CONTINUOUS
Status: ACTIVE | OUTPATIENT
Start: 2024-12-23 | End: 2024-12-23

## 2024-12-23 RX ORDER — LOSARTAN POTASSIUM 50 MG/1
50 TABLET ORAL DAILY
Status: DISCONTINUED | OUTPATIENT
Start: 2024-12-23 | End: 2024-12-24 | Stop reason: HOSPADM

## 2024-12-23 RX ORDER — ONDANSETRON 2 MG/ML
4 INJECTION INTRAMUSCULAR; INTRAVENOUS EVERY 4 HOURS PRN
Status: DISCONTINUED | OUTPATIENT
Start: 2024-12-23 | End: 2024-12-24 | Stop reason: HOSPADM

## 2024-12-23 RX ORDER — SODIUM CHLORIDE, SODIUM LACTATE, POTASSIUM CHLORIDE, CALCIUM CHLORIDE 600; 310; 30; 20 MG/100ML; MG/100ML; MG/100ML; MG/100ML
INJECTION, SOLUTION INTRAVENOUS CONTINUOUS
Status: DISCONTINUED | OUTPATIENT
Start: 2024-12-23 | End: 2024-12-23 | Stop reason: HOSPADM

## 2024-12-23 RX ORDER — ATORVASTATIN CALCIUM 80 MG/1
80 TABLET, FILM COATED ORAL DAILY
Status: DISCONTINUED | OUTPATIENT
Start: 2024-12-23 | End: 2024-12-24 | Stop reason: HOSPADM

## 2024-12-23 RX ORDER — VERAPAMIL HYDROCHLORIDE 2.5 MG/ML
INJECTION, SOLUTION INTRAVENOUS
Status: DISCONTINUED | OUTPATIENT
Start: 2024-12-23 | End: 2024-12-23 | Stop reason: HOSPADM

## 2024-12-23 RX ORDER — BISACODYL 10 MG
10 SUPPOSITORY, RECTAL RECTAL
Status: DISCONTINUED | OUTPATIENT
Start: 2024-12-23 | End: 2024-12-24 | Stop reason: HOSPADM

## 2024-12-23 RX ORDER — BUPIVACAINE HYDROCHLORIDE 5 MG/ML
INJECTION, SOLUTION EPIDURAL; INTRACAUDAL
Status: DISCONTINUED | OUTPATIENT
Start: 2024-12-23 | End: 2024-12-23 | Stop reason: HOSPADM

## 2024-12-23 RX ORDER — LIDOCAINE HYDROCHLORIDE 20 MG/ML
INJECTION, SOLUTION INFILTRATION; PERINEURAL
Status: DISCONTINUED | OUTPATIENT
Start: 2024-12-23 | End: 2024-12-23 | Stop reason: HOSPADM

## 2024-12-23 RX ORDER — DOCUSATE SODIUM 100 MG/1
100 CAPSULE, LIQUID FILLED ORAL 2 TIMES DAILY
Status: DISCONTINUED | OUTPATIENT
Start: 2024-12-23 | End: 2024-12-24 | Stop reason: HOSPADM

## 2024-12-23 RX ORDER — OXYCODONE HCL 5 MG/5 ML
10 SOLUTION, ORAL ORAL
Status: DISCONTINUED | OUTPATIENT
Start: 2024-12-23 | End: 2024-12-23 | Stop reason: HOSPADM

## 2024-12-23 RX ORDER — DIPHENHYDRAMINE HYDROCHLORIDE 50 MG/ML
25 INJECTION INTRAMUSCULAR; INTRAVENOUS EVERY 6 HOURS PRN
Status: DISCONTINUED | OUTPATIENT
Start: 2024-12-23 | End: 2024-12-24 | Stop reason: HOSPADM

## 2024-12-23 RX ORDER — METOPROLOL TARTRATE 25 MG/1
25 TABLET, FILM COATED ORAL 2 TIMES DAILY
Status: DISCONTINUED | OUTPATIENT
Start: 2024-12-23 | End: 2024-12-24 | Stop reason: HOSPADM

## 2024-12-23 RX ORDER — HYDRALAZINE HYDROCHLORIDE 20 MG/ML
10 INJECTION INTRAMUSCULAR; INTRAVENOUS
Status: DISCONTINUED | OUTPATIENT
Start: 2024-12-23 | End: 2024-12-24 | Stop reason: HOSPADM

## 2024-12-23 RX ORDER — SODIUM CHLORIDE 9 MG/ML
INJECTION, SOLUTION INTRAVENOUS CONTINUOUS
Status: ACTIVE | OUTPATIENT
Start: 2024-12-23 | End: 2024-12-23

## 2024-12-23 RX ORDER — DEXTROSE MONOHYDRATE 25 G/50ML
25 INJECTION, SOLUTION INTRAVENOUS
Status: DISCONTINUED | OUTPATIENT
Start: 2024-12-23 | End: 2024-12-24 | Stop reason: HOSPADM

## 2024-12-23 RX ORDER — CEFAZOLIN SODIUM 1 G/3ML
INJECTION, POWDER, FOR SOLUTION INTRAMUSCULAR; INTRAVENOUS PRN
Status: DISCONTINUED | OUTPATIENT
Start: 2024-12-23 | End: 2024-12-23 | Stop reason: SURG

## 2024-12-23 RX ORDER — POLYETHYLENE GLYCOL 3350 17 G/17G
1 POWDER, FOR SOLUTION ORAL 2 TIMES DAILY PRN
Status: DISCONTINUED | OUTPATIENT
Start: 2024-12-23 | End: 2024-12-24 | Stop reason: HOSPADM

## 2024-12-23 RX ORDER — ONDANSETRON 2 MG/ML
4 INJECTION INTRAMUSCULAR; INTRAVENOUS
Status: DISCONTINUED | OUTPATIENT
Start: 2024-12-23 | End: 2024-12-23 | Stop reason: HOSPADM

## 2024-12-23 RX ORDER — AMOXICILLIN 250 MG
1 CAPSULE ORAL
Status: DISCONTINUED | OUTPATIENT
Start: 2024-12-23 | End: 2024-12-24 | Stop reason: HOSPADM

## 2024-12-23 RX ORDER — HEPARIN SODIUM,PORCINE 1000/ML
VIAL (ML) INJECTION PRN
Status: DISCONTINUED | OUTPATIENT
Start: 2024-12-23 | End: 2024-12-23 | Stop reason: SURG

## 2024-12-23 RX ORDER — ACETAMINOPHEN 325 MG/1
650 TABLET ORAL EVERY 6 HOURS PRN
Status: DISCONTINUED | OUTPATIENT
Start: 2024-12-23 | End: 2024-12-24 | Stop reason: HOSPADM

## 2024-12-23 RX ORDER — OXYCODONE HCL 5 MG/5 ML
5 SOLUTION, ORAL ORAL
Status: DISCONTINUED | OUTPATIENT
Start: 2024-12-23 | End: 2024-12-23 | Stop reason: HOSPADM

## 2024-12-23 RX ADMIN — SENNOSIDES AND DOCUSATE SODIUM 1 TABLET: 50; 8.6 TABLET ORAL at 21:00

## 2024-12-23 RX ADMIN — ASPIRIN 81 MG: 81 TABLET, CHEWABLE ORAL at 15:27

## 2024-12-23 RX ADMIN — PROPOFOL 50 MCG/KG/MIN: 10 INJECTION, EMULSION INTRAVENOUS at 09:11

## 2024-12-23 RX ADMIN — LOSARTAN POTASSIUM 50 MG: 50 TABLET, FILM COATED ORAL at 15:27

## 2024-12-23 RX ADMIN — HEPARIN SODIUM 10000 UNITS: 1000 INJECTION, SOLUTION INTRAVENOUS; SUBCUTANEOUS at 09:35

## 2024-12-23 RX ADMIN — CEFAZOLIN 2 G: 1 INJECTION, POWDER, FOR SOLUTION INTRAMUSCULAR; INTRAVENOUS at 09:14

## 2024-12-23 RX ADMIN — DOCUSATE SODIUM 100 MG: 100 CAPSULE, LIQUID FILLED ORAL at 17:22

## 2024-12-23 RX ADMIN — ONDANSETRON 4 MG: 2 INJECTION INTRAMUSCULAR; INTRAVENOUS at 09:18

## 2024-12-23 RX ADMIN — METOPROLOL TARTRATE 25 MG: 25 TABLET, FILM COATED ORAL at 17:22

## 2024-12-23 RX ADMIN — DONEPEZIL HYDROCHLORIDE 10 MG: 5 TABLET, FILM COATED ORAL at 15:27

## 2024-12-23 RX ADMIN — ATORVASTATIN CALCIUM 80 MG: 80 TABLET, FILM COATED ORAL at 15:27

## 2024-12-23 RX ADMIN — SODIUM CHLORIDE: 0.9 INJECTION, SOLUTION INTRAVENOUS at 11:59

## 2024-12-23 RX ADMIN — SODIUM CHLORIDE, POTASSIUM CHLORIDE, SODIUM LACTATE AND CALCIUM CHLORIDE: 600; 310; 30; 20 INJECTION, SOLUTION INTRAVENOUS at 09:05

## 2024-12-23 RX ADMIN — INSULIN LISPRO 2 UNITS: 100 INJECTION, SOLUTION INTRAVENOUS; SUBCUTANEOUS at 17:25

## 2024-12-23 RX ADMIN — SENNOSIDES AND DOCUSATE SODIUM 1 TABLET: 50; 8.6 TABLET ORAL at 20:48

## 2024-12-23 RX ADMIN — PROTAMINE SULFATE 50 MG: 10 INJECTION, SOLUTION INTRAVENOUS at 10:01

## 2024-12-23 SDOH — ECONOMIC STABILITY: TRANSPORTATION INSECURITY
IN THE PAST 12 MONTHS, HAS LACK OF RELIABLE TRANSPORTATION KEPT YOU FROM MEDICAL APPOINTMENTS, MEETINGS, WORK OR FROM GETTING THINGS NEEDED FOR DAILY LIVING?: NO

## 2024-12-23 SDOH — ECONOMIC STABILITY: TRANSPORTATION INSECURITY
IN THE PAST 12 MONTHS, HAS THE LACK OF TRANSPORTATION KEPT YOU FROM MEDICAL APPOINTMENTS OR FROM GETTING MEDICATIONS?: NO

## 2024-12-23 ASSESSMENT — COGNITIVE AND FUNCTIONAL STATUS - GENERAL
SUGGESTED CMS G CODE MODIFIER DAILY ACTIVITY: CH
MOBILITY SCORE: 24
DAILY ACTIVITIY SCORE: 24
SUGGESTED CMS G CODE MODIFIER MOBILITY: CH

## 2024-12-23 ASSESSMENT — LIFESTYLE VARIABLES
HAVE PEOPLE ANNOYED YOU BY CRITICIZING YOUR DRINKING: NO
EVER HAD A DRINK FIRST THING IN THE MORNING TO STEADY YOUR NERVES TO GET RID OF A HANGOVER: NO
TOTAL SCORE: 0
ON A TYPICAL DAY WHEN YOU DRINK ALCOHOL HOW MANY DRINKS DO YOU HAVE: 1
TOTAL SCORE: 0
HOW MANY TIMES IN THE PAST YEAR HAVE YOU HAD 5 OR MORE DRINKS IN A DAY: 0
ALCOHOL_USE: YES
HAVE YOU EVER FELT YOU SHOULD CUT DOWN ON YOUR DRINKING: NO
AVERAGE NUMBER OF DAYS PER WEEK YOU HAVE A DRINK CONTAINING ALCOHOL: 1
CONSUMPTION TOTAL: NEGATIVE
EVER FELT BAD OR GUILTY ABOUT YOUR DRINKING: NO
TOTAL SCORE: 0

## 2024-12-23 ASSESSMENT — PAIN DESCRIPTION - PAIN TYPE
TYPE: ACUTE PAIN

## 2024-12-23 ASSESSMENT — PATIENT HEALTH QUESTIONNAIRE - PHQ9
SUM OF ALL RESPONSES TO PHQ9 QUESTIONS 1 AND 2: 0
1. LITTLE INTEREST OR PLEASURE IN DOING THINGS: NOT AT ALL
2. FEELING DOWN, DEPRESSED, IRRITABLE, OR HOPELESS: NOT AT ALL

## 2024-12-23 ASSESSMENT — SOCIAL DETERMINANTS OF HEALTH (SDOH)
WITHIN THE PAST 12 MONTHS, THE FOOD YOU BOUGHT JUST DIDN'T LAST AND YOU DIDN'T HAVE MONEY TO GET MORE: NEVER TRUE
WITHIN THE PAST 12 MONTHS, YOU WORRIED THAT YOUR FOOD WOULD RUN OUT BEFORE YOU GOT THE MONEY TO BUY MORE: NEVER TRUE
IN THE PAST 12 MONTHS, HAS THE ELECTRIC, GAS, OIL, OR WATER COMPANY THREATENED TO SHUT OFF SERVICE IN YOUR HOME?: NO
WITHIN THE LAST YEAR, HAVE YOU BEEN HUMILIATED OR EMOTIONALLY ABUSED IN OTHER WAYS BY YOUR PARTNER OR EX-PARTNER?: NO
WITHIN THE LAST YEAR, HAVE YOU BEEN KICKED, HIT, SLAPPED, OR OTHERWISE PHYSICALLY HURT BY YOUR PARTNER OR EX-PARTNER?: NO
WITHIN THE LAST YEAR, HAVE TO BEEN RAPED OR FORCED TO HAVE ANY KIND OF SEXUAL ACTIVITY BY YOUR PARTNER OR EX-PARTNER?: NO
WITHIN THE LAST YEAR, HAVE YOU BEEN AFRAID OF YOUR PARTNER OR EX-PARTNER?: NO

## 2024-12-23 ASSESSMENT — FIBROSIS 4 INDEX: FIB4 SCORE: 1.4

## 2024-12-23 NOTE — ANESTHESIA PREPROCEDURE EVALUATION
Case: [id number] Date/Time: 12/23/24 0900    Procedures:       TRANSCATHETER AORTIC VALVE REPLACEMENT WITH POTENTIAL TRANSESOPHAGEAL ECHOCARDIOGRAM      ECHOCARDIOGRAM, TRANSESOPHAGEAL, INTRAOPERATIVE    Pre-op diagnosis: SEVERE AORTIC STENOSIS    Location: [place] [address] / SURGERY [facility]    Surgeons: Jerrell Macias M.D.            Relevant Problems   CARDIAC   (positive) Essential hypertension   (positive) Severe aortic stenosis         (positive) Stage 3a chronic kidney disease      ENDO   (positive) Type 2 diabetes mellitus with microalbuminuria, without long-term current use of insulin (HCC)      Other   (positive) Arthritis   (positive) S/P CABG x 4     BP (!) 159/92   Pulse 60   Temp 36.6 °C (97.8 °F) (Temporal)   Resp 18   SpO2 96%     Physical Exam    Airway   Mallampati: II  TM distance: >3 FB  Neck ROM: full       Cardiovascular - normal exam  Rhythm: regular  Rate: normal  (-) murmur     Dental - normal exam           Pulmonary - normal exam  Breath sounds clear to auscultation     Abdominal    Neurological - normal exam                   Anesthesia Plan    ASA 4       Plan - MAC and general       Airway plan will be natural airway          Induction: intravenous    Postoperative Plan: Postoperative administration of opioids is intended.    Pertinent diagnostic labs and testing reviewed    Informed Consent:    Anesthetic plan and risks discussed with patient.    Use of blood products discussed with: patient whom consented to blood products.

## 2024-12-23 NOTE — ANESTHESIA TIME REPORT
Anesthesia Start and Stop Event Times       Date Time Event    12/23/2024 0845 Ready for Procedure     0905 Anesthesia Start     1018 Anesthesia Stop          Responsible Staff  12/23/24      Name Role Begin End    Dany Reeder M.D. Anesth 0905 1018          Overtime Reason:  no overtime (within assigned shift)    Comments:                                                           No

## 2024-12-23 NOTE — ANESTHESIA PROCEDURE NOTES
Arterial Line    Performed by: Dany Reeder M.D.  Authorized by: Dany Reeder M.D.    Start Time:  12/23/2024 9:16 AM  End Time:  12/23/2024 9:19 AM  Localization: surface landmarks    Patient Location:  OR  Indication: continuous blood pressure monitoring        Catheter Size:  20 G  Seldinger Technique?: Yes    Laterality:  Left  Site:  Radial artery  Line Secured:  Antimicrobial disc, tape and transparent dressing  Events: patient tolerated procedure well with no complications

## 2024-12-23 NOTE — OR SURGEON
OPERATIVE REPORT    Operation date: 12/23/2024    PreOp Diagnosis: Severe symptomatic aortic stenosis    PostOp Diagnosis: Severe symptomatic aortic stenosis    Procedure(s):  TRANSCATHETER AORTIC VALVE REPLACEMENT (TAVR 26 mm S3 Ultra Resilia Silver bovine pericardial valve) and intraoperative transthoracic echocardiography    Surgeon(s):  TERRI Lock M.D.    Anesthesiologist/Type of Anesthesia:  Anesthesiologist: Dany Reeder M.D./Sedation  Perfusionist: Megan Sparks    Surgical Staff:  Circulator: Raciel Agosto R.N.; Cesia Melchor R.N.  Relief Circulator: Van Cooper R.N.  Scrub Person: Puja Kraus  Radiology Technologist: Magdalene Montanez; Devante Mullins    Specimens removed if any: None    Estimated Blood Loss: Minimal    Findings: Severe aortic stenosis    Complications: None    Indications:  Mr. Pelayo is an 84-year-old male with severe symptomatic aortic stenosis.    Procedure:  The patient was brought to the operating room and placed on the operating room table in the supine position.  After successful induction of general anesthesia endotracheal intubation, the patient was prepped and draped in the usual sterile fashion.  Ultrasound-guided bilateral femoral arterial and right femoral venous access was obtained.  A temporary transvenous pacemaker was placed in the right ventricle and a pigtail catheter in the right coronary sinus.  The deployment angle was determined.  A 1 cm incision was made in the left groin and 2 Perclose sutures were deployed.  A 14 Greek eSheath was then placed in the left common femoral artery and its tip was positioned in the abdominal aorta.  The aortic valve was crossed with a wire.  A deployment catheter with a 26 mm S3 Ultra Resilia Silver bovine pericardial valve added step was positioned across the aortic annulus.  The implantation balloon was inflated to a peak pressure of 7 preet.  Wires and catheters were removed.   Transthoracic echocardiography did not show any paravalvular or central leaks.  The left femoral Leese sheath was removed and the Perclose sutures were tightened down.  Dermabond was applied over the small left groin incision.  The remainder of the operation will be dictated by Dr. Macias.  There were no apparent complications.  The patient tolerated the procedure well and left the operating room in stable condition.      12/23/2024 10:18 AM Francisco Javier Pavon MD, FACS

## 2024-12-23 NOTE — ANESTHESIA POSTPROCEDURE EVALUATION
Patient: Raf Pelayo    Procedure Summary       Date: 12/23/24 Room / Location: [place] [address] / SURGERY [facility]    Anesthesia Start: 0905 Anesthesia Stop: 1018    Procedures:       TRANSCATHETER AORTIC VALVE REPLACEMENT (Bilateral: Groin)      ECHOCARDIOGRAM, TRANSTHORACIC, INTRAOPERATIVE (Chest) Diagnosis: (SEVERE AORTIC STENOSIS)    Surgeons: Jerrell Macias M.D. Responsible Provider: Dany Reeder M.D.    Anesthesia Type: MAC, general ASA Status: 4            Final Anesthesia Type: MAC, general  Last vitals  BP   Blood Pressure : (!) 159/92    Temp   36.6 °C (97.8 °F)    Pulse   60   Resp   18    SpO2   96 %      Anesthesia Post Evaluation    Patient location during evaluation: PACU  Patient participation: complete - patient participated  Level of consciousness: awake and alert    Airway patency: patent  Anesthetic complications: no  Cardiovascular status: hemodynamically stable  Respiratory status: face mask    PONV: none          No notable events documented.     Nurse Pain Score: 0 (NPRS)

## 2024-12-24 ENCOUNTER — APPOINTMENT (OUTPATIENT)
Dept: RADIOLOGY | Facility: MEDICAL CENTER | Age: 84
DRG: 267 | End: 2024-12-24
Payer: MEDICARE

## 2024-12-24 VITALS
WEIGHT: 185.19 LBS | OXYGEN SATURATION: 98 % | HEIGHT: 70 IN | BODY MASS INDEX: 26.51 KG/M2 | RESPIRATION RATE: 16 BRPM | DIASTOLIC BLOOD PRESSURE: 56 MMHG | HEART RATE: 78 BPM | SYSTOLIC BLOOD PRESSURE: 145 MMHG | TEMPERATURE: 98.4 F

## 2024-12-24 PROBLEM — I35.0 SEVERE AORTIC STENOSIS: Status: RESOLVED | Noted: 2024-12-11 | Resolved: 2024-12-24

## 2024-12-24 LAB
ALBUMIN SERPL BCP-MCNC: 3.9 G/DL (ref 3.2–4.9)
ALBUMIN/GLOB SERPL: 1.7 G/DL
ALP SERPL-CCNC: 66 U/L (ref 30–99)
ALT SERPL-CCNC: 17 U/L (ref 2–50)
ANION GAP SERPL CALC-SCNC: 13 MMOL/L (ref 7–16)
AST SERPL-CCNC: 20 U/L (ref 12–45)
BILIRUB SERPL-MCNC: 0.5 MG/DL (ref 0.1–1.5)
BUN SERPL-MCNC: 21 MG/DL (ref 8–22)
CALCIUM ALBUM COR SERPL-MCNC: 9.2 MG/DL (ref 8.5–10.5)
CALCIUM SERPL-MCNC: 9.1 MG/DL (ref 8.5–10.5)
CHLORIDE SERPL-SCNC: 106 MMOL/L (ref 96–112)
CO2 SERPL-SCNC: 19 MMOL/L (ref 20–33)
CREAT SERPL-MCNC: 1.34 MG/DL (ref 0.5–1.4)
EKG IMPRESSION: NORMAL
ERYTHROCYTE [DISTWIDTH] IN BLOOD BY AUTOMATED COUNT: 44.3 FL (ref 35.9–50)
GFR SERPLBLD CREATININE-BSD FMLA CKD-EPI: 52 ML/MIN/1.73 M 2
GLOBULIN SER CALC-MCNC: 2.3 G/DL (ref 1.9–3.5)
GLUCOSE SERPL-MCNC: 151 MG/DL (ref 65–99)
HCT VFR BLD AUTO: 39.7 % (ref 42–52)
HGB BLD-MCNC: 12.7 G/DL (ref 14–18)
MCH RBC QN AUTO: 30.1 PG (ref 27–33)
MCHC RBC AUTO-ENTMCNC: 32 G/DL (ref 32.3–36.5)
MCV RBC AUTO: 94.1 FL (ref 81.4–97.8)
NT-PROBNP SERPL IA-MCNC: 389 PG/ML (ref 0–125)
PLATELET # BLD AUTO: 216 K/UL (ref 164–446)
PMV BLD AUTO: 10.2 FL (ref 9–12.9)
POTASSIUM SERPL-SCNC: 4.6 MMOL/L (ref 3.6–5.5)
PROT SERPL-MCNC: 6.2 G/DL (ref 6–8.2)
RBC # BLD AUTO: 4.22 M/UL (ref 4.7–6.1)
SODIUM SERPL-SCNC: 138 MMOL/L (ref 135–145)
WBC # BLD AUTO: 10.1 K/UL (ref 4.8–10.8)

## 2024-12-24 PROCEDURE — 99239 HOSP IP/OBS DSCHRG MGMT >30: CPT | Performed by: INTERNAL MEDICINE

## 2024-12-24 PROCEDURE — 71045 X-RAY EXAM CHEST 1 VIEW: CPT

## 2024-12-24 PROCEDURE — 85027 COMPLETE CBC AUTOMATED: CPT

## 2024-12-24 PROCEDURE — 97535 SELF CARE MNGMENT TRAINING: CPT

## 2024-12-24 PROCEDURE — A9270 NON-COVERED ITEM OR SERVICE: HCPCS

## 2024-12-24 PROCEDURE — 700102 HCHG RX REV CODE 250 W/ 637 OVERRIDE(OP)

## 2024-12-24 PROCEDURE — 36415 COLL VENOUS BLD VENIPUNCTURE: CPT

## 2024-12-24 PROCEDURE — 99238 HOSP IP/OBS DSCHRG MGMT 30/<: CPT

## 2024-12-24 PROCEDURE — 93005 ELECTROCARDIOGRAM TRACING: CPT | Mod: TC

## 2024-12-24 PROCEDURE — 93010 ELECTROCARDIOGRAM REPORT: CPT | Mod: Q0 | Performed by: STUDENT IN AN ORGANIZED HEALTH CARE EDUCATION/TRAINING PROGRAM

## 2024-12-24 PROCEDURE — 80053 COMPREHEN METABOLIC PANEL: CPT

## 2024-12-24 PROCEDURE — 83880 ASSAY OF NATRIURETIC PEPTIDE: CPT

## 2024-12-24 RX ADMIN — METOPROLOL TARTRATE 25 MG: 25 TABLET, FILM COATED ORAL at 04:23

## 2024-12-24 RX ADMIN — LOSARTAN POTASSIUM 50 MG: 50 TABLET, FILM COATED ORAL at 04:23

## 2024-12-24 RX ADMIN — ASPIRIN 81 MG: 81 TABLET, CHEWABLE ORAL at 04:23

## 2024-12-24 RX ADMIN — DOCUSATE SODIUM 100 MG: 100 CAPSULE, LIQUID FILLED ORAL at 04:23

## 2024-12-24 RX ADMIN — DONEPEZIL HYDROCHLORIDE 10 MG: 5 TABLET, FILM COATED ORAL at 04:23

## 2024-12-24 ASSESSMENT — FIBROSIS 4 INDEX: FIB4 SCORE: 1.89

## 2024-12-24 ASSESSMENT — PATIENT HEALTH QUESTIONNAIRE - PHQ9
1. LITTLE INTEREST OR PLEASURE IN DOING THINGS: NOT AT ALL
SUM OF ALL RESPONSES TO PHQ9 QUESTIONS 1 AND 2: 0

## 2024-12-24 NOTE — PROGRESS NOTES
Pt dc'd in stable and satisfactory condition. IV and monitor removed; monitor room notified. Pt left unit via walking with family. Personal belongings with pt when leaving unit. Pt given discharge instructions prior to leaving unit including where to  prescriptions and when to follow-up; verbalizes understanding. Copy of discharge instructions with pt and in the chart.

## 2024-12-24 NOTE — CARE PLAN
The patient is Stable - Low risk of patient condition declining or worsening    Shift Goals  Clinical Goals: monitor VS, procedure sites, neuro checks  Patient Goals: eat, drink water    Progress made toward(s) clinical / shift goals:    Problem: Knowledge Deficit - Standard  Goal: Patient and family/care givers will demonstrate understanding of plan of care, disease process/condition, diagnostic tests and medications  Outcome: Progressing     Problem: Day of surgery post CABG/Heart valve replacement  Goal: Stabilization in immediate post op period  Outcome: Progressing     Problem: Hemodynamics  Goal: Patient's hemodynamics, fluid balance and neurologic status will be stable or improve  Outcome: Progressing  - VS stable     Problem: Respiratory  Goal: Patient will achieve/maintain optimum respiratory ventilation and gas exchange  Outcome: Progressing  - on RA     Problem: Risk for Aspiration  Goal: Patient's risk for aspiration will be absent or decrease  Outcome: Progressing  - no s/s of aspiration     Problem: Nutrition - Advanced  Goal: Patient will display progressive weight gain toward goal have adequate food and fluid intake  Outcome: Progressing  - adequate oral intake     Problem: Self Care  Goal: Patient will have the ability to perform ADLs independently or with assistance (bathe, groom, dress, toilet and feed)  Outcome: Progressing     Problem: Bowel Elimination - Post Surgical  Goal: Patient will resume regular bowel sounds and function with no discomfort or distention  Outcome: Progressing   - stool softener sgiven

## 2024-12-24 NOTE — THERAPY
12/24/24 0933   Interdisciplinary Plan of Care Collaboration   Collaboration Comments Met w/ pt at bed side.  Pt is s/p TAVR.  Educated regarding post acute therex and monitoring his excersion.  Pt w/ good understanding,  hand out given and reviewed.  He denies mobility issues, reports up/ambulating self w/o issue.  No acute PT needs.

## 2024-12-24 NOTE — CARE PLAN
The patient is Stable - Low risk of patient condition declining or worsening    Shift Goals  Clinical Goals: Monitor vitals/procedure site  Patient Goals: Sleep    Progress made toward(s) clinical / shift goals:    Problem: Knowledge Deficit - Standard  Goal: Patient and family/care givers will demonstrate understanding of plan of care, disease process/condition, diagnostic tests and medications  Description: Target End Date:  1-3 days or as soon as patient condition allows    Document in Patient Education    1.  Patient and family/caregiver oriented to unit, equipment, visitation policy and means for communicating concern  2.  Complete/review Learning Assessment  3.  Assess knowledge level of disease process/condition, treatment plan, diagnostic tests and medications  4.  Explain disease process/condition, treatment plan, diagnostic tests and medications  Outcome: Progressing     Problem: Hemodynamics  Goal: Patient's hemodynamics, fluid balance and neurologic status will be stable or improve  Description: Target End Date:  Prior to discharge or change in level of care    Document on Assessment and I/O flowsheet templates    1.  Monitor vital signs, pulse oximetry and cardiac monitor per provider order and/or policy  2.  Maintain blood pressure per provider order  3.  Hemodynamic monitoring per provider order  4.  Manage IV fluids and IV infusions  5.  Monitor intake and output  6.  Daily weights per unit policy or provider order  7.  Assess peripheral pulses and capillary refill  8.  Assess color and body temperature  9.  Position patient for maximum circulation/cardiac output  10. Monitor for signs/symptoms of excessive bleeding  11. Assess mental status, restlessness and changes in level of consciousness  12. Monitor temperature and report fever or hypothermia to provider immediately. Consideration of targeted temperature management.  Outcome: Progressing       Patient is not progressing towards the following  goals:

## 2024-12-24 NOTE — PROGRESS NOTES
Monitor Summary  Rhythm: Normal Sinus Rhythm  Rate: 60 - 76  Ectopy: PVCs  .18 / .05 / .39

## 2024-12-24 NOTE — PROGRESS NOTES
Bedside report received from off going RN/tech: Denisse, assumed care of patient.     Fall Risk Score: NO RISK  Fall risk interventions in place: Place yellow fall risk ID band on patient, Provide patient/family education based on risk assessment, Educate patient/family to call staff for assistance when getting out of bed, Place fall precaution signage outside patient door, Place patient in room close to nursing station, and Bed alarm connected correctly  Bed type: Regular (Alexi Score less than 17 interventions in place)  Patient on cardiac monitor: Yes  IVF/IV medications: Not Applicable   Oxygen: Room Air  Bedside sitter: Not Applicable   Isolation: Not applicable

## 2024-12-24 NOTE — DISCHARGE SUMMARY
PRIMARY DISCHARGE DIAGNOSIS: Status post transcatheter aortic valve replacement.    DISCHARGE DIAGNOSIS:  S/P TAVR    PROCEDURES/TESTIN. Successful transcatheter aortic valve replacement (TAVR) with # 26 mm Silver zaida 3 Ultra resilia valve under conscious sedation via the right femoral approach on 2024   2. Intraoperative transesophageal echocardiogram showing   CONCLUSIONS  Intraoperative TTE during TAVR.  Baseline images show normal   biventricular systolic function with EF = 55%.  Calcified aortic valve   leaflets. Severe aortic valve stenosis.  Post deployment images show   preserved function.  A 26 mm Silver TAVR valve is seen in the aortic   position with normal leaflet motion, no paravalvular leak, mean   gradient of 4 mm Hg, and calculated effective orifice area of 3.3 cm2.    Findings communicated at the time of exam.   3. CXR on 2024 showing   1. TAVR type prosthetic aortic valve. Sternal wires.  2. Mild left base atelectasis.  4. EKG on 2024    SR with LVH, 63, 0.166/0.153/0.375      Labs   Lab Results   Component Value Date/Time    SODIUM 138 2024 04:00 AM    POTASSIUM 4.6 2024 04:00 AM    CHLORIDE 106 2024 04:00 AM    CO2 19 (L) 2024 04:00 AM    GLUCOSE 151 (H) 2024 04:00 AM    BUN 21 2024 04:00 AM    CREATININE 1.34 2024 04:00 AM    CREATININE 1.0 10/27/2008 08:20 AM       Lab Results   Component Value Date/Time    PROTHROMBTM 13.6 2024 02:10 PM    INR 1.04 2024 02:10 PM       Lab Results   Component Value Date/Time    WBC 10.1 2024 04:00 AM    RBC 4.22 (L) 2024 04:00 AM    HEMOGLOBIN 12.7 (L) 2024 04:00 AM    HEMATOCRIT 39.7 (L) 2024 04:00 AM    MCV 94.1 2024 04:00 AM    MCH 30.1 2024 04:00 AM    MCHC 32.0 (L) 2024 04:00 AM    MPV 10.2 2024 04:00 AM    NEUTSPOLYS 68.40 2024 02:10 PM    LYMPHOCYTES 19.10 (L) 2024 02:10 PM    MONOCYTES 8.70 2024 02:10 PM     EOSINOPHILS 2.70 12/16/2024 02:10 PM    BASOPHILS 0.70 12/16/2024 02:10 PM    HYPOCHROMIA 1+ 02/19/2013 10:35 AM         HOSPITAL COURSE: The patient is a pleasant 84 year old male with severe symptomatic aortic stenosis, CABG in 2017, HLD, HTN, CKD 3a. Due to the patient's symptoms, the patient underwent successful TAVR described as above. Post-procedure, the patient did well. They did not require IV diuresis during their stay and will not continue on oral diuretics post-operatively due to euvolemia. They were able to ambulate without difficulty.  No further events were noted during their stay. They are now off oxygen and are to be discharged to home.    DISCHARGE MEDICATIONS:      Medication List        CONTINUE taking these medications        Instructions   ammonium lactate 12 % Lotn  Commonly known as: Lac-Hydrin   Apply 1 Application topically 2 times a day. APPLY TO THE AFFECTED AREA TWICE DAILY  Dose: 1 Application     aspirin 81 MG Chew chewable tablet  Commonly known as: Asa   Take 81 mg by mouth every day. Indications: post cardiac surgery to prevent blood clots  Dose: 81 mg     atorvastatin 80 MG tablet  Commonly known as: Lipitor   Take 1 Tablet by mouth every day.  Dose: 80 mg     Blood Glucose Test Strips   Doctor's comments: Or per formulary preference. ICD-10 code: E11.9 Controlled type 2 Diabetes Mellitus  Use one One Touch strip to test blood sugar once daily .     donepezil 10 MG tablet  Commonly known as: Aricept   Take 1 Tablet by mouth every day.  Dose: 10 mg     losartan 50 MG Tabs  Commonly known as: Cozaar   Take 1 Tablet by mouth every day.  Dose: 50 mg     metformin 1000 MG tablet  Commonly known as: Glucophage   Take 1 Tablet by mouth 2 times a day with meals.  Dose: 1,000 mg     metoprolol tartrate 25 MG Tabs  Commonly known as: Lopressor   Take 1 Tablet by mouth 2 times a day.  Dose: 25 mg     ONE TOUCH ULTRA TEST strip  Generic drug: glucose blood   TEST BLOOD SUGAR LEVELS EVERY  DAY     OneTouch Delica Lancets Fine Misc   USE TO TEST BLOOD SUGARS ONCE DAILY     Semaglutide 7 MG Tabs   Take 7 mg by mouth every day.  Dose: 7 mg     therapeutic multivitamin-minerals Tabs   Take 1 Tab by mouth every day. Indications: supplement  Dose: 1 Tablet     VITAMIN C PO   Take  by mouth.     vitamin D 1000 Unit Tabs  Commonly known as: Cholecalciferol   Take 1 Tab by mouth every day.  Dose: 1,000 Units     ZINC 15 PO   Take  by mouth.              DISCHARGE INSTRUCTIONS: They are given discharge instructions on potential post-operative complications and symptoms to watch out for. Their groin sites were checked and were clean, dry, and intact. Patient or family to notify us for any complications noted on the discharge instructions. They will follow up with myself, YOSELYN Sosa, on Tuesday in our cardiology office. They will not need labs before their follow up appointment. They will then follow up with myself with a repeat echocardiogram in one month for post TAVR assessment.      Future Appointments   Date Time Provider Department Center   12/31/2024  1:15 PM LORENA Chen None   1/16/2025  3:15 PM [facility]   1/23/2025  8:45 AM LORENA Chen None   3/4/2025  2:00 PM TERRI Walter None   3/13/2025  4:20 PM TERRI Su None   12/12/2025  1:15 PM [facility] EX[facility] 9 [facility]   12/17/2025 10:45 AM LORENA Chen None       Discharge time spent with patient was 24 minutes.

## 2024-12-24 NOTE — CARE PLAN
The patient is Stable - Low risk of patient condition declining or worsening    Shift Goals  Clinical Goals: discharge, stable groin sites  Patient Goals: discharge    Progress made toward(s) clinical / shift goals:  patient ready for discharge    Patient is not progressing towards the following goals:      Problem: Knowledge Deficit - Standard  Goal: Patient and family/care givers will demonstrate understanding of plan of care, disease process/condition, diagnostic tests and medications  Outcome: Met     Problem: Day of surgery post CABG/Heart valve replacement  Goal: Stabilization in immediate post op period  Outcome: Met     Problem: Post Op Day 1 CABG/Heart Valve Replacement  Goal: Optimal care of the post op CABG/heart valve replacement Post Op Day 1  Outcome: Met     Problem: Hemodynamics  Goal: Patient's hemodynamics, fluid balance and neurologic status will be stable or improve  Outcome: Met     Problem: Respiratory  Goal: Patient will achieve/maintain optimum respiratory ventilation and gas exchange  Outcome: Met     Problem: Risk for Aspiration  Goal: Patient's risk for aspiration will be absent or decrease  Outcome: Met     Problem: Nutrition - Advanced  Goal: Patient will display progressive weight gain toward goal have adequate food and fluid intake  Outcome: Met     Problem: Self Care  Goal: Patient will have the ability to perform ADLs independently or with assistance (bathe, groom, dress, toilet and feed)  Outcome: Met     Problem: Bowel Elimination - Post Surgical  Goal: Patient will resume regular bowel sounds and function with no discomfort or distention  Outcome: Met

## 2024-12-24 NOTE — PROGRESS NOTES
Bedside report received from off going RN/tech: JANET Fisher, assumed care of patient.     Fall Risk Score: NO RISK  Fall risk interventions in place: Provide patient/family education based on risk assessment, Educate patient/family to call staff for assistance when getting out of bed, Place fall precaution signage outside patient door, Utilize bed/chair fall alarm, and Bed alarm connected correctly  Bed type: Regular (Alexi Score less than 17 interventions in place)  Patient on cardiac monitor: Yes  IVF/IV medications: Not Applicable   Oxygen: Room Air  Bedside sitter: Not Applicable   Isolation: Not applicable

## 2024-12-31 ENCOUNTER — OFFICE VISIT (OUTPATIENT)
Dept: CARDIOLOGY | Facility: MEDICAL CENTER | Age: 84
End: 2024-12-31
Payer: MEDICARE

## 2024-12-31 VITALS
HEIGHT: 70 IN | RESPIRATION RATE: 16 BRPM | WEIGHT: 184 LBS | OXYGEN SATURATION: 95 % | HEART RATE: 73 BPM | DIASTOLIC BLOOD PRESSURE: 52 MMHG | SYSTOLIC BLOOD PRESSURE: 134 MMHG | BODY MASS INDEX: 26.34 KG/M2

## 2024-12-31 DIAGNOSIS — I10 ESSENTIAL HYPERTENSION: ICD-10-CM

## 2024-12-31 DIAGNOSIS — Z95.2 S/P TAVR (TRANSCATHETER AORTIC VALVE REPLACEMENT): ICD-10-CM

## 2024-12-31 DIAGNOSIS — E78.2 MIXED HYPERLIPIDEMIA: ICD-10-CM

## 2024-12-31 DIAGNOSIS — Z95.1 S/P CABG X 4: ICD-10-CM

## 2024-12-31 LAB — EKG IMPRESSION: NORMAL

## 2024-12-31 PROCEDURE — 99214 OFFICE O/P EST MOD 30 MIN: CPT

## 2024-12-31 PROCEDURE — 93005 ELECTROCARDIOGRAM TRACING: CPT | Mod: TC

## 2024-12-31 RX ORDER — AMOXICILLIN 500 MG/1
2000 TABLET, FILM COATED ORAL
Qty: 4 TABLET | Refills: 0 | Status: SHIPPED | OUTPATIENT
Start: 2024-12-31

## 2024-12-31 RX ORDER — CARVEDILOL 6.25 MG/1
6.25 TABLET ORAL 2 TIMES DAILY WITH MEALS
Qty: 60 TABLET | Refills: 11 | Status: SHIPPED | OUTPATIENT
Start: 2024-12-31

## 2024-12-31 ASSESSMENT — ENCOUNTER SYMPTOMS
PND: 0
PALPITATIONS: 0
CONSTITUTIONAL NEGATIVE: 1
NERVOUS/ANXIOUS: 0
EYES NEGATIVE: 1
MUSCULOSKELETAL NEGATIVE: 1
ORTHOPNEA: 0
GASTROINTESTINAL NEGATIVE: 1
SHORTNESS OF BREATH: 0
DEPRESSION: 0
NEUROLOGICAL NEGATIVE: 1

## 2024-12-31 ASSESSMENT — FIBROSIS 4 INDEX: FIB4 SCORE: 1.89

## 2024-12-31 NOTE — PROGRESS NOTES
Chief Complaint   Patient presents with    Aortic Stenosis     Aortic stenosis, moderate       Subjective     Oswald Pelayo is a 84 y.o. male who presents today for his 1 week post TAVR visit. He has a history of severe aortic stenosis status post TAVR on 12/23/2024, CABG in 2017, HLD, HTN, CKD 3a.     Today 12/31/2024 he has been doing well, going on walks, tolerating well. NYHA class I    Past Medical History:   Diagnosis Date    Anxiety     Arthritis     Breath shortness 12/16/2024    CAD (coronary artery disease) 07/06/2017    CABG x 4 (LIMA to distal diagonal, rSVG to distal OM1, rSVG to distal OM2, rSVG to distal LAD) by Dr. Gaffney.    Cancer (HCC)     Prostate CA    Diabetes (HCC)     oral medication     Heart valve disease     Hypertension     Inguinal hernia     Memory difficulties     Mixed hyperlipidemia     Nodular prostate without urinary obstruction     Prostate cancer (HCC)     Severe aortic stenosis 12/16/2024    Stage 3a chronic kidney disease     Per Problem List     Past Surgical History:   Procedure Laterality Date    TRANSCATHETER AORTIC VALVE REPLACEMENT Bilateral 12/23/2024    Procedure: TRANSCATHETER AORTIC VALVE REPLACEMENT;  Surgeon: Jerrell Macias M.D.;  Location: [facility];  Service: Cardiac    ECHOCARDIOGRAM, TRANSESOPHAGEAL, INTRAOPERATIVE N/A 12/23/2024    Procedure: ECHOCARDIOGRAM, TRANSTHORACIC, INTRAOPERATIVE;  Surgeon: Jerrell Macias M.D.;  Location: [facility];  Service: Cardiac    ROHINI  7/6/2017    Procedure: ROHINI - INTRAOPERATIVE ;  Surgeon: Anthony Gaffney M.D.;  Location: SURGERY [facility];  Service:     MULTIPLE CORONARY ARTERY BYPASS ENDO VEIN HARVEST  7/6/2017    Procedure: MULTIPLE CORONARY ARTERY BYPASS ENDO VEIN HARVEST X2-4, PREVENA DRESSING ;  Surgeon: Anthony Gaffney M.D.;  Location: SURGERY [facility];  Service:     APPENDECTOMY  1955    TONSILLECTOMY       Family History   Problem Relation Age of Onset    Stroke  Mother     Heart Disease Father     Heart Disease Brother      Social History     Socioeconomic History    Marital status:      Spouse name: Not on file    Number of children: Not on file    Years of education: Not on file    Highest education level: Not on file   Occupational History    Not on file   Tobacco Use    Smoking status: Never    Smokeless tobacco: Never   Vaping Use    Vaping status: Never Used   Substance and Sexual Activity    Alcohol use: Yes     Comment: 1 oz / week    Drug use: No    Sexual activity: Never     Partners: Female     Birth control/protection: Post-Menopausal   Other Topics Concern    Not on file   Social History Narrative    Not on file     Social Drivers of Health     Financial Resource Strain: Not on file   Food Insecurity: No Food Insecurity (12/23/2024)    Hunger Vital Sign     Worried About Running Out of Food in the Last Year: Never true     Ran Out of Food in the Last Year: Never true   Transportation Needs: No Transportation Needs (12/23/2024)    PRAPARE - Transportation     Lack of Transportation (Medical): No     Lack of Transportation (Non-Medical): No   Physical Activity: Not on file   Stress: Not on file   Social Connections: Not on file   Intimate Partner Violence: Not At Risk (12/23/2024)    Humiliation, Afraid, Rape, and Kick questionnaire     Fear of Current or Ex-Partner: No     Emotionally Abused: No     Physically Abused: No     Sexually Abused: No   Housing Stability: Low Risk  (12/23/2024)    Housing Stability Vital Sign     Unable to Pay for Housing in the Last Year: No     Number of Times Moved in the Last Year: 1     Homeless in the Last Year: No     Allergies   Allergen Reactions    Other Environmental Runny Nose     SAW DUST     Outpatient Encounter Medications as of 12/31/2024   Medication Sig Dispense Refill    ammonium lactate (LAC-HYDRIN) 12 % Lotion Apply 1 Application topically 2 times a day. APPLY TO THE AFFECTED AREA TWICE DAILY      metoprolol  tartrate (LOPRESSOR) 25 MG Tab Take 1 Tablet by mouth 2 times a day. 180 Tablet 3    Zinc Sulfate (ZINC 15 PO) Take  by mouth.      donepezil (ARICEPT) 10 MG tablet Take 1 Tablet by mouth every day. 90 Tablet 3    Blood Glucose Test Strips Use one One Touch strip to test blood sugar once daily . 100 Strip 3    atorvastatin (LIPITOR) 80 MG tablet Take 1 Tablet by mouth every day. (Patient taking differently: Take 80 mg by mouth every day. ON HOLD) 90 Tablet 3    metformin (GLUCOPHAGE) 1000 MG tablet Take 1 Tablet by mouth 2 times a day with meals. 180 Tablet 3    losartan (COZAAR) 50 MG Tab Take 1 Tablet by mouth every day. 90 Tablet 3    ONE TOUCH ULTRA TEST strip TEST BLOOD SUGAR LEVELS EVERY  Strip 3    ONETOUCH DELICA LANCETS FINE Misc USE TO TEST BLOOD SUGARS ONCE DAILY 100 Each 3    Ascorbic Acid (VITAMIN C PO) Take  by mouth.      aspirin (ASA) 81 MG Chew Tab chewable tablet Take 81 mg by mouth every day. Indications: post cardiac surgery to prevent blood clots      Cholecalciferol (VITAMIN D3) 1000 UNIT TABS Take 1 Tab by mouth every day. 100 Tab 3    Semaglutide 7 MG Tab Take 7 mg by mouth every day. (Patient not taking: Reported on 12/31/2024) 90 Tablet 3    therapeutic multivitamin-minerals (THERAGRAN-M) Tab Take 1 Tab by mouth every day. Indications: supplement       No facility-administered encounter medications on file as of 12/31/2024.     Review of Systems   Constitutional: Negative.    HENT: Negative.     Eyes: Negative.    Respiratory:  Negative for shortness of breath.    Cardiovascular:  Negative for chest pain, palpitations, orthopnea, leg swelling and PND.   Gastrointestinal: Negative.    Genitourinary: Negative.    Musculoskeletal: Negative.    Skin: Negative.    Neurological: Negative.    Endo/Heme/Allergies: Negative.    Psychiatric/Behavioral:  Negative for depression. The patient is not nervous/anxious.               Objective     /52 (BP Location: Left arm, Patient Position:  "Sitting, BP Cuff Size: Adult)   Pulse 73   Resp 16   Ht 1.778 m (5' 10\")   Wt 83.5 kg (184 lb)   SpO2 95%   BMI 26.40 kg/m²     Physical Exam  Constitutional:       Appearance: Normal appearance.   HENT:      Head: Normocephalic.   Neck:      Vascular: No JVD.   Cardiovascular:      Rate and Rhythm: Normal rate and regular rhythm.      Pulses: Normal pulses.      Heart sounds: Normal heart sounds. No murmur heard.     No friction rub.      Comments: Groin sites are CDI with mild amount of bruising and small nodule  Pulmonary:      Effort: Pulmonary effort is normal.      Breath sounds: Normal breath sounds.   Abdominal:      Palpations: Abdomen is soft.   Musculoskeletal:         General: Normal range of motion.      Right lower leg: No edema.      Left lower leg: No edema.   Skin:     General: Skin is warm and dry.   Neurological:      General: No focal deficit present.      Mental Status: He is alert and oriented to person, place, and time.   Psychiatric:         Mood and Affect: Mood normal.         Behavior: Behavior normal.            Lab Results   Component Value Date/Time    CHOLSTRLTOT 119 01/19/2024 08:09 AM    LDL 59 01/19/2024 08:09 AM    HDL 35 (A) 01/19/2024 08:09 AM    TRIGLYCERIDE 123 01/19/2024 08:09 AM       Lab Results   Component Value Date/Time    SODIUM 138 12/24/2024 04:00 AM    POTASSIUM 4.6 12/24/2024 04:00 AM    CHLORIDE 106 12/24/2024 04:00 AM    CO2 19 (L) 12/24/2024 04:00 AM    GLUCOSE 151 (H) 12/24/2024 04:00 AM    BUN 21 12/24/2024 04:00 AM    CREATININE 1.34 12/24/2024 04:00 AM    CREATININE 1.0 10/27/2008 08:20 AM     Lab Results   Component Value Date/Time    ALKPHOSPHAT 66 12/24/2024 04:00 AM    ASTSGOT 20 12/24/2024 04:00 AM    ALTSGPT 17 12/24/2024 04:00 AM    TBILIRUBIN 0.5 12/24/2024 04:00 AM      Lab Results   Component Value Date/Time    WBC 10.1 12/24/2024 04:00 AM    RBC 4.22 (L) 12/24/2024 04:00 AM    HEMOGLOBIN 12.7 (L) 12/24/2024 04:00 AM    HEMATOCRIT 39.7 (L) " 12/24/2024 04:00 AM    MCV 94.1 12/24/2024 04:00 AM    MCH 30.1 12/24/2024 04:00 AM    MCHC 32.0 (L) 12/24/2024 04:00 AM    MPV 10.2 12/24/2024 04:00 AM    NEUTSPOLYS 68.40 12/16/2024 02:10 PM    LYMPHOCYTES 19.10 (L) 12/16/2024 02:10 PM    MONOCYTES 8.70 12/16/2024 02:10 PM    EOSINOPHILS 2.70 12/16/2024 02:10 PM    BASOPHILS 0.70 12/16/2024 02:10 PM    HYPOCHROMIA 1+ 02/19/2013 10:35 AM       Intraoperative transesophageal echocardiogram showing   CONCLUSIONS  Intraoperative TTE during TAVR.  Baseline images show normal   biventricular systolic function with EF = 55%.  Calcified aortic valve   leaflets. Severe aortic valve stenosis.  Post deployment images show   preserved function.  A 26 mm Silver TAVR valve is seen in the aortic   position with normal leaflet motion, no paravalvular leak, mean   gradient of 4 mm Hg, and calculated effective orifice area of 3.3 cm2.    Findings communicated at the time of exam.     CXR on 12/24/2024 showing   1. TAVR type prosthetic aortic valve. Sternal wires.  2. Mild left base atelectasis.    EKG on 12/24/2024    SR with LVH, 63, 0.166/0.153/0.375    EKG 12/31/2024  Sinus rhythm, rate 73, 0.23/0.080/0.408    Assessment & Plan     1. S/P TAVR (transcatheter aortic valve replacement)  EKG      2. S/P CABG x 4        3. Essential hypertension        4. Mixed hyperlipidemia            Medical Decision Making: Today's Assessment/Status/Plan:        S/P TAVR, NYHA I  -doing well post-op  -cont asa lifelong  -groins CDI with mild bruising and small nodule   -SBE prophylaxis understands, provided Rx for amoxicillin  -echo 1 month with structural heart follow up  -reviewed hospital imaging, labs, and EKG  -cardiac rehab referral placed, he attended after his CABG procedure and may not want to attend again  -EKG shows SR    Coronary Artery Disease  S/p CABG in 2017  HTN, HLD  -Asymptomatic  -BP elevated at home ranging between 130 250 systolic, change metoprolol to carvedilol 6.25 mg  twice daily  -LDL well controlled but due for recheck which I have ordered  - continue aspirin, atorvastatin 80 mg daily, losartan 50 mg daily  We addressed the management of atherosclerotic artery disease.  He is on proper antiplatelet, cholesterol management and beta-blockers as appropriate.  We addressed the potential side effects and laboratory follow-up for these medications.    Follow up with echocardiogram on 1/16, myself on 1/23/2025    This note was dictated using Dragon speech recognition software.

## 2025-01-09 ENCOUNTER — DOCUMENTATION (OUTPATIENT)
Dept: CARDIOLOGY | Facility: MEDICAL CENTER | Age: 85
End: 2025-01-09
Payer: MEDICARE

## 2025-01-10 NOTE — PROGRESS NOTES
On behalf of [facility] of [facility]'s Structural [facility], we would like to thank you for allowing us to participate in the care of your patient, Mr. Pelayo.     He underwent a successful transcatheter aortic valve replacement (TAVR) on Monday, December 23, 2024.     Your patient is scheduled to follow up with our Structural Heart Program at one month and one year post procedure.     Again, thank you for allowing us to participate in the care of your patient. If you have any questions, please do not hesitate to contact our Structural Heart team.     Sincerely,    [facility] Structural [facility] Team    ESHA Reno, RN, Structural Heart Program Nurse Coordinator ([phone])  ESHA Pantoja, RN, Structural Heart Program Nurse Coordinator ([phone])

## 2025-01-16 ENCOUNTER — HOSPITAL ENCOUNTER (OUTPATIENT)
Dept: CARDIOLOGY | Facility: MEDICAL CENTER | Age: 85
End: 2025-01-16
Attending: INTERNAL MEDICINE
Payer: MEDICARE

## 2025-01-16 DIAGNOSIS — I35.0 NONRHEUMATIC AORTIC VALVE STENOSIS: ICD-10-CM

## 2025-01-16 PROCEDURE — 93306 TTE W/DOPPLER COMPLETE: CPT

## 2025-01-18 LAB
LV EJECT FRACT  99904: 65
LV EJECT FRACT MOD 2C 99903: 68.73
LV EJECT FRACT MOD 4C 99902: 57.74
LV EJECT FRACT MOD BP 99901: 63.67

## 2025-01-18 PROCEDURE — 93306 TTE W/DOPPLER COMPLETE: CPT | Mod: 26 | Performed by: INTERNAL MEDICINE

## 2025-01-23 ENCOUNTER — DOCUMENTATION (OUTPATIENT)
Dept: CARDIOLOGY | Facility: MEDICAL CENTER | Age: 85
End: 2025-01-23

## 2025-01-23 ENCOUNTER — OFFICE VISIT (OUTPATIENT)
Dept: CARDIOLOGY | Facility: MEDICAL CENTER | Age: 85
End: 2025-01-23
Payer: MEDICARE

## 2025-01-23 VITALS
OXYGEN SATURATION: 98 % | HEART RATE: 65 BPM | WEIGHT: 186 LBS | DIASTOLIC BLOOD PRESSURE: 62 MMHG | BODY MASS INDEX: 26.63 KG/M2 | HEIGHT: 70 IN | RESPIRATION RATE: 18 BRPM | SYSTOLIC BLOOD PRESSURE: 116 MMHG

## 2025-01-23 DIAGNOSIS — E78.2 MIXED HYPERLIPIDEMIA: ICD-10-CM

## 2025-01-23 DIAGNOSIS — Z95.1 S/P CABG X 4: ICD-10-CM

## 2025-01-23 DIAGNOSIS — I10 ESSENTIAL HYPERTENSION: ICD-10-CM

## 2025-01-23 DIAGNOSIS — Z95.2 S/P TAVR (TRANSCATHETER AORTIC VALVE REPLACEMENT): ICD-10-CM

## 2025-01-23 PROCEDURE — 99213 OFFICE O/P EST LOW 20 MIN: CPT

## 2025-01-23 PROCEDURE — 99214 OFFICE O/P EST MOD 30 MIN: CPT

## 2025-01-23 PROCEDURE — 3074F SYST BP LT 130 MM HG: CPT

## 2025-01-23 PROCEDURE — 3078F DIAST BP <80 MM HG: CPT

## 2025-01-23 RX ORDER — METOPROLOL TARTRATE 25 MG/1
25 TABLET, FILM COATED ORAL 2 TIMES DAILY
COMMUNITY
Start: 2025-01-21 | End: 2025-01-23

## 2025-01-23 ASSESSMENT — ENCOUNTER SYMPTOMS
NERVOUS/ANXIOUS: 0
CONSTITUTIONAL NEGATIVE: 1
NEUROLOGICAL NEGATIVE: 1
DEPRESSION: 0
PALPITATIONS: 0
SHORTNESS OF BREATH: 0
MUSCULOSKELETAL NEGATIVE: 1
GASTROINTESTINAL NEGATIVE: 1
PND: 0
EYES NEGATIVE: 1
ORTHOPNEA: 0

## 2025-01-23 ASSESSMENT — FIBROSIS 4 INDEX: FIB4 SCORE: 1.89

## 2025-01-23 NOTE — PROGRESS NOTES
Valve Program Functional Assessment:     KCCQ12   1a) Showering/bathin  1b) Walking 1 block on ground: 5  1c) Hurrying or joggin  2) Swellin  3) Fatigue: 6  4) Shortness of breath: 6  5) Sleep sitting up: 4  6) Limited enjoyment of life: 5  7) Spend the rest of your life with HF: 5  8a) Hobbies, recreational activities:5  8b) Working or doing household chores:5  8c) Visiting family or friends: 5

## 2025-01-23 NOTE — PROGRESS NOTES
Chief Complaint   Patient presents with    Aortic Stenosis       Subjective     Oswald Pelayo is a 84 y.o. male who presents today for his 1 month postoperative visit. He has a history of severe aortic stenosis status post TAVR on 12/23/2024, CABG in 2017, HLD, HTN, CKD 3a.     1/23/2025 he has been doing well, he has been working around his house. He is able to do all of his normal activities including woodworking. NYHA I    Past Medical History:   Diagnosis Date    Anxiety     Arthritis     Breath shortness 12/16/2024    CAD (coronary artery disease) 07/06/2017    CABG x 4 (LIMA to distal diagonal, rSVG to distal OM1, rSVG to distal OM2, rSVG to distal LAD) by Dr. Gaffney.    Cancer (HCC)     Prostate CA    Diabetes (HCC)     oral medication     Heart valve disease     Hypertension     Inguinal hernia     Memory difficulties     Mixed hyperlipidemia     Nodular prostate without urinary obstruction     Prostate cancer (HCC)     Severe aortic stenosis 12/16/2024    Stage 3a chronic kidney disease     Per Problem List     Past Surgical History:   Procedure Laterality Date    TRANSCATHETER AORTIC VALVE REPLACEMENT Bilateral 12/23/2024    Procedure: TRANSCATHETER AORTIC VALVE REPLACEMENT;  Surgeon: Jerrell Macias M.D.;  Location: [facility];  Service: Cardiac    ECHOCARDIOGRAM, TRANSESOPHAGEAL, INTRAOPERATIVE N/A 12/23/2024    Procedure: ECHOCARDIOGRAM, TRANSTHORACIC, INTRAOPERATIVE;  Surgeon: Jerrell Macias M.D.;  Location: [facility];  Service: Cardiac    ROHINI  7/6/2017    Procedure: ROHINI - INTRAOPERATIVE ;  Surgeon: Anthony Gaffney M.D.;  Location: [facility];  Service:     MULTIPLE CORONARY ARTERY BYPASS ENDO VEIN HARVEST  7/6/2017    Procedure: MULTIPLE CORONARY ARTERY BYPASS ENDO VEIN HARVEST X2-4, PREVENA DRESSING ;  Surgeon: Anthony Gaffney M.D.;  Location: [facility];  Service:     APPENDECTOMY  1955    TONSILLECTOMY       Family History   Problem  Relation Age of Onset    Stroke Mother     Heart Disease Father     Heart Disease Brother      Social History     Socioeconomic History    Marital status:      Spouse name: Not on file    Number of children: Not on file    Years of education: Not on file    Highest education level: Not on file   Occupational History    Not on file   Tobacco Use    Smoking status: Never    Smokeless tobacco: Never   Vaping Use    Vaping status: Never Used   Substance and Sexual Activity    Alcohol use: Yes     Comment: 1 oz / week    Drug use: No    Sexual activity: Never     Partners: Female     Birth control/protection: Post-Menopausal   Other Topics Concern    Not on file   Social History Narrative    Not on file     Social Drivers of Health     Financial Resource Strain: Not on file   Food Insecurity: No Food Insecurity (12/23/2024)    Hunger Vital Sign     Worried About Running Out of Food in the Last Year: Never true     Ran Out of Food in the Last Year: Never true   Transportation Needs: No Transportation Needs (12/23/2024)    PRAPARE - Transportation     Lack of Transportation (Medical): No     Lack of Transportation (Non-Medical): No   Physical Activity: Not on file   Stress: Not on file   Social Connections: Not on file   Intimate Partner Violence: Not At Risk (12/23/2024)    Humiliation, Afraid, Rape, and Kick questionnaire     Fear of Current or Ex-Partner: No     Emotionally Abused: No     Physically Abused: No     Sexually Abused: No   Housing Stability: Low Risk  (12/23/2024)    Housing Stability Vital Sign     Unable to Pay for Housing in the Last Year: No     Number of Times Moved in the Last Year: 1     Homeless in the Last Year: No     Allergies   Allergen Reactions    Other Environmental Runny Nose     SAW DUST     Outpatient Encounter Medications as of 1/23/2025   Medication Sig Dispense Refill    metoprolol tartrate (LOPRESSOR) 25 MG Tab Take 25 mg by mouth 2 times a day.      Glucosamine-Chondroitin  (GLUCOSAMINE CHONDR COMPLEX PO) Take  by mouth.      carvedilol (COREG) 6.25 MG Tab Take 1 Tablet by mouth 2 times a day with meals. 60 Tablet 11    Amoxicillin 500 MG Tab Take 4 Tablets by mouth one time as needed (take 30 minutes before dental visit) for up to 1 dose. 4 Tablet 0    ammonium lactate (LAC-HYDRIN) 12 % Lotion Apply 1 Application topically 2 times a day. APPLY TO THE AFFECTED AREA TWICE DAILY      Semaglutide 7 MG Tab Take 7 mg by mouth every day. 90 Tablet 3    Zinc Sulfate (ZINC 15 PO) Take  by mouth.      donepezil (ARICEPT) 10 MG tablet Take 1 Tablet by mouth every day. 90 Tablet 3    Blood Glucose Test Strips Use one One Touch strip to test blood sugar once daily . 100 Strip 3    atorvastatin (LIPITOR) 80 MG tablet Take 1 Tablet by mouth every day. (Patient taking differently: Take 80 mg by mouth every day. ON HOLD) 90 Tablet 3    metformin (GLUCOPHAGE) 1000 MG tablet Take 1 Tablet by mouth 2 times a day with meals. 180 Tablet 3    losartan (COZAAR) 50 MG Tab Take 1 Tablet by mouth every day. 90 Tablet 3    ONE TOUCH ULTRA TEST strip TEST BLOOD SUGAR LEVELS EVERY  Strip 3    ONETOUCH DELICA LANCETS FINE Misc USE TO TEST BLOOD SUGARS ONCE DAILY 100 Each 3    Ascorbic Acid (VITAMIN C PO) Take  by mouth.      aspirin (ASA) 81 MG Chew Tab chewable tablet Take 81 mg by mouth every day. Indications: post cardiac surgery to prevent blood clots      therapeutic multivitamin-minerals (THERAGRAN-M) Tab Take 1 Tab by mouth every day. Indications: supplement      Cholecalciferol (VITAMIN D3) 1000 UNIT TABS Take 1 Tab by mouth every day. 100 Tab 3     No facility-administered encounter medications on file as of 1/23/2025.     Review of Systems   Constitutional: Negative.    HENT: Negative.     Eyes: Negative.    Respiratory:  Negative for shortness of breath.    Cardiovascular:  Negative for chest pain, palpitations, orthopnea, leg swelling and PND.   Gastrointestinal: Negative.    Genitourinary:  "Negative.    Musculoskeletal: Negative.    Skin: Negative.    Neurological: Negative.    Endo/Heme/Allergies: Negative.    Psychiatric/Behavioral:  Negative for depression. The patient is not nervous/anxious.               Objective     /62 (BP Location: Left arm, Patient Position: Sitting, BP Cuff Size: Adult)   Pulse 65   Resp 18   Ht 1.778 m (5' 10\")   Wt 84.4 kg (186 lb)   SpO2 98%   BMI 26.69 kg/m²     Physical Exam  Constitutional:       Appearance: Normal appearance.   HENT:      Head: Normocephalic.   Neck:      Vascular: No JVD.   Cardiovascular:      Rate and Rhythm: Normal rate and regular rhythm.      Pulses: Normal pulses.      Heart sounds: Normal heart sounds. No murmur heard.     No friction rub.   Pulmonary:      Effort: Pulmonary effort is normal.      Breath sounds: Normal breath sounds.   Abdominal:      Palpations: Abdomen is soft.   Musculoskeletal:         General: Normal range of motion.      Right lower leg: No edema.      Left lower leg: No edema.   Skin:     General: Skin is warm and dry.   Neurological:      General: No focal deficit present.      Mental Status: He is alert and oriented to person, place, and time.   Psychiatric:         Mood and Affect: Mood normal.         Behavior: Behavior normal.            Lab Results   Component Value Date/Time    WBC 10.1 12/24/2024 04:00 AM    RBC 4.22 (L) 12/24/2024 04:00 AM    HEMOGLOBIN 12.7 (L) 12/24/2024 04:00 AM    HEMATOCRIT 39.7 (L) 12/24/2024 04:00 AM    MCV 94.1 12/24/2024 04:00 AM    MCH 30.1 12/24/2024 04:00 AM    MCHC 32.0 (L) 12/24/2024 04:00 AM    MPV 10.2 12/24/2024 04:00 AM    NEUTSPOLYS 68.40 12/16/2024 02:10 PM    LYMPHOCYTES 19.10 (L) 12/16/2024 02:10 PM    MONOCYTES 8.70 12/16/2024 02:10 PM    EOSINOPHILS 2.70 12/16/2024 02:10 PM    BASOPHILS 0.70 12/16/2024 02:10 PM    HYPOCHROMIA 1+ 02/19/2013 10:35 AM      Lab Results   Component Value Date/Time    CHOLSTRLTOT 119 01/19/2024 08:09 AM    LDL 59 01/19/2024 08:09 " AM    HDL 35 (A) 01/19/2024 08:09 AM    TRIGLYCERIDE 123 01/19/2024 08:09 AM       Lab Results   Component Value Date/Time    SODIUM 138 12/24/2024 04:00 AM    POTASSIUM 4.6 12/24/2024 04:00 AM    CHLORIDE 106 12/24/2024 04:00 AM    CO2 19 (L) 12/24/2024 04:00 AM    GLUCOSE 151 (H) 12/24/2024 04:00 AM    BUN 21 12/24/2024 04:00 AM    CREATININE 1.34 12/24/2024 04:00 AM    CREATININE 1.0 10/27/2008 08:20 AM     Lab Results   Component Value Date/Time    ALKPHOSPHAT 66 12/24/2024 04:00 AM    ASTSGOT 20 12/24/2024 04:00 AM    ALTSGPT 17 12/24/2024 04:00 AM    TBILIRUBIN 0.5 12/24/2024 04:00 AM      Echocardiogram 1/16/2025  CONCLUSIONS   The ejection fraction is measured to be 64% by Costa's biplane.   Normal right ventricular size and systolic function.   Known TAVR aortic valve that is functioning normally with normal   transvalvular gradients.     Assessment & Plan     1. S/P CABG x 4        2. S/P TAVR (transcatheter aortic valve replacement)        3. Essential hypertension        4. Mixed hyperlipidemia            Medical Decision Making: Today's Assessment/Status/Plan:          S/P TAVR, NYHA I  -doing well post-op  -cont asa lifelong  -SBE prophylaxis understands, provided Rx for amoxicillin  -Echocardiogram showed normal TAVR valve functioning  -Repeat echo annually      Coronary Artery Disease  S/p CABG in 2017  HTN, HLD  -Asymptomatic  -good control in the office but higher at home, we had changed his metoprolol to carvedilol at her last visit but he had continued taking his metoprolol, reinforced recommendation to transition to carvedilol for better blood pressure control  -LDL well-controlled on last check but due for recheck, planning to get this done tomorrow  - continue aspirin, atorvastatin 80 mg daily, losartan 50 mg daily  We addressed the management of atherosclerotic artery disease.  He is on proper antiplatelet, cholesterol management and beta-blockers as appropriate.  We addressed the potential  side effects and laboratory follow-up for these medications.     Follow up with Dr. Silva in March and myself in December after annual echocardiogram     This note was dictated using Dragon speech recognition software.

## 2025-01-24 ENCOUNTER — HOSPITAL ENCOUNTER (OUTPATIENT)
Dept: LAB | Facility: MEDICAL CENTER | Age: 85
End: 2025-01-24
Payer: MEDICARE

## 2025-01-24 ENCOUNTER — TELEPHONE (OUTPATIENT)
Dept: CARDIOLOGY | Facility: MEDICAL CENTER | Age: 85
End: 2025-01-24
Payer: MEDICARE

## 2025-01-24 DIAGNOSIS — E78.2 MIXED HYPERLIPIDEMIA: ICD-10-CM

## 2025-01-24 LAB
CHOLEST SERPL-MCNC: 217 MG/DL (ref 100–199)
FASTING STATUS PATIENT QL REPORTED: NORMAL
HDLC SERPL-MCNC: 36 MG/DL
LDLC SERPL CALC-MCNC: 150 MG/DL
TRIGL SERPL-MCNC: 153 MG/DL (ref 0–149)

## 2025-01-24 PROCEDURE — 36415 COLL VENOUS BLD VENIPUNCTURE: CPT

## 2025-01-24 PROCEDURE — 80061 LIPID PANEL: CPT

## 2025-01-24 NOTE — TELEPHONE ENCOUNTER
----- Message from Nurse Practitioner LORENA Castillo sent at 1/24/2025  3:44 PM PST -----  Recommend following up with LS at next apt. [place]

## 2025-02-13 NOTE — PROGRESS NOTES
Medical decision making:  -Did well after his TAVR.  Not certain if he feels significantly better.  Physical exam and echo looked great.  -In terms of CAD, remains on secondary prevention aspirin, getting him back on statin.  Patent grafts by [facility] in December 2024.  -LDL cholesterol looks good prior to the statin holiday.  -Because of memory loss, he has been on a statin holiday.  Time to resume the atorvastatin, instructions given.  -Diabetes is not to goal, working on this.  -Blood pressure looks fine.  -Back in 3 months.    Addendum: I again called his wife Estefanía, no major improvement after TAVR except breathing at night.  She will watch for cognitive setbacks after resuming atorvastatin.  She confirms cognitive decline, agitation over the past recent 1-2 years. On aricept now, has follow-up with neurology.     Problem list:  1. S/P CABG x 4    2. S/P TAVR (transcatheter aortic valve replacement)    3. Essential hypertension    4. Mixed hyperlipidemia  - atorvastatin (LIPITOR) 80 MG tablet; Take 1 Tablet by mouth every day.  Dispense: 90 Tablet; Refill: 3    5. Nonrheumatic aortic valve stenosis    6. Severe aortic stenosis    7. History of transcatheter aortic valve replacement (TAVR)    8. Stage 3a chronic kidney disease    9. Cognitive decline     Chief Complaint:   Chief Complaint   Patient presents with    Follow-Up     Coronary artery disease involving native coronary artery of native heart with angina pectoris (HCC)    Hyperlipidemia    Hypertension     History of Present Illness:  Raf Pelayo is a 84 y.o. male who returns for long-term management of complex care including CAD/CABG, AS/TAVR hypertension, hyperlipidemia, DM 2.    CAD, CABG x4 July 2017 with Dr. Anthony Steward, included LIMA to distal diagonal, SVG to distal OM1, distal OM 2, SVG to distal LAD.  Pre TAVR [facility] 12-17-24 with patent LIMA-diagonal, SVG-LAD, skip SVG-OM1/left PDA grafts.  Remains on aspirin.  Has been on a  statin holiday due to some cognitive decline, time to resume the statin.    Aortic stenosis, remains moderate by echo July 2024, V-max 3.8, TRISTA 1.3 cm², DI 0.22.  However, the LVOT was measured at 2.5 which is overestimated compared to prior.  I recalculated the dimensionless index on the VTI and the V-max and both are consistent with severe AS.  Physical exam is consistent with severe AS.  Underwent TAVR 12/23/2024 with Dr. Macias.    Hyperlipidemia, on statin.  LDL 59.  HDL low at times.  Statin holiday due to cognitive decline.    DM2, uncontrolled.  A1c up to 7.2.   Was 6.9, but prior 7.9.     Hypertension, overall controlled.    Bilateral carotid artery plaque in 2017, on statin.    CKD 2.    Former smoker, negative AAA by CT 2024, Ascending aortic diameter: 3.8 x 3.7 cm.     No history of autoimmune disease such as lupus or rheumatoid arthritis.  No history of chest radiation or cardiotoxic chemotherapy.  No ETOH overuse.  No caffeine overuse.  No recreation substance use.  No hx asthma.    Works with tools in the garage.   Not limited by chest pain, pressure or tightness with activity.  No significant dyspnea on exertion, orthopnea or lower extremity swelling.  No significant palpitations, lightheadedness, or presyncope/syncope.  No symptoms of leg claudication.  No stroke/TIA like symptoms.    Lives in [place].   to Estefanía who is not here today, she is in decent health he tells me.     Wt Readings from Last 5 Encounters:   03/04/25 83.9 kg (185 lb)   01/23/25 84.4 kg (186 lb)   12/31/24 83.5 kg (184 lb)   12/24/24 84 kg (185 lb 3 oz)   12/17/24 84.2 kg (185 lb 10 oz)     DATA REVIEWED by me:  ECG (my personal interpretation)  12/31/2024 sinus, 73, nonspecific ST changes  7/7/2017 sinus, 57    Echo  1/18/2025  The ejection fraction is measured to be 64% by Costa's biplane.  Normal right ventricular size and systolic function.  Known TAVR aortic valve that is functioning normally with normal    transvalvular gradients.  7/30/2024  Compared to the prior study on 01/23/2024 - AS has progressed, prior   Vmax 3.3 m/s.  Normal left ventricular chamber size.  The ejection fraction is measured to be  60% by Costa's biplane.  Moderate to severe aortic valve stenosis. Vmax is  3.8m/s.  Normal inferior vena cava size and inspiratory collapse.  TRISTA 1.3 cm², DI 0.22.  1/23/2024  Compared to the prior study on 2/26/2023 - aortic stenosis is now best   characterized as moderate.   Normal left ventricular chamber size.  The left ventricular ejection fraction is visually estimated to be 60%.  Moderate aortic valve stenosis. Vmax is 3.3 m/s.  Mild aortic insufficiency.  Normal inferior vena cava size and inspiratory collapse.  2/6/2023  Compared to the prior study on 10/23/2020 - AS remains mild.   Normal left ventricular chamber size.  The left ventricular ejection fraction is visually estimated to be 65%.  Mild aortic valve stenosis. Vmax is 2.66 m/s.  Normal inferior vena cava size and inspiratory collapse.  10/24/2020  Normal left ventricular chamber size.  Left ventricular ejection fraction is visually estimated to be 65%.  Mild aortic stenosis.  Right heart pressures are normal.     TAVR 12-23-24  Successful transcatheter aortic valve replacement using 26 mm Silver zaida 3 Ultra resilia valve     [facility]   12-17-24  HEMODYNAMICS:  Aortic pressure: 158 /66/72 mmHg  CORONARY ANGIOGRAPHY:  The left main coronary artery: Diffuse disease with severe distal stenosis.  The left anterior descending coronary artery: Occluded proximally.  Fills via patent SVG graft, and apical LAD provides left to right and left to left collaterals  The left circumflex coronary artery: Occluded at its ostium, fills via patent SVG graft.  Dominant left circumflex  The right coronary artery: Nondominant vessel with severe ostial and proximal disease.  BYPASS GRAFT ANGIOGRAPHY:  LIMA-diagonal: Patent, small diagonal branch  SVG-LAD: Patent.   Apical LAD provides left-to-right and left to left collaterals  SVG-OM1 and L-PDA : Patent  IMPRESSION:  1.  Severe three-vessel native CAD.  Dominant left circumflex  2.  Patent LIMA-diagonal, SVG-LAD, skip SVG-OM1/left PDA grafts.  3.  Mild PAD in the iliofemoral arteries  7/3/2017  1. 50% distal LM stenosis, highly eccentric  2. Diffuse prioximal 80% LAD stenosis  3. Tandem focal 80% bulky proximal and mid LCx disease with OM involvement (Gaines 0,1,0)  4. LVEF 65%    CABG   7/6/2017 Dr. Bowers Linkous   Coronary artery bypass grafting x4, left MIKHAIL to the distal diagonal,     reverse saphenous vein graft sequence to the distal OM1 and distal OM 2,   and reverse saphenous vein graft to distal LAD, left greater saphenous vein endoscopic vein    harvest.    CT chest   12-12-24  1.  Aortic annulus area: 467 sq mm.  2.  Aortic valve calcium score: 2331.9.  3.  Both coronary ostia are more than 10 mm away from the aortic annulus.  4.  Bilateral iliofemoral arteries are adequate for endovascular access.  5.  Proximal left vertebral artery is not identified, possibly occluded.  -No abdominal aortic aneurysm or dissection.     Carotid ultrasound   7/3/2017   Mild plaque of the right carotid artery with no significant right internal    carotid artery stenosis (<50%)   Mild plaque of the left carotid artery with no significant left internal    carotid artery stenosis  (<50%)   No significant stenosis of the right or left subclavian artery.   No significant stenosis of the right or left vertebral artery.    Most recent labs:       Lab Results   Component Value Date/Time    WBC 10.1 12/24/2024 04:00 AM    HEMOGLOBIN 12.7 (L) 12/24/2024 04:00 AM    HEMATOCRIT 39.7 (L) 12/24/2024 04:00 AM    MCV 94.1 12/24/2024 04:00 AM    INR 1.04 12/16/2024 02:10 PM      Lab Results   Component Value Date/Time    SODIUM 138 12/24/2024 04:00 AM    POTASSIUM 4.6 12/24/2024 04:00 AM    CHLORIDE 106 12/24/2024 04:00 AM    CO2 19 (L) 12/24/2024  "04:00 AM    GLUCOSE 151 (H) 12/24/2024 04:00 AM    BUN 21 12/24/2024 04:00 AM    CREATININE 1.34 12/24/2024 04:00 AM    CREATININE 1.0 10/27/2008 08:20 AM      Lab Results   Component Value Date/Time    ASTSGOT 20 12/24/2024 04:00 AM    ALTSGPT 17 12/24/2024 04:00 AM    ALBUMIN 3.9 12/24/2024 04:00 AM      Lab Results   Component Value Date/Time    CHOLSTRLTOT 217 (H) 01/24/2025 07:17 AM     (H) 01/24/2025 07:17 AM    HDL 36 (A) 01/24/2025 07:17 AM    TRIGLYCERIDE 153 (H) 01/24/2025 07:17 AM     No results for input(s): \"NTPROBNP\", \"TROPONINT\" in the last 72 hours.      Past Medical History:   Diagnosis Date    Anxiety     Arthritis     Breath shortness 12/16/2024    CAD (coronary artery disease) 07/06/2017    CABG x 4 (LIMA to distal diagonal, rSVG to distal OM1, rSVG to distal OM2, rSVG to distal LAD) by Dr. Gaffney.    Cancer (HCC)     Prostate CA    Diabetes (HCC)     oral medication     Heart valve disease     Hypertension     Inguinal hernia     Memory difficulties     Mixed hyperlipidemia     Nodular prostate without urinary obstruction     Prostate cancer (HCC)     Severe aortic stenosis 12/16/2024    Stage 3a chronic kidney disease     Per Problem List     Past Surgical History:   Procedure Laterality Date    TRANSCATHETER AORTIC VALVE REPLACEMENT Bilateral 12/23/2024    Procedure: TRANSCATHETER AORTIC VALVE REPLACEMENT;  Surgeon: Jerrell Macias M.D.;  Location: [facility];  Service: Cardiac    ECHOCARDIOGRAM, TRANSESOPHAGEAL, INTRAOPERATIVE N/A 12/23/2024    Procedure: ECHOCARDIOGRAM, TRANSTHORACIC, INTRAOPERATIVE;  Surgeon: Jerrell Macias M.D.;  Location: [facility];  Service: Cardiac    ROHINI  7/6/2017    Procedure: ROHINI - INTRAOPERATIVE ;  Surgeon: Anthony Gaffney M.D.;  Location: [facility];  Service:     MULTIPLE CORONARY ARTERY BYPASS ENDO VEIN HARVEST  7/6/2017    Procedure: MULTIPLE CORONARY ARTERY BYPASS ENDO VEIN HARVEST X2-4, PREVENA DRESSING ;  " Surgeon: Anthony Gaffney M.D.;  Location: SURGERY [facility];  Service:     APPENDECTOMY  1955    TONSILLECTOMY       Family History   Problem Relation Age of Onset    Stroke Mother     Heart Disease Father     Heart Disease Brother      Social History     Socioeconomic History    Marital status:      Spouse name: Not on file    Number of children: Not on file    Years of education: Not on file    Highest education level: Not on file   Occupational History    Not on file   Tobacco Use    Smoking status: Never    Smokeless tobacco: Never   Vaping Use    Vaping status: Never Used   Substance and Sexual Activity    Alcohol use: Yes     Comment: 1 oz / week    Drug use: No    Sexual activity: Never     Partners: Female     Birth control/protection: Post-Menopausal   Other Topics Concern    Not on file   Social History Narrative    Not on file     Social Drivers of Health     Financial Resource Strain: Not on file   Food Insecurity: No Food Insecurity (12/23/2024)    Hunger Vital Sign     Worried About Running Out of Food in the Last Year: Never true     Ran Out of Food in the Last Year: Never true   Transportation Needs: No Transportation Needs (12/23/2024)    PRAPARE - Transportation     Lack of Transportation (Medical): No     Lack of Transportation (Non-Medical): No   Physical Activity: Not on file   Stress: Not on file   Social Connections: Not on file   Intimate Partner Violence: Not At Risk (12/23/2024)    Humiliation, Afraid, Rape, and Kick questionnaire     Fear of Current or Ex-Partner: No     Emotionally Abused: No     Physically Abused: No     Sexually Abused: No   Housing Stability: Low Risk  (12/23/2024)    Housing Stability Vital Sign     Unable to Pay for Housing in the Last Year: No     Number of Times Moved in the Last Year: 1     Homeless in the Last Year: No     Allergies   Allergen Reactions    Other Environmental Runny Nose     SAW DUST       Current Outpatient Medications   Medication  "Sig Dispense Refill    atorvastatin (LIPITOR) 80 MG tablet Take 1 Tablet by mouth every day. 90 Tablet 3    Glucosamine-Chondroitin (GLUCOSAMINE CHONDR COMPLEX PO) Take  by mouth.      carvedilol (COREG) 6.25 MG Tab Take 1 Tablet by mouth 2 times a day with meals. 60 Tablet 11    Amoxicillin 500 MG Tab Take 4 Tablets by mouth one time as needed (take 30 minutes before dental visit) for up to 1 dose. 4 Tablet 0    Semaglutide 7 MG Tab Take 7 mg by mouth every day. (Patient taking differently: Take 7 mg by mouth every day. Rybelsus) 90 Tablet 3    Zinc Sulfate (ZINC 15 PO) Take  by mouth.      donepezil (ARICEPT) 10 MG tablet Take 1 Tablet by mouth every day. 90 Tablet 3    Blood Glucose Test Strips Use one One Touch strip to test blood sugar once daily . 100 Strip 3    metformin (GLUCOPHAGE) 1000 MG tablet Take 1 Tablet by mouth 2 times a day with meals. 180 Tablet 3    losartan (COZAAR) 50 MG Tab Take 1 Tablet by mouth every day. 90 Tablet 3    ONE TOUCH ULTRA TEST strip TEST BLOOD SUGAR LEVELS EVERY  Strip 3    ONETOUCH DELICA LANCETS FINE Misc USE TO TEST BLOOD SUGARS ONCE DAILY 100 Each 3    Ascorbic Acid (VITAMIN C PO) Take  by mouth.      aspirin (ASA) 81 MG Chew Tab chewable tablet Take 81 mg by mouth every day. Indications: post cardiac surgery to prevent blood clots      therapeutic multivitamin-minerals (THERAGRAN-M) Tab Take 1 Tab by mouth every day. Indications: supplement      Cholecalciferol (VITAMIN D3) 1000 UNIT TABS Take 1 Tab by mouth every day. 100 Tab 3     No current facility-administered medications for this visit.       ROS  All others systems reviewed and negative.    /50 (BP Location: Left arm, Patient Position: Sitting, BP Cuff Size: Adult)   Pulse 73   Resp 18   Ht 1.778 m (5' 10\")   Wt 83.9 kg (185 lb)   SpO2 94%  Body mass index is 26.54 kg/m².    General: No acute distress. Well nourished.  HEENT: EOM grossly intact, no scleral icterus, no pharyngeal erythema.   Neck:  " No JVD, no bruits, trachea midline  CVS: RRR.  3 out of 6 late peaking systolic murmur throughout the chest. No LE edema.  2+ radial pulses, 2+ PT pulses, well-healed midline incision, mild pectus excavatum  Resp: CTAB. No wheezing or crackles/rhonchi. Normal respiratory effort.  Abdomen: Soft, NT, no daniel hepatomegaly.  MSK/Ext: No clubbing or cyanosis.  Skin: Warm and dry, no rashes.  Neurological: CN III-XII grossly intact. No focal deficits.   Psych: A&O x 3, pleasant, charming, fair judgement, reduced STM, some reduced insight.     Physical Exam listed below was completed in entrety today and unchanged from 12-2-24 except where noted.  Some elements were copied from my note of that same day, which have been updated where appropriate and all reflect current meedical decision making from today.  I have confirmed and edited as necessary, the PFSH and ROS obtained by others.    Return in about 3 months (around 6/4/2025).    Written instructions given today:      Atorvastatin holiday:  -Stop your atorvastatin for 4 weeks, see if your memory improves, start the atorvastatin back after 4 weeks, see if you have any setbacks.  It is very rare for atorvastatin to cause memory problems but I do like to exclude it as a possibility.    -We have much better medications on the market now to control diabetes which also help with heart disease.  We need to get your A1c more consistently under 6.8 to protect your kidneys, eyes and bypass grafts.    -Semaglutide/Rybelsus is a very good heart and diabetes medication.  I have no idea if your insurance would cover this.  If it is too expensive, you do not need to  the prescription.        It is my pleasure to participate in the care of Mr. Pelayo.  Please do not hesitate to contact me with questions or concerns.    Denisse Silva MD, [facility]  Cardiologist, [facility]    Please note that this dictation was created using voice recognition  software. I have made every reasonable attempt to correct obvious errors, but it is possible there are errors of grammar and possibly content that I did not discover before finalizing the note.

## 2025-03-04 ENCOUNTER — OFFICE VISIT (OUTPATIENT)
Dept: CARDIOLOGY | Facility: MEDICAL CENTER | Age: 85
End: 2025-03-04
Attending: INTERNAL MEDICINE
Payer: MEDICARE

## 2025-03-04 VITALS
HEART RATE: 73 BPM | RESPIRATION RATE: 18 BRPM | WEIGHT: 185 LBS | DIASTOLIC BLOOD PRESSURE: 50 MMHG | HEIGHT: 70 IN | OXYGEN SATURATION: 94 % | SYSTOLIC BLOOD PRESSURE: 124 MMHG | BODY MASS INDEX: 26.48 KG/M2

## 2025-03-04 DIAGNOSIS — I35.0 NONRHEUMATIC AORTIC VALVE STENOSIS: ICD-10-CM

## 2025-03-04 DIAGNOSIS — Z95.2 HISTORY OF TRANSCATHETER AORTIC VALVE REPLACEMENT (TAVR): ICD-10-CM

## 2025-03-04 DIAGNOSIS — Z95.2 S/P TAVR (TRANSCATHETER AORTIC VALVE REPLACEMENT): ICD-10-CM

## 2025-03-04 DIAGNOSIS — R41.89 COGNITIVE DECLINE: ICD-10-CM

## 2025-03-04 DIAGNOSIS — E78.2 MIXED HYPERLIPIDEMIA: ICD-10-CM

## 2025-03-04 DIAGNOSIS — I10 ESSENTIAL HYPERTENSION: ICD-10-CM

## 2025-03-04 DIAGNOSIS — Z95.1 S/P CABG X 4: ICD-10-CM

## 2025-03-04 DIAGNOSIS — I35.0 SEVERE AORTIC STENOSIS: ICD-10-CM

## 2025-03-04 DIAGNOSIS — N18.31 STAGE 3A CHRONIC KIDNEY DISEASE: ICD-10-CM

## 2025-03-04 PROCEDURE — 99214 OFFICE O/P EST MOD 30 MIN: CPT | Performed by: INTERNAL MEDICINE

## 2025-03-04 PROCEDURE — 99213 OFFICE O/P EST LOW 20 MIN: CPT | Performed by: INTERNAL MEDICINE

## 2025-03-04 RX ORDER — ATORVASTATIN CALCIUM 80 MG/1
80 TABLET, FILM COATED ORAL
Qty: 90 TABLET | Refills: 3 | Status: SHIPPED | OUTPATIENT
Start: 2025-03-04

## 2025-03-04 ASSESSMENT — FIBROSIS 4 INDEX: FIB4 SCORE: 1.89

## 2025-03-04 NOTE — PATIENT INSTRUCTIONS
-Resume atorvastatin 80 mg once daily.  If anyone is concerned for brain fog or reduced memory, stop the medication and let me know.

## 2025-03-13 ENCOUNTER — OFFICE VISIT (OUTPATIENT)
Dept: NEUROLOGY | Facility: MEDICAL CENTER | Age: 85
End: 2025-03-13
Attending: PSYCHIATRY & NEUROLOGY
Payer: MEDICARE

## 2025-03-13 VITALS
SYSTOLIC BLOOD PRESSURE: 112 MMHG | WEIGHT: 188.27 LBS | TEMPERATURE: 97.5 F | DIASTOLIC BLOOD PRESSURE: 58 MMHG | RESPIRATION RATE: 16 BRPM | OXYGEN SATURATION: 94 % | BODY MASS INDEX: 26.95 KG/M2 | HEART RATE: 35 BPM | HEIGHT: 70 IN

## 2025-03-13 DIAGNOSIS — R41.3 AGE-RELATED MEMORY DISORDER: ICD-10-CM

## 2025-03-13 PROCEDURE — 3078F DIAST BP <80 MM HG: CPT | Performed by: PSYCHIATRY & NEUROLOGY

## 2025-03-13 PROCEDURE — 99204 OFFICE O/P NEW MOD 45 MIN: CPT | Performed by: PSYCHIATRY & NEUROLOGY

## 2025-03-13 PROCEDURE — 3074F SYST BP LT 130 MM HG: CPT | Performed by: PSYCHIATRY & NEUROLOGY

## 2025-03-13 PROCEDURE — 99212 OFFICE O/P EST SF 10 MIN: CPT | Performed by: PSYCHIATRY & NEUROLOGY

## 2025-03-13 ASSESSMENT — FIBROSIS 4 INDEX: FIB4 SCORE: 1.89

## 2025-03-13 ASSESSMENT — PATIENT HEALTH QUESTIONNAIRE - PHQ9: CLINICAL INTERPRETATION OF PHQ2 SCORE: 0

## 2025-03-13 NOTE — Clinical Note
Roselyn  Extensive discussion with Oswald and his wife. I don't feel  he needs Aricept. MOCA score of 29/30. Alzheimer's Screening score per wife was very low.  Regards, Azael LOPEZ

## 2025-03-14 NOTE — PROGRESS NOTES
"Reason for Neurology Consult:  question of memory disturbances    History of present illness:    aRf Pelayo 84 y.o. right handed gentleman who is from [place] (changed to [facility] and he graduated from High School and did 2 years of J.C. in the [place] area. He worked for nearly 30  years at [employer] as   and retired in 1995.    He has been  x 60 years and is accompanied to this visit by his wife.    Problem List Reviewed.    When asked about his memory or thinking ability and whether he feels or thinks it's worsened or changed-  his response was \" I don't think so.\" His wife states that she has \"always been his  for over 20-30 years\" such as paying the bills, making sure that basic house hold items are in order.  Despite the above issues, she can not clearly endorse obvious difficulties though he recently got hearing aides.  She does not endorse that Oswald frequently repeats himself and/or asking himself over and over again in the last few years.    He has never used a computer since retiring.    There is no  history of concussion(s), seizure(s) or stroke events in her adult life.      No history of parkinsonian features such as evolving gait slowness, shuffling,postural instability, softening of voice, involuntary movements of the body/limbs in the last 6 months.    No rapidly evolving weight loss, dysarthria,dysphagia, orthostatic dizziness/faintness in the last 6 months or so.    There has been no falls or near falls in the last 3 months or so.    There have been episodes of confusion,disorientation in the recent months.    No delusions,paranoia,hallucinations, behavioral or personality changes in the last 3 months.    Sleep: averages 7-8 hours most nights of the week in the recent months. Denies REM Sleep Behavioral type symptoms such as vivid dreaming.  Rare awakenings to urinate (at most 2 times per night in the recent few months). Denies Excessive " Day Time Sleepiness or need for daily or frequent napping in the recent few months.      No significant tobacco or alcohol use in adult life.  1 ounce of Greenlandic Whiskey with Koala after dinner.    Family Hx:    No clear family history of any evolving memory/cognitive or dementia related issues.      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 Gaffney.    Cancer (HCC)     Prostate CA    Diabetes (HCC)     oral medication     Heart valve disease     Hypertension     Inguinal hernia     Memory difficulties     Mixed hyperlipidemia     Nodular prostate without urinary obstruction     Prostate cancer (HCC)     Severe aortic stenosis 12/16/2024    Stage 3a chronic kidney disease     Per Problem List       Past surgical history:   Past Surgical History:   Procedure Laterality Date    TRANSCATHETER AORTIC VALVE REPLACEMENT Bilateral 12/23/2024    Procedure: TRANSCATHETER AORTIC VALVE REPLACEMENT;  Surgeon: Jerrell Macias M.D.;  Location: SURGERY [facility];  Service: Cardiac    ECHOCARDIOGRAM, TRANSESOPHAGEAL, INTRAOPERATIVE N/A 12/23/2024     Procedure: ECHOCARDIOGRAM, TRANSTHORACIC, INTRAOPERATIVE;  Surgeon: Jerrell Macias M.D.;  Location: SURGERY [facility];  Service: Cardiac    ROHINI  7/6/2017    Procedure: ROHINI - INTRAOPERATIVE ;  Surgeon: Anthony Gaffney M.D.;  Location: SURGERY [facility];  Service:     MULTIPLE CORONARY ARTERY BYPASS ENDO VEIN HARVEST  7/6/2017    Procedure: MULTIPLE CORONARY ARTERY BYPASS ENDO VEIN HARVEST X2-4, PREVENA DRESSING ;  Surgeon: Anthony Gaffney M.D.;  Location: SURGERY [facility];  Service:     APPENDECTOMY  1955    TONSILLECTOMY           Social history:   Social History     Socioeconomic History    Marital status:      Spouse name: Not on file    Number of children: Not on file    Years of education: Not on file    Highest education level: Not on file   Occupational History    Not on file   Tobacco Use    Smoking status: Never    Smokeless tobacco: Never   Vaping Use    Vaping status: Never Used   Substance and Sexual Activity    Alcohol use: Yes     Comment: 1 oz / week    Drug use: No    Sexual activity: Never     Partners: Female     Birth control/protection: Post-Menopausal   Other Topics Concern    Not on file   Social History Narrative    Not on file     Social Drivers of Health     Financial Resource Strain: Not on file   Food Insecurity: No Food Insecurity (12/23/2024)    Hunger Vital Sign     Worried About Running Out of Food in the Last Year: Never true     Ran Out of Food in the Last Year: Never true   Transportation Needs: No Transportation Needs (12/23/2024)    PRAPARE - Transportation     Lack of Transportation (Medical): No     Lack of Transportation (Non-Medical): No   Physical Activity: Not on file   Stress: Not on file   Social Connections: Not on file   Intimate Partner Violence: Not At Risk (12/23/2024)    Humiliation, Afraid, Rape, and Kick questionnaire     Fear of Current or Ex-Partner: No     Emotionally Abused: No     Physically Abused: No     Sexually Abused: No  "  Housing Stability: Low Risk  (12/23/2024)    Housing Stability Vital Sign     Unable to Pay for Housing in the Last Year: No     Number of Times Moved in the Last Year: 1     Homeless in the Last Year: No       Family history:   Family History   Problem Relation Age of Onset    Stroke Mother     Heart Disease Father     Heart Disease Brother          Current medications:   Current Outpatient Medications   Medication    atorvastatin (LIPITOR) 80 MG tablet    Glucosamine-Chondroitin (GLUCOSAMINE CHONDR COMPLEX PO)    carvedilol (COREG) 6.25 MG Tab    Amoxicillin 500 MG Tab    Semaglutide 7 MG Tab    Zinc Sulfate (ZINC 15 PO)    donepezil (ARICEPT) 10 MG tablet    Blood Glucose Test Strips    metformin (GLUCOPHAGE) 1000 MG tablet    losartan (COZAAR) 50 MG Tab    ONE TOUCH ULTRA TEST strip    ONETOUCH DELICA LANCETS FINE Misc    Ascorbic Acid (VITAMIN C PO)    aspirin (ASA) 81 MG Chew Tab chewable tablet    therapeutic multivitamin-minerals (THERAGRAN-M) Tab    Cholecalciferol (VITAMIN D3) 1000 UNIT TABS     No current facility-administered medications for this visit.       Medication Allergy:  Allergies   Allergen Reactions    Other Environmental Runny Nose     SAW DUST           Physical examination:   Vitals:    03/13/25 1645   BP: 112/58   BP Location: Left arm   Patient Position: Sitting   BP Cuff Size: Adult   Pulse: (!) 35   Resp: 16   Temp: 36.4 °C (97.5 °F)   TempSrc: Temporal   SpO2: 94%   Weight: 85.4 kg (188 lb 4.4 oz)   Height: 1.778 m (5' 10\")       Normal cephalic atraumatic.  There is full range of movement around the neck in all directions without restrictions or discrete pain evoked triggers.  No lower extremity edema.      Neurological  Exam:      Sanford Cognitive Assessment (MOCA) Version 7.1    Years of Education: 2 years J.C.    TOTAL SCORE: 29/30  (to be scanned into the MEDIA section in the E.M.R.)          Mental status: Awake, alert and fully oriented to person, place, time, and situation. " Normal attention and concentration.  Did not appear/act combative,irritable,anxious,paranoid/delusional or aggressive to or with me.    Speech and language: Speech is fluent without errors, clear, intact to repetition, and intact to naming.     Follows 3 step motor commands in sequence without significant delay and correctly.    Cranial nerve exam:  II: Pupils are equally round and reactive to light. Visual fields are intact by confrontation.  III, IV, VI: EOMI, no diplopia, no ptosis.  V: Sensation to light touch is normal over V1-3 distributions bilaterally.  .  VII: Facial movements are symmetrical. There is no facial droop. .  VIII: Hearing intact to soft speech and finger rub bilaterally  IX: Palate elevates symmetrically, uvula is midline. Dysarthria is not present.  XI: Shoulder shrug are symmetrical and strong.   XII: Tongue protrudes midline.      Motor exam:  Muscle tone is normal in all 4 limbs. and No abnormal movements appreciated.    Muscle strength:    Neck Flexors/Extensors: 5/5       Right  Left  Deltoid   5/5  5/5      Biceps   5/5  5/5  Triceps  5/5  5/5   Wrist extensors 5/5  5/5  Wrist flexors  5/5  5/5     5/5  5/5  Interossei  5/5  5/5  Thenar (APB)  5/5  5/5   Hip flexors  5/5  5/5  Quadriceps  5/5  5/5    Hamstrings  5/5  5/5  Dorsiflexors  5/5  5/5  Plantarflexors  5/5  5/5  Toe extension  5/5  5/5      Sensory exam:    Vibratory: 6-8 seconds at the great toes; 10-12 seconds at the ankles bilaterally (symmetrically).  Proprioception: normal at great toes.    Reflexes:       Right  Left  Biceps   2/4  2/4  Triceps             2/4  2/4  Brachioradialis 2/4  2/4  Knee jerk  2/4  2/4  Ankle jerk  2/4  2/4     Frontal release signs are absent    bilaterally toes are downgoing to plantar stimulation..    Coordination (finger-to-nose, heel/knee/shin, rapid alternating movements) was normal.     There was no ataxia, no tremors, and no dysmetria.     Station and gait > easily stands up from exam  chair without retropulsion,veering,leaning,swaying (to either side).     No rombergism.    Labs and Tests:    0 Result Notes       1 HM Topic             Component  Ref Range & Units (hover) 2 mo ago  (12/23/24) 2 mo ago  (12/16/24) 3 mo ago  (12/5/24) 1 yr ago  (1/19/24) 1 yr ago  (8/16/23) 1 yr ago  (7/24/23) 2 yr ago  (1/27/23) 2 yr ago  (1/27/23)   Sodium 138 142 141 137 142 141  140   Potassium 4.5 4.5 4.3 5.1 5.0 4.7  4.6   Chloride 107 104 107 103 107 105  104   Co2 20 26 24 22 22 24  25   Anion Gap 11.0 12.0 10.0 12.0 13.0 12.0  11.0   Glucose 129 High  106 High  117 High  117 High  131 High  148 High   109 High    Bun 23 High  25 High  16 22 20 20  22   Creatinine 1.16 1.08 1.20 1.09 1.23 1.46 High   1.00   Calcium 9.1 9.9 9.7 9.3 9.2 10.2  9.6   Correct Calcium 9.3 9.7 9.5 9.1   9.4    AST(SGOT) 20 17 26 23    16   ALT(SGPT) 17 18 29 37    19   Alkaline Phosphatase 65 73 74 77    73   Total Bilirubin 0.3 0.4 0.5 0.4    0.5   Albumin 3.8 4.3 4.2 4.3    4.2   Total Protein 6.0 6.8 6.5 6.8    6.6   Globulin 2.2 2.5 2.3 2.5    2.4   A-G Ratio 1.7 1.7 1.8 1.7    1.8   Resulting Agency M M M M M M M M        VITAMIN D,25 HYDROXY  Order: [id number]   Status: Final result       Next appt: 06/04/2025 at 02:20 PM in Cardiology (Denisse Silva M.D.)       Dx: Vitamin D deficiency disease    Test Result Released: Yes (seen)       Messages: Not Seen    1 Result Note       1 Patient Communication            Component  Ref Range & Units (hover) 4 yr ago  (10/12/20) 8 yr ago  (3/15/17) 8 yr ago  (10/20/16) 9 yr ago  (1/23/16) 10 yr ago  (1/22/15) 12 yr ago  (2/19/13) 12 yr ago  (8/17/12)   25-Hydroxy   Vitamin D 25 51 33 CM 39 CM 32 CM 33 CM 40 CM 38 CM   Comment: Adult Ranges:   <20 ng/mL - Deficiency  20-29 ng/mL - Insufficiency   ng/mL - Sufficiency  Effective 3/18/2020, this electrochemiluminescence binding assay is  performed using Roche marilee e immunoassay analyzer.  The Ele[employer]ys Vitamin D  total II assay  is intended for the quantitative determination of total 25  hydroxyvitamin D in human serum and plasma. This assay is to be used as an  aid in the assessment of vitamin D sufficiency in adults.            Impression/Plans/Recommendations:    Age related memory changes- (very mild). I had an  extensive discussion with wife today and she clearly does not endorse any progressive or clearly progressive changes in his delayed recall of information including in conversations.    He has maintained his personality and his loquaciousness and jokingness for several decades.    Medication list reviewed- see no culprit medications.    There has been no features of psychosis or evolving gait-balance decline in the last few years.    There are no other features of a progressive gait-balance decline.    Today- MOCA score was 29/30 today which is very encouraging.    Alzheimer's Questionnaire score of 4 today per wife (completely in the normal range).    Wife feels he is strongly independent and she adamantly felt and stated today that she could go away for several days and would not feel worried about doing so.      Plans:    A. Lifestyle factors reviewed today at length including trying to stay away from processed sugars.    B.  At this point I do not feel a brain imaging test is needed.    C.  He continues to take a daily adult MVI.    D. I see no reason for Oswald to continue with Donepezil (10mg a day) at this time.    E. Long term blood pressure monitoring with goal under 130/80 discussed.      I have performed  a history and physical exam and a directed /focused  ROS today.    Total time spent today or this patient's care was 45 minutes  and included reviewing  the diagnostic workup to date (such as labs and imaging as well as interpreting such tests relevant to this patient's neurological condition),  reviewing/obtaining separately obtained history (from patient and/or wife  member)  and counseling and educating Oswald and his  wife on issues related to cognition/memory and cognitive health factors and documenting  the clinical information in the EMR.    Follow up at this point on a PRN basis; short note to Roselyn Monge (APRN) about my feelings that I don't feel Oswald needs Aricept (ie, Donepezil).        Srinivasan Holder MD  [facility]- [facility].   [facility]

## 2025-03-24 ENCOUNTER — OFFICE VISIT (OUTPATIENT)
Dept: MEDICAL GROUP | Facility: PHYSICIAN GROUP | Age: 85
End: 2025-03-24
Payer: MEDICARE

## 2025-03-24 VITALS
TEMPERATURE: 98.2 F | OXYGEN SATURATION: 95 % | HEART RATE: 69 BPM | DIASTOLIC BLOOD PRESSURE: 42 MMHG | BODY MASS INDEX: 26.44 KG/M2 | SYSTOLIC BLOOD PRESSURE: 132 MMHG | HEIGHT: 70 IN | WEIGHT: 184.7 LBS

## 2025-03-24 DIAGNOSIS — Z00.00 ROUTINE HEALTH MAINTENANCE: ICD-10-CM

## 2025-03-24 DIAGNOSIS — I10 ESSENTIAL HYPERTENSION: ICD-10-CM

## 2025-03-24 DIAGNOSIS — E11.29 TYPE 2 DIABETES MELLITUS WITH MICROALBUMINURIA, WITHOUT LONG-TERM CURRENT USE OF INSULIN (HCC): ICD-10-CM

## 2025-03-24 DIAGNOSIS — R74.8 ELEVATED LIVER ENZYMES: ICD-10-CM

## 2025-03-24 DIAGNOSIS — N18.31 STAGE 3A CHRONIC KIDNEY DISEASE: ICD-10-CM

## 2025-03-24 DIAGNOSIS — Z09 HOSPITAL DISCHARGE FOLLOW-UP: Primary | ICD-10-CM

## 2025-03-24 DIAGNOSIS — R80.9 TYPE 2 DIABETES MELLITUS WITH MICROALBUMINURIA, WITHOUT LONG-TERM CURRENT USE OF INSULIN (HCC): ICD-10-CM

## 2025-03-24 DIAGNOSIS — R53.1 GENERALIZED WEAKNESS: ICD-10-CM

## 2025-03-24 DIAGNOSIS — N17.9 ACUTE KIDNEY INJURY (HCC): ICD-10-CM

## 2025-03-24 DIAGNOSIS — Z79.899 MEDICATION MANAGEMENT: ICD-10-CM

## 2025-03-24 ASSESSMENT — FIBROSIS 4 INDEX: FIB4 SCORE: 1.89

## 2025-03-25 NOTE — PROGRESS NOTES
Subjective:   Verbal consent was acquired by the patient to use [employer] ambient listening note generation during this visit Yes    Raf Pelayo is a 84 y.o. male who presents for Hospital Follow-up.    Transitional Care Management     HPI:   Recently hospitalized for:  Dehydration    History of Present Illness  The patient presents for evaluation of generalized weakness, diabetes mellitus, and medication management.    He was previously admitted to [facility] due to dehydration. On Sunday, he experienced difficulty rising from his chair, leading to a fall. Despite attempts to assist him, he was unable to regain his footing. A similar incident occurred approximately 1.5 years ago, necessitating emergency services. He was discharged from the hospital on Sunday night but returned early Monday morning after another fall. During his hospital stay, he received intravenous fluids and was evaluated for a potential transient ischemic attack (TIA) or minor stroke, which were ruled out. He was discharged on Wednesday. His wife reports that his temperature peaked at 102.9 degrees during his hospital stay. He has not experienced any further falls or fevers since his discharge. He reports that his leg strength has returned to baseline, allowing him to resume normal activities such as walking and working in the garage. He has been maintaining adequate hydration with Gatorade diluted in water, as recommended by his healthcare provider. He has a history of aortic valve replacement in December 2024 and quadruple bypass surgery. He is currently on carvedilol, not metoprolol.    His hemoglobin A1c level is typically around 7. His dietary intake has remained consistent, with a preference for vegetables.    He was previously prescribed donepezil for memory enhancement but discontinued it 3 weeks ago due to lack of perceived benefit.       Current medicines (including reconciliation performed today)  Current  Outpatient Medications   Medication Sig Dispense Refill    atorvastatin (LIPITOR) 80 MG tablet Take 1 Tablet by mouth every day. 90 Tablet 3    Glucosamine-Chondroitin (GLUCOSAMINE CHONDR COMPLEX PO) Take  by mouth.      carvedilol (COREG) 6.25 MG Tab Take 1 Tablet by mouth 2 times a day with meals. 60 Tablet 11    Amoxicillin 500 MG Tab Take 4 Tablets by mouth one time as needed (take 30 minutes before dental visit) for up to 1 dose. 4 Tablet 0    Semaglutide 7 MG Tab Take 7 mg by mouth every day. (Patient taking differently: Take 7 mg by mouth every day. Rybelsus) 90 Tablet 3    Zinc Sulfate (ZINC 15 PO) Take  by mouth.      Blood Glucose Test Strips Use one One Touch strip to test blood sugar once daily . 100 Strip 3    metformin (GLUCOPHAGE) 1000 MG tablet Take 1 Tablet by mouth 2 times a day with meals. 180 Tablet 3    losartan (COZAAR) 50 MG Tab Take 1 Tablet by mouth every day. 90 Tablet 3    ONE TOUCH ULTRA TEST strip TEST BLOOD SUGAR LEVELS EVERY  Strip 3    ONETOUCH DELICA LANCETS FINE Misc USE TO TEST BLOOD SUGARS ONCE DAILY 100 Each 3    Ascorbic Acid (VITAMIN C PO) Take  by mouth.      aspirin (ASA) 81 MG Chew Tab chewable tablet Take 81 mg by mouth every day. Indications: post cardiac surgery to prevent blood clots      therapeutic multivitamin-minerals (THERAGRAN-M) Tab Take 1 Tab by mouth every day. Indications: supplement      Cholecalciferol (VITAMIN D3) 1000 UNIT TABS Take 1 Tab by mouth every day. 100 Tab 3     No current facility-administered medications for this visit.     Allergies:   Other environmental    Social History     Tobacco Use    Smoking status: Never    Smokeless tobacco: Never   Vaping Use    Vaping status: Never Used   Substance Use Topics    Alcohol use: Yes     Comment: 1 oz / week    Drug use: No     ROS:  Constitutional: No fevers, chills, malaise/fatigue.  Eyes: No eye pain.  ENT: No sore throat, congestion.   Resp: No cough, shortness of breath.  CV: No chest pain,  "leg swelling, palpitations.  GI: No nausea/vomiting, abdominal pain, constipation, diarrhea.  : No dysuria, hematuria.  MSK: No weakness.  Skin: No rashes.  Neuro: No dizziness, weakness, headaches.  Psych: No suicidal ideations.    All remaining systems reviewed and found to be negative, except as stated above.      Objective:     Vitals:    03/24/25 1553   BP: 132/42   BP Location: Left arm   Patient Position: Sitting   BP Cuff Size: Adult   Pulse: 69   Temp: 36.8 °C (98.2 °F)   TempSrc: Temporal   SpO2: 95%   Weight: 83.8 kg (184 lb 11.2 oz)   Height: 1.778 m (5' 10\")     Body mass index is 26.5 kg/m².    Physical Exam:  General: Well nourished, well developed male in NAD, awake and conversant.  Eyes: Normal conjunctiva, anicteric.  Round symmetrical pupils.  ENT: Hearing grossly intact.  No nasal discharge.  Neck: Neck is supple.  No masses or thyromegaly.  CV: No lower extremity edema.  Respiratory: Respirations are nonlabored.  No wheezing.  Abdomen: Non-Distended.  Skin: Warm.  No rashes or ulcers.  MSK: Normal ambulation.  No clubbing or cyanosis.  Neuro: Sensation and CN II-XII grossly normal.  Psych: Alert and oriented.  Cooperative, appropriate mood and affect, normal judgment.      Assessment and Plan:   1. Hospital discharge follow-up (Primary)  2. Acute kidney injury (HCC)  3. Stage 3a chronic kidney disease  New to examiner.  His recent labs indicated acute kidney injury, likely due to dehydration. He has a history of slightly affected kidney function, which can be attributed to age and diabetes. He is advised to continue drinking plenty of fluids to help improve kidney function. A re-evaluation of his kidney function will be conducted through a comprehensive metabolic panel (CMP) and a urine test to assess the microalbumin creatinine ratio.  He will plan to complete these labs prior to follow-up with PCP in 1 month.  - CBC WITHOUT DIFFERENTIAL; Future  - Comp Metabolic Panel; Future  - Microalbumin " Creat Ratio Urine (Lab Collect); Future    4. Generalized weakness  New to examiner.  He was hospitalized for generalized weakness and dehydration. During his hospital stay, he was treated with vancomycin and ceftriaxone. His condition improved with IV fluids and antibiotics. He is advised to continue drinking plenty of fluids, including Gatorade mixed with water, to maintain hydration and electrolyte balance.    5. Elevated liver enzymes  New to examiner.  His liver enzymes were elevated during his recent hospitalization. This is a new finding, as he has no prior history of elevated liver enzymes. A re-evaluation of his liver function will be conducted through a comprehensive metabolic panel (CMP). He will plan to complete these labs prior to follow-up with PCP in 1 month.  - Comp Metabolic Panel; Future    6. Essential hypertension  New to examiner, chronic for patient.  Continue carvedilol 6.25 mg twice daily and losartan 50 mg daily. He will plan to complete these labs prior to follow-up with PCP in 1 month.  - CBC WITHOUT DIFFERENTIAL; Future  - Comp Metabolic Panel; Future  - Microalbumin Creat Ratio Urine (Lab Collect); Future    7. Type 2 diabetes mellitus with microalbuminuria, without long-term current use of insulin (HCC)  New to examiner, chronic for patient.  His hemoglobin A1c level was recorded at 6.4% during his hospital stay, which is lower than his usual level of around 7%. He is advised to continue his current diabetes medications, including metformin 1000 mg twice daily and Rybelsus 7 mg daily. A re-evaluation of his hemoglobin A1c level will be conducted before his next appointment with Luiza.  - HEMOGLOBIN A1C; Future  - Comp Metabolic Panel; Future  - Microalbumin Creat Ratio Urine (Lab Collect); Future    8. Medication management  He is currently taking carvedilol 6.25 mg twice daily, losartan 50 mg daily, aspirin 81 mg daily, atorvastatin 80 mg daily, zinc, vitamin D, Rybelsus 7 mg daily,  and metformin 1000 mg twice daily. He was previously taking donepezil for memory but has discontinued it for the past 3 weeks. He is advised to continue his current medication regimen.    9. Routine health maintenance  He declined the Tdap vaccine during this visit.    - Chart and discharge summary were reviewed.   - Hospitalization and results reviewed with patient.   - Medications reviewed including instructions regarding high risk medications, dosing and side effects.  - Recommended Services: No services needed at this time  - Advance directive/POLST on file?  No     Follow-up:Return in about 1 month (around 4/24/2025) for Follow up with PCP.    Face-to-face transitional care management services with [facility] (today's visit is within 14 days post discharge & LACE+ score of 28-58) medical decision complexity were provided.     Please note that this dictation was created using voice recognition software. I have worked with consultants from the vendor as well as technical experts from [facility] to optimize the interface. I have made every reasonable attempt to correct obvious errors, but I expect that there are errors of grammar and possibly content that I did not discover before finalizing the note.

## 2025-04-17 ENCOUNTER — APPOINTMENT (OUTPATIENT)
Dept: URBAN - METROPOLITAN AREA CLINIC 22 | Facility: CLINIC | Age: 85
Setting detail: DERMATOLOGY
End: 2025-04-17

## 2025-04-17 DIAGNOSIS — L57.0 ACTINIC KERATOSIS: ICD-10-CM

## 2025-04-17 DIAGNOSIS — D22 MELANOCYTIC NEVI: ICD-10-CM

## 2025-04-17 DIAGNOSIS — D18.0 HEMANGIOMA: ICD-10-CM

## 2025-04-17 DIAGNOSIS — Z86.007 PERSONAL HISTORY OF IN-SITU NEOPLASM OF SKIN: ICD-10-CM

## 2025-04-17 DIAGNOSIS — Z71.89 OTHER SPECIFIED COUNSELING: ICD-10-CM

## 2025-04-17 DIAGNOSIS — L82.0 INFLAMED SEBORRHEIC KERATOSIS: ICD-10-CM

## 2025-04-17 DIAGNOSIS — L82.1 OTHER SEBORRHEIC KERATOSIS: ICD-10-CM

## 2025-04-17 DIAGNOSIS — L81.4 OTHER MELANIN HYPERPIGMENTATION: ICD-10-CM

## 2025-04-17 PROBLEM — D22.5 MELANOCYTIC NEVI OF TRUNK: Status: ACTIVE | Noted: 2025-04-17

## 2025-04-17 PROBLEM — D18.01 HEMANGIOMA OF SKIN AND SUBCUTANEOUS TISSUE: Status: ACTIVE | Noted: 2025-04-17

## 2025-04-17 PROCEDURE — 99213 OFFICE O/P EST LOW 20 MIN: CPT | Mod: 25

## 2025-04-17 PROCEDURE — 17110 DESTRUCTION B9 LES UP TO 14: CPT

## 2025-04-17 PROCEDURE — ? COUNSELING

## 2025-04-17 PROCEDURE — 17003 DESTRUCT PREMALG LES 2-14: CPT | Mod: 59

## 2025-04-17 PROCEDURE — ? SUNSCREEN RECOMMENDATIONS

## 2025-04-17 PROCEDURE — ? ADDITIONAL NOTES

## 2025-04-17 PROCEDURE — ? LIQUID NITROGEN

## 2025-04-17 PROCEDURE — 17000 DESTRUCT PREMALG LESION: CPT | Mod: 59

## 2025-04-17 ASSESSMENT — LOCATION ZONE DERM
LOCATION ZONE: NOSE
LOCATION ZONE: TRUNK
LOCATION ZONE: LEG
LOCATION ZONE: ARM
LOCATION ZONE: HAND
LOCATION ZONE: EAR

## 2025-04-17 ASSESSMENT — LOCATION SIMPLE DESCRIPTION DERM
LOCATION SIMPLE: ABDOMEN
LOCATION SIMPLE: RIGHT THIGH
LOCATION SIMPLE: LEFT POPLITEAL SKIN
LOCATION SIMPLE: RIGHT HAND
LOCATION SIMPLE: RIGHT LOWER BACK
LOCATION SIMPLE: RIGHT FOREARM
LOCATION SIMPLE: LEFT EAR
LOCATION SIMPLE: LEFT UPPER BACK
LOCATION SIMPLE: CHEST
LOCATION SIMPLE: LEFT NOSE
LOCATION SIMPLE: RIGHT EAR

## 2025-04-17 ASSESSMENT — LOCATION DETAILED DESCRIPTION DERM
LOCATION DETAILED: RIGHT SUPERIOR MEDIAL MIDBACK
LOCATION DETAILED: RIGHT ULNAR DORSAL HAND
LOCATION DETAILED: LEFT POPLITEAL SKIN
LOCATION DETAILED: RIGHT LATERAL SUPERIOR CHEST
LOCATION DETAILED: LEFT NASAL ALA
LOCATION DETAILED: EPIGASTRIC SKIN
LOCATION DETAILED: RIGHT SUPERIOR HELIX
LOCATION DETAILED: LEFT SUPERIOR HELIX
LOCATION DETAILED: RIGHT ANTERIOR PROXIMAL THIGH
LOCATION DETAILED: LEFT SUPERIOR MEDIAL UPPER BACK
LOCATION DETAILED: RIGHT PROXIMAL DORSAL FOREARM

## 2025-05-06 ENCOUNTER — PATIENT MESSAGE (OUTPATIENT)
Dept: CARDIOLOGY | Facility: MEDICAL CENTER | Age: 85
End: 2025-05-06
Payer: MEDICARE

## 2025-05-06 DIAGNOSIS — I10 ESSENTIAL HYPERTENSION: ICD-10-CM

## 2025-05-15 ENCOUNTER — PATIENT MESSAGE (OUTPATIENT)
Dept: MEDICAL GROUP | Facility: PHYSICIAN GROUP | Age: 85
End: 2025-05-15
Payer: MEDICARE

## 2025-05-15 DIAGNOSIS — E11.29 TYPE 2 DIABETES MELLITUS WITH MICROALBUMINURIA, WITHOUT LONG-TERM CURRENT USE OF INSULIN (HCC): ICD-10-CM

## 2025-05-15 DIAGNOSIS — R80.9 TYPE 2 DIABETES MELLITUS WITH MICROALBUMINURIA, WITHOUT LONG-TERM CURRENT USE OF INSULIN (HCC): ICD-10-CM

## 2025-05-15 NOTE — PROGRESS NOTES
Requested Prescriptions     Signed Prescriptions Disp Refills    metformin (GLUCOPHAGE) 1000 MG tablet 180 Tablet 3     Sig: Take 1 Tablet by mouth 2 times a day with meals.     Authorizing Provider: PETER PAZ     GFR at [facility] on 3/19/2025 was 56 mL/min    ADELITA Fermin.

## 2025-05-19 RX ORDER — LOSARTAN POTASSIUM 50 MG/1
75 TABLET ORAL
Qty: 90 TABLET | Refills: 3 | Status: SHIPPED | OUTPATIENT
Start: 2025-05-19

## 2025-05-20 NOTE — PATIENT COMMUNICATION
Phone Number Called: [phone]    Call outcome: Spoke to patient regarding message below.    Message: Called to discuss increased dosage of losartan from 50mg to 75mg. Confirmed order to pharmacy. Pt can use up current supply. He will record BP and bring log to appt with LS on 6/4. Appt confirmed appreciative of call

## 2025-05-30 NOTE — PROGRESS NOTES
Medical decision making:  *Today, I have great concerns about him taking Gatorade and using electrolyte supplements in addition to higher doses of losartan.  I need to get records from [facility] to explain why they said he might be dehydrated and had abnormal electrolytes.  In the meantime, I want him to discontinue the supplements and back down the losartan to 50 mg.  I previously increased it to 75 due to higher blood pressure.  - Also, he has a wrist cuff so have asked them to stop at the store and  a better cuff so we can get better data.  -Did well after his TAVR.  Not certain if he feels significantly better.  Physical exam and echo looked great.  -In terms of CAD, remains on secondary prevention aspirin, getting him back on statin.  Patent grafts by [facility] in December 2024.  -Because of memory loss, he has been on a statin holiday.  January 2025 did not look good.  Back on atorvastatin.  Follow-up blood work pending.  -Diabetes is not to goal, working on this.  Repeat A1c.  -Records requested from [facility]  - Needs close follow-up, RTC 4 to 6 weeks    Addendum 4/6/2025: Some records received from [facility], summary:  ED note dated 3/16/2025.  Patient brought in by ambulance with sudden onset right leg weakness lasting about 4 hours.  On initial exam he had 5 out of 5 strength.  Temperature documented as 100.6.  /80.  CT head without contrast, no hemorrhage or mass or obvious stroke.  Labs: Glucose 123, sodium 140, potassium 4.4, BUN 18, creatinine 1.33, alkaline phosphatase 148, , , lactic acid 2.4.    Labs consistent with lactic acidosis and abnormal LFTs.  Diagnosis was TIA.  Patient was discharged home.    As a result of these findings, I am ordering a brain MRI, complete chemistry and CPK    Problem list:  1. Ischemic heart disease due to coronary artery obstruction (HCC)  - Comp Metabolic Panel; Future    2. S/P CABG x 4    3. S/P TAVR (transcatheter  aortic valve replacement)    4. Nonrheumatic aortic valve stenosis    5. Mixed hyperlipidemia  - APOLIPOPROTEIN B; Future  - Lipid Profile; Future  - Lipoprotein (a); Future    6. Stage 3a chronic kidney disease    7. Cognitive decline    8. Type 2 diabetes mellitus with microalbuminuria, without long-term current use of insulin (HCC)  - HEMOGLOBIN A1C; Future    9. Vitamin D deficiency disease    10. Carotid artery plaque, bilateral    11. CKD (chronic kidney disease) stage 2, GFR 60-89 ml/min       Chief Complaint:   Chief Complaint   Patient presents with    Coronary Artery Bypass Graft (CABG)     F/v DX: S/P CABG x 4    Coronary Artery Disease     F/v DX: Coronary artery disease involving native coronary artery of native heart with angina pectoris (HCC)     History of Present Illness:  Raf Pelayo is a 84 y.o. male who returns for long-term management of complex care including CAD/CABG, AS/TAVR, hypertension, hyperlipidemia, DM 2.    Has hypertension.  Numbers are looking elevated  Since last visit, admitted [facility]24/[facility]25 with right leg weakness for 4 hours.  Paperwork says TIA.  Sounds like 1 time he fell as well.  Somewhat suggestive dehydration and has been using electrolyte supplements and Gatorade.  Now he is on increased doses of losartan for elevated blood pressure.  Brought in his cuff today and it is a wrist cuff.  Sounds like he got up once in the middle of the night and fell to the ground and was taken back to the hospital.    CAD, CABG x4 July 2017 with Dr. Anthony Steward, included LIMA to distal diagonal, SVG to distal OM1, distal OM 2, SVG to distal LAD.  Pre TAVR [facility] 12-17-24 with patent LIMA-diagonal, SVG-LAD, skip SVG-OM1/left PDA grafts.  Remains on aspirin.  Has been on a statin holiday due to some cognitive decline, time to resume the statin.    Aortic stenosis, remains moderate by echo July 2024, V-max 3.8, TRISTA 1.3 cm², DI 0.22.  However, the LVOT was measured at  2.5 which is overestimated compared to prior.  I recalculated the dimensionless index on the VTI and the V-max and both are consistent with severe AS.  Physical exam is consistent with severe AS.  Underwent TAVR 12/23/2024 with Dr. Macias.    Hyperlipidemia, on statin.  LDL 59.  HDL low at times.  Statin holiday due to cognitive decline, lipids were significantly elevated January 2025 but he is back on a statin so we will follow-up.    DM2, uncontrolled.  A1c up to 7.2.   Was 6.9, but prior 7.9.     Bilateral carotid artery plaque in 2017, on statin.    CKD 2.    Former smoker, negative AAA by CT 2024, Ascending aortic diameter: 3.8 x 3.7 cm.     No history of autoimmune disease such as lupus or rheumatoid arthritis.  No history of chest radiation or cardiotoxic chemotherapy.  No ETOH overuse.  No caffeine overuse.  No recreation substance use.  No hx asthma.    Works with tools in the garage.   Not limited by chest pain, pressure or tightness with activity.  No significant dyspnea on exertion, orthopnea or lower extremity swelling.  No significant palpitations, lightheadedness, or presyncope/syncope.  No symptoms of leg claudication.  No stroke/TIA like symptoms.    Lives in [place].   to Estefanía who is here today.    Wt Readings from Last 5 Encounters:   06/04/25 81.2 kg (179 lb)   03/24/25 83.8 kg (184 lb 11.2 oz)   03/13/25 85.4 kg (188 lb 4.4 oz)   03/04/25 83.9 kg (185 lb)   01/23/25 84.4 kg (186 lb)     DATA REVIEWED by me:  ECG (my personal interpretation)  12/31/2024 sinus, 73, nonspecific ST changes  7/7/2017 sinus, 57    Echo  1/18/2025  The ejection fraction is measured to be 64% by Costa's biplane.  Normal right ventricular size and systolic function.  Known TAVR aortic valve that is functioning normally with normal   transvalvular gradients.  7/30/2024  Compared to the prior study on 01/23/2024 - AS has progressed, prior   Vmax 3.3 m/s.  Normal left ventricular chamber size.  The ejection  fraction is measured to be  60% by Costa's biplane.  Moderate to severe aortic valve stenosis. Vmax is  3.8m/s.  Normal inferior vena cava size and inspiratory collapse.  TRISTA 1.3 cm², DI 0.22.  1/23/2024  Compared to the prior study on 2/26/2023 - aortic stenosis is now best   characterized as moderate.   Normal left ventricular chamber size.  The left ventricular ejection fraction is visually estimated to be 60%.  Moderate aortic valve stenosis. Vmax is 3.3 m/s.  Mild aortic insufficiency.  Normal inferior vena cava size and inspiratory collapse.  2/6/2023  Compared to the prior study on 10/23/2020 - AS remains mild.   Normal left ventricular chamber size.  The left ventricular ejection fraction is visually estimated to be 65%.  Mild aortic valve stenosis. Vmax is 2.66 m/s.  Normal inferior vena cava size and inspiratory collapse.  10/24/2020  Normal left ventricular chamber size.  Left ventricular ejection fraction is visually estimated to be 65%.  Mild aortic stenosis.  Right heart pressures are normal.     TAVR 12-23-24  Successful transcatheter aortic valve replacement using 26 mm Silver zaida 3 Ultra resilia valve     [facility]   12-17-24  HEMODYNAMICS:  Aortic pressure: 158 /66/72 mmHg  CORONARY ANGIOGRAPHY:  The left main coronary artery: Diffuse disease with severe distal stenosis.  The left anterior descending coronary artery: Occluded proximally.  Fills via patent SVG graft, and apical LAD provides left to right and left to left collaterals  The left circumflex coronary artery: Occluded at its ostium, fills via patent SVG graft.  Dominant left circumflex  The right coronary artery: Nondominant vessel with severe ostial and proximal disease.  BYPASS GRAFT ANGIOGRAPHY:  LIMA-diagonal: Patent, small diagonal branch  SVG-LAD: Patent.  Apical LAD provides left-to-right and left to left collaterals  SVG-OM1 and L-PDA : Patent  IMPRESSION:  1.  Severe three-vessel native CAD.  Dominant left circumflex  2.  Patent  LIMA-diagonal, SVG-LAD, skip SVG-OM1/left PDA grafts.  3.  Mild PAD in the iliofemoral arteries  7/3/2017  1. 50% distal LM stenosis, highly eccentric  2. Diffuse prioximal 80% LAD stenosis  3. Tandem focal 80% bulky proximal and mid LCx disease with OM involvement (Gaines 0,1,0)  4. LVEF 65%    CABG   7/6/2017 Dr. Bowers Linkous   Coronary artery bypass grafting x4, left MIKHAIL to the distal diagonal,     reverse saphenous vein graft sequence to the distal OM1 and distal OM 2,   and reverse saphenous vein graft to distal LAD, left greater saphenous vein endoscopic vein    harvest.    CT chest   12-12-24  1.  Aortic annulus area: 467 sq mm.  2.  Aortic valve calcium score: 2331.9.  3.  Both coronary ostia are more than 10 mm away from the aortic annulus.  4.  Bilateral iliofemoral arteries are adequate for endovascular access.  5.  Proximal left vertebral artery is not identified, possibly occluded.  -No abdominal aortic aneurysm or dissection.     Carotid ultrasound   7/3/2017   Mild plaque of the right carotid artery with no significant right internal    carotid artery stenosis (<50%)   Mild plaque of the left carotid artery with no significant left internal    carotid artery stenosis  (<50%)   No significant stenosis of the right or left subclavian artery.   No significant stenosis of the right or left vertebral artery.    Most recent labs:       Lab Results   Component Value Date/Time    WBC 10.1 12/24/2024 04:00 AM    HEMOGLOBIN 12.7 (L) 12/24/2024 04:00 AM    HEMATOCRIT 39.7 (L) 12/24/2024 04:00 AM    MCV 94.1 12/24/2024 04:00 AM    INR 1.04 12/16/2024 02:10 PM      Lab Results   Component Value Date/Time    SODIUM 138 12/24/2024 04:00 AM    POTASSIUM 4.6 12/24/2024 04:00 AM    CHLORIDE 106 12/24/2024 04:00 AM    CO2 19 (L) 12/24/2024 04:00 AM    GLUCOSE 151 (H) 12/24/2024 04:00 AM    BUN 21 12/24/2024 04:00 AM    CREATININE 1.34 12/24/2024 04:00 AM    CREATININE 1.0 10/27/2008 08:20 AM      Lab Results  "  Component Value Date/Time    ASTSGOT 20 12/24/2024 04:00 AM    ALTSGPT 17 12/24/2024 04:00 AM    ALBUMIN 3.9 12/24/2024 04:00 AM      Lab Results   Component Value Date/Time    CHOLSTRLTOT 217 (H) 01/24/2025 07:17 AM     (H) 01/24/2025 07:17 AM    HDL 36 (A) 01/24/2025 07:17 AM    TRIGLYCERIDE 153 (H) 01/24/2025 07:17 AM     No results for input(s): \"NTPROBNP\", \"TROPONINT\" in the last 72 hours.      Past Medical History:   Diagnosis Date    Anxiety     Arthritis     Breath shortness 12/16/2024    CAD (coronary artery disease) 07/06/2017    CABG x 4 (LIMA to distal diagonal, rSVG to distal OM1, rSVG to distal OM2, rSVG to distal LAD) by Dr. Gaffney.    Cancer (HCC)     Prostate CA    Diabetes (HCC)     oral medication     Heart valve disease     Hypertension     Inguinal hernia     Memory difficulties     Mixed hyperlipidemia     Nodular prostate without urinary obstruction     Prostate cancer (HCC)     Severe aortic stenosis 12/16/2024    Stage 3a chronic kidney disease     Per Problem List     Past Surgical History:   Procedure Laterality Date    TRANSCATHETER AORTIC VALVE REPLACEMENT Bilateral 12/23/2024    Procedure: TRANSCATHETER AORTIC VALVE REPLACEMENT;  Surgeon: Jerrell Macias M.D.;  Location: [facility];  Service: Cardiac    ECHOCARDIOGRAM, TRANSESOPHAGEAL, INTRAOPERATIVE N/A 12/23/2024    Procedure: ECHOCARDIOGRAM, TRANSTHORACIC, INTRAOPERATIVE;  Surgeon: Jerrell Macias M.D.;  Location: [facility];  Service: Cardiac    ROHINI  07/06/2017    Procedure: ROHINI - INTRAOPERATIVE ;  Surgeon: Anthony Gaffney M.D.;  Location: [facility];  Service:     MULTIPLE CORONARY ARTERY BYPASS ENDO VEIN HARVEST  07/06/2017    Procedure: MULTIPLE CORONARY ARTERY BYPASS ENDO VEIN HARVEST X2-4, PREVENA DRESSING ;  Surgeon: Anthony Gaffney M.D.;  Location: [facility];  Service:     APPENDECTOMY      HERNIA REPAIR      TONSILLECTOMY      VASECTOMY       Family History "   Problem Relation Age of Onset    Stroke Mother     Heart Disease Father     Heart Disease Brother      Social History     Socioeconomic History    Marital status:      Spouse name: Not on file    Number of children: Not on file    Years of education: Not on file    Highest education level: Not on file   Occupational History    Not on file   Tobacco Use    Smoking status: Never    Smokeless tobacco: Never   Vaping Use    Vaping status: Never Used   Substance and Sexual Activity    Alcohol use: Yes     Comment: 1 oz / week    Drug use: No    Sexual activity: Never     Partners: Female     Birth control/protection: Post-Menopausal   Other Topics Concern    Not on file   Social History Narrative    Not on file     Social Drivers of Health     Financial Resource Strain: Not on file   Food Insecurity: No Food Insecurity (12/23/2024)    Hunger Vital Sign     Worried About Running Out of Food in the Last Year: Never true     Ran Out of Food in the Last Year: Never true   Transportation Needs: No Transportation Needs (12/23/2024)    PRAPARE - Transportation     Lack of Transportation (Medical): No     Lack of Transportation (Non-Medical): No   Physical Activity: Not on file   Stress: Not on file   Social Connections: Not on file   Intimate Partner Violence: Not At Risk (12/23/2024)    Humiliation, Afraid, Rape, and Kick questionnaire     Fear of Current or Ex-Partner: No     Emotionally Abused: No     Physically Abused: No     Sexually Abused: No   Housing Stability: Low Risk  (12/23/2024)    Housing Stability Vital Sign     Unable to Pay for Housing in the Last Year: No     Number of Times Moved in the Last Year: 1     Homeless in the Last Year: No     Allergies   Allergen Reactions    Other Environmental Runny Nose     SAW DUST       Current Outpatient Medications   Medication Sig Dispense Refill    losartan (COZAAR) 50 MG Tab Take 1.5 Tablets by mouth every day. 90 Tablet 3    metformin (GLUCOPHAGE) 1000 MG  "tablet Take 1 Tablet by mouth 2 times a day with meals. 180 Tablet 3    atorvastatin (LIPITOR) 80 MG tablet Take 1 Tablet by mouth every day. 90 Tablet 3    Glucosamine-Chondroitin (GLUCOSAMINE CHONDR COMPLEX PO) Take  by mouth.      carvedilol (COREG) 6.25 MG Tab Take 1 Tablet by mouth 2 times a day with meals. 60 Tablet 11    Amoxicillin 500 MG Tab Take 4 Tablets by mouth one time as needed (take 30 minutes before dental visit) for up to 1 dose. 4 Tablet 0    Semaglutide 7 MG Tab Take 7 mg by mouth every day. (Patient taking differently: Take 7 mg by mouth every day. Rybelsus) 90 Tablet 3    Zinc Sulfate (ZINC 15 PO) Take  by mouth.      Blood Glucose Test Strips Use one One Touch strip to test blood sugar once daily . 100 Strip 3    ONE TOUCH ULTRA TEST strip TEST BLOOD SUGAR LEVELS EVERY  Strip 3    ONETOUCH DELICA LANCETS FINE Misc USE TO TEST BLOOD SUGARS ONCE DAILY 100 Each 3    Ascorbic Acid (VITAMIN C PO) Take  by mouth.      aspirin (ASA) 81 MG Chew Tab chewable tablet Take 81 mg by mouth every day. Indications: post cardiac surgery to prevent blood clots      therapeutic multivitamin-minerals (THERAGRAN-M) Tab Take 1 Tab by mouth every day. Indications: supplement      Cholecalciferol (VITAMIN D3) 1000 UNIT TABS Take 1 Tab by mouth every day. 100 Tab 3     No current facility-administered medications for this visit.       ROS  All others systems reviewed and negative.    /64 (BP Location: Right arm, Patient Position: Sitting, BP Cuff Size: Adult)   Pulse 64   Resp 14   Ht 1.778 m (5' 10\")   Wt 81.2 kg (179 lb)   SpO2 94%  Body mass index is 25.68 kg/m².    General: No acute distress. Well nourished.  HEENT: EOM grossly intact, no scleral icterus, no pharyngeal erythema.   Neck:  No JVD, no bruits, trachea midline  CVS: RRR.  3 out of 6 late peaking systolic murmur throughout the chest. No LE edema.  2+ radial pulses, 2+ PT pulses, well-healed midline incision, mild pectus " excavatum  Resp: CTAB. No wheezing or crackles/rhonchi. Normal respiratory effort.  Abdomen: Soft, NT, no daniel hepatomegaly.  MSK/Ext: No clubbing or cyanosis.  Skin: Warm and dry, no rashes.  Neurological: CN III-XII grossly intact. No focal deficits.   Psych: A&O x 3, pleasant, charming, fair judgement, reduced STM    Physical Exam listed below was completed in entrety today and unchanged from 3/4/2025 except where noted.  Some elements were copied from my note of that same day, which have been updated where appropriate and all reflect current meedical decision making from today.  I have confirmed and edited as necessary, the PFSH and ROS obtained by others.    Return in about 4 weeks (around 7/2/2025).    Written instructions given today:      Atorvastatin holiday:    - Do not take any electrolytes that have sodium/salt or sea salt.  -Bring a copy of the electrolytes from [place] you have been taking.  - Do not drink Gatorade, it is evil.    -Reduce your losartan back to 50 mg for now.  - Get me better data.  - I will try to get the discharge summary from [facility].    *Fasting blood work in 2 weeks to follow-up on electrolytes and your cholesterol.    -Find a carbonated flavor water with almost no sugar and no salt.  Spindrift, Edy, etc.    -It is important to check blood pressure at home from time to time at home to ensure your blood pressure is in a good range because we do not check it correctly in the doctor's office.    Checking Blood Pressure:    -Be sure you have a good blood pressure cuff, spend in the $40-60, ideally a “calibrated” cuff such as Omron.  I recommend purchasing your cuff from a company with a good return policy.    -Your BP machine should be an automatic, upper arm cuff.  -Measure your arm circumference to get the correct size, probably adult Large.     -Sit with back support, legs uncrossed, feet flat on the floor.  Your arm should be propped up level with your heart but  completely supported, dead weight.  -Put the cuff in place, then wait 5 minutes before pushing the button, 10 minutes would be ideal. No talking, no TV, no social media, etc. Set a timer on your phone.    -Do not check blood pressure within 30 minutes of exercise, caffeine, or large meals.    -It is ok to check immediately if you are experiencing a symptom which may be related to high or low blood pressure.    -Write your blood pressures down, keep a log and bring this to your appointments.    -Check no more than 1 time a day on normal days, maybe 1-2 times per week, try different times of day.  Typically do not check in the morning before your medications unless this information is helpful for you.    -You are concerned your blood pressure cuff is not giving you accurate data, you can bring your cuff to at least one nursing appointment where it can be calibrated to a manual cuff.    -Goal blood pressure is at least under 130/80, ideally under 120/80.  If you think your BP is overall too high, reach out to your PCP or another provider for assistance.    Salt restriction is key for blood pressure control. Salt is killing Americans, it is in almost everything.  Salt=sodium=sea salt, guidelines say stay under 2,400 mg daily but I ask for under 3,000 mg daily if possible.  Get salt smart, start looking at labels, count it up.  Salt is hidden in everything, salad dressing, sauces, cheese, most canned food, any processed meat.     It is my pleasure to participate in the care of Mr. Pelayo.  Please do not hesitate to contact me with questions or concerns.    Denisse Silva MD, [facility]  Cardiologist, [facility]    Please note that this dictation was created using voice recognition software. I have made every reasonable attempt to correct obvious errors, but it is possible there are errors of grammar and possibly content that I did not discover before finalizing the note.

## 2025-06-04 ENCOUNTER — OFFICE VISIT (OUTPATIENT)
Dept: CARDIOLOGY | Facility: MEDICAL CENTER | Age: 85
End: 2025-06-04
Attending: INTERNAL MEDICINE
Payer: MEDICARE

## 2025-06-04 VITALS
RESPIRATION RATE: 14 BRPM | SYSTOLIC BLOOD PRESSURE: 110 MMHG | HEART RATE: 64 BPM | HEIGHT: 70 IN | DIASTOLIC BLOOD PRESSURE: 64 MMHG | WEIGHT: 179 LBS | BODY MASS INDEX: 25.62 KG/M2 | OXYGEN SATURATION: 94 %

## 2025-06-04 DIAGNOSIS — N18.31 STAGE 3A CHRONIC KIDNEY DISEASE: ICD-10-CM

## 2025-06-04 DIAGNOSIS — G45.9 TIA (TRANSIENT ISCHEMIC ATTACK): ICD-10-CM

## 2025-06-04 DIAGNOSIS — I24.0 ISCHEMIC HEART DISEASE DUE TO CORONARY ARTERY OBSTRUCTION (HCC): Primary | ICD-10-CM

## 2025-06-04 DIAGNOSIS — E11.29 TYPE 2 DIABETES MELLITUS WITH MICROALBUMINURIA, WITHOUT LONG-TERM CURRENT USE OF INSULIN (HCC): ICD-10-CM

## 2025-06-04 DIAGNOSIS — N18.2 CKD (CHRONIC KIDNEY DISEASE) STAGE 2, GFR 60-89 ML/MIN: ICD-10-CM

## 2025-06-04 DIAGNOSIS — I25.9 ISCHEMIC HEART DISEASE DUE TO CORONARY ARTERY OBSTRUCTION (HCC): Primary | ICD-10-CM

## 2025-06-04 DIAGNOSIS — Z95.1 S/P CABG X 4: ICD-10-CM

## 2025-06-04 DIAGNOSIS — I35.0 NONRHEUMATIC AORTIC VALVE STENOSIS: ICD-10-CM

## 2025-06-04 DIAGNOSIS — I65.23 CAROTID ARTERY PLAQUE, BILATERAL: ICD-10-CM

## 2025-06-04 DIAGNOSIS — E55.9 VITAMIN D DEFICIENCY: ICD-10-CM

## 2025-06-04 DIAGNOSIS — R41.89 COGNITIVE DECLINE: ICD-10-CM

## 2025-06-04 DIAGNOSIS — Z95.2 S/P TAVR (TRANSCATHETER AORTIC VALVE REPLACEMENT): ICD-10-CM

## 2025-06-04 DIAGNOSIS — R79.89 ABNORMAL LFTS: ICD-10-CM

## 2025-06-04 DIAGNOSIS — R80.9 TYPE 2 DIABETES MELLITUS WITH MICROALBUMINURIA, WITHOUT LONG-TERM CURRENT USE OF INSULIN (HCC): ICD-10-CM

## 2025-06-04 DIAGNOSIS — E78.2 MIXED HYPERLIPIDEMIA: ICD-10-CM

## 2025-06-04 PROCEDURE — 3074F SYST BP LT 130 MM HG: CPT | Performed by: INTERNAL MEDICINE

## 2025-06-04 PROCEDURE — 99214 OFFICE O/P EST MOD 30 MIN: CPT | Performed by: INTERNAL MEDICINE

## 2025-06-04 PROCEDURE — 3078F DIAST BP <80 MM HG: CPT | Performed by: INTERNAL MEDICINE

## 2025-06-04 PROCEDURE — 99213 OFFICE O/P EST LOW 20 MIN: CPT | Performed by: INTERNAL MEDICINE

## 2025-06-04 ASSESSMENT — FIBROSIS 4 INDEX: FIB4 SCORE: 1.89

## 2025-06-04 NOTE — PATIENT INSTRUCTIONS
- Do not take any electrolytes that have sodium/salt or sea salt.  -Bring a copy of the electrolytes from [place] you have been taking.  - Do not drink Gatorade, it is evil.    -Reduce your losartan back to 50 mg for now.  - Get me better data.  - I will try to get the discharge summary from [facility].    *Fasting blood work in 2 weeks to follow-up on electrolytes and your cholesterol.    -Find a carbonated flavor water with almost no sugar and no salt.  Spindrift, Edy, etc.    -It is important to check blood pressure at home from time to time at home to ensure your blood pressure is in a good range because we do not check it correctly in the doctor's office.    Checking Blood Pressure:    -Be sure you have a good blood pressure cuff, spend in the $40-60, ideally a “calibrated” cuff such as Omron.  I recommend purchasing your cuff from a company with a good return policy.    -Your BP machine should be an automatic, upper arm cuff.  -Measure your arm circumference to get the correct size, probably adult Large.     -Sit with back support, legs uncrossed, feet flat on the floor.  Your arm should be propped up level with your heart but completely supported, dead weight.  -Put the cuff in place, then wait 5 minutes before pushing the button, 10 minutes would be ideal. No talking, no TV, no social media, etc. Set a timer on your phone.    -Do not check blood pressure within 30 minutes of exercise, caffeine, or large meals.    -It is ok to check immediately if you are experiencing a symptom which may be related to high or low blood pressure.    -Write your blood pressures down, keep a log and bring this to your appointments.    -Check no more than 1 time a day on normal days, maybe 1-2 times per week, try different times of day.  Typically do not check in the morning before your medications unless this information is helpful for you.    -You are concerned your blood pressure cuff is not giving you accurate data,  you can bring your cuff to at least one nursing appointment where it can be calibrated to a manual cuff.    -Goal blood pressure is at least under 130/80, ideally under 120/80.  If you think your BP is overall too high, reach out to your PCP or another provider for assistance.    Salt restriction is key for blood pressure control. Salt is killing Americans, it is in almost everything.  Salt=sodium=sea salt, guidelines say stay under 2,400 mg daily but I ask for under 3,000 mg daily if possible.  Get salt smart, start looking at labels, count it up.  Salt is hidden in everything, salad dressing, sauces, cheese, most canned food, any processed meat.

## 2025-06-04 NOTE — LETTER
[facility] [place] [facility]   [address] , [address]  [place] [address]  Phone: [phone]  Fax: [phone]              Raf Pelayo  1940    Encounter Date: 6/4/2025    Denisse Silva M.D.          PROGRESS NOTE:        Medical decision making:  *Today, I have great concerns about him taking Gatorade and using electrolyte supplements in addition to higher doses of losartan.  I need to get records from [facility][1] to explain why they said he might be dehydrated and had abnormal electrolytes.  In the meantime, I want him to discontinue the supplements and back down the losartan to 50 mg.  I previously increased it to 75 due to higher blood pressure.  - Also, he has a wrist cuff so have asked them to stop at the store and  a better cuff so we can get better data.  -Did well after his TAVR.  Not certain if he feels significantly better.  Physical exam and echo looked great.  -In terms of CAD, remains on secondary prevention aspirin, getting him back on statin.  Patent grafts by [facility] in December 2024.  -Because of memory loss, he has been on a statin holiday.  January 2025 did not look good.  Back on atorvastatin.  Follow-up blood work pending.  -Diabetes is not to goal, working on this.  Repeat A1c.  -Records requested from [facility][1]  - Needs close follow-up, RTC 4 to 6 weeks    Problem list:  1. Ischemic heart disease due to coronary artery obstruction (HCC)  - Comp Metabolic Panel; Future    2. S/P CABG x 4    3. S/P TAVR (transcatheter aortic valve replacement)    4. Nonrheumatic aortic valve stenosis    5. Mixed hyperlipidemia  - APOLIPOPROTEIN B; Future  - Lipid Profile; Future  - Lipoprotein (a); Future    6. Stage 3a chronic kidney disease    7. Cognitive decline    8. Type 2 diabetes mellitus with microalbuminuria, without long-term current use of insulin (HCC)  - HEMOGLOBIN A1C; Future    9. Vitamin D deficiency disease    10. Carotid artery  Up; A&O.   Oral intake of cranberry juice and teena crackers.  Discharge instructions reviewed with pt via video .    plaque, bilateral    11. CKD (chronic kidney disease) stage 2, GFR 60-89 ml/min       Chief Complaint:   Chief Complaint   Patient presents with    Coronary Artery Bypass Graft (CABG)     F/v DX: S/P CABG x 4    Coronary Artery Disease     F/v DX: Coronary artery disease involving native coronary artery of native heart with angina pectoris (HCC)     History of Present Illness:  Raf Pelayo is a 84 y.o. male who returns for long-term management of complex care including CAD/CABG, AS/TAVR, hypertension, hyperlipidemia, DM 2.    Has hypertension.  Numbers are looking elevated  Since last visit, admitted to [facility]/24/2025 with right leg weakness for 4 hours.  Paperwork says TIA.  Sounds like 1 time he fell as well.  Somewhat suggestive dehydration and has been using electrolyte supplements and Gatorade.  Now he is on increased doses of losartan for elevated blood pressure.  Brought in his cuff today and it is a wrist cuff.  Sounds like he got up once in the middle of the night and fell to the ground and was taken back to the hospital.    CAD, CABG x4 July 2017 with Dr. Anthony Steward, included LIMA to distal diagonal, SVG to distal OM1, distal OM 2, SVG to distal LAD.  Pre TAVR [facility] 12-17-24 with patent LIMA-diagonal, SVG-LAD, skip SVG-OM1/left PDA grafts.  Remains on aspirin.  Has been on a statin holiday due to some cognitive decline, time to resume the statin.    Aortic stenosis, remains moderate by echo July 2024, V-max 3.8, TRISTA 1.3 cm², DI 0.22.  However, the LVOT was measured at 2.5 which is overestimated compared to prior.  I recalculated the dimensionless index on the VTI and the V-max and both are consistent with severe AS.  Physical exam is consistent with severe AS.  Underwent TAVR 12/23/2024 with Dr. Macias.    Hyperlipidemia, on statin.  LDL 59.  HDL low at times.  Statin holiday due to cognitive decline, lipids were significantly elevated January 2025 but he is back on a statin  so we will follow-up.    DM2, uncontrolled.  A1c up to 7.2.   Was 6.9, but prior 7.9.     Bilateral carotid artery plaque in 2017, on statin.    CKD 2.    Former smoker, negative AAA by CT 2024, Ascending aortic diameter: 3.8 x 3.7 cm.     No history of autoimmune disease such as lupus or rheumatoid arthritis.  No history of chest radiation or cardiotoxic chemotherapy.  No ETOH overuse.  No caffeine overuse.  No recreation substance use.  No hx asthma.    Works with tools in the garage.   Not limited by chest pain, pressure or tightness with activity.  No significant dyspnea on exertion, orthopnea or lower extremity swelling.  No significant palpitations, lightheadedness, or presyncope/syncope.  No symptoms of leg claudication.  No stroke/TIA like symptoms.    Lives in [place].   to Estefanía who is here today.    Wt Readings from Last 5 Encounters:   06/04/25 81.2 kg (179 lb)   03/24/25 83.8 kg (184 lb 11.2 oz)   03/13/25 85.4 kg (188 lb 4.4 oz)   03/04/25 83.9 kg (185 lb)   01/23/25 84.4 kg (186 lb)     DATA REVIEWED by me:  ECG (my personal interpretation)  12/31/2024 sinus, 73, nonspecific ST changes  7/7/2017 sinus, 57    Echo  1/18/2025  The ejection fraction is measured to be 64% by Costa's biplane.  Normal right ventricular size and systolic function.  Known TAVR aortic valve that is functioning normally with normal   transvalvular gradients.  7/30/2024  Compared to the prior study on 01/23/2024 - AS has progressed, prior   Vmax 3.3 m/s.  Normal left ventricular chamber size.  The ejection fraction is measured to be  60% by Costa's biplane.  Moderate to severe aortic valve stenosis. Vmax is  3.8m/s.  Normal inferior vena cava size and inspiratory collapse.  TRISTA 1.3 cm², DI 0.22.  1/23/2024  Compared to the prior study on 2/26/2023 - aortic stenosis is now best   characterized as moderate.   Normal left ventricular chamber size.  The left ventricular ejection fraction is visually estimated to be  60%.  Moderate aortic valve stenosis. Vmax is 3.3 m/s.  Mild aortic insufficiency.  Normal inferior vena cava size and inspiratory collapse.  2/6/2023  Compared to the prior study on 10/23/2020 - AS remains mild.   Normal left ventricular chamber size.  The left ventricular ejection fraction is visually estimated to be 65%.  Mild aortic valve stenosis. Vmax is 2.66 m/s.  Normal inferior vena cava size and inspiratory collapse.  10/24/2020  Normal left ventricular chamber size.  Left ventricular ejection fraction is visually estimated to be 65%.  Mild aortic stenosis.  Right heart pressures are normal.     TAVR 12-23-24  Successful transcatheter aortic valve replacement using 26 mm Silver zaida 3 Ultra resilia valve     [facility]   12-17-24  HEMODYNAMICS:  Aortic pressure: 158 /66/72 mmHg  CORONARY ANGIOGRAPHY:  The left main coronary artery: Diffuse disease with severe distal stenosis.  The left anterior descending coronary artery: Occluded proximally.  Fills via patent SVG graft, and apical LAD provides left to right and left to left collaterals  The left circumflex coronary artery: Occluded at its ostium, fills via patent SVG graft.  Dominant left circumflex  The right coronary artery: Nondominant vessel with severe ostial and proximal disease.  BYPASS GRAFT ANGIOGRAPHY:  LIMA-diagonal: Patent, small diagonal branch  SVG-LAD: Patent.  Apical LAD provides left-to-right and left to left collaterals  SVG-OM1 and L-PDA : Patent  IMPRESSION:  1.  Severe three-vessel native CAD.  Dominant left circumflex  2.  Patent LIMA-diagonal, SVG-LAD, skip SVG-OM1/left PDA grafts.  3.  Mild PAD in the iliofemoral arteries  7/3/2017  1. 50% distal LM stenosis, highly eccentric  2. Diffuse prioximal 80% LAD stenosis  3. Tandem focal 80% bulky proximal and mid LCx disease with OM involvement (Gaines 0,1,0)  4. LVEF 65%    CABG   7/6/2017 Dr. Bowers Linkous   Coronary artery bypass grafting x4, left MIKHAIL to the distal diagonal,     reverse  "saphenous vein graft sequence to the distal OM1 and distal OM 2,   and reverse saphenous vein graft to distal LAD, left greater saphenous vein endoscopic vein    harvest.    CT chest   12-12-24  1.  Aortic annulus area: 467 sq mm.  2.  Aortic valve calcium score: 2331.9.  3.  Both coronary ostia are more than 10 mm away from the aortic annulus.  4.  Bilateral iliofemoral arteries are adequate for endovascular access.  5.  Proximal left vertebral artery is not identified, possibly occluded.  -No abdominal aortic aneurysm or dissection.     Carotid ultrasound   7/3/2017   Mild plaque of the right carotid artery with no significant right internal    carotid artery stenosis (<50%)   Mild plaque of the left carotid artery with no significant left internal    carotid artery stenosis  (<50%)   No significant stenosis of the right or left subclavian artery.   No significant stenosis of the right or left vertebral artery.    Most recent labs:       Lab Results   Component Value Date/Time    WBC 10.1 12/24/2024 04:00 AM    HEMOGLOBIN 12.7 (L) 12/24/2024 04:00 AM    HEMATOCRIT 39.7 (L) 12/24/2024 04:00 AM    MCV 94.1 12/24/2024 04:00 AM    INR 1.04 12/16/2024 02:10 PM      Lab Results   Component Value Date/Time    SODIUM 138 12/24/2024 04:00 AM    POTASSIUM 4.6 12/24/2024 04:00 AM    CHLORIDE 106 12/24/2024 04:00 AM    CO2 19 (L) 12/24/2024 04:00 AM    GLUCOSE 151 (H) 12/24/2024 04:00 AM    BUN 21 12/24/2024 04:00 AM    CREATININE 1.34 12/24/2024 04:00 AM    CREATININE 1.0 10/27/2008 08:20 AM      Lab Results   Component Value Date/Time    ASTSGOT 20 12/24/2024 04:00 AM    ALTSGPT 17 12/24/2024 04:00 AM    ALBUMIN 3.9 12/24/2024 04:00 AM      Lab Results   Component Value Date/Time    CHOLSTRLTOT 217 (H) 01/24/2025 07:17 AM     (H) 01/24/2025 07:17 AM    HDL 36 (A) 01/24/2025 07:17 AM    TRIGLYCERIDE 153 (H) 01/24/2025 07:17 AM     No results for input(s): \"NTPROBNP\", \"TROPONINT\" in the last 72 hours.      Past Medical " History:   Diagnosis Date    Anxiety     Arthritis     Breath shortness 12/16/2024    CAD (coronary artery disease) 07/06/2017    CABG x 4 (LIMA to distal diagonal, rSVG to distal OM1, rSVG to distal OM2, rSVG to distal LAD) by Dr. Gaffney.    Cancer (HCC)     Prostate CA    Diabetes (HCC)     oral medication     Heart valve disease     Hypertension     Inguinal hernia     Memory difficulties     Mixed hyperlipidemia     Nodular prostate without urinary obstruction     Prostate cancer (HCC)     Severe aortic stenosis 12/16/2024    Stage 3a chronic kidney disease     Per Problem List     Past Surgical History:   Procedure Laterality Date    TRANSCATHETER AORTIC VALVE REPLACEMENT Bilateral 12/23/2024    Procedure: TRANSCATHETER AORTIC VALVE REPLACEMENT;  Surgeon: Jerrell Macias M.D.;  Location: SURGERY [facility];  Service: Cardiac    ECHOCARDIOGRAM, TRANSESOPHAGEAL, INTRAOPERATIVE N/A 12/23/2024    Procedure: ECHOCARDIOGRAM, TRANSTHORACIC, INTRAOPERATIVE;  Surgeon: Jerrell Macias M.D.;  Location: SURGERY [facility];  Service: Cardiac    ROHINI  07/06/2017    Procedure: ROHINI - INTRAOPERATIVE ;  Surgeon: Anthony Gaffney M.D.;  Location: SURGERY [facility];  Service:     MULTIPLE CORONARY ARTERY BYPASS ENDO VEIN HARVEST  07/06/2017    Procedure: MULTIPLE CORONARY ARTERY BYPASS ENDO VEIN HARVEST X2-4, PREVENA DRESSING ;  Surgeon: Anthony Gaffney M.D.;  Location: SURGERY [facility];  Service:     APPENDECTOMY      HERNIA REPAIR      TONSILLECTOMY      VASECTOMY       Family History   Problem Relation Age of Onset    Stroke Mother     Heart Disease Father     Heart Disease Brother      Social History     Socioeconomic History    Marital status:      Spouse name: Not on file    Number of children: Not on file    Years of education: Not on file    Highest education level: Not on file   Occupational History    Not on file   Tobacco Use    Smoking status: Never    Smokeless tobacco: Never   Vaping Use     Vaping status: Never Used   Substance and Sexual Activity    Alcohol use: Yes     Comment: 1 oz / week    Drug use: No    Sexual activity: Never     Partners: Female     Birth control/protection: Post-Menopausal   Other Topics Concern    Not on file   Social History Narrative    Not on file     Social Drivers of Health     Financial Resource Strain: Not on file   Food Insecurity: No Food Insecurity (12/23/2024)    Hunger Vital Sign     Worried About Running Out of Food in the Last Year: Never true     Ran Out of Food in the Last Year: Never true   Transportation Needs: No Transportation Needs (12/23/2024)    PRAPARE - Transportation     Lack of Transportation (Medical): No     Lack of Transportation (Non-Medical): No   Physical Activity: Not on file   Stress: Not on file   Social Connections: Not on file   Intimate Partner Violence: Not At Risk (12/23/2024)    Humiliation, Afraid, Rape, and Kick questionnaire     Fear of Current or Ex-Partner: No     Emotionally Abused: No     Physically Abused: No     Sexually Abused: No   Housing Stability: Low Risk  (12/23/2024)    Housing Stability Vital Sign     Unable to Pay for Housing in the Last Year: No     Number of Times Moved in the Last Year: 1     Homeless in the Last Year: No     Allergies   Allergen Reactions    Other Environmental Runny Nose     SAW DUST       Current Outpatient Medications   Medication Sig Dispense Refill    losartan (COZAAR) 50 MG Tab Take 1.5 Tablets by mouth every day. 90 Tablet 3    metformin (GLUCOPHAGE) 1000 MG tablet Take 1 Tablet by mouth 2 times a day with meals. 180 Tablet 3    atorvastatin (LIPITOR) 80 MG tablet Take 1 Tablet by mouth every day. 90 Tablet 3    Glucosamine-Chondroitin (GLUCOSAMINE CHONDR COMPLEX PO) Take  by mouth.      carvedilol (COREG) 6.25 MG Tab Take 1 Tablet by mouth 2 times a day with meals. 60 Tablet 11    Amoxicillin 500 MG Tab Take 4 Tablets by mouth one time as needed (take 30 minutes before dental visit)  "for up to 1 dose. 4 Tablet 0    Semaglutide 7 MG Tab Take 7 mg by mouth every day. (Patient taking differently: Take 7 mg by mouth every day. Rybelsus) 90 Tablet 3    Zinc Sulfate (ZINC 15 PO) Take  by mouth.      Blood Glucose Test Strips Use one One Touch strip to test blood sugar once daily . 100 Strip 3    ONE TOUCH ULTRA TEST strip TEST BLOOD SUGAR LEVELS EVERY  Strip 3    ONETOUCH DELICA LANCETS FINE Misc USE TO TEST BLOOD SUGARS ONCE DAILY 100 Each 3    Ascorbic Acid (VITAMIN C PO) Take  by mouth.      aspirin (ASA) 81 MG Chew Tab chewable tablet Take 81 mg by mouth every day. Indications: post cardiac surgery to prevent blood clots      therapeutic multivitamin-minerals (THERAGRAN-M) Tab Take 1 Tab by mouth every day. Indications: supplement      Cholecalciferol (VITAMIN D3) 1000 UNIT TABS Take 1 Tab by mouth every day. 100 Tab 3     No current facility-administered medications for this visit.       ROS  All others systems reviewed and negative.    /64 (BP Location: Right arm, Patient Position: Sitting, BP Cuff Size: Adult)   Pulse 64   Resp 14   Ht 1.778 m (5' 10\")   Wt 81.2 kg (179 lb)   SpO2 94%  Body mass index is 25.68 kg/m².    General: No acute distress. Well nourished.  HEENT: EOM grossly intact, no scleral icterus, no pharyngeal erythema.   Neck:  No JVD, no bruits, trachea midline  CVS: RRR.  3 out of 6 late peaking systolic murmur throughout the chest. No LE edema.  2+ radial pulses, 2+ PT pulses, well-healed midline incision, mild pectus excavatum  Resp: CTAB. No wheezing or crackles/rhonchi. Normal respiratory effort.  Abdomen: Soft, NT, no daniel hepatomegaly.  MSK/Ext: No clubbing or cyanosis.  Skin: Warm and dry, no rashes.  Neurological: CN III-XII grossly intact. No focal deficits.   Psych: A&O x 3, pleasant, charming, fair judgement, reduced STM    Physical Exam listed below was completed in entrety today and unchanged from 3/4/2025 except where noted.  Some elements were " copied from my note of that same day, which have been updated where appropriate and all reflect current meedical decision making from today.  I have confirmed and edited as necessary, the PFSH and ROS obtained by others.    Return in about 4 weeks (around 7/2/2025).    Written instructions given today:      Atorvastatin holiday:    - Do not take any electrolytes that have sodium/salt or sea salt.  -Bring a copy of the electrolytes from [facility] you have been taking.  - Do not drink Gatorade, it is evil.    -Reduce your losartan back to 50 mg for now.  - Get me better data.  - I will try to get the discharge summary from [facility].    *Fasting blood work in 2 weeks to follow-up on electrolytes and your cholesterol.    -Find a carbonated flavor water with almost no sugar and no salt.  Spindrift, Edy, etc.    -It is important to check blood pressure at home from time to time at home to ensure your blood pressure is in a good range because we do not check it correctly in the doctor's office.    Checking Blood Pressure:    -Be sure you have a good blood pressure cuff, spend in the $40-60, ideally a “calibrated” cuff such as Omron.  I recommend purchasing your cuff from a company with a good return policy.    -Your BP machine should be an automatic, upper arm cuff.  -Measure your arm circumference to get the correct size, probably adult Large.     -Sit with back support, legs uncrossed, feet flat on the floor.  Your arm should be propped up level with your heart but completely supported, dead weight.  -Put the cuff in place, then wait 5 minutes before pushing the button, 10 minutes would be ideal. No talking, no TV, no social media, etc. Set a timer on your phone.    -Do not check blood pressure within 30 minutes of exercise, caffeine, or large meals.    -It is ok to check immediately if you are experiencing a symptom which may be related to high or low blood pressure.    -Write your blood pressures down, keep a  log and bring this to your appointments.    -Check no more than 1 time a day on normal days, maybe 1-2 times per week, try different times of day.  Typically do not check in the morning before your medications unless this information is helpful for you.    -You are concerned your blood pressure cuff is not giving you accurate data, you can bring your cuff to at least one nursing appointment where it can be calibrated to a manual cuff.    -Goal blood pressure is at least under 130/80, ideally under 120/80.  If you think your BP is overall too high, reach out to your PCP or another provider for assistance.    Salt restriction is key for blood pressure control. Salt is killing Americans, it is in almost everything.  Salt=sodium=sea salt, guidelines say stay under 2,400 mg daily but I ask for under 3,000 mg daily if possible.  Get salt smart, start looking at labels, count it up.  Salt is hidden in everything, salad dressing, sauces, cheese, most canned food, any processed meat.     It is my pleasure to participate in the care of Mr. Pelayo.  Please do not hesitate to contact me with questions or concerns.    Denisse Silva MD, [facility]  Cardiologist, [facility]    Please note that this dictation was created using voice recognition software. I have made every reasonable attempt to correct obvious errors, but it is possible there are errors of grammar and possibly content that I did not discover before finalizing the note.        Luiza Monge, GABI.P.R.N.  [address] [address]  [place] [address]  Via In Basket

## 2025-06-05 ENCOUNTER — TELEPHONE (OUTPATIENT)
Dept: CARDIOLOGY | Facility: MEDICAL CENTER | Age: 85
End: 2025-06-05
Payer: MEDICARE

## 2025-06-05 NOTE — TELEPHONE ENCOUNTER
----- Message from Physician Denisse Silva M.D. sent at 6/4/2025  3:01 PM PDT -----  Regarding: Records from [facility]  Please get the discharge summary and emergency room notes from [facility] this year in 2025, I think he has been there 3 times.  Thank you.

## 2025-06-05 NOTE — TELEPHONE ENCOUNTER
Requested medical records from [facility] and received confirmation fax. Scanned to [employer].    Received records from [facility] and scanned to pt's chart under media.

## 2025-06-05 NOTE — TELEPHONE ENCOUNTER
----- Message from Physician Denisse Silva M.D. sent at 6/5/2025  9:23 AM PDT -----  Regarding: José Manuel Garcia, Please call him, let him know I received records from 1 ER visit from [facility].  He had very abnormal liver function testing and something called lactic acidosis so I want him to go to the lab and get a CPK and CMP, not fasting so he can go today or tomorrow.Please also let him know I ordered an MRI of the brain because he was diagnosed with TIA and typically patients get a brain MRI to exclude actual stroke.[place]rington, I had forwarded you a message because you can see him in follow-up.  These are the people that were told to drink Gatorade and take electrolytes.  I do not know why he had the lactic acidosis.  He was febrile at the time also and had abnormal LFTs, hopefully what ever it was is transient.FYI to Roselyn Monge, PCP, keeping you in the loop.Thanks everyone,Denisse

## 2025-06-05 NOTE — TELEPHONE ENCOUNTER
Phone Number Called: [phone]    Call outcome: Did not leave a detailed message. Requested patient to call back.    Message: Called to discuss LS recommendations

## 2025-06-06 ENCOUNTER — RESULTS FOLLOW-UP (OUTPATIENT)
Dept: CARDIOLOGY | Facility: MEDICAL CENTER | Age: 85
End: 2025-06-06
Payer: MEDICARE

## 2025-06-06 ENCOUNTER — HOSPITAL ENCOUNTER (OUTPATIENT)
Dept: LAB | Facility: MEDICAL CENTER | Age: 85
End: 2025-06-06
Attending: INTERNAL MEDICINE
Payer: MEDICARE

## 2025-06-06 DIAGNOSIS — R80.9 TYPE 2 DIABETES MELLITUS WITH MICROALBUMINURIA, WITHOUT LONG-TERM CURRENT USE OF INSULIN (HCC): ICD-10-CM

## 2025-06-06 DIAGNOSIS — I24.0 ISCHEMIC HEART DISEASE DUE TO CORONARY ARTERY OBSTRUCTION (HCC): ICD-10-CM

## 2025-06-06 DIAGNOSIS — R79.89 ABNORMAL LFTS: ICD-10-CM

## 2025-06-06 DIAGNOSIS — E78.2 MIXED HYPERLIPIDEMIA: ICD-10-CM

## 2025-06-06 DIAGNOSIS — E11.29 TYPE 2 DIABETES MELLITUS WITH MICROALBUMINURIA, WITHOUT LONG-TERM CURRENT USE OF INSULIN (HCC): ICD-10-CM

## 2025-06-06 DIAGNOSIS — I25.9 ISCHEMIC HEART DISEASE DUE TO CORONARY ARTERY OBSTRUCTION (HCC): ICD-10-CM

## 2025-06-06 LAB
ALBUMIN SERPL BCP-MCNC: 4.3 G/DL (ref 3.2–4.9)
ALBUMIN/GLOB SERPL: 1.7 G/DL
ALP SERPL-CCNC: 73 U/L (ref 30–99)
ALT SERPL-CCNC: 27 U/L (ref 2–50)
ANION GAP SERPL CALC-SCNC: 12 MMOL/L (ref 7–16)
AST SERPL-CCNC: 26 U/L (ref 12–45)
BILIRUB SERPL-MCNC: 0.7 MG/DL (ref 0.1–1.5)
BUN SERPL-MCNC: 27 MG/DL (ref 8–22)
CALCIUM ALBUM COR SERPL-MCNC: 9.5 MG/DL (ref 8.5–10.5)
CALCIUM SERPL-MCNC: 9.7 MG/DL (ref 8.5–10.5)
CHLORIDE SERPL-SCNC: 108 MMOL/L (ref 96–112)
CHOLEST SERPL-MCNC: 108 MG/DL (ref 100–199)
CK SERPL-CCNC: 99 U/L (ref 0–154)
CO2 SERPL-SCNC: 22 MMOL/L (ref 20–33)
CREAT SERPL-MCNC: 1.32 MG/DL (ref 0.5–1.4)
EST. AVERAGE GLUCOSE BLD GHB EST-MCNC: 143 MG/DL
FASTING STATUS PATIENT QL REPORTED: NORMAL
GFR SERPLBLD CREATININE-BSD FMLA CKD-EPI: 53 ML/MIN/1.73 M 2
GLOBULIN SER CALC-MCNC: 2.6 G/DL (ref 1.9–3.5)
GLUCOSE SERPL-MCNC: 121 MG/DL (ref 65–99)
HBA1C MFR BLD: 6.6 % (ref 4–5.6)
HDLC SERPL-MCNC: 32 MG/DL
LDLC SERPL CALC-MCNC: 49 MG/DL
POTASSIUM SERPL-SCNC: 4.6 MMOL/L (ref 3.6–5.5)
PROT SERPL-MCNC: 6.9 G/DL (ref 6–8.2)
SODIUM SERPL-SCNC: 142 MMOL/L (ref 135–145)
TRIGL SERPL-MCNC: 135 MG/DL (ref 0–149)

## 2025-06-06 PROCEDURE — 80053 COMPREHEN METABOLIC PANEL: CPT

## 2025-06-06 PROCEDURE — 82172 ASSAY OF APOLIPOPROTEIN: CPT

## 2025-06-06 PROCEDURE — 83036 HEMOGLOBIN GLYCOSYLATED A1C: CPT | Mod: GA

## 2025-06-06 PROCEDURE — 80061 LIPID PANEL: CPT

## 2025-06-06 PROCEDURE — 36415 COLL VENOUS BLD VENIPUNCTURE: CPT

## 2025-06-06 PROCEDURE — 83695 ASSAY OF LIPOPROTEIN(A): CPT

## 2025-06-06 PROCEDURE — 82550 ASSAY OF CK (CPK): CPT

## 2025-06-08 LAB — APO B100 SERPL-MCNC: 67 MG/DL (ref 66–133)

## 2025-06-09 LAB — LPA SERPL-MCNC: 157 MG/DL

## 2025-06-14 NOTE — PROGRESS NOTES
Medical decision making:  - Records from [facility] indicated TIA so I am increasing aspirin to Plavix 75 mg for now.  I have ordered an MRI which is pending.  -Blood pressure not to goal but has a better cuff.  Increasing losartan 1 more time to 100 mg.  If blood pressure lisseth elevated, we can go up slightly on the carvedilol to 1.5 tablets twice daily.  -Did well after his TAVR.  Not certain if he feels significantly better.  Physical exam and echo looked great.  -In terms of CAD, remains on secondary prevention aspirin, getting him back on statin.  Patent grafts by [facility] in December 2024.  -Because of memory loss, he has been on a statin holiday.  January 2025 did not look good.  Back on atorvastatin.  Follow-up cholesterol looks great.  -Continue high intensity statin given elevated lipoprotein a.  -Diabetes finally to goal which is fantastic.  - Needs close follow-up, RTC 4 weeks to follow-up on MRI, Plavix and blood pressure medication changes.  At that time we will order follow-up BMP to see how his kidney function is doing on higher doses losartan.  We except that the creatinine should go up slightly due to the mechanism of action of the medication.    Addendum 4/6/2025: Some records received from [facility], summary:  ED note dated 3/16/2025.  Patient brought in by ambulance with sudden onset right leg weakness lasting about 4 hours.  On initial exam he had 5 out of 5 strength.  Temperature documented as 100.6.  /80.  CT head without contrast, no hemorrhage or mass or obvious stroke.  Labs: Glucose 123, sodium 140, potassium 4.4, BUN 18, creatinine 1.33, alkaline phosphatase 148, , , lactic acid 2.4.    Labs consistent with lactic acidosis and abnormal LFTs.  Diagnosis was TIA.  Patient was discharged home.      Problem list:  1. Ischemic heart disease due to coronary artery obstruction (HCC)    2. S/P CABG x 4    3. S/P TAVR (transcatheter aortic valve replacement)    4.  Nonrheumatic aortic valve stenosis    5. Mixed hyperlipidemia    6. Stage 3a chronic kidney disease    7. Type 2 diabetes mellitus with microalbuminuria, without long-term current use of insulin (HCC)    8. Vitamin D deficiency disease    9. Carotid artery plaque, bilateral    10. Cognitive decline    11. TIA (transient ischemic attack)    12. Abnormal LFTs    13. Essential hypertension    14. Elevated lipoprotein(a)     Chief Complaint:   Chief Complaint   Patient presents with    Coronary Artery Disease     F/V Dx: Coronary artery disease involving native coronary artery of native heart with angina pectoris (HCC)    Coronary Artery Bypass Graft (CABG)    Hypertension     History of Present Illness:  Raf Pelayo is a 84 y.o. male who returns for long-term management of complex care including CAD/CABG, AS/TAVR, hypertension, hyperlipidemia, elev Lpa, DM 2 and today close FU of abnormal LFTs, illness.    Insomnia few weeks ago for ED follow-up at [facility], note dated 3/16/2025 reviewed.  Patient brought in by ambulance with sudden onset right leg weakness lasting about 4 hours.  On initial exam he had 5 out of 5 strength.  Temperature documented as 100.6.  /80.  CT head without contrast, no hemorrhage or mass or obvious stroke.  Labs: Glucose 123, sodium 140, potassium 4.4, BUN 18, creatinine 1.33, alkaline phosphatase 148, , , lactic acid 2.4.  Labs consistent with lactic acidosis and abnormal LFTs.  Diagnosis was TIA.  Patient was discharged home but no medication changes and no MRI which I did order.  Today, I am changing aspirin to Plavix and working on blood pressure control.  Follow-up LFTs and CPK were normal.    Has hypertension.  Numbers are looking elevated, has a new cuff.  Increasing losartan.  Fluid restricting, no more Gatorade.    CAD, CABG x4 July 2017 with Dr. Anthony Steward, included LIMA to distal diagonal, SVG to distal OM1, distal OM 2, SVG to distal LAD.  Pre TAVR  [facility] 12-17-24 with patent LIMA-diagonal, SVG-LAD, skip SVG-OM1/left PDA grafts.  Remains on aspirin.  Has been on a statin holiday due to some cognitive decline, time to resume the statin.    Aortic stenosis, remains moderate by echo July 2024, V-max 3.8, TRISTA 1.3 cm², DI 0.22.  However, the LVOT was measured at 2.5 which is overestimated compared to prior.  I recalculated the dimensionless index on the VTI and the V-max and both are consistent with severe AS.  Physical exam is consistent with severe AS.  Underwent TAVR 12/23/2024 with Dr. Macias.    Hyperlipidemia, on statin.  LDL 59.  HDL low at times.  Statin holiday due to cognitive decline, lipids were significantly elevated January 2025 but he is back on a statin so we will follow-up.   -> 49.  Apo B 67.   Lpa 157 nmol/dL, normal < 29.     DM2, controlled finally.   Was 7.9 -> 6.9 -> 6.6.    Bilateral carotid artery plaque in 2017, on statin.    CKD 2.    Former smoker, negative AAA by CT 2024, Ascending aortic diameter: 3.8 x 3.7 cm.     No history of autoimmune disease such as lupus or rheumatoid arthritis.  No history of chest radiation or cardiotoxic chemotherapy.  No ETOH overuse.  No caffeine overuse.  No recreation substance use.  No hx asthma.    Works with tools in the garage.   Not limited by chest pain, pressure or tightness with activity.  No significant dyspnea on exertion, orthopnea or lower extremity swelling.  No significant palpitations, lightheadedness, or presyncope/syncope.  No symptoms of leg claudication.  No stroke/TIA like symptoms.    Lives in [place].   to Estefanía who is here today.    Wt Readings from Last 5 Encounters:   06/16/25 80.7 kg (178 lb)   06/04/25 81.2 kg (179 lb)   03/24/25 83.8 kg (184 lb 11.2 oz)   03/13/25 85.4 kg (188 lb 4.4 oz)   03/04/25 83.9 kg (185 lb)     DATA REVIEWED by me:  ECG (my personal interpretation)  12/31/2024 sinus, 73, nonspecific ST changes  7/7/2017 sinus, 57    Echocardiogram    1/18/2025  The ejection fraction is measured to be 64% by Costa's biplane.  Normal right ventricular size and systolic function.  Known TAVR aortic valve that is functioning normally with normal   transvalvular gradients.  7/30/2024  Compared to the prior study on 01/23/2024 - AS has progressed, prior   Vmax 3.3 m/s.  Normal left ventricular chamber size.  The ejection fraction is measured to be  60% by Costa's biplane.  Moderate to severe aortic valve stenosis. Vmax is  3.8m/s.  Normal inferior vena cava size and inspiratory collapse.  TRISTA 1.3 cm², DI 0.22.  1/23/2024  Compared to the prior study on 2/26/2023 - aortic stenosis is now best   characterized as moderate.   Normal left ventricular chamber size.  The left ventricular ejection fraction is visually estimated to be 60%.  Moderate aortic valve stenosis. Vmax is 3.3 m/s.  Mild aortic insufficiency.  Normal inferior vena cava size and inspiratory collapse.  2/6/2023  Compared to the prior study on 10/23/2020 - AS remains mild.   Normal left ventricular chamber size.  The left ventricular ejection fraction is visually estimated to be 65%.  Mild aortic valve stenosis. Vmax is 2.66 m/s.  Normal inferior vena cava size and inspiratory collapse.  10/24/2020  Normal left ventricular chamber size.  Left ventricular ejection fraction is visually estimated to be 65%.  Mild aortic stenosis.  Right heart pressures are normal.     TAVR   12-23-24  Successful transcatheter aortic valve replacement using 26 mm Silver zaida 3 Ultra resilia valve     [facility]   12-17-24  HEMODYNAMICS:  Aortic pressure: 158 /66/72 mmHg  CORONARY ANGIOGRAPHY:  The left main coronary artery: Diffuse disease with severe distal stenosis.  The left anterior descending coronary artery: Occluded proximally.  Fills via patent SVG graft, and apical LAD provides left to right and left to left collaterals  The left circumflex coronary artery: Occluded at its ostium, fills via patent SVG graft.   Dominant left circumflex  The right coronary artery: Nondominant vessel with severe ostial and proximal disease.  BYPASS GRAFT ANGIOGRAPHY:  LIMA-diagonal: Patent, small diagonal branch  SVG-LAD: Patent.  Apical LAD provides left-to-right and left to left collaterals  SVG-OM1 and L-PDA : Patent  IMPRESSION:  1.  Severe three-vessel native CAD.  Dominant left circumflex  2.  Patent LIMA-diagonal, SVG-LAD, skip SVG-OM1/left PDA grafts.  3.  Mild PAD in the iliofemoral arteries  7/3/2017  1. 50% distal LM stenosis, highly eccentric  2. Diffuse prioximal 80% LAD stenosis  3. Tandem focal 80% bulky proximal and mid LCx disease with OM involvement (Gaines 0,1,0)  4. LVEF 65%    CABG   7/6/2017 Dr. Bowers Linkous   Coronary artery bypass grafting x4, left MIKHAIL to the distal diagonal,     reverse saphenous vein graft sequence to the distal OM1 and distal OM 2,   and reverse saphenous vein graft to distal LAD, left greater saphenous vein endoscopic vein    harvest.    CT chest   12-12-24  1.  Aortic annulus area: 467 sq mm.  2.  Aortic valve calcium score: 2331.9.  3.  Both coronary ostia are more than 10 mm away from the aortic annulus.  4.  Bilateral iliofemoral arteries are adequate for endovascular access.  5.  Proximal left vertebral artery is not identified, possibly occluded.  -No abdominal aortic aneurysm or dissection.     Carotid ultrasound   7/3/2017   Mild plaque of the right carotid artery with no significant right internal    carotid artery stenosis (<50%)   Mild plaque of the left carotid artery with no significant left internal    carotid artery stenosis  (<50%)   No significant stenosis of the right or left subclavian artery.   No significant stenosis of the right or left vertebral artery.    Most recent labs:     Lipoprotein (a)   Date Value Ref Range Status   06/06/2025 157 (H) <=29 mg/dL Final     Comment:     Performed By: [facility]  [address] [place] [id number]  :  "Srinivasan Hinojosa MD, PhD  IA Number: [id number]         Lab Results   Component Value Date/Time    WBC 10.1 12/24/2024 04:00 AM    HEMOGLOBIN 12.7 (L) 12/24/2024 04:00 AM    HEMATOCRIT 39.7 (L) 12/24/2024 04:00 AM    MCV 94.1 12/24/2024 04:00 AM    INR 1.04 12/16/2024 02:10 PM      Lab Results   Component Value Date/Time    SODIUM 142 06/06/2025 07:50 AM    POTASSIUM 4.6 06/06/2025 07:50 AM    CHLORIDE 108 06/06/2025 07:50 AM    CO2 22 06/06/2025 07:50 AM    GLUCOSE 121 (H) 06/06/2025 07:50 AM    BUN 27 (H) 06/06/2025 07:50 AM    CREATININE 1.32 06/06/2025 07:50 AM    CREATININE 1.0 10/27/2008 08:20 AM      Lab Results   Component Value Date/Time    ASTSGOT 26 06/06/2025 07:50 AM    ALTSGPT 27 06/06/2025 07:50 AM    ALBUMIN 4.3 06/06/2025 07:50 AM      Lab Results   Component Value Date/Time    CHOLSTRLTOT 108 06/06/2025 07:50 AM    LDL 49 06/06/2025 07:50 AM    HDL 32 (A) 06/06/2025 07:50 AM    TRIGLYCERIDE 135 06/06/2025 07:50 AM     No results for input(s): \"NTPROBNP\", \"TROPONINT\" in the last 72 hours.      Past Medical History:   Diagnosis Date    Anxiety     Arthritis     Breath shortness 12/16/2024    CAD (coronary artery disease) 07/06/2017    CABG x 4 (LIMA to distal diagonal, rSVG to distal OM1, rSVG to distal OM2, rSVG to distal LAD) by Dr. Gaffney.    Cancer (HCC)     Prostate CA    Diabetes (HCC)     oral medication     Heart valve disease     Hypertension     Inguinal hernia     Memory difficulties     Mixed hyperlipidemia     Nodular prostate without urinary obstruction     Prostate cancer (HCC)     Severe aortic stenosis 12/16/2024    Stage 3a chronic kidney disease     Per Problem List     Past Surgical History:   Procedure Laterality Date    TRANSCATHETER AORTIC VALVE REPLACEMENT Bilateral 12/23/2024    Procedure: TRANSCATHETER AORTIC VALVE REPLACEMENT;  Surgeon: MCKINLEY LockD.;  Location: SURGERY [facility];  Service: Cardiac    ECHOCARDIOGRAM, TRANSESOPHAGEAL, INTRAOPERATIVE N/A " 12/23/2024    Procedure: ECHOCARDIOGRAM, TRANSTHORACIC, INTRAOPERATIVE;  Surgeon: Jerrell Macias M.D.;  Location: SURGERY [facility];  Service: Cardiac    ROHINI  07/06/2017    Procedure: ROHINI - INTRAOPERATIVE ;  Surgeon: Anthony Gaffney M.D.;  Location: SURGERY [facility];  Service:     MULTIPLE CORONARY ARTERY BYPASS ENDO VEIN HARVEST  07/06/2017    Procedure: MULTIPLE CORONARY ARTERY BYPASS ENDO VEIN HARVEST X2-4, PREVENA DRESSING ;  Surgeon: Anthony Gaffney M.D.;  Location: SURGERY [facility];  Service:     APPENDECTOMY      HERNIA REPAIR      TONSILLECTOMY      VASECTOMY       Family History   Problem Relation Age of Onset    Stroke Mother     Heart Disease Father     Heart Disease Brother      Social History     Socioeconomic History    Marital status:      Spouse name: Not on file    Number of children: Not on file    Years of education: Not on file    Highest education level: Not on file   Occupational History    Not on file   Tobacco Use    Smoking status: Never    Smokeless tobacco: Never   Vaping Use    Vaping status: Never Used   Substance and Sexual Activity    Alcohol use: Yes     Comment: 1 oz / week    Drug use: No    Sexual activity: Never     Partners: Female     Birth control/protection: Post-Menopausal   Other Topics Concern    Not on file   Social History Narrative    Not on file     Social Drivers of Health     Financial Resource Strain: Not on file   Food Insecurity: No Food Insecurity (12/23/2024)    Hunger Vital Sign     Worried About Running Out of Food in the Last Year: Never true     Ran Out of Food in the Last Year: Never true   Transportation Needs: No Transportation Needs (12/23/2024)    PRAPARE - Transportation     Lack of Transportation (Medical): No     Lack of Transportation (Non-Medical): No   Physical Activity: Not on file   Stress: Not on file   Social Connections: Not on file   Intimate Partner Violence: Not At Risk (12/23/2024)    Humiliation, Afraid, Rape,  and Kick questionnaire     Fear of Current or Ex-Partner: No     Emotionally Abused: No     Physically Abused: No     Sexually Abused: No   Housing Stability: Low Risk  (12/23/2024)    Housing Stability Vital Sign     Unable to Pay for Housing in the Last Year: No     Number of Times Moved in the Last Year: 1     Homeless in the Last Year: No     Allergies   Allergen Reactions    Other Environmental Runny Nose     SAW DUST       Current Outpatient Medications   Medication Sig Dispense Refill    losartan (COZAAR) 50 MG Tab Take 1.5 Tablets by mouth every day. 90 Tablet 3    metformin (GLUCOPHAGE) 1000 MG tablet Take 1 Tablet by mouth 2 times a day with meals. 180 Tablet 3    atorvastatin (LIPITOR) 80 MG tablet Take 1 Tablet by mouth every day. 90 Tablet 3    Glucosamine-Chondroitin (GLUCOSAMINE CHONDR COMPLEX PO) Take  by mouth.      carvedilol (COREG) 6.25 MG Tab Take 1 Tablet by mouth 2 times a day with meals. 60 Tablet 11    Amoxicillin 500 MG Tab Take 4 Tablets by mouth one time as needed (take 30 minutes before dental visit) for up to 1 dose. 4 Tablet 0    Semaglutide 7 MG Tab Take 7 mg by mouth every day. 90 Tablet 3    Zinc Sulfate (ZINC 15 PO) Take  by mouth.      Blood Glucose Test Strips Use one One Touch strip to test blood sugar once daily . 100 Strip 3    ONE TOUCH ULTRA TEST strip TEST BLOOD SUGAR LEVELS EVERY  Strip 3    ONETOUCH DELICA LANCETS FINE Misc USE TO TEST BLOOD SUGARS ONCE DAILY 100 Each 3    Ascorbic Acid (VITAMIN C PO) Take  by mouth.      aspirin (ASA) 81 MG Chew Tab chewable tablet Take 81 mg by mouth every day. Indications: post cardiac surgery to prevent blood clots      therapeutic multivitamin-minerals (THERAGRAN-M) Tab Take 1 Tab by mouth every day. Indications: supplement      Cholecalciferol (VITAMIN D3) 1000 UNIT TABS Take 1 Tab by mouth every day. 100 Tab 3     No current facility-administered medications for this visit.       ROS  All others systems reviewed and  "negative.    /68 (BP Location: Left arm, Patient Position: Sitting, BP Cuff Size: Adult)   Pulse 63   Resp 16   Ht 1.778 m (5' 10\")   Wt 80.7 kg (178 lb)   SpO2 97%  Body mass index is 25.54 kg/m².    General: No acute distress. Well nourished.  HEENT: EOM grossly intact, no scleral icterus, no pharyngeal erythema.   Neck:  No JVD, no bruits, trachea midline  CVS: RRR. 1/6 systolic murmur right sternal border.  No LE edema.  2+ radial pulses, 2+ PT pulses, well-healed midline incision, mild pectus excavatum  Resp: CTAB. No wheezing or crackles/rhonchi. Normal respiratory effort.  Abdomen: Soft, NT, no daniel hepatomegaly.  MSK/Ext: No clubbing or cyanosis.  Skin: Warm and dry, no rashes.  Neurological: CN III-XII grossly intact. No focal deficits.   Psych: A&O x 3, pleasant, charming, fair judgement, reduced STM    Physical Exam listed below was completed in entrety today and unchanged from 6-4-25 except where noted.  Some elements were copied from my note of that same day, which have been updated where appropriate and all reflect current meedical decision making from today.  I have confirmed and edited as necessary, the PFSH and ROS obtained by others.    No follow-ups on file.    Written instructions given today:      -Stop Aspirin 81 mg, start Plavix/Clopidogrel 75 mg daily  -Increase losartan from 75 mg to 100 mg, I sent a new prescription. You can take 2 of your 50 mg tablets together to use them up.  -Report back if you fall again.      It is my pleasure to participate in the care of Mr. Pelayo.  Please do not hesitate to contact me with questions or concerns.    Denisse Silva MD, [facility]  Cardiologist, [facility]    Please note that this dictation was created using voice recognition software. I have made every reasonable attempt to correct obvious errors, but it is possible there are errors of grammar and possibly content that I did not discover before " finalizing the note.

## 2025-06-16 ENCOUNTER — OFFICE VISIT (OUTPATIENT)
Facility: MEDICAL CENTER | Age: 85
End: 2025-06-16
Attending: INTERNAL MEDICINE
Payer: MEDICARE

## 2025-06-16 VITALS
RESPIRATION RATE: 16 BRPM | HEIGHT: 70 IN | BODY MASS INDEX: 25.48 KG/M2 | SYSTOLIC BLOOD PRESSURE: 132 MMHG | DIASTOLIC BLOOD PRESSURE: 68 MMHG | OXYGEN SATURATION: 97 % | WEIGHT: 178 LBS | HEART RATE: 63 BPM

## 2025-06-16 DIAGNOSIS — I25.9 ISCHEMIC HEART DISEASE DUE TO CORONARY ARTERY OBSTRUCTION (HCC): Primary | ICD-10-CM

## 2025-06-16 DIAGNOSIS — R79.89 ABNORMAL LFTS: ICD-10-CM

## 2025-06-16 DIAGNOSIS — N18.31 STAGE 3A CHRONIC KIDNEY DISEASE: ICD-10-CM

## 2025-06-16 DIAGNOSIS — E11.29 TYPE 2 DIABETES MELLITUS WITH MICROALBUMINURIA, WITHOUT LONG-TERM CURRENT USE OF INSULIN (HCC): ICD-10-CM

## 2025-06-16 DIAGNOSIS — G45.9 TIA (TRANSIENT ISCHEMIC ATTACK): ICD-10-CM

## 2025-06-16 DIAGNOSIS — I10 ESSENTIAL HYPERTENSION: ICD-10-CM

## 2025-06-16 DIAGNOSIS — I35.0 NONRHEUMATIC AORTIC VALVE STENOSIS: ICD-10-CM

## 2025-06-16 DIAGNOSIS — R80.9 TYPE 2 DIABETES MELLITUS WITH MICROALBUMINURIA, WITHOUT LONG-TERM CURRENT USE OF INSULIN (HCC): ICD-10-CM

## 2025-06-16 DIAGNOSIS — R41.89 COGNITIVE DECLINE: ICD-10-CM

## 2025-06-16 DIAGNOSIS — Z95.1 S/P CABG X 4: ICD-10-CM

## 2025-06-16 DIAGNOSIS — Z95.2 S/P TAVR (TRANSCATHETER AORTIC VALVE REPLACEMENT): ICD-10-CM

## 2025-06-16 DIAGNOSIS — I24.0 ISCHEMIC HEART DISEASE DUE TO CORONARY ARTERY OBSTRUCTION (HCC): Primary | ICD-10-CM

## 2025-06-16 DIAGNOSIS — E78.41 ELEVATED LIPOPROTEIN(A): ICD-10-CM

## 2025-06-16 DIAGNOSIS — I65.23 CAROTID ARTERY PLAQUE, BILATERAL: ICD-10-CM

## 2025-06-16 DIAGNOSIS — E78.2 MIXED HYPERLIPIDEMIA: ICD-10-CM

## 2025-06-16 DIAGNOSIS — E55.9 VITAMIN D DEFICIENCY: ICD-10-CM

## 2025-06-16 PROCEDURE — 99212 OFFICE O/P EST SF 10 MIN: CPT | Performed by: INTERNAL MEDICINE

## 2025-06-16 PROCEDURE — 3078F DIAST BP <80 MM HG: CPT | Performed by: INTERNAL MEDICINE

## 2025-06-16 PROCEDURE — 3075F SYST BP GE 130 - 139MM HG: CPT | Performed by: INTERNAL MEDICINE

## 2025-06-16 PROCEDURE — 99214 OFFICE O/P EST MOD 30 MIN: CPT | Performed by: INTERNAL MEDICINE

## 2025-06-16 RX ORDER — CLOPIDOGREL BISULFATE 75 MG/1
75 TABLET ORAL DAILY
Qty: 90 TABLET | Refills: 3 | Status: SHIPPED | OUTPATIENT
Start: 2025-06-16

## 2025-06-16 RX ORDER — LOSARTAN POTASSIUM 100 MG/1
100 TABLET ORAL DAILY
Qty: 90 TABLET | Refills: 3 | Status: SHIPPED | OUTPATIENT
Start: 2025-06-16 | End: 2026-07-21

## 2025-06-16 ASSESSMENT — FIBROSIS 4 INDEX: FIB4 SCORE: 1.95

## 2025-06-16 NOTE — LETTER
[facility]   [address],   [address]  [place] [address]  Phone: [phone]  Fax: [phone]              Raf Pelayo  1940    Encounter Date: 6/16/2025    Denisse Silva M.D.          PROGRESS NOTE:        Medical decision making:  - Records from [facility] indicated TIA so I am increasing aspirin to Plavix 75 mg for now.  I have ordered an MRI which is pending.  -Blood pressure not to goal but has a better cuff.  Increasing losartan 1 more time to 100 mg.  If blood pressure lisseth elevated, we can go up slightly on the carvedilol to 1.5 tablets twice daily.  -Did well after his TAVR.  Not certain if he feels significantly better.  Physical exam and echo looked great.  -In terms of CAD, remains on secondary prevention aspirin, getting him back on statin.  Patent grafts by [facility] in December 2024.  -Because of memory loss, he has been on a statin holiday.  January 2025 did not look good.  Back on atorvastatin.  Follow-up cholesterol looks great.  -Continue high intensity statin given elevated lipoprotein a.  -Diabetes finally to goal which is fantastic.  - Needs close follow-up, RTC 4 weeks to follow-up on MRI, Plavix and blood pressure medication changes.  At that time we will order follow-up BMP to see how his kidney function is doing on higher doses losartan.  We except that the creatinine should go up slightly due to the mechanism of action of the medication.    Addendum 4/6/2025: Some records received from [facility], summary:  ED note dated 3/16/2025.  Patient brought in by ambulance with sudden onset right leg weakness lasting about 4 hours.  On initial exam he had 5 out of 5 strength.  Temperature documented as 100.6.  /80.  CT head without contrast, no hemorrhage or mass or obvious stroke.  Labs: Glucose 123, sodium 140, potassium 4.4, BUN 18, creatinine 1.33, alkaline phosphatase 148, , , lactic acid  2.4.    Labs consistent with lactic acidosis and abnormal LFTs.  Diagnosis was TIA.  Patient was discharged home.      Problem list:  1. Ischemic heart disease due to coronary artery obstruction (HCC)    2. S/P CABG x 4    3. S/P TAVR (transcatheter aortic valve replacement)    4. Nonrheumatic aortic valve stenosis    5. Mixed hyperlipidemia    6. Stage 3a chronic kidney disease    7. Type 2 diabetes mellitus with microalbuminuria, without long-term current use of insulin (HCC)    8. Vitamin D deficiency disease    9. Carotid artery plaque, bilateral    10. Cognitive decline    11. TIA (transient ischemic attack)    12. Abnormal LFTs    13. Essential hypertension    14. Elevated lipoprotein(a)     Chief Complaint:   Chief Complaint   Patient presents with    Coronary Artery Disease     F/V Dx: Coronary artery disease involving native coronary artery of native heart with angina pectoris (HCC)    Coronary Artery Bypass Graft (CABG)    Hypertension     History of Present Illness:  Raf Pelayo is a 84 y.o. male who returns for long-term management of complex care including CAD/CABG, AS/TAVR, hypertension, hyperlipidemia, elev Lpa, DM 2 and today close FU of abnormal LFTs, illness.    Insomnia few weeks ago for ED follow-up at [facility], note dated 3/16/2025 reviewed.  Patient brought in by ambulance with sudden onset right leg weakness lasting about 4 hours.  On initial exam he had 5 out of 5 strength.  Temperature documented as 100.6.  /80.  CT head without contrast, no hemorrhage or mass or obvious stroke.  Labs: Glucose 123, sodium 140, potassium 4.4, BUN 18, creatinine 1.33, alkaline phosphatase 148, , , lactic acid 2.4.  Labs consistent with lactic acidosis and abnormal LFTs.  Diagnosis was TIA.  Patient was discharged home but no medication changes and no MRI which I did order.  Today, I am changing aspirin to Plavix and working on blood pressure control.  Follow-up LFTs and CPK were  normal.    Has hypertension.  Numbers are looking elevated, has a new cuff.  Increasing losartan.  Fluid restricting, no more Gatorade.    CAD, CABG x4 July 2017 with Dr. Anthony Steward, included LIMA to distal diagonal, SVG to distal OM1, distal OM 2, SVG to distal LAD.  Pre TAVR [facility] 12-17-24 with patent LIMA-diagonal, SVG-LAD, skip SVG-OM1/left PDA grafts.  Remains on aspirin.  Has been on a statin holiday due to some cognitive decline, time to resume the statin.    Aortic stenosis, remains moderate by echo July 2024, V-max 3.8, TRISTA 1.3 cm², DI 0.22.  However, the LVOT was measured at 2.5 which is overestimated compared to prior.  I recalculated the dimensionless index on the VTI and the V-max and both are consistent with severe AS.  Physical exam is consistent with severe AS.  Underwent TAVR 12/23/2024 with Dr. Macias.    Hyperlipidemia, on statin.  LDL 59.  HDL low at times.  Statin holiday due to cognitive decline, lipids were significantly elevated January 2025 but he is back on a statin so we will follow-up.   -> 49.  Apo B 67.   Lpa 157 nmol/dL, normal < 29.     DM2, controlled finally.   Was 7.9 -> 6.9 -> 6.6.    Bilateral carotid artery plaque in 2017, on statin.    CKD 2.    Former smoker, negative AAA by CT 2024, Ascending aortic diameter: 3.8 x 3.7 cm.     No history of autoimmune disease such as lupus or rheumatoid arthritis.  No history of chest radiation or cardiotoxic chemotherapy.  No ETOH overuse.  No caffeine overuse.  No recreation substance use.  No hx asthma.    Works with tools in the garage.   Not limited by chest pain, pressure or tightness with activity.  No significant dyspnea on exertion, orthopnea or lower extremity swelling.  No significant palpitations, lightheadedness, or presyncope/syncope.  No symptoms of leg claudication.  No stroke/TIA like symptoms.    Lives in [place].   to Estefanía who is here today.    Wt Readings from Last 5 Encounters:   06/16/25 80.7 kg  (178 lb)   06/04/25 81.2 kg (179 lb)   03/24/25 83.8 kg (184 lb 11.2 oz)   03/13/25 85.4 kg (188 lb 4.4 oz)   03/04/25 83.9 kg (185 lb)     DATA REVIEWED by me:  ECG (my personal interpretation)  12/31/2024 sinus, 73, nonspecific ST changes  7/7/2017 sinus, 57    Echocardiogram   1/18/2025  The ejection fraction is measured to be 64% by Costa's biplane.  Normal right ventricular size and systolic function.  Known TAVR aortic valve that is functioning normally with normal   transvalvular gradients.  7/30/2024  Compared to the prior study on 01/23/2024 - AS has progressed, prior   Vmax 3.3 m/s.  Normal left ventricular chamber size.  The ejection fraction is measured to be  60% by Costa's biplane.  Moderate to severe aortic valve stenosis. Vmax is  3.8m/s.  Normal inferior vena cava size and inspiratory collapse.  TRISTA 1.3 cm², DI 0.22.  1/23/2024  Compared to the prior study on 2/26/2023 - aortic stenosis is now best   characterized as moderate.   Normal left ventricular chamber size.  The left ventricular ejection fraction is visually estimated to be 60%.  Moderate aortic valve stenosis. Vmax is 3.3 m/s.  Mild aortic insufficiency.  Normal inferior vena cava size and inspiratory collapse.  2/6/2023  Compared to the prior study on 10/23/2020 - AS remains mild.   Normal left ventricular chamber size.  The left ventricular ejection fraction is visually estimated to be 65%.  Mild aortic valve stenosis. Vmax is 2.66 m/s.  Normal inferior vena cava size and inspiratory collapse.  10/24/2020  Normal left ventricular chamber size.  Left ventricular ejection fraction is visually estimated to be 65%.  Mild aortic stenosis.  Right heart pressures are normal.     TAVR   12-23-24  Successful transcatheter aortic valve replacement using 26 mm Silver zaida 3 Ultra resilia valve     [facility]   12-17-24  HEMODYNAMICS:  Aortic pressure: 158 /66/72 mmHg  CORONARY ANGIOGRAPHY:  The left main coronary artery: Diffuse disease with severe  distal stenosis.  The left anterior descending coronary artery: Occluded proximally.  Fills via patent SVG graft, and apical LAD provides left to right and left to left collaterals  The left circumflex coronary artery: Occluded at its ostium, fills via patent SVG graft.  Dominant left circumflex  The right coronary artery: Nondominant vessel with severe ostial and proximal disease.  BYPASS GRAFT ANGIOGRAPHY:  LIMA-diagonal: Patent, small diagonal branch  SVG-LAD: Patent.  Apical LAD provides left-to-right and left to left collaterals  SVG-OM1 and L-PDA : Patent  IMPRESSION:  1.  Severe three-vessel native CAD.  Dominant left circumflex  2.  Patent LIMA-diagonal, SVG-LAD, skip SVG-OM1/left PDA grafts.  3.  Mild PAD in the iliofemoral arteries  7/3/2017  1. 50% distal LM stenosis, highly eccentric  2. Diffuse prioximal 80% LAD stenosis  3. Tandem focal 80% bulky proximal and mid LCx disease with OM involvement (Gaines 0,1,0)  4. LVEF 65%    CABG   7/6/2017 Dr. Bowers Linkous   Coronary artery bypass grafting x4, left MIKHAIL to the distal diagonal,     reverse saphenous vein graft sequence to the distal OM1 and distal OM 2,   and reverse saphenous vein graft to distal LAD, left greater saphenous vein endoscopic vein    harvest.    CT chest   12-12-24  1.  Aortic annulus area: 467 sq mm.  2.  Aortic valve calcium score: 2331.9.  3.  Both coronary ostia are more than 10 mm away from the aortic annulus.  4.  Bilateral iliofemoral arteries are adequate for endovascular access.  5.  Proximal left vertebral artery is not identified, possibly occluded.  -No abdominal aortic aneurysm or dissection.     Carotid ultrasound   7/3/2017   Mild plaque of the right carotid artery with no significant right internal    carotid artery stenosis (<50%)   Mild plaque of the left carotid artery with no significant left internal    carotid artery stenosis  (<50%)   No significant stenosis of the right or left subclavian artery.   No significant  "stenosis of the right or left vertebral artery.    Most recent labs:     Lipoprotein (a)   Date Value Ref Range Status   06/06/2025 157 (H) <=29 mg/dL Final     Comment:     Performed By: [facility]  [address] [place] [address]  : Srinivasan Hinojosa MD, PhD  CLIA Number: [id number]         Lab Results   Component Value Date/Time    WBC 10.1 12/24/2024 04:00 AM    HEMOGLOBIN 12.7 (L) 12/24/2024 04:00 AM    HEMATOCRIT 39.7 (L) 12/24/2024 04:00 AM    MCV 94.1 12/24/2024 04:00 AM    INR 1.04 12/16/2024 02:10 PM      Lab Results   Component Value Date/Time    SODIUM 142 06/06/2025 07:50 AM    POTASSIUM 4.6 06/06/2025 07:50 AM    CHLORIDE 108 06/06/2025 07:50 AM    CO2 22 06/06/2025 07:50 AM    GLUCOSE 121 (H) 06/06/2025 07:50 AM    BUN 27 (H) 06/06/2025 07:50 AM    CREATININE 1.32 06/06/2025 07:50 AM    CREATININE 1.0 10/27/2008 08:20 AM      Lab Results   Component Value Date/Time    ASTSGOT 26 06/06/2025 07:50 AM    ALTSGPT 27 06/06/2025 07:50 AM    ALBUMIN 4.3 06/06/2025 07:50 AM      Lab Results   Component Value Date/Time    CHOLSTRLTOT 108 06/06/2025 07:50 AM    LDL 49 06/06/2025 07:50 AM    HDL 32 (A) 06/06/2025 07:50 AM    TRIGLYCERIDE 135 06/06/2025 07:50 AM     No results for input(s): \"NTPROBNP\", \"TROPONINT\" in the last 72 hours.      Past Medical History:   Diagnosis Date    Anxiety     Arthritis     Breath shortness 12/16/2024    CAD (coronary artery disease) 07/06/2017    CABG x 4 (LIMA to distal diagonal, rSVG to distal OM1, rSVG to distal OM2, rSVG to distal LAD) by Dr. Gaffney.    Cancer (HCC)     Prostate CA    Diabetes (HCC)     oral medication     Heart valve disease     Hypertension     Inguinal hernia     Memory difficulties     Mixed hyperlipidemia     Nodular prostate without urinary obstruction     Prostate cancer (HCC)     Severe aortic stenosis 12/16/2024    Stage 3a chronic kidney disease     Per Problem List     Past Surgical History:   Procedure " Laterality Date    TRANSCATHETER AORTIC VALVE REPLACEMENT Bilateral 12/23/2024    Procedure: TRANSCATHETER AORTIC VALVE REPLACEMENT;  Surgeon: Jerrell Macias M.D.;  Location: SURGERY [facility];  Service: Cardiac    ECHOCARDIOGRAM, TRANSESOPHAGEAL, INTRAOPERATIVE N/A 12/23/2024    Procedure: ECHOCARDIOGRAM, TRANSTHORACIC, INTRAOPERATIVE;  Surgeon: Jerrell Macias M.D.;  Location: SURGERY [facility];  Service: Cardiac    ROHINI  07/06/2017    Procedure: ROHINI - INTRAOPERATIVE ;  Surgeon: Anthony Gaffney M.D.;  Location: SURGERY [facility];  Service:     MULTIPLE CORONARY ARTERY BYPASS ENDO VEIN HARVEST  07/06/2017    Procedure: MULTIPLE CORONARY ARTERY BYPASS ENDO VEIN HARVEST X2-4, PREVENA DRESSING ;  Surgeon: Anthony Gaffney M.D.;  Location: SURGERY [facility];  Service:     APPENDECTOMY      HERNIA REPAIR      TONSILLECTOMY      VASECTOMY       Family History   Problem Relation Age of Onset    Stroke Mother     Heart Disease Father     Heart Disease Brother      Social History     Socioeconomic History    Marital status:      Spouse name: Not on file    Number of children: Not on file    Years of education: Not on file    Highest education level: Not on file   Occupational History    Not on file   Tobacco Use    Smoking status: Never    Smokeless tobacco: Never   Vaping Use    Vaping status: Never Used   Substance and Sexual Activity    Alcohol use: Yes     Comment: 1 oz / week    Drug use: No    Sexual activity: Never     Partners: Female     Birth control/protection: Post-Menopausal   Other Topics Concern    Not on file   Social History Narrative    Not on file     Social Drivers of Health     Financial Resource Strain: Not on file   Food Insecurity: No Food Insecurity (12/23/2024)    Hunger Vital Sign     Worried About Running Out of Food in the Last Year: Never true     Ran Out of Food in the Last Year: Never true   Transportation Needs: No Transportation Needs (12/23/2024)    PRAPARDEZ  - Transportation     Lack of Transportation (Medical): No     Lack of Transportation (Non-Medical): No   Physical Activity: Not on file   Stress: Not on file   Social Connections: Not on file   Intimate Partner Violence: Not At Risk (12/23/2024)    Humiliation, Afraid, Rape, and Kick questionnaire     Fear of Current or Ex-Partner: No     Emotionally Abused: No     Physically Abused: No     Sexually Abused: No   Housing Stability: Low Risk  (12/23/2024)    Housing Stability Vital Sign     Unable to Pay for Housing in the Last Year: No     Number of Times Moved in the Last Year: 1     Homeless in the Last Year: No     Allergies   Allergen Reactions    Other Environmental Runny Nose     SAW DUST       Current Outpatient Medications   Medication Sig Dispense Refill    losartan (COZAAR) 50 MG Tab Take 1.5 Tablets by mouth every day. 90 Tablet 3    metformin (GLUCOPHAGE) 1000 MG tablet Take 1 Tablet by mouth 2 times a day with meals. 180 Tablet 3    atorvastatin (LIPITOR) 80 MG tablet Take 1 Tablet by mouth every day. 90 Tablet 3    Glucosamine-Chondroitin (GLUCOSAMINE CHONDR COMPLEX PO) Take  by mouth.      carvedilol (COREG) 6.25 MG Tab Take 1 Tablet by mouth 2 times a day with meals. 60 Tablet 11    Amoxicillin 500 MG Tab Take 4 Tablets by mouth one time as needed (take 30 minutes before dental visit) for up to 1 dose. 4 Tablet 0    Semaglutide 7 MG Tab Take 7 mg by mouth every day. 90 Tablet 3    Zinc Sulfate (ZINC 15 PO) Take  by mouth.      Blood Glucose Test Strips Use one One Touch strip to test blood sugar once daily . 100 Strip 3    ONE TOUCH ULTRA TEST strip TEST BLOOD SUGAR LEVELS EVERY  Strip 3    ONETOUCH DELICA LANCETS FINE Misc USE TO TEST BLOOD SUGARS ONCE DAILY 100 Each 3    Ascorbic Acid (VITAMIN C PO) Take  by mouth.      aspirin (ASA) 81 MG Chew Tab chewable tablet Take 81 mg by mouth every day. Indications: post cardiac surgery to prevent blood clots      therapeutic multivitamin-minerals  "(THERAGRAN-M) Tab Take 1 Tab by mouth every day. Indications: supplement      Cholecalciferol (VITAMIN D3) 1000 UNIT TABS Take 1 Tab by mouth every day. 100 Tab 3     No current facility-administered medications for this visit.       ROS  All others systems reviewed and negative.    /68 (BP Location: Left arm, Patient Position: Sitting, BP Cuff Size: Adult)   Pulse 63   Resp 16   Ht 1.778 m (5' 10\")   Wt 80.7 kg (178 lb)   SpO2 97%  Body mass index is 25.54 kg/m².    General: No acute distress. Well nourished.  HEENT: EOM grossly intact, no scleral icterus, no pharyngeal erythema.   Neck:  No JVD, no bruits, trachea midline  CVS: RRR. 1/6 systolic murmur right sternal border.  No LE edema.  2+ radial pulses, 2+ PT pulses, well-healed midline incision, mild pectus excavatum  Resp: CTAB. No wheezing or crackles/rhonchi. Normal respiratory effort.  Abdomen: Soft, NT, no daniel hepatomegaly.  MSK/Ext: No clubbing or cyanosis.  Skin: Warm and dry, no rashes.  Neurological: CN III-XII grossly intact. No focal deficits.   Psych: A&O x 3, pleasant, charming, fair judgement, reduced STM    Physical Exam listed below was completed in entrety today and unchanged from 6-4-25 except where noted.  Some elements were copied from my note of that same day, which have been updated where appropriate and all reflect current meedical decision making from today.  I have confirmed and edited as necessary, the PFSH and ROS obtained by others.    No follow-ups on file.    Written instructions given today:      -Stop Aspirin 81 mg, start Plavix/Clopidogrel 75 mg daily  -Increase losartan from 75 mg to 100 mg, I sent a new prescription. You can take 2 of your 50 mg tablets together to use them up.  -Report back if you fall again.      It is my pleasure to participate in the care of Mr. Pelayo.  Please do not hesitate to contact me with questions or concerns.    Denisse Silva MD, [facility]  Cardiologist, [facility] " [facility]    Please note that this dictation was created using voice recognition software. I have made every reasonable attempt to correct obvious errors, but it is possible there are errors of grammar and possibly content that I did not discover before finalizing the note.        Luiza Monge A.P.R.N.  [address] [address]  [place] [address]  Via In Basket

## 2025-06-16 NOTE — PATIENT INSTRUCTIONS
-Stop Aspirin 81 mg, start Plavix/Clopidogrel 75 mg daily  -Increase losartan from 75 mg to 100 mg, I sent a new prescription. You can take 2 of your 50 mg tablets together to use them up.  -Report back if you fall again.

## 2025-06-27 ENCOUNTER — TELEPHONE (OUTPATIENT)
Dept: CARDIOLOGY | Facility: MEDICAL CENTER | Age: 85
End: 2025-06-27
Payer: MEDICARE

## 2025-06-27 VITALS — HEART RATE: 73 BPM | SYSTOLIC BLOOD PRESSURE: 116 MMHG | DIASTOLIC BLOOD PRESSURE: 64 MMHG

## 2025-07-14 ENCOUNTER — HOSPITAL ENCOUNTER (OUTPATIENT)
Dept: RADIOLOGY | Facility: MEDICAL CENTER | Age: 85
End: 2025-07-14
Attending: INTERNAL MEDICINE
Payer: MEDICARE

## 2025-07-14 DIAGNOSIS — G45.9 TIA (TRANSIENT ISCHEMIC ATTACK): ICD-10-CM

## 2025-07-14 PROCEDURE — 70553 MRI BRAIN STEM W/O & W/DYE: CPT

## 2025-07-14 PROCEDURE — 700117 HCHG RX CONTRAST REV CODE 255: Mod: JZ | Performed by: INTERNAL MEDICINE

## 2025-07-14 PROCEDURE — A9579 GAD-BASE MR CONTRAST NOS,1ML: HCPCS | Mod: JZ | Performed by: INTERNAL MEDICINE

## 2025-07-14 RX ORDER — GADOTERIDOL 279.3 MG/ML
15 INJECTION INTRAVENOUS ONCE
Status: COMPLETED | OUTPATIENT
Start: 2025-07-14 | End: 2025-07-14

## 2025-07-14 RX ADMIN — GADOTERIDOL 15 ML: 279.3 INJECTION, SOLUTION INTRAVENOUS at 08:59

## 2025-07-15 ENCOUNTER — OFFICE VISIT (OUTPATIENT)
Dept: MEDICAL GROUP | Facility: PHYSICIAN GROUP | Age: 85
End: 2025-07-15
Payer: MEDICARE

## 2025-07-15 ENCOUNTER — HOSPITAL ENCOUNTER (OUTPATIENT)
Facility: MEDICAL CENTER | Age: 85
End: 2025-07-15
Attending: NURSE PRACTITIONER
Payer: MEDICARE

## 2025-07-15 VITALS
BODY MASS INDEX: 25.34 KG/M2 | OXYGEN SATURATION: 95 % | HEIGHT: 70 IN | SYSTOLIC BLOOD PRESSURE: 128 MMHG | WEIGHT: 177 LBS | DIASTOLIC BLOOD PRESSURE: 60 MMHG | TEMPERATURE: 98 F | HEART RATE: 67 BPM

## 2025-07-15 DIAGNOSIS — E78.2 MIXED HYPERLIPIDEMIA: ICD-10-CM

## 2025-07-15 DIAGNOSIS — E11.29 TYPE 2 DIABETES MELLITUS WITH MICROALBUMINURIA, WITHOUT LONG-TERM CURRENT USE OF INSULIN (HCC): ICD-10-CM

## 2025-07-15 DIAGNOSIS — G45.9 TIA (TRANSIENT ISCHEMIC ATTACK): ICD-10-CM

## 2025-07-15 DIAGNOSIS — N18.31 STAGE 3A CHRONIC KIDNEY DISEASE: ICD-10-CM

## 2025-07-15 DIAGNOSIS — R80.9 TYPE 2 DIABETES MELLITUS WITH MICROALBUMINURIA, WITHOUT LONG-TERM CURRENT USE OF INSULIN (HCC): ICD-10-CM

## 2025-07-15 DIAGNOSIS — I10 ESSENTIAL HYPERTENSION: Primary | ICD-10-CM

## 2025-07-15 LAB
CREAT UR-MCNC: 80.4 MG/DL
MICROALBUMIN UR-MCNC: 2.8 MG/DL
MICROALBUMIN/CREAT UR: 35 MG/G (ref 0–30)

## 2025-07-15 PROCEDURE — 3074F SYST BP LT 130 MM HG: CPT | Performed by: NURSE PRACTITIONER

## 2025-07-15 PROCEDURE — 3078F DIAST BP <80 MM HG: CPT | Performed by: NURSE PRACTITIONER

## 2025-07-15 PROCEDURE — 82570 ASSAY OF URINE CREATININE: CPT

## 2025-07-15 PROCEDURE — 82043 UR ALBUMIN QUANTITATIVE: CPT

## 2025-07-15 PROCEDURE — 99214 OFFICE O/P EST MOD 30 MIN: CPT | Performed by: NURSE PRACTITIONER

## 2025-07-15 ASSESSMENT — FIBROSIS 4 INDEX: FIB4 SCORE: 1.95

## 2025-07-15 NOTE — PROGRESS NOTES
"Subjective:     CC: Hypertension follow-up    HPI:   Raf presents today with the following:    Essential hypertension  BP today 128/60. Continues losartan 100 mg daily and carvedilol 6.25 mg twice daily. Reports home carvedilol dose has 25 mg on the bottle.  He is going to the pharmacy after today's appointment to clarify.  Discussed with patient that based on his chart review prescriptions have been for carvedilol 6.25 mg dose. Home BP readings 120-140/60-80. He is using an arm cuff.  Notes that 1 month ago he was at the store caring a piece of [employer] and fell. He fell forward, reports no injury.  Offered physical therapy and declines at this time.    Mixed hyperlipidemia  Cholesterol controlled with atorvastatin 80 mg daily.    TIA (transient ischemic attack)  March 2025 was at [facility], [place] for TIA. Cardiology has started clopidogrel 75 mg daily.  He had MRI of his brain 1 day ago did not show any new masses or bleeds.    Type 2 diabetes mellitus with microalbuminuria, without long-term current use of insulin ([facility])  Recent June A1C 6.6%. Continues metformin 1000 mg twice daily and Rybelsus 7 mg daily.  He has been tolerating his medications.  His blood sugars were 140-160 beginning of year and last 3 months 120-150's.  Completing urine microalbumin today.  Health maintenance is up-to-date.  Continue A1c every 6 months.    Past Medical History[1]    Social History[2]    Current Medications and Prescriptions Ordered in Epic[3]    Allergies:  Other environmental    Health Maintenance: Completed. Declines annual wellness visit.       Objective:     Vital signs reviewed  Exam:  /60 (BP Location: Right arm, Patient Position: Sitting, BP Cuff Size: Adult)   Pulse 67   Temp 36.7 °C (98 °F) (Temporal)   Ht 1.778 m (5' 10\")   Wt 80.3 kg (177 lb)   SpO2 95%   BMI 25.40 kg/m²  Body mass index is 25.4 kg/m².    Gen: Alert and oriented, No apparent distress.  Lungs: Normal effort, CTA bilaterally, no wheezes, rhonchi, " or rales  CV: Regular rate and rhythm. No murmurs, rubs, or gallops.  Ext: No clubbing, cyanosis, edema.      Assessment & Plan:     84 y.o. male with the following -     1. Essential hypertension (Primary)  Chronic stable problem.  BP controlled.  Continue carvedilol 6.25 mg twice daily and losartan 100 mg daily.  Continue home BP monitoring.    2. Type 2 diabetes mellitus with microalbuminuria, without long-term current use of insulin (HCC)  Chronic stable problem.  A1c controlled at 6.6%.  Continue metformin 1000 mg twice daily and Rybelsus 7 mg daily.  Continue A1c every 6 months, labs ordered for December 2025.  - POCT Microalbumin Creat Ratio Urine; Future  - HEMOGLOBIN A1C; Future    3. Mixed hyperlipidemia  Chronic stable problem.  Continue atorvastatin 80 mg daily.    4. Stage 3a chronic kidney disease  Chronic stable problem.  GFR has been stable.  Renal dose medications.  Avoid NSAIDs.  Recheck kidney function around December 2025.  - Basic Metabolic Panel; Future    5. TIA (transient ischemic attack)  Chronic stable problem.  Continue with clopidogrel 75 mg daily.      Return in about 5 months (around 12/15/2025) for Diabetes, Hypertension, Labs.    Please note that this dictation was created using voice recognition software. I have made every reasonable attempt to correct obvious errors, but I expect that there are errors of grammar and possibly content that I did not discover before finalizing the note.             [1]   Past Medical History:  Diagnosis Date    Anxiety     Arthritis     Breath shortness 12/16/2024    CAD (coronary artery disease) 07/06/2017    CABG x 4 (LIMA to distal diagonal, rSVG to distal OM1, rSVG to distal OM2, rSVG to distal LAD) by Dr. Gaffney.    Cancer (HCC)     Prostate CA    Diabetes (HCC)     oral medication     Heart valve disease     Hypertension     Inguinal hernia     Memory difficulties     Mixed hyperlipidemia     Nodular prostate without urinary obstruction      Prostate cancer (HCC)     Severe aortic stenosis 12/16/2024    Stage 3a chronic kidney disease     Per Problem List   [2]   Social History  Tobacco Use    Smoking status: Never    Smokeless tobacco: Never   Vaping Use    Vaping status: Never Used   Substance Use Topics    Alcohol use: Yes     Comment: 1 oz / week    Drug use: No   [3]   Current Outpatient Medications Ordered in Epic   Medication Sig Dispense Refill    losartan (COZAAR) 100 MG Tab Take 1 Tablet by mouth every day. 90 Tablet 3    clopidogrel (PLAVIX) 75 MG Tab Take 1 Tablet by mouth every day. 90 Tablet 3    metformin (GLUCOPHAGE) 1000 MG tablet Take 1 Tablet by mouth 2 times a day with meals. 180 Tablet 3    atorvastatin (LIPITOR) 80 MG tablet Take 1 Tablet by mouth every day. 90 Tablet 3    Glucosamine-Chondroitin (GLUCOSAMINE CHONDR COMPLEX PO) Take  by mouth.      carvedilol (COREG) 6.25 MG Tab Take 1 Tablet by mouth 2 times a day with meals. (Patient taking differently: Take 6.25 mg by mouth 2 times a day with meals. Pt notes shows 25mg that he said is what he copied off his bottle at home) 60 Tablet 11    Amoxicillin 500 MG Tab Take 4 Tablets by mouth one time as needed (take 30 minutes before dental visit) for up to 1 dose. 4 Tablet 0    Semaglutide 7 MG Tab Take 7 mg by mouth every day. 90 Tablet 3    Zinc Sulfate (ZINC 15 PO) Take  by mouth.      Blood Glucose Test Strips Use one One Touch strip to test blood sugar once daily . 100 Strip 3    ONE TOUCH ULTRA TEST strip TEST BLOOD SUGAR LEVELS EVERY  Strip 3    ONETOUCH DELICA LANCETS FINE Misc USE TO TEST BLOOD SUGARS ONCE DAILY 100 Each 3    Ascorbic Acid (VITAMIN C PO) Take  by mouth.      therapeutic multivitamin-minerals (THERAGRAN-M) Tab Take 1 Tab by mouth every day. Indications: supplement      Cholecalciferol (VITAMIN D3) 1000 UNIT TABS Take 1 Tab by mouth every day. 100 Tab 3     No current Epic-ordered facility-administered medications on file.

## 2025-07-15 NOTE — ASSESSMENT & PLAN NOTE
BP today 128/60. Continues losartan 100 mg daily and carvedilol 6.25 mg twice daily. Reports home carvedilol dose has 25 mg on the bottle.  He is going to the pharmacy after today's appointment to clarify.  Discussed with patient that based on his chart review prescriptions have been for carvedilol 6.25 mg dose. Home BP readings 120-140/60-80. He is using an arm cuff.  Notes that 1 month ago he was at the store caring a piece of [employer] and fell. He fell forward, reports no injury.  Offered physical therapy and declines at this time.

## 2025-07-15 NOTE — ASSESSMENT & PLAN NOTE
Recent June A1C 6.6%. Continues metformin 1000 mg twice daily and Rybelsus 7 mg daily.  He has been tolerating his medications.  His blood sugars were 140-160 beginning of year and last 3 months 120-150's.  Completing urine microalbumin today.  Health maintenance is up-to-date.  Continue A1c every 6 months.

## 2025-07-15 NOTE — ASSESSMENT & PLAN NOTE
March 2025 was at [facility] for TIA. Cardiology has started clopidogrel 75 mg daily.  He had MRI of his brain 1 day ago did not show any new masses or bleeds.

## 2025-07-16 NOTE — PROGRESS NOTES
Medical decision making:  -Multiple things to address today including coronary artery disease, ischemic heart disease, status post TAVR, elevated LFTs, TIA, hypertension, elevated lipoprotein a, stage IIIa CKD  -Brings blood pressure log in today which reveals consistent blood pressure readings over 130/80. Unclear if he is taking his blood pressure in the right way and what blood pressure medications he is actually taking at home. Plan to call his wife later today to discuss further. He struggles with some level of cognitive decline  -Trial of increased blood pressure medications in the past resulted in syncope so I want to proceed with caution before increasing any blood pressure medications.  -MRI done after visit to [facility] for TIA-like symptoms does not reveal any acute stroke. Abnormal LFTs during his time in the hospital have resolved along with any electrolyte abnormalities that may have been present at that time.  -He has not been drinking Gatorade like he was told at [facility] which is great he is euvolemic on exam today. Most recent CMP also looks great.  -If he comes back with consistently elevated blood pressure readings again and I have confirmed that he is taking his blood pressure medications and taking his blood pressure in a proper manner we will consider increasing carvedilol or switching losartan to valsartan.  -Doing well on atorvastatin with no myalgias, LDL at goal.  Elevated lipoprotein a. On high intensity statin  -Seems to be some confusion about whether he is taking aspirin or Plavix or both.  I again will call his wife later today to confirm. Does mention some very minor bleeding from his groin area over the last couple days. Does not sound like there is any blood in the urine or melena occurring.  -Repeat BMP ordered to evaluate how he is doing on increased dose of losartan and kidney function.  -Close follow up with me in 4 weeks to go over blood pressure.      Problem list:  1. Ischemic heart disease due to coronary artery obstruction (HCC)  - Basic Metabolic Panel; Future    2. S/P CABG x 4    3. S/P TAVR (transcatheter aortic valve replacement)    4. Nonrheumatic aortic valve stenosis    5. Mixed hyperlipidemia    6. Stage 3a chronic kidney disease    7. Type 2 diabetes mellitus with microalbuminuria, without long-term current use of insulin (HCC)    8. TIA (transient ischemic attack)    9. Abnormal LFTs    10. Essential hypertension  - Basic Metabolic Panel; Future    11. Elevated lipoprotein(a)    12. Coronary artery disease involving native coronary artery of native heart with angina pectoris (HCC)  - Basic Metabolic Panel; Future    Other orders  - Semaglutide (RYBELSUS) 7 MG Tab; Take 7 mg by mouth every day.       Chief Complaint:   Chief Complaint   Patient presents with    Follow-Up     F/V DX: Ischemic heart disease due to coronary artery obstruction (HCC)           History of Present Illness:  Raf Pelayo is a 84 y.o. male who returns for long-term management of complex care including CAD/CABG, AS/TAVR, hypertension, hyperlipidemia, elev Lpa, DM 2.     ED follow-up at [facility], note dated 3/16/2025 reviewed. Patient brought in by ambulance with sudden onset right leg weakness lasting about 4 hours. On initial exam he had 5/5 strength. Temperature documented as 100.6. /80.  CT head without contrast, no hemorrhage or mass or obvious stroke.  Labs: Glucose 123, sodium 140, potassium 4.4, BUN 18, creatinine 1.33, alkaline phosphatase 148, , , lactic acid 2.4.  Labs consistent with lactic acidosis and abnormal LFTs. Diagnosis was TIA. Patient was discharged home but no medication changes and no MRI which was ordered by us.  At last visit we changed aspirin to Plavix and working on blood pressure control.  Unclear if he is taking aspirin and Plavix. Endorses some bleeding in groin area over the last couple days.  No blood in the  urine.  Follow-up LFTs and CPK were normal.  MRI does not show evidence of stroke.     Has hypertension. Numbers are looking elevated, has a new cuff.  Increasing losartan.  Fluid restricting, no more Gatorade.  Brings blood pressure log in today which reveals consistently elevated blood pressures with majority over 130/80. Some confusion about what medications he is taking and how he is taking his blood pressure.  To he denies chest pain, shortness of breath, headaches, changes in vision, dizziness, lightheadedness, or syncope outside of 1 episode.     CAD, CABG x4 July 2017 with Dr. Anthony Steward, included LIMA to distal diagonal, SVG to distal OM1, distal OM 2, SVG to distal LAD.  Pre TAVR [facility] 12-17-24 with patent LIMA-diagonal, SVG-LAD, skip SVG-OM1/left PDA grafts.  Changed ASA to plavix at last visit. Has been on a statin holiday due to some cognitive decline, time to resume the statin.  Doing well on statin now no myalgias     Aortic stenosis, remains moderate by echo July 2024, V-max 3.8, TRISTA 1.3 cm², DI 0.22. However, the LVOT was measured at 2.5 which is overestimated compared to prior. Recalculated the dimensionless index on the VTI and the V-max and both are consistent with severe AS.  Physical exam is consistent with severe AS.  Underwent TAVR 12/23/2024 with Dr. Macias.     Hyperlipidemia, on statin.  LDL 59.  HDL low at times.  Statin holiday due to cognitive decline, lipids were significantly elevated January 2025 but he is back on a statin so we will follow-up.  Repeat lipids look great, no myalgias   -> 49.  Apo B 67.  Lpa 157 nmol/dL, normal < 29.      DM2, controlled finally.   Was 7.9 -> 6.9 -> 6.6.     Bilateral carotid artery plaque in 2017, on statin.     CKD 2.     Former smoker, negative AAA by CT 2024, Ascending aortic diameter: 3.8 x 3.7 cm.      No history of autoimmune disease such as lupus or rheumatoid arthritis.  No history of chest radiation or cardiotoxic  chemotherapy.  No ETOH overuse.  No caffeine overuse.  No recreation substance use.  No hx asthma.     Works with tools in the garage.   Not limited by chest pain, pressure or tightness with activity.  No significant dyspnea on exertion, orthopnea or lower extremity swelling.  No significant palpitations, lightheadedness, or presyncope/syncope.  No symptoms of leg claudication.  No stroke/TIA like symptoms.    Wt Readings from Last 5 Encounters:   07/17/25 81.1 kg (178 lb 12.8 oz)   07/15/25 80.3 kg (177 lb)   06/16/25 80.7 kg (178 lb)   06/04/25 81.2 kg (179 lb)   03/24/25 83.8 kg (184 lb 11.2 oz)     DATA REVIEWED by me:  ECG (my personal interpretation)  12/31/2024 sinus, 73, nonspecific ST changes  7/7/2017 sinus, 57     Echocardiogram   1/18/2025  The ejection fraction is measured to be 64% by Costa's biplane.  Normal right ventricular size and systolic function.  Known TAVR aortic valve that is functioning normally with normal transvalvular gradients.  7/30/2024  Compared to the prior study on 01/23/2024 - AS has progressed, prior Vmax 3.3 m/s.  Normal left ventricular chamber size.  The ejection fraction is measured to be  60% by Costa's biplane.  Moderate to severe aortic valve stenosis. Vmax is  3.8m/s.  Normal inferior vena cava size and inspiratory collapse. TRISTA 1.3 cm², DI 0.22.  1/23/2024  Compared to the prior study on 2/26/2023 - aortic stenosis is now best characterized as moderate.   Normal left ventricular chamber size.  The left ventricular ejection fraction is visually estimated to be 60%.  Moderate aortic valve stenosis. Vmax is 3.3 m/s.  Mild aortic insufficiency.  Normal inferior vena cava size and inspiratory collapse.  2/6/2023  Compared to the prior study on 10/23/2020 - AS remains mild.   Normal left ventricular chamber size.  The left ventricular ejection fraction is visually estimated to be 65%.  Mild aortic valve stenosis. Vmax is 2.66 m/s.  Normal inferior vena cava size and  inspiratory collapse.  10/24/2020  Normal left ventricular chamber size.  Left ventricular ejection fraction is visually estimated to be 65%.  Mild aortic stenosis.  Right heart pressures are normal.      TAVR   12-23-24  Successful transcatheter aortic valve replacement using 26 mm Silver zaida 3 Ultra resilia valve      [facility]   12-17-24  HEMODYNAMICS:  Aortic pressure: 158 /66/72 mmHg  CORONARY ANGIOGRAPHY:  The left main coronary artery: Diffuse disease with severe distal stenosis.  The left anterior descending coronary artery: Occluded proximally.  Fills via patent SVG graft, and apical LAD provides left to right and left to left collaterals  The left circumflex coronary artery: Occluded at its ostium, fills via patent SVG graft.  Dominant left circumflex  The right coronary artery: Nondominant vessel with severe ostial and proximal disease.  BYPASS GRAFT ANGIOGRAPHY:  LIMA-diagonal: Patent, small diagonal branch  SVG-LAD: Patent.  Apical LAD provides left-to-right and left to left collaterals  SVG-OM1 and L-PDA : Patent  IMPRESSION:  1.  Severe three-vessel native CAD.  Dominant left circumflex  2.  Patent LIMA-diagonal, SVG-LAD, skip SVG-OM1/left PDA grafts.  3.  Mild PAD in the iliofemoral arteries  7/3/2017  1. 50% distal LM stenosis, highly eccentric  2. Diffuse prioximal 80% LAD stenosis  3. Tandem focal 80% bulky proximal and mid LCx disease with OM involvement (Gaines 0,1,0)  4. LVEF 65%     CABG   7/6/2017 Dr. Bowers Linkous  Coronary artery bypass grafting x4, left MIKHAIL to the distal diagonal, reverse saphenous vein graft sequence to the distal OM1 and distal OM 2, and reverse saphenous vein graft to distal LAD, left greater saphenous vein endoscopic vein harvest.     CT chest   12-12-24  1.  Aortic annulus area: 467 sq mm.  2.  Aortic valve calcium score: 2331.9.  3.  Both coronary ostia are more than 10 mm away from the aortic annulus.  4.  Bilateral iliofemoral arteries are adequate for endovascular  "access.  5.  Proximal left vertebral artery is not identified, possibly occluded.  -No abdominal aortic aneurysm or dissection.      Carotid ultrasound   7/3/2017  Mild plaque of the right carotid artery with no significant right internal carotid artery stenosis (<50%)  Mild plaque of the left carotid artery with no significant left internal carotid artery stenosis  (<50%)  No significant stenosis of the right or left subclavian artery.  No significant stenosis of the right or left vertebral artery.    Most recent labs:       Lab Results   Component Value Date/Time    WBC 10.1 12/24/2024 04:00 AM    HEMOGLOBIN 12.7 (L) 12/24/2024 04:00 AM    HEMATOCRIT 39.7 (L) 12/24/2024 04:00 AM    MCV 94.1 12/24/2024 04:00 AM    INR 1.04 12/16/2024 02:10 PM      Lab Results   Component Value Date/Time    SODIUM 142 06/06/2025 07:50 AM    POTASSIUM 4.6 06/06/2025 07:50 AM    CHLORIDE 108 06/06/2025 07:50 AM    CO2 22 06/06/2025 07:50 AM    GLUCOSE 121 (H) 06/06/2025 07:50 AM    BUN 27 (H) 06/06/2025 07:50 AM    CREATININE 1.32 06/06/2025 07:50 AM    CREATININE 1.0 10/27/2008 08:20 AM      Lab Results   Component Value Date/Time    ASTSGOT 26 06/06/2025 07:50 AM    ALTSGPT 27 06/06/2025 07:50 AM    ALBUMIN 4.3 06/06/2025 07:50 AM      Lab Results   Component Value Date/Time    CHOLSTRLTOT 108 06/06/2025 07:50 AM    LDL 49 06/06/2025 07:50 AM    HDL 32 (A) 06/06/2025 07:50 AM    TRIGLYCERIDE 135 06/06/2025 07:50 AM     No results for input(s): \"NTPROBNP\", \"TROPONINT\" in the last 72 hours.      Past Medical History[1]  Past Surgical History[2]  Family History   Problem Relation Age of Onset    Stroke Mother     Heart Disease Father     Heart Disease Brother      Social History     Socioeconomic History    Marital status:      Spouse name: Not on file    Number of children: Not on file    Years of education: Not on file    Highest education level: Not on file   Occupational History    Not on file   Tobacco Use    Smoking " "status: Never    Smokeless tobacco: Never   Vaping Use    Vaping status: Never Used   Substance and Sexual Activity    Alcohol use: Yes     Comment: 1 oz / week    Drug use: No    Sexual activity: Never     Partners: Female     Birth control/protection: Post-Menopausal   Other Topics Concern    Not on file   Social History Narrative    Not on file     Social Drivers of Health     Financial Resource Strain: Not on file   Food Insecurity: No Food Insecurity (12/23/2024)    Hunger Vital Sign     Worried About Running Out of Food in the Last Year: Never true     Ran Out of Food in the Last Year: Never true   Transportation Needs: No Transportation Needs (12/23/2024)    PRAPARE - Transportation     Lack of Transportation (Medical): No     Lack of Transportation (Non-Medical): No   Physical Activity: Not on file   Stress: Not on file   Social Connections: Not on file   Intimate Partner Violence: Not At Risk (12/23/2024)    Humiliation, Afraid, Rape, and Kick questionnaire     Fear of Current or Ex-Partner: No     Emotionally Abused: No     Physically Abused: No     Sexually Abused: No   Housing Stability: Low Risk  (12/23/2024)    Housing Stability Vital Sign     Unable to Pay for Housing in the Last Year: No     Number of Times Moved in the Last Year: 1     Homeless in the Last Year: No     Allergies[3]    Current Medications[4]    ROS  All others systems reviewed and negative.    /58 (BP Location: Left arm, Patient Position: Sitting, BP Cuff Size: Adult)   Pulse 77   Resp 17   Ht 1.778 m (5' 10\")   Wt 81.1 kg (178 lb 12.8 oz)   SpO2 95%  Body mass index is 25.66 kg/m².    General: No acute distress. Well nourished.  HEENT: EOM grossly intact, no scleral icterus, no pharyngeal erythema.   Neck:  No JVD 90, no bruits, trachea midline  CVS: RRR. Normal S1, S2. No M/R/G. No LE edema. 2+ radial pulses, 2+ PT pulses  Resp: CTAB. No wheezing or crackles/rhonchi. Normal respiratory effort.  Abdomen: Soft, NT, no " daniel hepatomegaly.  MSK/Ext: No clubbing or cyanosis.  Skin: Warm and dry, no rashes.  Neurological: CN III-XII grossly intact. No focal deficits.   Psych: A&O x 3, appropriate affect, good judgement      Return in about 4 weeks (around 8/14/2025).    Written instructions given today:    -Stop taking the aspirin and start taking plavix. Do not take both. Only take the plavix.     It is important to check blood pressure at home from time to time at home to ensure your blood pressure is in a good range because we do not check it correctly in the doctor's office.    Checking Blood Pressure:    -Be sure you have a good blood pressure cuff, spend in the $40-60, ideally a “calibrated” cuff such as Omron.  I recommend purchasing your cuff from a company with a good return policy.    -Your BP machine should be an automatic, upper arm cuff.  -Measure your arm circumference to get the correct size, probably adult Large. If your arm circumference is greater than the size listed (typically 16.5-17 in), you may need an XL cuff (order from a special pharmacy or durable medical equipment company).  If your arm is small you might need an Adult Small or XL.    -Sit with back support, legs uncrossed, feet flat on the floor.  Your arm should be propped up level with your heart but completely supported, dead weight.  -Put the cuff in place, then wait 5 minutes before pushing the button, 10 minutes would be ideal. No talking, no TV, no social media, etc. Set a timer on your phone.    -Do not check blood pressure within 30 minutes of exercise, caffeine, or large meals.    -It is ok to check immediately if you are experiencing a symptom which may be related to high or low blood pressure.    -Write your blood pressures down, keep a log and bring this to your appointments.    -Check no more than 1 time a day on normal days, maybe 1-2 times per week, try different times of day.  Typically do not check in the morning before your medications  unless this information is helpful for you.    -You are concerned your blood pressure cuff is not giving you accurate data, you can bring your cuff to at least one nursing appointment where it can be calibrated to a manual cuff.    -Goal blood pressure is at least under 130/80, ideally under 120/80.  If you think your BP is overall too high, reach out to your PCP or another provider for assistance.    Salt restriction is key for blood pressure control. Salt is killing Americans, it is in almost everything.  Salt=sodium=sea salt, guidelines say stay under 2,400 mg daily but I ask for under 3,000 mg daily if possible.  Get salt smart, start looking at labels, count it up.  Salt is hidden in everything, salad dressing, sauces, cheese, most canned food, any processed meat.     It is my pleasure to participate in the care of Mr. Pelayo.  Please do not hesitate to contact me with questions or concerns.    Cristóbal Russo PA-C  [facility]    Please note that this dictation was created using voice recognition software. I have made every reasonable attempt to correct obvious errors, but it is possible there are errors of grammar and possibly content that I did not discover before finalizing the note.         [1]   Past Medical History:  Diagnosis Date    Anxiety     Arthritis     Breath shortness 12/16/2024    CAD (coronary artery disease) 07/06/2017    CABG x 4 (LIMA to distal diagonal, rSVG to distal OM1, rSVG to distal OM2, rSVG to distal LAD) by Dr. Gaffney.    Cancer (HCC)     Prostate CA    Diabetes (HCC)     oral medication     Heart valve disease     Hypertension     Inguinal hernia     Memory difficulties     Mixed hyperlipidemia     Nodular prostate without urinary obstruction     Prostate cancer (HCC)     Severe aortic stenosis 12/16/2024    Stage 3a chronic kidney disease     Per Problem List   [2]   Past Surgical History:  Procedure Laterality Date    TRANSCATHETER AORTIC  VALVE REPLACEMENT Bilateral 12/23/2024    Procedure: TRANSCATHETER AORTIC VALVE REPLACEMENT;  Surgeon: Jerrell Macias M.D.;  Location: SURGERY [facility];  Service: Cardiac    ECHOCARDIOGRAM, TRANSESOPHAGEAL, INTRAOPERATIVE N/A 12/23/2024    Procedure: ECHOCARDIOGRAM, TRANSTHORACIC, INTRAOPERATIVE;  Surgeon: Jerrell Macias M.D.;  Location: SURGERY [facility];  Service: Cardiac    ROHINI  07/06/2017    Procedure: ROHINI - INTRAOPERATIVE ;  Surgeon: Anthony Gaffney M.D.;  Location: SURGERY [facility];  Service:     MULTIPLE CORONARY ARTERY BYPASS ENDO VEIN HARVEST  07/06/2017    Procedure: MULTIPLE CORONARY ARTERY BYPASS ENDO VEIN HARVEST X2-4, PREVENA DRESSING ;  Surgeon: Anthony Gaffney M.D.;  Location: SURGERY [facility];  Service:     APPENDECTOMY      HERNIA REPAIR      TONSILLECTOMY      VASECTOMY     [3]   Allergies  Allergen Reactions    Other Environmental Runny Nose     SAW DUST   [4]   Current Outpatient Medications   Medication Sig Dispense Refill    Semaglutide (RYBELSUS) 7 MG Tab Take 7 mg by mouth every day.      losartan (COZAAR) 100 MG Tab Take 1 Tablet by mouth every day. 90 Tablet 3    clopidogrel (PLAVIX) 75 MG Tab Take 1 Tablet by mouth every day. 90 Tablet 3    metformin (GLUCOPHAGE) 1000 MG tablet Take 1 Tablet by mouth 2 times a day with meals. 180 Tablet 3    atorvastatin (LIPITOR) 80 MG tablet Take 1 Tablet by mouth every day. 90 Tablet 3    Glucosamine-Chondroitin (GLUCOSAMINE CHONDR COMPLEX PO) Take  by mouth.      carvedilol (COREG) 6.25 MG Tab Take 1 Tablet by mouth 2 times a day with meals. 60 Tablet 11    Amoxicillin 500 MG Tab Take 4 Tablets by mouth one time as needed (take 30 minutes before dental visit) for up to 1 dose. 4 Tablet 0    Zinc Sulfate (ZINC 15 PO) Take  by mouth.      Blood Glucose Test Strips Use one One Touch strip to test blood sugar once daily . 100 Strip 3    ONE TOUCH ULTRA TEST strip TEST BLOOD SUGAR LEVELS EVERY  Strip 3    ONETOUCH  DELICA LANCETS FINE Misc USE TO TEST BLOOD SUGARS ONCE DAILY 100 Each 3    Ascorbic Acid (VITAMIN C PO) Take  by mouth.      therapeutic multivitamin-minerals (THERAGRAN-M) Tab Take 1 Tab by mouth every day. Indications: supplement      Cholecalciferol (VITAMIN D3) 1000 UNIT TABS Take 1 Tab by mouth every day. 100 Tab 3    Semaglutide 7 MG Tab Take 7 mg by mouth every day. 90 Tablet 3     No current facility-administered medications for this visit.

## 2025-07-17 ENCOUNTER — OFFICE VISIT (OUTPATIENT)
Facility: MEDICAL CENTER | Age: 85
End: 2025-07-17
Payer: MEDICARE

## 2025-07-17 VITALS
HEIGHT: 70 IN | BODY MASS INDEX: 25.6 KG/M2 | HEART RATE: 77 BPM | SYSTOLIC BLOOD PRESSURE: 124 MMHG | RESPIRATION RATE: 17 BRPM | WEIGHT: 178.8 LBS | DIASTOLIC BLOOD PRESSURE: 58 MMHG | OXYGEN SATURATION: 95 %

## 2025-07-17 DIAGNOSIS — R79.89 ABNORMAL LFTS: ICD-10-CM

## 2025-07-17 DIAGNOSIS — I35.0 NONRHEUMATIC AORTIC VALVE STENOSIS: ICD-10-CM

## 2025-07-17 DIAGNOSIS — R80.9 TYPE 2 DIABETES MELLITUS WITH MICROALBUMINURIA, WITHOUT LONG-TERM CURRENT USE OF INSULIN (HCC): ICD-10-CM

## 2025-07-17 DIAGNOSIS — I25.119 CORONARY ARTERY DISEASE INVOLVING NATIVE CORONARY ARTERY OF NATIVE HEART WITH ANGINA PECTORIS (HCC): ICD-10-CM

## 2025-07-17 DIAGNOSIS — E78.41 ELEVATED LIPOPROTEIN(A): ICD-10-CM

## 2025-07-17 DIAGNOSIS — I10 ESSENTIAL HYPERTENSION: ICD-10-CM

## 2025-07-17 DIAGNOSIS — Z95.2 S/P TAVR (TRANSCATHETER AORTIC VALVE REPLACEMENT): ICD-10-CM

## 2025-07-17 DIAGNOSIS — E11.29 TYPE 2 DIABETES MELLITUS WITH MICROALBUMINURIA, WITHOUT LONG-TERM CURRENT USE OF INSULIN (HCC): ICD-10-CM

## 2025-07-17 DIAGNOSIS — Z95.1 S/P CABG X 4: ICD-10-CM

## 2025-07-17 DIAGNOSIS — N18.31 STAGE 3A CHRONIC KIDNEY DISEASE: ICD-10-CM

## 2025-07-17 DIAGNOSIS — I24.0 ISCHEMIC HEART DISEASE DUE TO CORONARY ARTERY OBSTRUCTION (HCC): Primary | ICD-10-CM

## 2025-07-17 DIAGNOSIS — I25.9 ISCHEMIC HEART DISEASE DUE TO CORONARY ARTERY OBSTRUCTION (HCC): Primary | ICD-10-CM

## 2025-07-17 DIAGNOSIS — G45.9 TIA (TRANSIENT ISCHEMIC ATTACK): ICD-10-CM

## 2025-07-17 DIAGNOSIS — E78.2 MIXED HYPERLIPIDEMIA: ICD-10-CM

## 2025-07-17 PROCEDURE — 99213 OFFICE O/P EST LOW 20 MIN: CPT

## 2025-07-17 RX ORDER — ORAL SEMAGLUTIDE 7 MG/1
7 TABLET ORAL DAILY
COMMUNITY

## 2025-07-17 ASSESSMENT — FIBROSIS 4 INDEX: FIB4 SCORE: 1.95

## 2025-07-17 NOTE — PATIENT INSTRUCTIONS
-Stop taking the aspirin and start taking plavix. Do not take both. Only take the plavix.     It is important to check blood pressure at home from time to time at home to ensure your blood pressure is in a good range because we do not check it correctly in the doctor's office.    Checking Blood Pressure:    -Be sure you have a good blood pressure cuff, spend in the $40-60, ideally a “calibrated” cuff such as Omron.  I recommend purchasing your cuff from a company with a good return policy.    -Your BP machine should be an automatic, upper arm cuff.  -Measure your arm circumference to get the correct size, probably adult Large. If your arm circumference is greater than the size listed (typically 16.5-17 in), you may need an XL cuff (order from a special pharmacy or durable medical equipment company).  If your arm is small you might need an Adult Small or XL.    -Sit with back support, legs uncrossed, feet flat on the floor.  Your arm should be propped up level with your heart but completely supported, dead weight.  -Put the cuff in place, then wait 5 minutes before pushing the button, 10 minutes would be ideal. No talking, no TV, no social media, etc. Set a timer on your phone.    -Do not check blood pressure within 30 minutes of exercise, caffeine, or large meals.    -It is ok to check immediately if you are experiencing a symptom which may be related to high or low blood pressure.    -Write your blood pressures down, keep a log and bring this to your appointments.    -Check no more than 1 time a day on normal days, maybe 1-2 times per week, try different times of day.  Typically do not check in the morning before your medications unless this information is helpful for you.    -You are concerned your blood pressure cuff is not giving you accurate data, you can bring your cuff to at least one nursing appointment where it can be calibrated to a manual cuff.    -Goal blood pressure is at least under 130/80, ideally  under 120/80.  If you think your BP is overall too high, reach out to your PCP or another provider for assistance.    Salt restriction is key for blood pressure control. Salt is killing Americans, it is in almost everything.  Salt=sodium=sea salt, guidelines say stay under 2,400 mg daily but I ask for under 3,000 mg daily if possible.  Get salt smart, start looking at labels, count it up.  Salt is hidden in everything, salad dressing, sauces, cheese, most canned food, any processed meat.

## 2025-08-13 ENCOUNTER — TELEPHONE (OUTPATIENT)
Facility: MEDICAL CENTER | Age: 85
End: 2025-08-13
Payer: MEDICARE

## 2025-08-14 ENCOUNTER — HOSPITAL ENCOUNTER (OUTPATIENT)
Dept: LAB | Facility: MEDICAL CENTER | Age: 85
End: 2025-08-14
Payer: MEDICARE

## 2025-08-14 DIAGNOSIS — I25.119 CORONARY ARTERY DISEASE INVOLVING NATIVE CORONARY ARTERY OF NATIVE HEART WITH ANGINA PECTORIS (HCC): ICD-10-CM

## 2025-08-14 DIAGNOSIS — I25.9 ISCHEMIC HEART DISEASE DUE TO CORONARY ARTERY OBSTRUCTION (HCC): ICD-10-CM

## 2025-08-14 DIAGNOSIS — I10 ESSENTIAL HYPERTENSION: ICD-10-CM

## 2025-08-14 DIAGNOSIS — I24.0 ISCHEMIC HEART DISEASE DUE TO CORONARY ARTERY OBSTRUCTION (HCC): ICD-10-CM

## 2025-08-14 LAB
ANION GAP SERPL CALC-SCNC: 12 MMOL/L (ref 7–16)
BUN SERPL-MCNC: 33 MG/DL (ref 8–22)
CALCIUM SERPL-MCNC: 9.8 MG/DL (ref 8.5–10.5)
CHLORIDE SERPL-SCNC: 110 MMOL/L (ref 96–112)
CO2 SERPL-SCNC: 20 MMOL/L (ref 20–33)
CREAT SERPL-MCNC: 1.28 MG/DL (ref 0.5–1.4)
GFR SERPLBLD CREATININE-BSD FMLA CKD-EPI: 55 ML/MIN/1.73 M 2
GLUCOSE SERPL-MCNC: 108 MG/DL (ref 65–99)
POTASSIUM SERPL-SCNC: 4.4 MMOL/L (ref 3.6–5.5)
SODIUM SERPL-SCNC: 142 MMOL/L (ref 135–145)

## 2025-08-14 PROCEDURE — 36415 COLL VENOUS BLD VENIPUNCTURE: CPT

## 2025-08-14 PROCEDURE — 80048 BASIC METABOLIC PNL TOTAL CA: CPT

## 2025-08-15 ENCOUNTER — RESULTS FOLLOW-UP (OUTPATIENT)
Dept: CARDIOLOGY | Facility: MEDICAL CENTER | Age: 85
End: 2025-08-15
Payer: MEDICARE

## 2025-08-20 ENCOUNTER — OFFICE VISIT (OUTPATIENT)
Facility: MEDICAL CENTER | Age: 85
End: 2025-08-20
Payer: MEDICARE

## 2025-08-20 VITALS
WEIGHT: 173.8 LBS | DIASTOLIC BLOOD PRESSURE: 70 MMHG | BODY MASS INDEX: 24.88 KG/M2 | HEART RATE: 59 BPM | OXYGEN SATURATION: 97 % | SYSTOLIC BLOOD PRESSURE: 114 MMHG | RESPIRATION RATE: 15 BRPM | HEIGHT: 70 IN

## 2025-08-20 DIAGNOSIS — R80.9 TYPE 2 DIABETES MELLITUS WITH MICROALBUMINURIA, WITHOUT LONG-TERM CURRENT USE OF INSULIN (HCC): ICD-10-CM

## 2025-08-20 DIAGNOSIS — I35.0 NONRHEUMATIC AORTIC VALVE STENOSIS: ICD-10-CM

## 2025-08-20 DIAGNOSIS — Z95.2 S/P TAVR (TRANSCATHETER AORTIC VALVE REPLACEMENT): ICD-10-CM

## 2025-08-20 DIAGNOSIS — N18.31 STAGE 3A CHRONIC KIDNEY DISEASE: ICD-10-CM

## 2025-08-20 DIAGNOSIS — E78.2 MIXED HYPERLIPIDEMIA: ICD-10-CM

## 2025-08-20 DIAGNOSIS — G45.9 TIA (TRANSIENT ISCHEMIC ATTACK): ICD-10-CM

## 2025-08-20 DIAGNOSIS — I10 ESSENTIAL HYPERTENSION: ICD-10-CM

## 2025-08-20 DIAGNOSIS — R79.89 ABNORMAL LFTS: ICD-10-CM

## 2025-08-20 DIAGNOSIS — E78.41 ELEVATED LIPOPROTEIN(A): ICD-10-CM

## 2025-08-20 DIAGNOSIS — Z95.1 S/P CABG X 4: ICD-10-CM

## 2025-08-20 DIAGNOSIS — I24.0 ISCHEMIC HEART DISEASE DUE TO CORONARY ARTERY OBSTRUCTION (HCC): Primary | ICD-10-CM

## 2025-08-20 DIAGNOSIS — I25.9 ISCHEMIC HEART DISEASE DUE TO CORONARY ARTERY OBSTRUCTION (HCC): Primary | ICD-10-CM

## 2025-08-20 DIAGNOSIS — I25.119 CORONARY ARTERY DISEASE INVOLVING NATIVE CORONARY ARTERY OF NATIVE HEART WITH ANGINA PECTORIS (HCC): ICD-10-CM

## 2025-08-20 DIAGNOSIS — E11.29 TYPE 2 DIABETES MELLITUS WITH MICROALBUMINURIA, WITHOUT LONG-TERM CURRENT USE OF INSULIN (HCC): ICD-10-CM

## 2025-08-20 PROCEDURE — 3074F SYST BP LT 130 MM HG: CPT

## 2025-08-20 PROCEDURE — 3078F DIAST BP <80 MM HG: CPT

## 2025-08-20 PROCEDURE — 99212 OFFICE O/P EST SF 10 MIN: CPT

## 2025-08-20 PROCEDURE — 99214 OFFICE O/P EST MOD 30 MIN: CPT

## 2025-08-20 RX ORDER — PIOGLITAZONE 15 MG/1
15 TABLET ORAL DAILY
COMMUNITY

## 2025-08-20 RX ORDER — CLOPIDOGREL BISULFATE 75 MG/1
75 TABLET ORAL DAILY
Qty: 90 TABLET | Refills: 3 | Status: SHIPPED | OUTPATIENT
Start: 2025-08-20

## 2025-08-20 ASSESSMENT — FIBROSIS 4 INDEX: FIB4 SCORE: 1.97

## (undated) DEVICE — PUNCH DISP VASCULAR 4.4 - 6/BX

## (undated) DEVICE — CANISTER SUCTION 3000ML MECHANICAL FILTER AUTO SHUTOFF MEDI-VAC NONSTERILE LF DISP (40EA/CA)

## (undated) DEVICE — KIT ROOM DECONTAMINATION

## (undated) DEVICE — BAG RESUSCITATION DISPOSABLE - WITH MASK (10 EA/CA)

## (undated) DEVICE — CRIMPER CATHETER EDWARDS DISPOSABLE (1EA)

## (undated) DEVICE — GLIDESHEATH SLENDER NITINOL KIT .021 GW 6FR 10CM SINGLE WALL

## (undated) DEVICE — GLOVE SZ 7 BIOGEL PI MICRO - PF LF (50PR/BX 4BX/CA)

## (undated) DEVICE — SET BIFURCATED BLOOD - (48EA/CS)

## (undated) DEVICE — KIT INTROPERCUTANEOUS SHEATH - 8.5 FR W/MAX BARRIER AND BIOPATCH  (5/CA)

## (undated) DEVICE — GLOVE BIOGEL SZ 6.5 SURGICAL PF LTX (50PR/BX 4BX/CA)

## (undated) DEVICE — SYR ANGIO CNRST INJ HI-PRS 3W 65 - (10EA/CA)"

## (undated) DEVICE — BLANKET WARMING CARDIAC STRL - (5/CA)

## (undated) DEVICE — PACK E SUTURE USED FOR - OPEN HEART  (5/BX)

## (undated) DEVICE — DRESSING TRANSPARENT FILM TEGADERM 4 X 4.75" (50EA/BX)"

## (undated) DEVICE — SYSTEM PREVENA INCISION MNGM - (1/EA)

## (undated) DEVICE — WIRE GUIDE LUNDQST.035X180 - TSMG-35-180-4-LES ORDER BY BOX (5EA/BX)

## (undated) DEVICE — BLADE BEAVER 6900 MINI SHARP ALL AROUND (20/CA)

## (undated) DEVICE — SET LEADWIRE 5 LEAD BEDSIDE DISPOSABLE ECG (1SET OF 5/EA)

## (undated) DEVICE — GLOVE BIOGEL INDICATOR SZ 7SURGICAL PF LTX - (50/BX 4BX/CA)

## (undated) DEVICE — GOWN SURGEONS LARGE - (32/CA)

## (undated) DEVICE — BLADE STERNUM SAW SURGICAL 32.0 X 6.4 MM STERILE (1/EA)

## (undated) DEVICE — CANISTER SUCTION 3000ML MECHANICAL FILTER AUTO SHUTOFF MEDI-VAC NONSTERILE LF DISP  (40EA/CA)

## (undated) DEVICE — SPRING BULLDOG 1/2 FORCE BLUE - (10/BX)

## (undated) DEVICE — ELECTRODE DUAL RETURN W/ CORD - (50/PK)

## (undated) DEVICE — SET EXTENSION WITH 2 PORTS (48EA/CA) ***PART #2C8610 IS A SUBSTITUTE*****

## (undated) DEVICE — D-5-W INJ. 500 ML - (24EA/CA)

## (undated) DEVICE — GLOVE SURGICAL LATEX POWDER FREE STIRLE SZ 8 ENCORE MICROPTIC (200PR/CA)

## (undated) DEVICE — GOWN WARMING STANDARD FLEX - (30/CA)

## (undated) DEVICE — SENSOR SPO2 NEO LNCS ADHESIVE (20/BX) SEE USER NOTES

## (undated) DEVICE — SUTURE 5-0 PROLENE C-1 D/A 24 (36PK/BX)"

## (undated) DEVICE — INTRODUCER SHEATH 6FR 2.5CM - DILATOR PROTRUDING (10/BX)

## (undated) DEVICE — INSERT STEALTH #5 - (10/BX)

## (undated) DEVICE — DECANTER FLD BLS - (50/CA)

## (undated) DEVICE — PROTECTOR ULNA NERVE - (36PR/CA)

## (undated) DEVICE — KIT RETROFIT PROBE COVERS (24EA/BX)

## (undated) DEVICE — LEAD PACING TEMP MYO - (12/BX)

## (undated) DEVICE — KIT CATHETERIZATION TWO-LUMEN VENOUS 8FR (5EA/CA)

## (undated) DEVICE — DEVICE INFLATION NOVAFLEX ATRION LOCK SYRINGE 29MM 38ML (1EA)

## (undated) DEVICE — GLOVE BIOGEL ECLIPSE  PF LATEX SIZE 6.5 (50PR/BX)

## (undated) DEVICE — DEVICE INFLATION ATRION NOVALFEX TRANSFEMORAL SYSTEM (1EA)

## (undated) DEVICE — STOPCOCK IV 400 PSI 3W ROT (50EA/BX)

## (undated) DEVICE — NEPTUNE 4 PORT MANIFOLD - (20/PK)

## (undated) DEVICE — IV TUBING HI-FLO RATE W/CLAMP (50/CA)

## (undated) DEVICE — FIBRILLAR SURGICEL 4X4 - 10/CA

## (undated) DEVICE — COVER LIGHT HANDLE ALC PLUS DISP (18EA/BX)

## (undated) DEVICE — ARMBOARD  SMALL IV 9 INLONG - (25EA/CA)

## (undated) DEVICE — STAPLER SKIN DISP - (6/BX 10BX/CA) VISISTAT

## (undated) DEVICE — Device

## (undated) DEVICE — INSERT STEALTH #1 - 10/BX

## (undated) DEVICE — DERMABOND ADVANCED - (12EA/BX)

## (undated) DEVICE — MICRODRIP PRIMARY VENTED 60 (48EA/CA) THIS WAS PART #2C8428 WHICH WAS DISCONTINUED

## (undated) DEVICE — DILATORS PARSONNET 1.0MM - (5EA/CA)

## (undated) DEVICE — SUCTION INSTRUMENT YANKAUER BULBOUS TIP W/O VENT (50EA/CA)

## (undated) DEVICE — KIT ANESTHESIA W/CIRCUIT & 3/LT BAG W/FILTER (20EA/CA)

## (undated) DEVICE — TUBING INSUFFLATION - (10/BX)

## (undated) DEVICE — GOWN SURGEONS X-LARGE - DISP. (30/CA)

## (undated) DEVICE — TOWELS CLOTH SURGICAL - (4/PK 20PK/CA)

## (undated) DEVICE — LEAD SET 6 DISP. EKG NIHON KOHDEN (100EA/CA) [9859].

## (undated) DEVICE — KIT TOURNIQUET DLP (40EA/PK)

## (undated) DEVICE — LACTATED RINGERS INJ 1000 ML - (14EA/CA 60CA/PF)

## (undated) DEVICE — DRAIN CHEST ADULT (6EA/CA) DELETED ITEM  ORDER #15909

## (undated) DEVICE — WIRE GUIDE AES .035 260CM WITH 3MM J TIP"

## (undated) DEVICE — SUTURE 8-0 PROLENE BV175-8 EVERPONT

## (undated) DEVICE — SUTURE PLEDGETS 9 X 4MM

## (undated) DEVICE — TUBE CHEST 36FR. STRAIGHT - (10EA/CA)

## (undated) DEVICE — RETRACTOR OFF PUMP OCTO ONLY - 10/BX

## (undated) DEVICE — SUTURE 0 ETHIBOND MO6 C/R - (12/BX) 8-18 INCH ETHICON

## (undated) DEVICE — BLADE SURGICAL #11 - (50/BX)

## (undated) DEVICE — SOD. CHL. INJ. 0.9% 1000 ML - (14EA/CA 60CA/PF)

## (undated) DEVICE — TRANSDUCER BIFURCATED MONITORING KIT (10EA/CA)

## (undated) DEVICE — KIT RADIAL ARTERY 20GA W/MAX BARRIER AND BIOPATCH  (5EA/CA) #10740 IS FOR THE SET RADIAL ARTERIAL

## (undated) DEVICE — BAG DECANTER (50EA/CS)

## (undated) DEVICE — NEEDLE SAFETY 18 GA X 1 1/2 IN (100EA/BX)

## (undated) DEVICE — SHEATH RO 6F 25CM (10EA/BX)

## (undated) DEVICE — CATHETER PIGTAIL 6FR 145 (5EA/BX)

## (undated) DEVICE — ADHESIVE DERMABOND HVD MINI (12EA/BX)

## (undated) DEVICE — KIT ENDOHARVEST SYSTEM 8 - MUST ORDER 5 AT A TIME

## (undated) DEVICE — SUTURE 5-0 PROLENE C-1 18 (36PK/BX)"

## (undated) DEVICE — SPONGE XRAY 8X4 STERL. 12PL - (10EA/TY 80TY/CA)

## (undated) DEVICE — GOWN SURGICAL XX-LARGE - (28EA/CA) SIRUS NON REINFORCED

## (undated) DEVICE — COVER LIGHT HANDLE FLEXIBLE - SOFT (2EA/PK 80PK/CA)

## (undated) DEVICE — DRAPE CLEAR W/ELASTIC BAND RAD CARM 40 X40" (20EA/CA)"

## (undated) DEVICE — CHLORAPREP 26 ML APPLICATOR - ORANGE TINT(25/CA)

## (undated) DEVICE — SUTURE 6-0 PROLENE RB-2 D/A 30 (36PK/BX)"

## (undated) DEVICE — ELECTRODE RADIOLUCNT SOLID GEL DEFIB PADS (12EA/CA)

## (undated) DEVICE — SENSOR CEREBRAL AND SOMATIC MONITORING (20/CA)

## (undated) DEVICE — TRAY SURESTEP FOLEY TEMP SENSING 16FR (10EA/CA) ORDER  #18764 FOR TEMP FOLEY ONLY

## (undated) DEVICE — SUTURE 4-0 MONOCRYL PLUS PS-2 - 27 INCH (36/BX)

## (undated) DEVICE — BLOWER/MISTER (5EA/PK)

## (undated) DEVICE — SYRINGE SAFETY 3 ML 18 GA X 1 1/2 BLUNT LL (100/BX 8BX/CA)

## (undated) DEVICE — TUBE E-T HI-LO CUFF 7.5MM (10EA/PK)

## (undated) DEVICE — DRAPE MAYO STAND - (30/CA)

## (undated) DEVICE — CABLE TEMPORARY PACING

## (undated) DEVICE — HEAD HOLDER JUNIOR/ADULT

## (undated) DEVICE — SUTURE 4-0 30CM STRATAFIX SPIRAL PS-2 (12EA/BX)

## (undated) DEVICE — PACK VEIN - (19/CA)

## (undated) DEVICE — PACK TAVR (3EA/CA)

## (undated) DEVICE — SET FLUID WARMING STANDARD FLOW - (10/CA)

## (undated) DEVICE — SOLUTION NORMOSOL-4 INJ 1000ML

## (undated) DEVICE — GUIDEWIRE STARTER STRAIGHT FIXED CORE .035 150CM 4 STRAIGHT PTFE/HEPARIN COATED (10/BX)

## (undated) DEVICE — SYRINGE 30 ML LL (56/BX)

## (undated) DEVICE — BLANKET UNDERBODY FULL ACCES - (5/CA)

## (undated) DEVICE — BANDAGE ELASTIC 4 IN X 5 YDS - LATEX FREE(10/BX 5BX/CA)

## (undated) DEVICE — SLEEVE, VASO, THIGH, MED

## (undated) DEVICE — WIRE STEEL 5-0 B&S 20 OHS - 5/PK 12PK/BX ITEM. D5329 OR D6625 CAN BE USED AS A SUB

## (undated) DEVICE — CATHETER 6FR AL1 100CM (5/BX)

## (undated) DEVICE — TUBING CLEARLINK DUO-VENT - C-FLO (48EA/CA)

## (undated) DEVICE — SHEATH DESTINATION WITH DILATOR 6FR 45CM

## (undated) DEVICE — STOPCOCK MALE 4-WAY - (50/CA)

## (undated) DEVICE — GAUZE FLUFF STERILE 2-PLY 36 X 36 (100EA/CA)

## (undated) DEVICE — GLOVE SIZE 6.5 SURGEON ACCELERATOR FREE GREEN (50PR/BX)

## (undated) DEVICE — GLOVE BIOGEL INDICATOR SZ 8 SURGICAL PF LTX - (50/BX 4BX/CA)

## (undated) DEVICE — COVER FOOT UNIVERSAL DISP. - (25EA/CA)

## (undated) DEVICE — MASK ANESTHESIA ADULT  - (100/CA)

## (undated) DEVICE — SODIUM CHL IRRIGATION 0.9% 1000ML (12EA/CA)

## (undated) DEVICE — SUTURE OHS

## (undated) DEVICE — SUTURE DEVICE CLOSURE REPAIR SYSTEM PERCLOSE PROSTYLE (10EA/PK)

## (undated) DEVICE — GLOVE SZ 8 BIOGEL PI MICRO - PF LF (50PR/BX)

## (undated) DEVICE — SODIUM CHL. INJ. 0.9% 500ML (24EA/CA 50CA/PF)

## (undated) DEVICE — CATHETER THERMALDILUTION SWAN - (5EA/CA)

## (undated) DEVICE — CATHETER EP 6FR 90CM FEM BP J CRV (J-TIP)

## (undated) DEVICE — SYS DLV COST CLS RM TEMP - INJECTATE (CO-SET II) (10EA/CA)

## (undated) DEVICE — SENSOR OXIMETER ADULT SPO2 RD SET (20EA/BX)

## (undated) DEVICE — GLOVE BIOGEL SZ 7.5 SURGICAL PF LTX - (50PR/BX 4BX/CA)

## (undated) DEVICE — SUTURE 0 ETHIBOND CT-1 30 IN (36PK/BX)

## (undated) DEVICE — PACK CV DRAPING/BASIN 2PART - (1/CA)

## (undated) DEVICE — ELECTRODE 850 FOAM ADHESIVE - HYDROGEL RADIOTRNSPRNT (50/PK)

## (undated) DEVICE — INTRODUCER CATHETER DILATOR PROTRUDING 8FR 2.5CM (10EA/BX)

## (undated) DEVICE — DRESSING TRANSPARENT FILM TEGADERM 2.375 X 2.75"  (100EA/BX)"

## (undated) DEVICE — GLOVE BIOGEL PI ORTHO SZ 6 1/2 SURGICAL PF LF (40PR/BX)